# Patient Record
Sex: FEMALE | Race: WHITE | NOT HISPANIC OR LATINO | Employment: OTHER | URBAN - METROPOLITAN AREA
[De-identification: names, ages, dates, MRNs, and addresses within clinical notes are randomized per-mention and may not be internally consistent; named-entity substitution may affect disease eponyms.]

---

## 2018-02-11 ENCOUNTER — TRANSFERRED RECORDS (OUTPATIENT)
Dept: HEALTH INFORMATION MANAGEMENT | Facility: CLINIC | Age: 78
End: 2018-02-11

## 2018-05-17 ENCOUNTER — TRANSFERRED RECORDS (OUTPATIENT)
Dept: HEALTH INFORMATION MANAGEMENT | Facility: CLINIC | Age: 78
End: 2018-05-17

## 2018-05-23 ENCOUNTER — TRANSFERRED RECORDS (OUTPATIENT)
Dept: HEALTH INFORMATION MANAGEMENT | Facility: CLINIC | Age: 78
End: 2018-05-23

## 2018-05-29 ENCOUNTER — PRE VISIT (OUTPATIENT)
Dept: CARDIOLOGY | Facility: CLINIC | Age: 78
End: 2018-05-29

## 2018-05-29 DIAGNOSIS — R93.89 ABNORMAL FINDINGS ON DIAGNOSTIC IMAGING OF OTHER SPECIFIED BODY STRUCTURES: ICD-10-CM

## 2018-05-29 DIAGNOSIS — I79.0 ANEURYSM OF AORTA IN DISEASES CLASSIFIED ELSEWHERE (H): ICD-10-CM

## 2018-05-29 DIAGNOSIS — Q23.81 BICUSPID AORTIC VALVE: Primary | ICD-10-CM

## 2018-05-29 NOTE — TELEPHONE ENCOUNTER
FUTURE VISIT INFORMATION      FUTURE VISIT INFORMATION:    Date: 7/20/18    Time: 1000    [place] [facility] [place]  REFERRAL INFORMATION:    Referring provider:  unknown    Referring providers clinic:  [facility]    Reason for visit/diagnosis  BAV    RECORDS REQUESTED FROM:       Clinic name Comments Records Status Imaging Status   [facility] Called to get records Received Received                                   RECORDS STATUS

## 2018-07-13 ENCOUNTER — RADIANT APPOINTMENT (OUTPATIENT)
Dept: CARDIOLOGY | Facility: CLINIC | Age: 78
End: 2018-07-13
Payer: COMMERCIAL

## 2018-07-13 ENCOUNTER — OFFICE VISIT (OUTPATIENT)
Dept: CARDIOLOGY | Facility: CLINIC | Age: 78
End: 2018-07-13
Attending: INTERNAL MEDICINE
Payer: MEDICARE

## 2018-07-13 VITALS
SYSTOLIC BLOOD PRESSURE: 124 MMHG | DIASTOLIC BLOOD PRESSURE: 73 MMHG | HEART RATE: 81 BPM | HEIGHT: 61 IN | BODY MASS INDEX: 31.74 KG/M2 | OXYGEN SATURATION: 97 % | WEIGHT: 168.1 LBS

## 2018-07-13 DIAGNOSIS — Q23.81 BICUSPID AORTIC VALVE: ICD-10-CM

## 2018-07-13 DIAGNOSIS — I79.0 ANEURYSM OF AORTA IN DISEASES CLASSIFIED ELSEWHERE (H): ICD-10-CM

## 2018-07-13 DIAGNOSIS — R93.89 ABNORMAL FINDINGS ON DIAGNOSTIC IMAGING OF OTHER SPECIFIED BODY STRUCTURES: ICD-10-CM

## 2018-07-13 DIAGNOSIS — R06.09 EXERTIONAL DYSPNEA: Primary | ICD-10-CM

## 2018-07-13 LAB
ALBUMIN SERPL-MCNC: 3.8 G/DL (ref 3.4–5)
ALP SERPL-CCNC: 115 U/L (ref 40–150)
ALT SERPL W P-5'-P-CCNC: 24 U/L (ref 0–50)
ANION GAP SERPL CALCULATED.3IONS-SCNC: 7 MMOL/L (ref 3–14)
AST SERPL W P-5'-P-CCNC: 16 U/L (ref 0–45)
BASOPHILS # BLD AUTO: 0 10E9/L (ref 0–0.2)
BASOPHILS NFR BLD AUTO: 0.5 %
BILIRUB SERPL-MCNC: 0.8 MG/DL (ref 0.2–1.3)
BUN SERPL-MCNC: 11 MG/DL (ref 7–30)
CALCIUM SERPL-MCNC: 9.2 MG/DL (ref 8.5–10.1)
CHLORIDE SERPL-SCNC: 101 MMOL/L (ref 94–109)
CHOLEST SERPL-MCNC: 211 MG/DL
CO2 SERPL-SCNC: 28 MMOL/L (ref 20–32)
CREAT SERPL-MCNC: 0.89 MG/DL (ref 0.52–1.04)
DIFFERENTIAL METHOD BLD: NORMAL
EOSINOPHIL # BLD AUTO: 0.1 10E9/L (ref 0–0.7)
EOSINOPHIL NFR BLD AUTO: 1.4 %
ERYTHROCYTE [DISTWIDTH] IN BLOOD BY AUTOMATED COUNT: 12.4 % (ref 10–15)
GFR SERPL CREATININE-BSD FRML MDRD: 61 ML/MIN/1.7M2
GLUCOSE SERPL-MCNC: 99 MG/DL (ref 70–99)
HCT VFR BLD AUTO: 40.1 % (ref 35–47)
HDLC SERPL-MCNC: 59 MG/DL
HGB BLD-MCNC: 13.2 G/DL (ref 11.7–15.7)
IMM GRANULOCYTES # BLD: 0.1 10E9/L (ref 0–0.4)
IMM GRANULOCYTES NFR BLD: 0.9 %
LDLC SERPL CALC-MCNC: 129 MG/DL
LYMPHOCYTES # BLD AUTO: 2.4 10E9/L (ref 0.8–5.3)
LYMPHOCYTES NFR BLD AUTO: 30.5 %
MCH RBC QN AUTO: 29.9 PG (ref 26.5–33)
MCHC RBC AUTO-ENTMCNC: 32.9 G/DL (ref 31.5–36.5)
MCV RBC AUTO: 91 FL (ref 78–100)
MONOCYTES # BLD AUTO: 0.5 10E9/L (ref 0–1.3)
MONOCYTES NFR BLD AUTO: 6.4 %
NEUTROPHILS # BLD AUTO: 4.7 10E9/L (ref 1.6–8.3)
NEUTROPHILS NFR BLD AUTO: 60.3 %
NONHDLC SERPL-MCNC: 152 MG/DL
NRBC # BLD AUTO: 0 10*3/UL
NRBC BLD AUTO-RTO: 0 /100
PLATELET # BLD AUTO: 226 10E9/L (ref 150–450)
POTASSIUM SERPL-SCNC: 3.8 MMOL/L (ref 3.4–5.3)
PROT SERPL-MCNC: 7.3 G/DL (ref 6.8–8.8)
RBC # BLD AUTO: 4.42 10E12/L (ref 3.8–5.2)
SODIUM SERPL-SCNC: 136 MMOL/L (ref 133–144)
T4 FREE SERPL-MCNC: 1.02 NG/DL (ref 0.76–1.46)
TRIGL SERPL-MCNC: 115 MG/DL
TSH SERPL DL<=0.005 MIU/L-ACNC: 0.37 MU/L (ref 0.4–4)
WBC # BLD AUTO: 7.8 10E9/L (ref 4–11)

## 2018-07-13 PROCEDURE — G0463 HOSPITAL OUTPT CLINIC VISIT: HCPCS | Mod: 25,ZF

## 2018-07-13 PROCEDURE — 93010 ELECTROCARDIOGRAM REPORT: CPT | Mod: ZP | Performed by: INTERNAL MEDICINE

## 2018-07-13 PROCEDURE — 84439 ASSAY OF FREE THYROXINE: CPT | Performed by: INTERNAL MEDICINE

## 2018-07-13 PROCEDURE — 80061 LIPID PANEL: CPT | Performed by: INTERNAL MEDICINE

## 2018-07-13 PROCEDURE — 85025 COMPLETE CBC W/AUTO DIFF WBC: CPT | Performed by: INTERNAL MEDICINE

## 2018-07-13 PROCEDURE — 84443 ASSAY THYROID STIM HORMONE: CPT | Performed by: INTERNAL MEDICINE

## 2018-07-13 PROCEDURE — 36415 COLL VENOUS BLD VENIPUNCTURE: CPT | Performed by: INTERNAL MEDICINE

## 2018-07-13 PROCEDURE — 99204 OFFICE O/P NEW MOD 45 MIN: CPT | Mod: GC | Performed by: INTERNAL MEDICINE

## 2018-07-13 PROCEDURE — 93005 ELECTROCARDIOGRAM TRACING: CPT | Mod: ZF

## 2018-07-13 PROCEDURE — 80053 COMPREHEN METABOLIC PANEL: CPT | Performed by: INTERNAL MEDICINE

## 2018-07-13 RX ORDER — FLUTICASONE PROPIONATE 50 MCG
SPRAY, SUSPENSION (ML) NASAL
Refills: 2 | COMMUNITY
Start: 2018-06-18 | End: 2022-10-11

## 2018-07-13 RX ORDER — ATENOLOL 50 MG/1
TABLET ORAL
Refills: 3 | COMMUNITY
Start: 2018-04-10 | End: 2018-07-31 | Stop reason: DRUGHIGH

## 2018-07-13 RX ORDER — LOSARTAN POTASSIUM AND HYDROCHLOROTHIAZIDE 12.5; 5 MG/1; MG/1
TABLET ORAL
Refills: 3 | COMMUNITY
Start: 2017-08-10 | End: 2018-07-13

## 2018-07-13 RX ORDER — HYDROCHLOROTHIAZIDE 25 MG/1
TABLET ORAL
Refills: 1 | COMMUNITY
Start: 2018-05-23 | End: 2018-07-31 | Stop reason: DRUGHIGH

## 2018-07-13 RX ORDER — ASPIRIN 81 MG/1
81 TABLET ORAL
COMMUNITY

## 2018-07-13 RX ORDER — HYDROCHLOROTHIAZIDE 25 MG/1
25 TABLET ORAL DAILY
Qty: 30 TABLET | COMMUNITY
Start: 2018-07-13 | End: 2022-10-11

## 2018-07-13 RX ORDER — CHOLECALCIFEROL (VITAMIN D3) 50 MCG
2000 TABLET ORAL
COMMUNITY
Start: 2017-11-30 | End: 2022-11-14

## 2018-07-13 RX ADMIN — Medication 6 ML: at 11:15

## 2018-07-13 ASSESSMENT — PAIN SCALES - GENERAL: PAINLEVEL: NO PAIN (0)

## 2018-07-13 NOTE — NURSING NOTE
Labs: Patient was given results of the laboratory testing obtained today.Patient demonstrated understanding of this information and agreed to call with further questions or concerns.     Left Heart Catheterization: Patient given Coronary Angiogram and Angioplasty: A Patient's Guide booklet. Patient was instructed regarding left heart catheterization, including discussion of the indication, procedure, preparation, intra-procedural steps, and recovery at home. Patient demonstrated understanding of this information and agreed to call with further questions or concerns.    Med Reconcile: Reviewed and verified all current medications with the patient. The updated medication list was printed and given to the patient.    Return Appointment: Follow up with Dr London after catherization completed.  Patient given instructions regarding scheduling next clinic visit. Patient demonstrated understanding of this information and agreed to call with further questions or concerns.    Right Heart Catheterization: Patient was instructed regarding right heart catheterization, including discussion of the procedure, preparation, intra-procedural steps, and recovery at home. Patient demonstrated understanding of this information and agreed to call with further questions or concerns.    Medication Change: Discontinue Losartan. Patient was educated regarding prescribed medication change, including discussion of the indication, administration, side effects, and when to report to MD or RN. Patient demonstrated understanding of this information and agreed to call with further questions or concerns.    Patient stated she understood all health information given and agreed to call with further questions or concerns.    Isma Caal, RN  RN Care Coordinator  [facility]  [phone]

## 2018-07-13 NOTE — NURSING NOTE
Chief Complaint   Patient presents with     New Patient      77 yr old female with PMH significant for BAV s/p AVR for severe AS in 8/2016 at [facility] with ongoing exertional dyspnea presenting for evaluation     Vitals were taken and medications were reconciled. EKG was performed    Ann Marie COREAS  12:58 PM

## 2018-07-13 NOTE — MR AVS SNAPSHOT
"              After Visit Summary   7/13/2018    Kandis Gallagher    MRN: [id number]           Patient Information     Date Of Birth          1940        Visit Information        Provider Department      7/13/2018 1:00 PM Denae London MD [facility]        Today's Diagnoses     Exertional dyspnea    -  1      Care Instructions    You were seen today in the Adult Congenital and Cardiovascular Genetics Clinic at the [facility].    Cardiology Providers you saw during your visit:  Dr Desmond London    Diagnosis:  History of bicuspid aortic valve s/p AVR    Results:  Dr London reviewed the results of your echo and EKG with you in clinic to[facility]    Recommendations:    1.  Continue to eat a heart healthy, low salt diet.  2.  Continue to get 20-30 minutes of aerobic activity, 4-5 days per week.  Examples of aerobic activity include walking, running, swimming, cycling, etc.  3.  Continue to observe good oral hygiene, with regular dental visits.  4.  We will call you to set up a right and left heart catherization.  5.  Stop your Losartan   6.  Keep taking your Hydrochlorothiazide 25 mg daily.       Vitals:    07/13/18 1252   BP: 124/73   BP Location: Left arm   Patient Position: Chair   Cuff Size: Adult Regular   Pulse: 81   SpO2: 97%   Weight: 76.2 kg (168 lb 1.6 oz)   Height: 1.549 m (5' 1\")       SBE prophylaxis:   Yes_x___  No____    Lifelong Bacterial Endocarditis Prophylaxis:  YES____  NO____    If YES is checked, follow the recommendations outlined below:   1. Take antibiotic(s) prior to recommended dental procedures and procedures on the respiratory tract or with infected skin, muscle or bones. SBE prophylaxis is not needed for routine GI and  procedures (ie. Colonoscopy or vaginal delivery)   2. Observe good oral hygiene daily, as advised by your dentist. Get regular professional dental care.   3. Keep cuts clean.   4. Infections should be treated promptly.   5. Symptoms of Infective " Endocarditis could include: fever lasting more than 4-5 days or a recurrent fever that initially resolves but returns within 1-2 days)     Exercise restrictions:   Yes____  No__x__         If yes, list restrictions:  Must be allowed to rest if fatigued or SOB      Work restrictions:  Yes____  No__x__         If yes, list restrictions:    FASTING CHOLESTEROL was checked in the last 5 years YES_x__  NO___  2018  Continue to eat a heart healthy, low salt diet.         ____ Fasting lipid panel order today         ____ No changes in medications          ____ I recommend the following changes in your cholesterol medications.:          ____ Please follow up for cholesterol screening at your primary care physician      Follow-up:  We will schedule follow up after your catherization.      For after hours urgent needs, call [phone] and ask to speak to the Adult Congenital Physician on call.  Mention Job Code 0401.    For emergencies call 911.    For any scheduling needs, please call [facility] , at [phone]  Thank you for your visit today!  If you have questions or concerns about today's visit, please call me.    Isma Caal RN, BSN  Cardiology Care Coordinator  [facility]  [phone]    [address] [place] [address]  Mail Code [id number]  [place] [address]            Follow-ups after your visit        Who to contact     If you have questions or need follow up information about today's clinic visit or your schedule please contact [facility] directly at [phone].  Normal or non-critical lab and imaging results will be communicated to you by MyChart, letter or phone within 4 business days after the clinic has received the results. If you do not hear from us within 7 days, please contact the clinic through MyChart or phone. If you have a critical or abnormal lab result, we will notify you by phone as soon as possible.  Submit refill requests through [facility] [employer] or  "call your pharmacy and they will forward the refill request to us. Please allow 3 business days for your refill to be completed.          Additional Information About Your Visit        MyChart Information     [facility] gives you secure access to your electronic health record. If you see a primary care provider, you can also send messages to your care team and make appointments. If you have questions, please call your primary care clinic.  If you do not have a primary care provider, please call [phone] and they will assist you.        Care EveryWhere ID     This is your Care EveryWhere ID. This could be used by other organizations to access your [facility] medical records  [id number]        Your Vitals Were     Pulse Height Pulse Oximetry BMI (Body Mass Index)          81 1.549 m (5' 1\") 97% 31.76 kg/m2         Blood Pressure from Last 3 Encounters:   07/13/18 124/73    Weight from Last 3 Encounters:   07/13/18 76.2 kg (168 lb 1.6 oz)              We Performed the Following     EKG 12-lead, tracing only (Same Day)     EKG 12-lead, tracing only (Same Day)        Primary Care Provider Office Phone # Fax #    Winnie Jose [phone]       [facility] [address]    [facility] [address]        Equal Access to Services     GELA OSUNA : Hadii aad ku hadasho Soomaali, waaxda luqadaha, qaybta kaalmada adeegyada, waxay idiin haykhrisn gee chapa larahul murry. So [facility] [phone].    ATENCIÓN: Si habla español, tiene a novak disposición servicios gratuitos de asistencia lingüística. Llame al [phone].    We comply with applicable federal civil rights laws and [place] laws. We do not discriminate on the basis of race, color, national origin, age, disability, sex, sexual orientation, or gender identity.            Thank you!     Thank you for choosing [facility]  for your care. Our goal is always to provide you with excellent care. Hearing back from our patients is one way we can continue to " improve our services. Please take a few minutes to complete the written survey that you may receive in the mail after your visit with us. Thank you!             Your Updated Medication List - Protect others around you: Learn how to safely use, store and throw away your medicines at www.disposemymeds.org.          This list is accurate as of 7/13/18  2:40 PM.  Always use your most recent med list.                   Brand Name Dispense Instructions for use Diagnosis    aspirin 81 MG EC tablet      Take 81 mg by mouth        atenolol 50 MG tablet    TENORMIN     TK 1 T PO BID FOR HYPERTENSION        fluticasone 50 MCG/ACT spray    FLONASE     SHAKE LQ AND U 1 SPR IEN QD        * hydrochlorothiazide 25 MG tablet    HYDRODIURIL     TK 1 T PO QD        * hydrochlorothiazide 25 MG tablet    HYDRODIURIL    30 tablet    Take 1 tablet (25 mg) by mouth daily        vitamin D 2000 units tablet      Take 2,000 Units by mouth        * Notice:  This list has 2 medication(s) that are the same as other medications prescribed for you. Read the directions carefully, and ask your doctor or other care provider to review them with you.

## 2018-07-13 NOTE — LETTER
"7/13/2018      RE: Kandis Gallagher  [address] [facility] [address]       Dear Colleague,    Thank you for the opportunity to participate in the care of your patient, Kandis Gallagher, at the [facility]. Please see a copy of my visit note below.    HPI:     Please see dictated note    Ms. Gallagher's care was transferred from [place].    Kandis Gallagher is a 77 year old female with history of hyperlipidemia and bicuspid aortic valve with severe aortic stenosis who is status post 23 mm Magna Ease Aortic valve in August 2016. Kandis is accompanied by her daughter and grandson in clinic.  Kandis states that she had gone to the ED around 5-6 times about 1 month following her aortic valve replacement surgery due to elevated systolic blood pressures in the 170s-190s.  Her blood pressure has been controlled, but she states now she has been on the \"lower end\" and has some times had dizziness.  She has tracked her BP via an online application which did have a few lower blood pressures ranging 87-98/58-60. She has also had a cough that has been on and off over the last few months and she was placed on zyrtec which she thinks has helped (her daughter believes the cough is about the same).  She does note that she has been more tired since her surgery.  She used to walk and was very active and now she has not been the same.  Her daughter believes that her mother has limited to her activity and that this may be a consequence of her trying to be careful following her heart surgery and being cautious.   She notes she has had a cough in the past a few years ago when she was on lisinopril and that is why she was transitioned to losartan.    PAST MEDICAL HISTORY:  Past Medical History:   Diagnosis Date     Congenital bicuspid aortic valve      H/O aortic valve replacement     23 mm Magna Ease      Hyperlipidemia      Hypertension      Hyperthyroidism      Severe " aortic stenosis      Thyroiditis        CURRENT MEDICATIONS:  Medication Sig     aspirin  Take 81 mg by mouth     Atenolol 25 mg every morning and 50 mg every evening     Cholecalciferol (VITAMIN D) Take 2,000 Units by mouth     fluticasone (FLONASE) 50 MCG/ACT spray SHAKE LQ AND U 1 SPR IEN QD     Adult multivitamin  1 vitamin daily     hydrochlorothiazide Take 1 tablet (25 mg) by mouth daily    Losartan  50 mg BID    Amlodipine 2.5 mg every evening       PAST SURGICAL HISTORY:  2016 - Aortic valve replacement    - Cataract removal   - Cholecystectomy   2014 - Right Hip replacement   - Hysterectomy  Tonsil and adenoidectomy   - Tubal ligation  Laser eye surgery  Subtalar arthroereisis      ALLERGIES     Allergies   Allergen Reactions     Carvedilol Other (See Comments)     Lidocaine Nausea and Vomiting     Vomiting with general anesthesia     Lisinopril Cough     Seasonal Allergies Other (See Comments)     Problems with narcotics - oxygen desaturates     Spironolactone    Amlodipine: Edema    FAMILY HISTORY:  Sister - HTN  Sister - HTN  Sister - High cholesterol/HTN  Brother - Colon Cancer  Mother- Heart disease/MI in 70-80s ( 94 yo)  Maternal uncle - MI ( late 70s)  Father - unknown (killed when she was 3 yo)  Daughter - Breast/Colon Cancer      SOCIAL HISTORY:  Active with walking, walks 3,000-4,000 steps/day, no alcohol use, never smoked tobacco       ROS:   Constitutional: No fever, chills, or sweats. + weight gain, + fatigue  ENT: No visual disturbance, ear ache, epistaxis, sore throat  Allergies/Immunologic: Negative.   Respiratory: Shortness of breath with activity, cough  Cardiovascular: As per HPI  GI: No nausea, vomiting, hematemesis, melena, or hematochezia  : No urinary frequency, dysuria, or hematuria  Integument: Negative  Psychiatric: Negative  Neuro: Negative  Endocrinology: Negative   Musculoskeletal: Negative    EXAM:  /73 (BP Location: Left arm,  "Patient Position: Chair, Cuff Size: Adult Regular)  Pulse 81  Ht 1.549 m (5' 1\")  Wt 76.2 kg (168 lb 1.6 oz)  SpO2 97%  BMI 31.76 kg/m2  In general, the patient is a pleasant female in no apparent distress.    HEENT: NC/AT.  PERRLA.  EOMI.  Sclerae white, not injected.  Nares clear.  Pharynx without erythema or exudate.  Dentition intact.    Neck: No adenopathy.  No thyromegaly. Carotids +4/4 bilaterally without bruits.  + mild jugular venous distension.   Heart: RRR. Normal S1, S2 splits physiologically. Grade II-III/VI YARI RUSB; no rub, click, or gallop. The PMI is in the 5th ICS in the midclavicular line. There is no heave.    Lungs: CTA.  No ronchi, wheezes, rales.  No dullness to percussion.   Abdomen: Soft, nontender, nondistended. No organomegaly.  No bruits.   Extremities: No clubbing, cyanosis, or edema.  The pulses are +4/4 at the radial, brachial, femoral, popliteal, DP, and PT sites bilaterally.  No bruits are noted.  Neurologic: Alert and oriented to person/place/time, normal speech, gait and affect  Skin: No petechiae, purpura or rash.    Labs:  LIPID RESULTS:  Lab Results   Component Value Date    CHOL 211 (H) 07/13/2018    HDL 59 07/13/2018     (H) 07/13/2018    TRIG 115 07/13/2018    NHDL 152 (H) 07/13/2018       LIVER ENZYME RESULTS:  Lab Results   Component Value Date    AST 16 07/13/2018    ALT 24 07/13/2018       CBC RESULTS:  Lab Results   Component Value Date    WBC 7.8 07/13/2018    RBC 4.42 07/13/2018    HGB 13.2 07/13/2018    HCT 40.1 07/13/2018    MCV 91 07/13/2018    MCH 29.9 07/13/2018    MCHC 32.9 07/13/2018    RDW 12.4 07/13/2018     07/13/2018       BMP RESULTS:  Lab Results   Component Value Date     07/13/2018    POTASSIUM 3.8 07/13/2018    CHLORIDE 101 07/13/2018    CO2 28 07/13/2018    ANIONGAP 7 07/13/2018    GLC 99 07/13/2018    BUN 11 07/13/2018    CR 0.89 07/13/2018    GFRESTIMATED 61 07/13/2018    GFRESTBLACK 74 07/13/2018    DOUG 9.2 07/13/2018    "   EKG (7/13/18): sinus rhythm, ventricular rate 83 bpm, intraventricular conduction delay, nonspecific ST-T wave abnormality    ECHO (7/13/18): History of AVR for bicuspid valve. Left ventricular function, chamber size, wall motion, and wall thickness are normal.The EF is 55-60%. A bioprosthetic aortic valve is present. Doppler interrogation of the aortic valve is normal.  No aortic regurgitation is present. Ascending aorta with normal diameter 3.3 cm.      Assessment and Plan:     Ms. Gallagher is a 77 year old with history of hyperlipidemia and bicuspid aortic valve with severe aortic stenosis who is status post 23 mm Magna Ease Aortic valve in August 2016. Her echocardiogram today is reassuring.  She also has a history of hypertension, but most recently has had lower blood pressures.  She also complains of shortness of breath with significant exertion, cough and fatigue.  Given the history provided by Ms. Gallagher and her family today, it would be worth evaluating her for restrictive/constrictive pericarditis and coronary artery disease.  We discussed scheduling a right and left cardiac catheterization in the future. Her cough could be associated with her losartan use.  Given her fatigue and intermittent lower blood pressures with dizziness we would like to trial her off her losartan.  We discussed stopping both the morning and evening doses, but Ms. Gallagher stated she may be interested in discontinuing in a gradual manner starting with stopping 1 dose at a time.  If hypertension worsens, increasing amlodipine may be an option.  If her cough continues off losartan and she remains hypertensive, restarting it would be reasonable.  In addition it may be worth obtaining pulmonology input especially if the trial off losartan does not improve her symptoms.  She has gained weight and hopefully with increased energy she will be able to resume her normal level of activity.  Her TSH was borderline low, this will continued  to be trended and she may be referred to endocrinology in the future.  She has been reminded to have SBE prophylaxis for invasive dental procedures.      1.  Continue to eat a heart healthy, low salt diet.  2.  Continue to get 20-30 minutes of aerobic activity, 4-5 days per week.    3.  Continue to observe good oral hygiene, with regular dental visits. SBE prophylaxis required for invasive dental procedures  4.  We will call patient to set up a right and left heart catherization.  5.  Discontinue Losartan   6.  Keep taking your Hydrochlorothiazide 25 mg daily.   7.  Will schedule follow-up after cardiac catheterization      Michelle Hui DO  Pediatric Cardiology Fellow  7/14/2018 7:47 PM    JESSY London MD [facility]  Adult Congenital and Interventional Cardiology   [facility]      CC  Patient Care Team:  Winnie Jose as PCP - General (Internal Medicine)  SELF, REFERRED    Please do not hesitate to contact me if you have any questions/concerns.     Sincerely,     Denae London MD

## 2018-07-13 NOTE — PATIENT INSTRUCTIONS
"You were seen today in the Adult Congenital and Cardiovascular Genetics Clinic at the [facility].    Cardiology Providers you saw during your visit:  Dr Desmond London    Diagnosis:  History of bicuspid aortic valve s/p AVR    Results:  Dr London reviewed the results of your echo and EKG with you in clinic to[facility]    Recommendations:    1.  Continue to eat a heart healthy, low salt diet.  2.  Continue to get 20-30 minutes of aerobic activity, 4-5 days per week.  Examples of aerobic activity include walking, running, swimming, cycling, etc.  3.  Continue to observe good oral hygiene, with regular dental visits.  4.  We will call you to set up a right and left heart catherization.  5.  Stop your Losartan   6.  Keep taking your Hydrochlorothiazide 25 mg daily.       Vitals:    07/13/18 1252   BP: 124/73   BP Location: Left arm   Patient Position: Chair   Cuff Size: Adult Regular   Pulse: 81   SpO2: 97%   Weight: 76.2 kg (168 lb 1.6 oz)   Height: 1.549 m (5' 1\")       SBE prophylaxis:   Yes_x___  No____    Lifelong Bacterial Endocarditis Prophylaxis:  YES____  NO____    If YES is checked, follow the recommendations outlined below:   1. Take antibiotic(s) prior to recommended dental procedures and procedures on the respiratory tract or with infected skin, muscle or bones. SBE prophylaxis is not needed for routine GI and  procedures (ie. Colonoscopy or vaginal delivery)   2. Observe good oral hygiene daily, as advised by your dentist. Get regular professional dental care.   3. Keep cuts clean.   4. Infections should be treated promptly.   5. Symptoms of Infective Endocarditis could include: fever lasting more than 4-5 days or a recurrent fever that initially resolves but returns within 1-2 days)     Exercise restrictions:   Yes____  No__x__         If yes, list restrictions:  Must be allowed to rest if fatigued or SOB      Work restrictions:  Yes____  No__x__         If yes, list restrictions:    FASTING " CHOLESTEROL was checked in the last 5 years YES_x__  NO___  2018  Continue to eat a heart healthy, low salt diet.         ____ Fasting lipid panel order today         ____ No changes in medications          ____ I recommend the following changes in your cholesterol medications.:          ____ Please follow up for cholesterol screening at your primary care physician      Follow-up:  We will schedule follow up after your catherization.      For after hours urgent needs, call [phone] and ask to speak to the Adult Congenital Physician on call.  Mention Job Code 0401.    For emergencies call 651.    For any scheduling needs, please call [facility] , at [phone]  Thank you for your visit today!  If you have questions or concerns about today's visit, please call me.    Isma Caal RN, BSN  Cardiology Care Coordinator  [facility]  [phone]    [address] [place] [address]  Mail Code [id number]  [place] [address]

## 2018-07-13 NOTE — PROGRESS NOTES
"HPI:     Please see dictated note    Ms. Gallagher's care was transferred from [place].    Kandis Gallagher is a 77 year old female with history of hyperlipidemia and bicuspid aortic valve with severe aortic stenosis who is status post 23 mm Magna Ease Aortic valve in August 2016. Kandis is accompanied by her daughter and grandson in clinic.  Kandis states that she had gone to the ED around 5-6 times about 1 month following her aortic valve replacement surgery due to elevated systolic blood pressures in the 170s-190s.  Her blood pressure has been controlled, but she states now she has been on the \"lower end\" and has some times had dizziness.  She has tracked her BP via an online application which did have a few lower blood pressures ranging 87-98/58-60. She has also had a cough that has been on and off over the last few months and she was placed on zyrtec which she thinks has helped (her daughter believes the cough is about the same).  She does note that she has been more tired since her surgery.  She used to walk and was very active and now she has not been the same.  Her daughter believes that her mother has limited to her activity and that this may be a consequence of her trying to be careful following her heart surgery and being cautious.   She notes she has had a cough in the past a few years ago when she was on lisinopril and that is why she was transitioned to losartan.    PAST MEDICAL HISTORY:  Past Medical History:   Diagnosis Date     Congenital bicuspid aortic valve      H/O aortic valve replacement     23 mm Magna Ease      Hyperlipidemia      Hypertension      Hyperthyroidism      Severe aortic stenosis      Thyroiditis        CURRENT MEDICATIONS:  Medication Sig     aspirin  Take 81 mg by mouth     Atenolol 25 mg every morning and 50 mg every evening     Cholecalciferol (VITAMIN D) Take 2,000 Units by mouth     fluticasone (FLONASE) 50 MCG/ACT spray SHAKE LQ AND U 1 SPR IEN QD     Adult " "multivitamin  1 vitamin daily     hydrochlorothiazide Take 1 tablet (25 mg) by mouth daily    Losartan  50 mg BID    Amlodipine 2.5 mg every evening       PAST SURGICAL HISTORY:  2016 - Aortic valve replacement    - Cataract removal   - Cholecystectomy   2014 - Right Hip replacement   - Hysterectomy  Tonsil and adenoidectomy   - Tubal ligation  Laser eye surgery  Subtalar arthroereisis      ALLERGIES     Allergies   Allergen Reactions     Carvedilol Other (See Comments)     Lidocaine Nausea and Vomiting     Vomiting with general anesthesia     Lisinopril Cough     Seasonal Allergies Other (See Comments)     Problems with narcotics - oxygen desaturates     Spironolactone    Amlodipine: Edema    FAMILY HISTORY:  Sister - HTN  Sister - HTN  Sister - High cholesterol/HTN  Brother - Colon Cancer  Mother- Heart disease/MI in 70-80s ( 94 yo)  Maternal uncle - MI ( late 70s)  Father - unknown (killed when she was 3 yo)  Daughter - Breast/Colon Cancer      SOCIAL HISTORY:  Active with walking, walks 3,000-4,000 steps/day, no alcohol use, never smoked tobacco       ROS:   Constitutional: No fever, chills, or sweats. + weight gain, + fatigue  ENT: No visual disturbance, ear ache, epistaxis, sore throat  Allergies/Immunologic: Negative.   Respiratory: Shortness of breath with activity, cough  Cardiovascular: As per HPI  GI: No nausea, vomiting, hematemesis, melena, or hematochezia  : No urinary frequency, dysuria, or hematuria  Integument: Negative  Psychiatric: Negative  Neuro: Negative  Endocrinology: Negative   Musculoskeletal: Negative    EXAM:  /73 (BP Location: Left arm, Patient Position: Chair, Cuff Size: Adult Regular)  Pulse 81  Ht 1.549 m (5' 1\")  Wt 76.2 kg (168 lb 1.6 oz)  SpO2 97%  BMI 31.76 kg/m2  In general, the patient is a pleasant female in no apparent distress.    HEENT: NC/AT.  PERRLA.  EOMI.  Sclerae white, not injected.  Nares clear.  Pharynx without " erythema or exudate.  Dentition intact.    Neck: No adenopathy.  No thyromegaly. Carotids +4/4 bilaterally without bruits.  + mild jugular venous distension.   Heart: RRR. Normal S1, S2 splits physiologically. Grade II-III/VI YARI RUSB; no rub, click, or gallop. The PMI is in the 5th ICS in the midclavicular line. There is no heave.    Lungs: CTA.  No ronchi, wheezes, rales.  No dullness to percussion.   Abdomen: Soft, nontender, nondistended. No organomegaly.  No bruits.   Extremities: No clubbing, cyanosis, or edema.  The pulses are +4/4 at the radial, brachial, femoral, popliteal, DP, and PT sites bilaterally.  No bruits are noted.  Neurologic: Alert and oriented to person/place/time, normal speech, gait and affect  Skin: No petechiae, purpura or rash.    Labs:  LIPID RESULTS:  Lab Results   Component Value Date    CHOL 211 (H) 07/13/2018    HDL 59 07/13/2018     (H) 07/13/2018    TRIG 115 07/13/2018    NHDL 152 (H) 07/13/2018       LIVER ENZYME RESULTS:  Lab Results   Component Value Date    AST 16 07/13/2018    ALT 24 07/13/2018       CBC RESULTS:  Lab Results   Component Value Date    WBC 7.8 07/13/2018    RBC 4.42 07/13/2018    HGB 13.2 07/13/2018    HCT 40.1 07/13/2018    MCV 91 07/13/2018    MCH 29.9 07/13/2018    MCHC 32.9 07/13/2018    RDW 12.4 07/13/2018     07/13/2018       BMP RESULTS:  Lab Results   Component Value Date     07/13/2018    POTASSIUM 3.8 07/13/2018    CHLORIDE 101 07/13/2018    CO2 28 07/13/2018    ANIONGAP 7 07/13/2018    GLC 99 07/13/2018    BUN 11 07/13/2018    CR 0.89 07/13/2018    GFRESTIMATED 61 07/13/2018    GFRESTBLACK 74 07/13/2018    DOUG 9.2 07/13/2018      EKG (7/13/18): sinus rhythm, ventricular rate 83 bpm, intraventricular conduction delay, nonspecific ST-T wave abnormality    ECHO (7/13/18): History of AVR for bicuspid valve. Left ventricular function, chamber size, wall motion, and wall thickness are normal.The EF is 55-60%. A bioprosthetic aortic valve  is present. Doppler interrogation of the aortic valve is normal.  No aortic regurgitation is present. Ascending aorta with normal diameter 3.3 cm.      Assessment and Plan:     Ms. Gallagher is a 77 year old with history of hyperlipidemia and bicuspid aortic valve with severe aortic stenosis who is status post 23 mm Magna Ease Aortic valve in August 2016. Her echocardiogram today is reassuring.  She also has a history of hypertension, but most recently has had lower blood pressures.  She also complains of shortness of breath with significant exertion, cough and fatigue.  Given the history provided by Ms. Gallagher and her family today, it would be worth evaluating her for restrictive/constrictive pericarditis and coronary artery disease.  We discussed scheduling a right and left cardiac catheterization in the future. Her cough could be associated with her losartan use.  Given her fatigue and intermittent lower blood pressures with dizziness we would like to trial her off her losartan.  We discussed stopping both the morning and evening doses, but Ms. Gallagher stated she may be interested in discontinuing in a gradual manner starting with stopping 1 dose at a time.  If hypertension worsens, increasing amlodipine may be an option.  If her cough continues off losartan and she remains hypertensive, restarting it would be reasonable.  In addition it may be worth obtaining pulmonology input especially if the trial off losartan does not improve her symptoms.  She has gained weight and hopefully with increased energy she will be able to resume her normal level of activity.  Her TSH was borderline low, this will continued to be trended and she may be referred to endocrinology in the future.  She has been reminded to have SBE prophylaxis for invasive dental procedures.      1.  Continue to eat a heart healthy, low salt diet.  2.  Continue to get 20-30 minutes of aerobic activity, 4-5 days per week.    3.  Continue to observe  good oral hygiene, with regular dental visits. SBE prophylaxis required for invasive dental procedures  4.  We will call patient to set up a right and left heart catherization.  5.  Discontinue Losartan   6.  Keep taking your Hydrochlorothiazide 25 mg daily.   7.  Will schedule follow-up after cardiac catheterization      Michelle Hui DO  Pediatric Cardiology Fellow  7/14/2018 7:47 PM    JESSY London MD [facility]  Adult Congenital and Interventional Cardiology   [facility]      CC  Patient Care Team:  Winnie Jose as PCP - General (Internal Medicine)  SELF, REFERRED

## 2018-07-16 LAB — INTERPRETATION ECG - MUSE: NORMAL

## 2018-07-19 ENCOUNTER — TELEPHONE (OUTPATIENT)
Dept: CARDIOLOGY | Facility: CLINIC | Age: 78
End: 2018-07-19

## 2018-07-19 NOTE — TELEPHONE ENCOUNTER
Called patient back to discuss concerns regarding medication. Unable to leave  at this time.  Will attempt to reach patient again.    Isma Caal, RN  RN Care Coordinator  [facility]  [phone]

## 2018-07-20 NOTE — TELEPHONE ENCOUNTER
Called patient and left voicemail to call back to discuss questions regarding medications.  Provided call back number to discuss concerns.    Isma Caal, RN  RN Care Coordinator  [facility]  [phone]

## 2018-07-23 ENCOUNTER — TELEPHONE (OUTPATIENT)
Dept: CARDIOLOGY | Facility: CLINIC | Age: 78
End: 2018-07-23

## 2018-07-31 ENCOUNTER — CARE COORDINATION (OUTPATIENT)
Dept: CARDIOLOGY | Facility: CLINIC | Age: 78
End: 2018-07-31

## 2018-07-31 DIAGNOSIS — R06.02 SOB (SHORTNESS OF BREATH): ICD-10-CM

## 2018-07-31 DIAGNOSIS — Q23.81 BICUSPID AORTIC VALVE: Primary | ICD-10-CM

## 2018-07-31 DIAGNOSIS — R06.09 DYSPNEA ON EXERTION: ICD-10-CM

## 2018-07-31 DIAGNOSIS — R53.83 FATIGUE: ICD-10-CM

## 2018-07-31 DIAGNOSIS — I35.0 SEVERE AORTIC STENOSIS: ICD-10-CM

## 2018-07-31 DIAGNOSIS — I35.0 AORTIC VALVE STENOSIS: ICD-10-CM

## 2018-07-31 RX ORDER — LIDOCAINE 40 MG/G
CREAM TOPICAL
Status: CANCELLED | OUTPATIENT
Start: 2018-07-31 | End: 2019-07-31

## 2018-07-31 RX ORDER — ATENOLOL 25 MG/1
TABLET ORAL
Qty: 270 TABLET | Refills: 3 | Status: SHIPPED | OUTPATIENT
Start: 2018-07-31 | End: 2019-07-29

## 2018-07-31 RX ORDER — SODIUM CHLORIDE 9 MG/ML
INJECTION, SOLUTION INTRAVENOUS CONTINUOUS
Status: CANCELLED | OUTPATIENT
Start: 2018-07-31 | End: 2019-07-31

## 2018-07-31 RX ORDER — LOSARTAN POTASSIUM 100 MG/1
50 TABLET ORAL
Qty: 90 TABLET | Refills: 3 | COMMUNITY
Start: 2018-07-31 | End: 2022-10-11

## 2018-07-31 NOTE — PROGRESS NOTES
Patient called to discuss medication questions.  She states she Atenolol was entered wrong - updated this.  Also hydrochlorothiazide was entered twice - updated this.  Patient states that she could not tolerate being off of her Losartan and that she restarted the medication.  She states that she takes 50 mg BID. Restarted the medication on her med list. And will update Dr. London. Patient states her cough was alleviated with Zyrtec.     Regis Ramirez RN, BSN  Cardiology Care Coordinator  [facility]  [email or web address]  [phone]

## 2018-08-17 ENCOUNTER — TELEPHONE (OUTPATIENT)
Dept: CARDIOLOGY | Facility: CLINIC | Age: 78
End: 2018-08-17

## 2018-08-20 ENCOUNTER — CARE COORDINATION (OUTPATIENT)
Dept: CARDIOLOGY | Facility: CLINIC | Age: 78
End: 2018-08-20

## 2018-08-20 NOTE — PROGRESS NOTES
Mailed letter for [place] heart cath on 9/4 - prep and location.  Advised patient to call with questions.     Regis Ramirez RN, BSN  Cardiology Care Coordinator  [facility]  [email or web address]  [phone]

## 2018-08-20 NOTE — LETTER
August 20, 2018      TO: Kandis Gallagher  [address] [facility] [address]         Dear Kandis,    You have been scheduled for a heart cath on September 4th at the [facility]    Address:  [address] [place] [address]    Instructions:  1. Please make arrangements to have a  as you will not be allowed to drive following the procedure.  is available at no additional cost in front of the building.  2. 8 hours prior to arrival stop all food, milk and chewing tobacco  3. You may keep drinking water up to 2 hours to your arrival and take your usual morning medications with water.  4. Please take your 81 mg baby aspirin as usual.  5. Make arrangements to have someone stay with you the night after your procedure.  6. Please hold the following medications the day of your procedure: You may hold your Hydrochlorothiazide if you choose.     Please arrive to the 1st floor Welcome desk at 8:00 am for a 10:00 am procedure time.    You will be escorted back to the pre procedure area called 2A. Here they will insert an IV, draw labs, and obtain a short medical history. Please bring an updated list of your current medications.    Dr. London will come and talk with you about the procedure and obtain consent.    A nurse from the Cardiac Catheterization Lab will then escort you to the procedure area. You will be receiving sedation during the procedure so you will need someone to drive you to and from the hospital.    After the procedure you will recover for a short period (2 - 6 hours) on 7A. You will be discharged with instructions for post procedure care.    However, depending on what the angiogram shows you may have to stay overnight. We ask that you bring a small bag of necessities for your comfort if you would need to stay overnight. DO NOT BRING ANY VALUABLES!    Regis Ramirez, RN, BSN  Cardiology Care Coordinator  [facility]  Heart  [email or web address]  [phone]

## 2018-09-04 ENCOUNTER — APPOINTMENT (OUTPATIENT)
Dept: CARDIOLOGY | Facility: CLINIC | Age: 78
End: 2018-09-04
Attending: INTERNAL MEDICINE
Payer: MEDICARE

## 2018-09-04 ENCOUNTER — HOSPITAL ENCOUNTER (OUTPATIENT)
Facility: CLINIC | Age: 78
Discharge: HOME | End: 2018-09-04
Attending: INTERNAL MEDICINE | Admitting: INTERNAL MEDICINE
Payer: MEDICARE

## 2018-09-04 VITALS
SYSTOLIC BLOOD PRESSURE: 128 MMHG | BODY MASS INDEX: 31.15 KG/M2 | DIASTOLIC BLOOD PRESSURE: 79 MMHG | RESPIRATION RATE: 16 BRPM | HEART RATE: 71 BPM | TEMPERATURE: 98.4 F | WEIGHT: 169.3 LBS | HEIGHT: 62 IN | OXYGEN SATURATION: 98 %

## 2018-09-04 DIAGNOSIS — Q23.81 BICUSPID AORTIC VALVE: ICD-10-CM

## 2018-09-04 DIAGNOSIS — Z95.2 S/P AVR: Primary | ICD-10-CM

## 2018-09-04 DIAGNOSIS — R06.09 DYSPNEA ON EXERTION: ICD-10-CM

## 2018-09-04 DIAGNOSIS — R53.83 FATIGUE: ICD-10-CM

## 2018-09-04 DIAGNOSIS — R06.02 SOB (SHORTNESS OF BREATH): ICD-10-CM

## 2018-09-04 DIAGNOSIS — I35.0 SEVERE AORTIC STENOSIS: ICD-10-CM

## 2018-09-04 DIAGNOSIS — R06.09 EXERTIONAL DYSPNEA: ICD-10-CM

## 2018-09-04 LAB
ANION GAP SERPL CALCULATED.3IONS-SCNC: 8 MMOL/L (ref 3–14)
BUN SERPL-MCNC: 13 MG/DL (ref 7–30)
CALCIUM SERPL-MCNC: 9.2 MG/DL (ref 8.5–10.1)
CHLORIDE SERPL-SCNC: 100 MMOL/L (ref 94–109)
CO2 BLDCOV-SCNC: 26 MMOL/L (ref 21–28)
CO2 SERPL-SCNC: 29 MMOL/L (ref 20–32)
CREAT SERPL-MCNC: 0.94 MG/DL (ref 0.52–1.04)
ERYTHROCYTE [DISTWIDTH] IN BLOOD BY AUTOMATED COUNT: 13.2 % (ref 10–15)
GFR SERPL CREATININE-BSD FRML MDRD: 58 ML/MIN/1.7M2
GLUCOSE SERPL-MCNC: 107 MG/DL (ref 70–99)
HCT VFR BLD AUTO: 35.9 % (ref 35–47)
HGB BLD-MCNC: 12.3 G/DL (ref 11.7–15.7)
INR PPP: 0.97 (ref 0.86–1.14)
MCH RBC QN AUTO: 30.4 PG (ref 26.5–33)
MCHC RBC AUTO-ENTMCNC: 34.3 G/DL (ref 31.5–36.5)
MCV RBC AUTO: 89 FL (ref 78–100)
PCO2 BLDV: 40 MM HG (ref 40–50)
PH BLDV: 7.41 PH (ref 7.32–7.43)
PLATELET # BLD AUTO: 222 10E9/L (ref 150–450)
PO2 BLDV: 34 MM HG (ref 25–47)
POTASSIUM SERPL-SCNC: 3.6 MMOL/L (ref 3.4–5.3)
RBC # BLD AUTO: 4.05 10E12/L (ref 3.8–5.2)
SAO2 % BLDV FROM PO2: 66 %
SODIUM SERPL-SCNC: 137 MMOL/L (ref 133–144)
WBC # BLD AUTO: 6.7 10E9/L (ref 4–11)

## 2018-09-04 PROCEDURE — 27210914 ZZH SHEATH CR8

## 2018-09-04 PROCEDURE — 25000125 ZZHC RX 250: Performed by: INTERNAL MEDICINE

## 2018-09-04 PROCEDURE — 27211089 ZZH KIT ACIST INJECTOR CR3

## 2018-09-04 PROCEDURE — 40000852 ZZH STATISTIC HEART CATH LAB OR EP LAB

## 2018-09-04 PROCEDURE — C1769 GUIDE WIRE: HCPCS

## 2018-09-04 PROCEDURE — 27210787 ZZH MANIFOLD CR2

## 2018-09-04 PROCEDURE — 40000065 ZZH STATISTIC EKG NON-CHARGEABLE

## 2018-09-04 PROCEDURE — 25000128 H RX IP 250 OP 636: Performed by: INTERNAL MEDICINE

## 2018-09-04 PROCEDURE — 80048 BASIC METABOLIC PNL TOTAL CA: CPT | Performed by: INTERNAL MEDICINE

## 2018-09-04 PROCEDURE — 93005 ELECTROCARDIOGRAM TRACING: CPT

## 2018-09-04 PROCEDURE — 27210946 ZZH KIT HC TOTES DISP CR8

## 2018-09-04 PROCEDURE — 27210910 ZZH NEEDLE CR6

## 2018-09-04 PROCEDURE — 99153 MOD SED SAME PHYS/QHP EA: CPT

## 2018-09-04 PROCEDURE — 99152 MOD SED SAME PHYS/QHP 5/>YRS: CPT

## 2018-09-04 PROCEDURE — 40000235 ZZH STATISTIC TELEMETRY

## 2018-09-04 PROCEDURE — 99152 MOD SED SAME PHYS/QHP 5/>YRS: CPT | Performed by: INTERNAL MEDICINE

## 2018-09-04 PROCEDURE — 93010 ELECTROCARDIOGRAM REPORT: CPT | Performed by: INTERNAL MEDICINE

## 2018-09-04 PROCEDURE — 93460 R&L HRT ART/VENTRICLE ANGIO: CPT | Mod: 26 | Performed by: INTERNAL MEDICINE

## 2018-09-04 PROCEDURE — 27211181 ZZH BALLOON TIP PRESSURE CR5

## 2018-09-04 PROCEDURE — 85027 COMPLETE CBC AUTOMATED: CPT | Performed by: INTERNAL MEDICINE

## 2018-09-04 PROCEDURE — 85610 PROTHROMBIN TIME: CPT | Performed by: INTERNAL MEDICINE

## 2018-09-04 PROCEDURE — 27210788 ZZH MANIFOLD CR3

## 2018-09-04 PROCEDURE — 36591 DRAW BLOOD OFF VENOUS DEVICE: CPT

## 2018-09-04 PROCEDURE — 27210795 ZZH PAD DEFIB QUICK CR4

## 2018-09-04 PROCEDURE — 93460 R&L HRT ART/VENTRICLE ANGIO: CPT

## 2018-09-04 PROCEDURE — 82803 BLOOD GASES ANY COMBINATION: CPT

## 2018-09-04 PROCEDURE — 27210856 ZZH ACCESS HEART CATH CR2

## 2018-09-04 RX ORDER — LIDOCAINE HYDROCHLORIDE 10 MG/ML
1-10 INJECTION, SOLUTION EPIDURAL; INFILTRATION; INTRACAUDAL; PERINEURAL
Status: COMPLETED | OUTPATIENT
Start: 2018-09-04 | End: 2018-09-04

## 2018-09-04 RX ORDER — CLOPIDOGREL BISULFATE 75 MG/1
75 TABLET ORAL
Status: DISCONTINUED | OUTPATIENT
Start: 2018-09-04 | End: 2018-09-04 | Stop reason: HOSPADM

## 2018-09-04 RX ORDER — METHYLPREDNISOLONE SODIUM SUCCINATE 125 MG/2ML
125 INJECTION, POWDER, LYOPHILIZED, FOR SOLUTION INTRAMUSCULAR; INTRAVENOUS
Status: DISCONTINUED | OUTPATIENT
Start: 2018-09-04 | End: 2018-09-04 | Stop reason: HOSPADM

## 2018-09-04 RX ORDER — NITROGLYCERIN 0.4 MG/1
0.4 TABLET SUBLINGUAL EVERY 5 MIN PRN
Status: DISCONTINUED | OUTPATIENT
Start: 2018-09-04 | End: 2018-09-04 | Stop reason: HOSPADM

## 2018-09-04 RX ORDER — METOPROLOL TARTRATE 1 MG/ML
5 INJECTION, SOLUTION INTRAVENOUS EVERY 5 MIN PRN
Status: DISCONTINUED | OUTPATIENT
Start: 2018-09-04 | End: 2018-09-04 | Stop reason: HOSPADM

## 2018-09-04 RX ORDER — ENALAPRILAT 1.25 MG/ML
1.25-2.5 INJECTION INTRAVENOUS
Status: DISCONTINUED | OUTPATIENT
Start: 2018-09-04 | End: 2018-09-04 | Stop reason: HOSPADM

## 2018-09-04 RX ORDER — LIDOCAINE 40 MG/G
CREAM TOPICAL
Status: DISCONTINUED | OUTPATIENT
Start: 2018-09-04 | End: 2018-09-04 | Stop reason: HOSPADM

## 2018-09-04 RX ORDER — SODIUM NITROPRUSSIDE 25 MG/ML
100-200 INJECTION INTRAVENOUS
Status: DISCONTINUED | OUTPATIENT
Start: 2018-09-04 | End: 2018-09-04 | Stop reason: HOSPADM

## 2018-09-04 RX ORDER — POTASSIUM CHLORIDE 29.8 MG/ML
20 INJECTION INTRAVENOUS
Status: DISCONTINUED | OUTPATIENT
Start: 2018-09-04 | End: 2018-09-04 | Stop reason: HOSPADM

## 2018-09-04 RX ORDER — PROTAMINE SULFATE 10 MG/ML
25-100 INJECTION, SOLUTION INTRAVENOUS EVERY 5 MIN PRN
Status: DISCONTINUED | OUTPATIENT
Start: 2018-09-04 | End: 2018-09-04 | Stop reason: HOSPADM

## 2018-09-04 RX ORDER — ATROPINE SULFATE 0.1 MG/ML
.5-1 INJECTION INTRAVENOUS
Status: DISCONTINUED | OUTPATIENT
Start: 2018-09-04 | End: 2018-09-04 | Stop reason: HOSPADM

## 2018-09-04 RX ORDER — EPINEPHRINE 1 MG/ML
0.3 INJECTION, SOLUTION, CONCENTRATE INTRAVENOUS
Status: DISCONTINUED | OUTPATIENT
Start: 2018-09-04 | End: 2018-09-04 | Stop reason: HOSPADM

## 2018-09-04 RX ORDER — PRASUGREL 10 MG/1
10-60 TABLET, FILM COATED ORAL
Status: DISCONTINUED | OUTPATIENT
Start: 2018-09-04 | End: 2018-09-04 | Stop reason: HOSPADM

## 2018-09-04 RX ORDER — VERAPAMIL HYDROCHLORIDE 2.5 MG/ML
1-2.5 INJECTION, SOLUTION INTRAVENOUS
Status: DISCONTINUED | OUTPATIENT
Start: 2018-09-04 | End: 2018-09-04 | Stop reason: HOSPADM

## 2018-09-04 RX ORDER — NITROGLYCERIN 5 MG/ML
100-200 VIAL (ML) INTRAVENOUS
Status: DISCONTINUED | OUTPATIENT
Start: 2018-09-04 | End: 2018-09-04 | Stop reason: HOSPADM

## 2018-09-04 RX ORDER — DOPAMINE HYDROCHLORIDE 160 MG/100ML
2-20 INJECTION, SOLUTION INTRAVENOUS CONTINUOUS PRN
Status: DISCONTINUED | OUTPATIENT
Start: 2018-09-04 | End: 2018-09-04 | Stop reason: HOSPADM

## 2018-09-04 RX ORDER — HYDRALAZINE HYDROCHLORIDE 20 MG/ML
10-20 INJECTION INTRAMUSCULAR; INTRAVENOUS
Status: DISCONTINUED | OUTPATIENT
Start: 2018-09-04 | End: 2018-09-04 | Stop reason: HOSPADM

## 2018-09-04 RX ORDER — MORPHINE SULFATE 2 MG/ML
1-2 INJECTION, SOLUTION INTRAMUSCULAR; INTRAVENOUS EVERY 5 MIN PRN
Status: DISCONTINUED | OUTPATIENT
Start: 2018-09-04 | End: 2018-09-04 | Stop reason: HOSPADM

## 2018-09-04 RX ORDER — LIDOCAINE HYDROCHLORIDE 10 MG/ML
30 INJECTION, SOLUTION EPIDURAL; INFILTRATION; INTRACAUDAL; PERINEURAL
Status: DISCONTINUED | OUTPATIENT
Start: 2018-09-04 | End: 2018-09-04 | Stop reason: HOSPADM

## 2018-09-04 RX ORDER — NICARDIPINE HYDROCHLORIDE 2.5 MG/ML
100 INJECTION INTRAVENOUS
Status: DISCONTINUED | OUTPATIENT
Start: 2018-09-04 | End: 2018-09-04 | Stop reason: HOSPADM

## 2018-09-04 RX ORDER — NALOXONE HYDROCHLORIDE 0.4 MG/ML
.2-.4 INJECTION, SOLUTION INTRAMUSCULAR; INTRAVENOUS; SUBCUTANEOUS
Status: DISCONTINUED | OUTPATIENT
Start: 2018-09-04 | End: 2018-09-04 | Stop reason: HOSPADM

## 2018-09-04 RX ORDER — ONDANSETRON 2 MG/ML
4 INJECTION INTRAMUSCULAR; INTRAVENOUS EVERY 4 HOURS PRN
Status: DISCONTINUED | OUTPATIENT
Start: 2018-09-04 | End: 2018-09-04 | Stop reason: HOSPADM

## 2018-09-04 RX ORDER — FENTANYL CITRATE 50 UG/ML
25-50 INJECTION, SOLUTION INTRAMUSCULAR; INTRAVENOUS
Status: DISCONTINUED | OUTPATIENT
Start: 2018-09-04 | End: 2018-09-04 | Stop reason: HOSPADM

## 2018-09-04 RX ORDER — HYDROCODONE BITARTRATE AND ACETAMINOPHEN 5; 325 MG/1; MG/1
1-2 TABLET ORAL EVERY 4 HOURS PRN
Status: DISCONTINUED | OUTPATIENT
Start: 2018-09-04 | End: 2018-09-04 | Stop reason: HOSPADM

## 2018-09-04 RX ORDER — NALOXONE HYDROCHLORIDE 0.4 MG/ML
0.4 INJECTION, SOLUTION INTRAMUSCULAR; INTRAVENOUS; SUBCUTANEOUS EVERY 5 MIN PRN
Status: DISCONTINUED | OUTPATIENT
Start: 2018-09-04 | End: 2018-09-04 | Stop reason: HOSPADM

## 2018-09-04 RX ORDER — NIFEDIPINE 10 MG/1
10 CAPSULE ORAL
Status: DISCONTINUED | OUTPATIENT
Start: 2018-09-04 | End: 2018-09-04 | Stop reason: HOSPADM

## 2018-09-04 RX ORDER — ASPIRIN 325 MG
325 TABLET ORAL
Status: DISCONTINUED | OUTPATIENT
Start: 2018-09-04 | End: 2018-09-04 | Stop reason: HOSPADM

## 2018-09-04 RX ORDER — DOBUTAMINE HYDROCHLORIDE 200 MG/100ML
2-20 INJECTION INTRAVENOUS CONTINUOUS PRN
Status: DISCONTINUED | OUTPATIENT
Start: 2018-09-04 | End: 2018-09-04 | Stop reason: HOSPADM

## 2018-09-04 RX ORDER — ADENOSINE 3 MG/ML
12-12000 INJECTION, SOLUTION INTRAVENOUS
Status: DISCONTINUED | OUTPATIENT
Start: 2018-09-04 | End: 2018-09-04 | Stop reason: HOSPADM

## 2018-09-04 RX ORDER — FUROSEMIDE 10 MG/ML
20-100 INJECTION INTRAMUSCULAR; INTRAVENOUS
Status: DISCONTINUED | OUTPATIENT
Start: 2018-09-04 | End: 2018-09-04 | Stop reason: HOSPADM

## 2018-09-04 RX ORDER — BUPIVACAINE HYDROCHLORIDE 2.5 MG/ML
1-10 INJECTION, SOLUTION EPIDURAL; INFILTRATION; INTRACAUDAL
Status: DISCONTINUED | OUTPATIENT
Start: 2018-09-04 | End: 2018-09-04 | Stop reason: HOSPADM

## 2018-09-04 RX ORDER — FLUMAZENIL 0.1 MG/ML
0.2 INJECTION, SOLUTION INTRAVENOUS
Status: DISCONTINUED | OUTPATIENT
Start: 2018-09-04 | End: 2018-09-04 | Stop reason: HOSPADM

## 2018-09-04 RX ORDER — DEXTROSE MONOHYDRATE 25 G/50ML
12.5-5 INJECTION, SOLUTION INTRAVENOUS EVERY 30 MIN PRN
Status: DISCONTINUED | OUTPATIENT
Start: 2018-09-04 | End: 2018-09-04 | Stop reason: HOSPADM

## 2018-09-04 RX ORDER — DIPHENHYDRAMINE HYDROCHLORIDE 50 MG/ML
25-50 INJECTION INTRAMUSCULAR; INTRAVENOUS
Status: DISCONTINUED | OUTPATIENT
Start: 2018-09-04 | End: 2018-09-04 | Stop reason: HOSPADM

## 2018-09-04 RX ORDER — POTASSIUM CHLORIDE 7.45 MG/ML
10 INJECTION INTRAVENOUS
Status: DISCONTINUED | OUTPATIENT
Start: 2018-09-04 | End: 2018-09-04 | Stop reason: HOSPADM

## 2018-09-04 RX ORDER — NITROGLYCERIN 20 MG/100ML
.07-2 INJECTION INTRAVENOUS CONTINUOUS PRN
Status: DISCONTINUED | OUTPATIENT
Start: 2018-09-04 | End: 2018-09-04 | Stop reason: HOSPADM

## 2018-09-04 RX ORDER — LORAZEPAM 2 MG/ML
.5-2 INJECTION INTRAMUSCULAR EVERY 4 HOURS PRN
Status: DISCONTINUED | OUTPATIENT
Start: 2018-09-04 | End: 2018-09-04 | Stop reason: HOSPADM

## 2018-09-04 RX ORDER — CLOPIDOGREL 300 MG/1
300-600 TABLET, FILM COATED ORAL
Status: DISCONTINUED | OUTPATIENT
Start: 2018-09-04 | End: 2018-09-04 | Stop reason: HOSPADM

## 2018-09-04 RX ORDER — PHENYLEPHRINE HCL IN 0.9% NACL 1 MG/10 ML
20-100 SYRINGE (ML) INTRAVENOUS
Status: DISCONTINUED | OUTPATIENT
Start: 2018-09-04 | End: 2018-09-04 | Stop reason: HOSPADM

## 2018-09-04 RX ORDER — NALOXONE HYDROCHLORIDE 0.4 MG/ML
.1-.4 INJECTION, SOLUTION INTRAMUSCULAR; INTRAVENOUS; SUBCUTANEOUS
Status: DISCONTINUED | OUTPATIENT
Start: 2018-09-04 | End: 2018-09-04 | Stop reason: HOSPADM

## 2018-09-04 RX ORDER — NITROGLYCERIN 5 MG/ML
100-500 VIAL (ML) INTRAVENOUS
Status: DISCONTINUED | OUTPATIENT
Start: 2018-09-04 | End: 2018-09-04 | Stop reason: HOSPADM

## 2018-09-04 RX ORDER — ASPIRIN 81 MG/1
81-324 TABLET, CHEWABLE ORAL
Status: DISCONTINUED | OUTPATIENT
Start: 2018-09-04 | End: 2018-09-04 | Stop reason: HOSPADM

## 2018-09-04 RX ORDER — IOPAMIDOL 755 MG/ML
50 INJECTION, SOLUTION INTRAVASCULAR ONCE
Status: COMPLETED | OUTPATIENT
Start: 2018-09-04 | End: 2018-09-04

## 2018-09-04 RX ORDER — HEPARIN SODIUM 1000 [USP'U]/ML
1000-10000 INJECTION, SOLUTION INTRAVENOUS; SUBCUTANEOUS EVERY 5 MIN PRN
Status: DISCONTINUED | OUTPATIENT
Start: 2018-09-04 | End: 2018-09-04 | Stop reason: HOSPADM

## 2018-09-04 RX ORDER — ACETAMINOPHEN 325 MG/1
325-650 TABLET ORAL EVERY 4 HOURS PRN
Status: DISCONTINUED | OUTPATIENT
Start: 2018-09-04 | End: 2018-09-04 | Stop reason: HOSPADM

## 2018-09-04 RX ORDER — SODIUM CHLORIDE 9 MG/ML
INJECTION, SOLUTION INTRAVENOUS CONTINUOUS
Status: DISCONTINUED | OUTPATIENT
Start: 2018-09-04 | End: 2018-09-04 | Stop reason: HOSPADM

## 2018-09-04 RX ORDER — PROTAMINE SULFATE 10 MG/ML
1-5 INJECTION, SOLUTION INTRAVENOUS
Status: DISCONTINUED | OUTPATIENT
Start: 2018-09-04 | End: 2018-09-04 | Stop reason: HOSPADM

## 2018-09-04 RX ORDER — ATROPINE SULFATE 0.1 MG/ML
0.5 INJECTION INTRAVENOUS EVERY 5 MIN PRN
Status: DISCONTINUED | OUTPATIENT
Start: 2018-09-04 | End: 2018-09-04 | Stop reason: HOSPADM

## 2018-09-04 RX ADMIN — SODIUM CHLORIDE: 9 INJECTION, SOLUTION INTRAVENOUS at 09:00

## 2018-09-04 RX ADMIN — FENTANYL CITRATE 25 MCG: 50 INJECTION, SOLUTION INTRAMUSCULAR; INTRAVENOUS at 11:36

## 2018-09-04 RX ADMIN — IOPAMIDOL 50 ML: 755 INJECTION, SOLUTION INTRAVASCULAR at 12:00

## 2018-09-04 RX ADMIN — FENTANYL CITRATE 25 MCG: 50 INJECTION, SOLUTION INTRAMUSCULAR; INTRAVENOUS at 10:58

## 2018-09-04 RX ADMIN — LIDOCAINE HYDROCHLORIDE 1 ML: 10 INJECTION, SOLUTION EPIDURAL; INFILTRATION; INTRACAUDAL; PERINEURAL at 11:11

## 2018-09-04 RX ADMIN — MIDAZOLAM 0.5 MG: 1 INJECTION INTRAMUSCULAR; INTRAVENOUS at 11:07

## 2018-09-04 RX ADMIN — MIDAZOLAM 0.5 MG: 1 INJECTION INTRAMUSCULAR; INTRAVENOUS at 10:58

## 2018-09-04 RX ADMIN — LIDOCAINE HYDROCHLORIDE 1 ML: 10 INJECTION, SOLUTION EPIDURAL; INFILTRATION; INTRACAUDAL; PERINEURAL at 11:08

## 2018-09-04 RX ADMIN — HEPARIN SODIUM 5000 UNITS: 1000 INJECTION, SOLUTION INTRAVENOUS; SUBCUTANEOUS at 11:14

## 2018-09-04 NOTE — IP AVS SNAPSHOT
MRN:[id number]                      After Visit Summary   9/4/2018    Kandis Gallagher    MRN: [id number]           Visit Information        Department      9/4/2018  7:58 AM [facility]          Review of your medicines      UNREVIEWED medicines. Ask your doctor about these medicines        Dose / Directions    aspirin 81 MG EC tablet        Dose:  81 mg   Take 81 mg by mouth   Refills:  0       atenolol 25 MG tablet   Commonly known as:  TENORMIN   Used for:  Bicuspid aortic valve, Aortic valve stenosis        Please take 25 mg tablet in the morning and 50 mg tablet in the evening as ordered.   Quantity:  270 tablet   Refills:  3       fluticasone 50 MCG/ACT spray   Commonly known as:  FLONASE        SHAKE LQ AND U 1 SPR IEN QD   Refills:  2       hydrochlorothiazide 25 MG tablet   Commonly known as:  HYDRODIURIL        Dose:  25 mg   Take 1 tablet (25 mg) by mouth daily   Quantity:  30 tablet   Refills:  0       losartan 100 MG tablet   Commonly known as:  COZAAR   Used for:  Bicuspid aortic valve, Aortic valve stenosis        Dose:  50 mg   Take 0.5 tablets (50 mg) by mouth 2 times daily   Quantity:  90 tablet   Refills:  3       vitamin D 2000 units tablet        Dose:  2000 Units   Take 2,000 Units by mouth   Refills:  0                Protect others around you: Learn how to safely use, store and throw away your medicines at www.disposemymeds.org.         Follow-ups after your visit        Your next 10 appointments already scheduled     Sep 11, 2018  2:30 PM CDT   Return Visit with JOANN Doherty CNP   [facility] ([facility])    [address] [place] [id number] [phone] OPT 2               Care Instructions        After Care Instructions     Discharge Instructions - Follow up with [facility] Nurse Practitioner        Follow up with [facility] Nurse Practitioner at [facility] Clinic of patient  preference in 7-10 days.            Discharge Instructions - IF on Metformin (Glucophage or Glucovance) or Metformin containing medications       IF on Metformin (Glucophage or Glucovance) or Metformin containing medications , schedule a Basic Metabolic Panel at [facility] Heart or Primary Clinic in 48 - 72 hours post procedure and PRIOR TO resuming the Metformin or Metformin containing medications.  Hold Metformin (Glucophage or Glucovance) or Metformin containing medications until after the Basic Metabolic Panel on the 2nd or 3rd day following the procedure.  May resume after blood draw is complete.                  Further instructions from your care team       Cardiac Angiogram Discharge Instructions - Neck & Radial    After you go home:      Have an adult stay with you until tomorrow.    Drink extra fluids for 2 days.    You may resume your normal diet.    No smoking       For 24 hours - due to the sedation you received:    Relax and take it easy.    Do NOT make any important or legal decisions.    Do NOT drive or operate machines at home or at work.    Do NOT drink alcohol.    Care of Neck & Wrist Puncture Sites:      For the first 24 hrs - check the puncture site every 1-2 hours while awake.    It is normal to have soreness at the puncture site and mild tingling in your hand for up to 3 days.    Remove the bandaid after 24 hours. If there is minor oozing, apply another bandaid and remove it after 12 hours.    You may shower tomorrow. Do NOT take a bath, or use a hot tub or pool for at least 3 days. Do NOT scrub the site. Do not use lotion or powder near the puncture site.           Activity - For 2 days:    Neck site:    Avoid heavy lifting or the overuse of your shoulder.        Wrist site:    do not use your hand or arm to support your weight (such as rising from a chair)     do not bend your wrist (such as lifting a garage door).    do not lift more than 5 pounds or exercise your arm (such as tennis, golf or  bowling).    Do NOT do any heavy activity such as exercise, lifting, or straining.     Bleeding:     Neck site:    If you start bleeding from the site in your neck, sit down and press gently on the site for 10 minutes.     Once bleeding stops, sit still for 2 hours.     Call [facility] as soon as you can    Wrist site:    If you start bleeding from the site in your wrist, sit down and press firmly on/above the site for 10 minutes.     Once bleeding stops, keep arm still for 2 hours.     Call [facility] as soon as you can.    Call 911 right away if you have heavy bleeding or bleeding that does not stop.      Medicines:      Take your medications, including blood thinners, unless your provider tells you not to.      If you have stopped any medicines, check with your provider about when to restart them.    Follow Up Appointments:      Follow up with [facility] Nurse Practitioner at [facility] Clinic of patient preference in 7-10 days.    Call the clinic if:      You have increased pain or a large or growing hard lump around the site.    The site is red, swollen, hot or tender.    Blood or fluid is draining from the site.    You have chills or a fever greater than 101 F (38 C).    Your arm feels numb, cool or changes color.    You have hives, a rash or unusual itching.    Any questions or concerns.      [facility] at [place]:    [phone] [facility] (7 days a week)       Additional Information About Your Visit        MyChart Information     [employer] gives you secure access to your electronic health record. If you see a primary care provider, you can also send messages to your care team and make appointments. If you have questions, please call your primary care clinic.  If you do not have a primary care provider, please call [phone] and they will assist you.        Care EveryWhere ID     This is your Care EveryWhere ID. This could be used by other organizations to access your [facility]  "records  [id number]        Your Vitals Were     Blood Pressure Pulse Temperature Respirations Height Weight    130/62 71 98.4  F (36.9  C) (Oral) 16 1.562 m (5' 1.5\") 76.8 kg (169 lb 4.8 oz)    Pulse Oximetry BMI (Body Mass Index)                97% 31.47 kg/m2           Primary Care Provider Office Phone # Fax #    Winnie Jose [phone]      Equal Access to Services     GELA OSUNA : Hadii petey ku hadasho Soomaali, waaxda luqadaha, qaybta kaalmada adeegyada, arleth murillo haychandrakant alvarenga . So [facility] [phone].    ATENCIÓN: Si habla español, tiene a novak disposición servicios gratuitos de asistencia lingüística. Llame al [phone].    We comply with applicable federal civil rights laws and [place] laws. We do not discriminate on the basis of race, color, national origin, age, disability, sex, sexual orientation, or gender identity.            Thank you!     Thank you for choosing [facility] for your care. Our goal is always to provide you with excellent care. Hearing back from our patients is one way we can continue to improve our services. Please take a few minutes to complete the written survey that you may receive in the mail after you visit with us. Thank you!             Medication List: This is a list of all your medications and when to take them. Check marks below indicate your daily home schedule. Keep this list as a reference.      Medications           Morning Afternoon Evening Bedtime As Needed    aspirin 81 MG EC tablet   Take 81 mg by mouth                                atenolol 25 MG tablet   Commonly known as:  TENORMIN   Please take 25 mg tablet in the morning and 50 mg tablet in the evening as ordered.                                fluticasone 50 MCG/ACT spray   Commonly known as:  FLONASE   SHAKE LQ AND U 1 SPR IEN QD                                hydrochlorothiazide 25 MG tablet   Commonly known as:  HYDRODIURIL   Take 1 tablet (25 mg) by mouth daily          "                       losartan 100 MG tablet   Commonly known as:  COZAAR   Take 0.5 tablets (50 mg) by mouth 2 times daily                                vitamin D 2000 units tablet   Take 2,000 Units by mouth

## 2018-09-04 NOTE — PROGRESS NOTES
Consent has been signed.    IV started and labs pending.    EKG completed showing SR.     Pulses charted.  Patient took 81 mg aspirin today.  Patient is not taking Metformin or any blood thinners  Patient is accompanied by [facility] [phone]  Pre procedure plan of care reviewed with patient prior to procedure. All questions answered and patient verbalizes acceptance of plan.

## 2018-09-04 NOTE — DISCHARGE SUMMARY
Patient discharged to home by wheelchair at 2:41 PM 09/04/18.  Medication regimen discussed with patient and patient verbalizes understanding.  Diet and activity and wound care discussed with patient.  Upcoming appointments reviewed.  No questions at this time. Right neck and left radial sites CDI, no hematoma or bleeding, CMS intact, denies pain.  Tele shows SR.

## 2018-09-04 NOTE — IP AVS SNAPSHOT
[place] [address]    [place] [address]    Phone:  [phone]                                       After Visit Summary   9/4/2018    Kandis Gallagher    MRN: [id number]           After Visit Summary Signature Page     I have received my discharge instructions, and my questions have been answered. I have discussed any challenges I see with this plan with the nurse or doctor.    ..........................................................................................................................................  Patient/Patient Representative Signature      ..........................................................................................................................................  Patient Representative Print Name and Relationship to Patient    ..................................................               ................................................  Date                                            Time    ..........................................................................................................................................  Reviewed by Signature/Title    ...................................................              ..............................................  Date                                                            Time          22EPIC Rev 08/18

## 2018-09-04 NOTE — DISCHARGE INSTRUCTIONS
Cardiac Angiogram Discharge Instructions - Neck & Radial    After you go home:      Have an adult stay with you until tomorrow.    Drink extra fluids for 2 days.    You may resume your normal diet.    No smoking       For 24 hours - due to the sedation you received:    Relax and take it easy.    Do NOT make any important or legal decisions.    Do NOT drive or operate machines at home or at work.    Do NOT drink alcohol.    Care of Neck & Wrist Puncture Sites:      For the first 24 hrs - check the puncture site every 1-2 hours while awake.    It is normal to have soreness at the puncture site and mild tingling in your hand for up to 3 days.    Remove the bandaid after 24 hours. If there is minor oozing, apply another bandaid and remove it after 12 hours.    You may shower tomorrow. Do NOT take a bath, or use a hot tub or pool for at least 3 days. Do NOT scrub the site. Do not use lotion or powder near the puncture site.           Activity - For 2 days:    Neck site:    Avoid heavy lifting or the overuse of your shoulder.        Wrist site:    do not use your hand or arm to support your weight (such as rising from a chair)     do not bend your wrist (such as lifting a garage door).    do not lift more than 5 pounds or exercise your arm (such as tennis, golf or bowling).    Do NOT do any heavy activity such as exercise, lifting, or straining.     Bleeding:     Neck site:    If you start bleeding from the site in your neck, sit down and press gently on the site for 10 minutes.     Once bleeding stops, sit still for 2 hours.     Call [facility] as soon as you can    Wrist site:    If you start bleeding from the site in your wrist, sit down and press firmly on/above the site for 10 minutes.     Once bleeding stops, keep arm still for 2 hours.     Call [facility] as soon as you can.    Call 911 right away if you have heavy bleeding or bleeding that does not stop.      Medicines:      Take your medications, including blood  thinners, unless your provider tells you not to.      If you have stopped any medicines, check with your provider about when to restart them.    Follow Up Appointments:      Follow up with [facility] Nurse Practitioner at [facility] of patient preference in 7-10 days.    Call the clinic if:      You have increased pain or a large or growing hard lump around the site.    The site is red, swollen, hot or tender.    Blood or fluid is draining from the site.    You have chills or a fever greater than 101 F (38 C).    Your arm feels numb, cool or changes color.    You have hives, a rash or unusual itching.    Any questions or concerns.      [facility] [place]:    [phone] [facility] (7 days a week)

## 2018-09-07 PROBLEM — Q24.9 CONGENITAL HEART ANOMALY: Status: ACTIVE | Noted: 2018-09-07

## 2018-09-07 LAB — INTERPRETATION ECG - MUSE: NORMAL

## 2018-09-28 ENCOUNTER — OFFICE VISIT (OUTPATIENT)
Dept: CARDIOLOGY | Facility: CLINIC | Age: 78
End: 2018-09-28
Payer: COMMERCIAL

## 2018-09-28 VITALS
DIASTOLIC BLOOD PRESSURE: 64 MMHG | BODY MASS INDEX: 32.1 KG/M2 | WEIGHT: 170 LBS | SYSTOLIC BLOOD PRESSURE: 128 MMHG | HEART RATE: 70 BPM | HEIGHT: 61 IN

## 2018-09-28 DIAGNOSIS — R00.2 PALPITATIONS: Primary | ICD-10-CM

## 2018-09-28 DIAGNOSIS — Z95.2 STATUS POST AORTIC VALVE REPLACEMENT: ICD-10-CM

## 2018-09-28 DIAGNOSIS — I35.0 SEVERE AORTIC STENOSIS: ICD-10-CM

## 2018-09-28 PROCEDURE — 99214 OFFICE O/P EST MOD 30 MIN: CPT | Performed by: NURSE PRACTITIONER

## 2018-09-28 RX ORDER — MULTIPLE VITAMINS W/ MINERALS TAB 9MG-400MCG
1 TAB ORAL DAILY
COMMUNITY
End: 2020-11-18

## 2018-09-28 NOTE — PATIENT INSTRUCTIONS
Today's Recommendations    1. Heart monitor for 2 weeks    [facility] [phone]    [place] ([phone])    [place] ([phone])  2. Elevate legs for 1 hour after hydrochlorothiazide  3. Will call with results  4. Continue all other medications without changes.    Please send a Highlighter message or call [phone] with questions or concerns.

## 2018-09-28 NOTE — MR AVS SNAPSHOT
After Visit Summary   9/28/2018    Kandis Gallagher    MRN: [id number]           Patient Information     Date Of Birth          1940        Visit Information        Provider Department      9/28/2018 2:30 PM Vesna Dalton APRN CNP [facility]   [place]        Today's Diagnoses     Palpitations    -  1      Care Instructions    Today's Recommendations    1. Heart monitor for 2 weeks    [facility] [phone]    [place] ([phone])    [place] ([phone])  2. Elevate legs for 1 hour after hydrochlorothiazide  3. Will call with results  4. Continue all other medications without changes.    Please send a Mixwit message or call [phone] with questions or concerns.                   Follow-ups after your visit        Future tests that were ordered for you today     Open Future Orders        Priority Expected Expires Ordered    Zio Patch Holter Routine 10/1/2018 11/12/2018 9/28/2018            Who to contact     If you have questions or need follow up information about today's clinic visit or your schedule please contact [facility] directly at [phone].  Normal or non-critical lab and imaging results will be communicated to you by BitDefenderhart, letter or phone within 4 business days after the clinic has received the results. If you do not hear from us within 7 days, please contact the clinic through Audit Verifyt or phone. If you have a critical or abnormal lab result, we will notify you by phone as soon as possible.  Submit refill requests through Mixwit or call your pharmacy and they will forward the refill request to us. Please allow 3 business days for your refill to be completed.          Additional Information About Your Visit        MyChart Information     Mixwit gives you secure access to your electronic health record. If you see a primary care provider, you can also send messages to your care team and  "make appointments. If you have questions, please call your primary care clinic.  If you do not have a primary care provider, please call [phone] and they will assist you.        Care EveryWhere ID     This is your Care EveryWhere ID. This could be used by other organizations to access your [place] medical records  [id number]        Your Vitals Were     Pulse Height BMI (Body Mass Index)             70 1.549 m (5' 1\") 32.12 kg/m2          Blood Pressure from Last 3 Encounters:   09/28/18 128/64   09/04/18 128/79   07/13/18 124/73    Weight from Last 3 Encounters:   09/28/18 77.1 kg (170 lb)   09/04/18 76.8 kg (169 lb 4.8 oz)   07/13/18 76.2 kg (168 lb 1.6 oz)               Primary Care Provider Office Phone # Fax #    Winnie Jose [phone]       [facility] [address]    [facility] [address]        Equal Access to Services     GELA OSUNA : Hadii aad ku hadasho Soomaali, waaxda luqadaha, qaybta kaalmada adeegyada, waxay sanjivin haychandrakant alvarenga . So [facility] [phone].    ATENCIÓN: Si habla español, tiene a novak disposición servicios gratuitos de asistencia lingüística. Llame al [phone].    We comply with applicable federal civil rights laws and [place] laws. We do not discriminate on the basis of race, color, national origin, age, disability, sex, sexual orientation, or gender identity.            Thank you!     Thank you for choosing [facility]  for your care. Our goal is always to provide you with excellent care. Hearing back from our patients is one way we can continue to improve our services. Please take a few minutes to complete the written survey that you may receive in the mail after your visit with us. Thank you!             Your Updated Medication List - Protect others around you: Learn how to safely use, store and throw away your medicines at www.disposemymeds.org.          This list is accurate as of 9/28/18  2:58 PM.  " Always use your most recent med list.                   Brand Name Dispense Instructions for use Diagnosis    AMLODIPINE BESYLATE PO      Take 2.5 mg by mouth every evening        aspirin 81 MG EC tablet      Take 81 mg by mouth        atenolol 25 MG tablet    TENORMIN    270 tablet    Please take 25 mg tablet in the morning and 50 mg tablet in the evening as ordered.    Bicuspid aortic valve, Aortic valve stenosis       fluticasone 50 MCG/ACT spray    FLONASE     SHAKE LQ AND U 1 SPR IEN QD        hydrochlorothiazide 25 MG tablet    HYDRODIURIL    30 tablet    Take 1 tablet (25 mg) by mouth daily        losartan 100 MG tablet    COZAAR    90 tablet    Take 0.5 tablets (50 mg) by mouth 2 times daily    Bicuspid aortic valve, Aortic valve stenosis       Multi-vitamin Tabs tablet      Take 1 tablet by mouth daily        vitamin D 2000 units tablet      Take 2,000 Units by mouth        ZYRTEC ALLERGY PO      Take 10 mg by mouth as needed

## 2018-09-28 NOTE — LETTER
9/28/2018    Winnie RAGINI XiongDamon  [facility] [address]    [place] [address]    RE: Kandis Gallagher       Dear Colleague,    I had the pleasure of seeing Kandis Gallagher in the [facility].    Cardiology Clinic Progress Note  Kandis Gallagher MRN# [id number]   YOB: 1940 Age: 78 year old     Reason for visit: Follow up heart catheterization           Assessment and Plan:     1. Exertional shortness of breath and fatigue    No obstructive CAD and no evidence of restrictive or constrictive pericarditis    LVEF 55-60%    No complaints of orthopnea or PND with mild bilateral lower extremity edema    Leg elevation for 1 hour post hydrochlorothiazide    Increase activity    2. Palpitations    Zio Patch for 14 days for further evaluation    Will call with results     Continue Atenolol 25 mg in am and 50 mg in pm    If no significant findings, return to clinic in 1 year with echocardiogram    3. Bicuspid aortic valve    Status post AVR at [facility] in 2016    Normal function valve on recent echocardiogram from July 2018    4. Hypertension    Continue atenolol, losartan and amlodipine           History of Presenting Illness:    Kandis Gallagher is a very pleasant 78 year old patient of Dr. London who presents today for a follow up post right and left hear catheterization.  Her past medical history is notable for hyperlipidemia, hypertension (history of labile BP), bicuspid aortic valve with severe aortic stenosis status post aortic valve replacement (23 mm Magna Ease) in August 2016 at [facility].    She was evaluated by Dr. London at [facility] this past July   For complaints of shortness of breath on exertion, cough and fatigue.  Was recommended that she undergo evaluation for constrictive/restrictive pericarditis as well as coronary artery disease with a right and left heart catheterization.  She was trialed off losartan due to her cough, fatigue and  "intermittent low blood pressure with associated dizziness, but the patient stated that she had increased palpitations after she tapered down her losartan dose to 50 mg twice daily from 100 mg twice daily.  She subsequently is now back on 100 mg twice daily.  Her daughter who accompanied her to the visit today also notes that her mother is relatively inactive and recommends that she participate in regular  aerobic activity.    Her most recent echocardiogram was in July 2018 that showed normal LV size, function and wall motion with an LVEF estimated at 55-60%.  Her bioprosthetic aortic valve was functioning normal. She underwent right and left heart catheterization on 9/4/2018.  It revealed no obstructive coronary artery disease or evidence of constrictive or restrictive pericarditis.  The aortic valve is functioning without significant disease with a mildly elevated LVEDP at 18 mmHg.    Today in clinic she presents with her daughter.  She states that she has a long-standing history of palpitations with her heart rate getting up into the 160s to 200 bpm.  She is previously been evaluated this by wearing a monitor and been seen in the ED.           Review of Systems:   Review of Systems:  Skin:  Negative     Eyes:  Negative    ENT:  Negative    Respiratory:  Positive for dyspnea on exertion  Cardiovascular:    Positive for;edema;fatigue  Gastroenterology: Negative    Genitourinary:  not assessed    Musculoskeletal:  Negative    Neurologic:  Negative    Psychiatric:  Negative    Heme/Lymph/Imm:  Positive for allergies  Endocrine:  Negative                Physical Exam:     Vitals: /64  Pulse 70  Ht 1.549 m (5' 1\")  Wt 77.1 kg (170 lb)  BMI 32.12 kg/m2  Constitutional:  cooperative;well developed        Skin:  warm and dry to the touch;no apparent skin lesions or masses noted        Head:  normocephalic        Eyes:  pupils equal and round        ENT:  dentition good        Chest:  clear to auscultation;normal " symmetry        Cardiac: regular rhythm                  Abdomen:  abdomen soft        Extremities and Back:  no edema        Neurological:  affect appropriate               Medications:     Current Outpatient Prescriptions   Medication Sig Dispense Refill     AMLODIPINE BESYLATE PO Take 2.5 mg by mouth every evening       aspirin 81 MG EC tablet Take 81 mg by mouth       atenolol (TENORMIN) 25 MG tablet Please take 25 mg tablet in the morning and 50 mg tablet in the evening as ordered. 270 tablet 3     Cetirizine HCl (ZYRTEC ALLERGY PO) Take 10 mg by mouth as needed       Cholecalciferol (VITAMIN D) 2000 units tablet Take 2,000 Units by mouth       fluticasone (FLONASE) 50 MCG/ACT spray SHAKE LQ AND U 1 SPR IEN QD  2     hydrochlorothiazide (HYDRODIURIL) 25 MG tablet Take 1 tablet (25 mg) by mouth daily 30 tablet      losartan (COZAAR) 100 MG tablet Take 0.5 tablets (50 mg) by mouth 2 times daily 90 tablet 3     multivitamin, therapeutic with minerals (MULTI-VITAMIN) TABS tablet Take 1 tablet by mouth daily             No family history on file.    Social History     Social History     Marital status:      Spouse name: N/A     Number of children: N/A     Years of education: N/A     Occupational History     Not on file.     Social History Main Topics     Smoking status: Never Smoker     Smokeless tobacco: Never Used     Alcohol use No     Drug use: No     Sexual activity: Not on file     Other Topics Concern     Not on file     Social History Narrative            Past Medical History:     Past Medical History:   Diagnosis Date     Congenital bicuspid aortic valve      H/O aortic valve replacement     23 mm Magna Ease      Hyperlipidemia      Hypertension      Hyperthyroidism      Severe aortic stenosis      Thyroiditis               Past Surgical History:   No past surgical history on file.           Allergies:   Carvedilol; Lidocaine; Lisinopril; Seasonal allergies; and Spironolactone       Data:   All  laboratory data reviewed:    LAST CHOLESTEROL:  Lab Results   Component Value Date    CHOL 211 07/13/2018     Lab Results   Component Value Date    HDL 59 07/13/2018     Lab Results   Component Value Date     07/13/2018     Lab Results   Component Value Date    TRIG 115 07/13/2018     No results found for: CHOLHDLRATIO    LAST BMP:  Lab Results   Component Value Date     09/04/2018      Lab Results   Component Value Date    POTASSIUM 3.6 09/04/2018     Lab Results   Component Value Date    CHLORIDE 100 09/04/2018     Lab Results   Component Value Date    DOUG 9.2 09/04/2018     Lab Results   Component Value Date    CO2 29 09/04/2018     Lab Results   Component Value Date    BUN 13 09/04/2018     Lab Results   Component Value Date    CR 0.94 09/04/2018     Lab Results   Component Value Date     09/04/2018       LAST CBC:  Lab Results   Component Value Date    WBC 6.7 09/04/2018     Lab Results   Component Value Date    RBC 4.05 09/04/2018     Lab Results   Component Value Date    HGB 12.3 09/04/2018     Lab Results   Component Value Date    HCT 35.9 09/04/2018     Lab Results   Component Value Date    MCV 89 09/04/2018     Lab Results   Component Value Date    MCH 30.4 09/04/2018     Lab Results   Component Value Date    MCHC 34.3 09/04/2018     Lab Results   Component Value Date    RDW 13.2 09/04/2018     Lab Results   Component Value Date     09/04/2018         JOANN HOGAN, CNP    Thank you for allowing me to participate in the care of your patient.      Sincerely,     JOANN HOGAN CNP     [facility]

## 2018-09-28 NOTE — PROGRESS NOTES
Cardiology Clinic Progress Note  Kandis Gallagher MRN# [id number]   YOB: 1940 Age: 78 year old     Reason for visit: Follow up heart catheterization           Assessment and Plan:     1. Exertional shortness of breath and fatigue    No obstructive CAD and no evidence of restrictive or constrictive pericarditis    LVEF 55-60%    No complaints of orthopnea or PND with mild bilateral lower extremity edema    Leg elevation for 1 hour post hydrochlorothiazide    Increase activity    2. Palpitations    Zio Patch for 14 days for further evaluation    Will call with results     Continue Atenolol 25 mg in am and 50 mg in pm    If no significant findings, return to clinic in 1 year with echocardiogram    3. Bicuspid aortic valve    Status post AVR at [facility] in 2016    Normal function valve on recent echocardiogram from July 2018    4. Hypertension    Continue atenolol, losartan and amlodipine           History of Presenting Illness:    Kandis Gallagher is a very pleasant 78 year old patient of Dr. London who presents today for a follow up post right and left hear catheterization.  Her past medical history is notable for hyperlipidemia, hypertension (history of labile BP), bicuspid aortic valve with severe aortic stenosis status post aortic valve replacement (23 mm Magna Ease) in August 2016 at [facility].    She was evaluated by Dr. London at [facility] this past July   For complaints of shortness of breath on exertion, cough and fatigue.  Was recommended that she undergo evaluation for constrictive/restrictive pericarditis as well as coronary artery disease with a right and left heart catheterization.  She was trialed off losartan due to her cough, fatigue and intermittent low blood pressure with associated dizziness, but the patient stated that she had increased palpitations after she tapered down her losartan dose to 50 mg twice daily from 100 mg twice daily.  She subsequently is now back  "on 100 mg twice daily.  Her daughter who accompanied her to the visit today also notes that her mother is relatively inactive and recommends that she participate in regular  aerobic activity.    Her most recent echocardiogram was in July 2018 that showed normal LV size, function and wall motion with an LVEF estimated at 55-60%.  Her bioprosthetic aortic valve was functioning normal. She underwent right and left heart catheterization on 9/4/2018.  It revealed no obstructive coronary artery disease or evidence of constrictive or restrictive pericarditis.  The aortic valve is functioning without significant disease with a mildly elevated LVEDP at 18 mmHg.    Today in clinic she presents with her daughter.  She states that she has a long-standing history of palpitations with her heart rate getting up into the 160s to 200 bpm.  She is previously been evaluated this by wearing a monitor and been seen in the ED.           Review of Systems:   Review of Systems:  Skin:  Negative     Eyes:  Negative    ENT:  Negative    Respiratory:  Positive for dyspnea on exertion  Cardiovascular:    Positive for;edema;fatigue  Gastroenterology: Negative    Genitourinary:  not assessed    Musculoskeletal:  Negative    Neurologic:  Negative    Psychiatric:  Negative    Heme/Lymph/Imm:  Positive for allergies  Endocrine:  Negative                Physical Exam:     Vitals: /64  Pulse 70  Ht 1.549 m (5' 1\")  Wt 77.1 kg (170 lb)  BMI 32.12 kg/m2  Constitutional:  cooperative;well developed        Skin:  warm and dry to the touch;no apparent skin lesions or masses noted        Head:  normocephalic        Eyes:  pupils equal and round        ENT:  dentition good        Chest:  clear to auscultation;normal symmetry        Cardiac: regular rhythm                  Abdomen:  abdomen soft        Extremities and Back:  no edema        Neurological:  affect appropriate               Medications:     Current Outpatient Prescriptions "   Medication Sig Dispense Refill     AMLODIPINE BESYLATE PO Take 2.5 mg by mouth every evening       aspirin 81 MG EC tablet Take 81 mg by mouth       atenolol (TENORMIN) 25 MG tablet Please take 25 mg tablet in the morning and 50 mg tablet in the evening as ordered. 270 tablet 3     Cetirizine HCl (ZYRTEC ALLERGY PO) Take 10 mg by mouth as needed       Cholecalciferol (VITAMIN D) 2000 units tablet Take 2,000 Units by mouth       fluticasone (FLONASE) 50 MCG/ACT spray SHAKE LQ AND U 1 SPR IEN QD  2     hydrochlorothiazide (HYDRODIURIL) 25 MG tablet Take 1 tablet (25 mg) by mouth daily 30 tablet      losartan (COZAAR) 100 MG tablet Take 0.5 tablets (50 mg) by mouth 2 times daily 90 tablet 3     multivitamin, therapeutic with minerals (MULTI-VITAMIN) TABS tablet Take 1 tablet by mouth daily             No family history on file.    Social History     Social History     Marital status:      Spouse name: N/A     Number of children: N/A     Years of education: N/A     Occupational History     Not on file.     Social History Main Topics     Smoking status: Never Smoker     Smokeless tobacco: Never Used     Alcohol use No     Drug use: No     Sexual activity: Not on file     Other Topics Concern     Not on file     Social History Narrative            Past Medical History:     Past Medical History:   Diagnosis Date     Congenital bicuspid aortic valve      H/O aortic valve replacement     23 mm Magna Ease      Hyperlipidemia      Hypertension      Hyperthyroidism      Severe aortic stenosis      Thyroiditis               Past Surgical History:   No past surgical history on file.           Allergies:   Carvedilol; Lidocaine; Lisinopril; Seasonal allergies; and Spironolactone       Data:   All laboratory data reviewed:    LAST CHOLESTEROL:  Lab Results   Component Value Date    CHOL 211 07/13/2018     Lab Results   Component Value Date    HDL 59 07/13/2018     Lab Results   Component Value Date     07/13/2018      Lab Results   Component Value Date    TRIG 115 07/13/2018     No results found for: CHOLHDLRATIO    LAST BMP:  Lab Results   Component Value Date     09/04/2018      Lab Results   Component Value Date    POTASSIUM 3.6 09/04/2018     Lab Results   Component Value Date    CHLORIDE 100 09/04/2018     Lab Results   Component Value Date    DOUG 9.2 09/04/2018     Lab Results   Component Value Date    CO2 29 09/04/2018     Lab Results   Component Value Date    BUN 13 09/04/2018     Lab Results   Component Value Date    CR 0.94 09/04/2018     Lab Results   Component Value Date     09/04/2018       LAST CBC:  Lab Results   Component Value Date    WBC 6.7 09/04/2018     Lab Results   Component Value Date    RBC 4.05 09/04/2018     Lab Results   Component Value Date    HGB 12.3 09/04/2018     Lab Results   Component Value Date    HCT 35.9 09/04/2018     Lab Results   Component Value Date    MCV 89 09/04/2018     Lab Results   Component Value Date    MCH 30.4 09/04/2018     Lab Results   Component Value Date    MCHC 34.3 09/04/2018     Lab Results   Component Value Date    RDW 13.2 09/04/2018     Lab Results   Component Value Date     09/04/2018         ALYSSA JOSE, APRN, CNP

## 2018-10-01 DIAGNOSIS — Q23.0 AORTIC VALVE STENOSIS, CONGENITAL: ICD-10-CM

## 2018-10-01 DIAGNOSIS — Q23.81 BICUSPID AORTIC VALVE: Primary | ICD-10-CM

## 2018-10-09 ENCOUNTER — ALLIED HEALTH/NURSE VISIT (OUTPATIENT)
Dept: CARDIOLOGY | Facility: CLINIC | Age: 78
End: 2018-10-09
Attending: NURSE PRACTITIONER
Payer: COMMERCIAL

## 2018-10-09 DIAGNOSIS — R00.2 PALPITATIONS: ICD-10-CM

## 2018-10-09 PROCEDURE — 0298T ZZC EXT ECG > 48HR TO 21 DAY REVIEW AND INTERPRETATN: CPT

## 2018-10-09 PROCEDURE — 0296T ZIO PATCH HOLTER: CPT

## 2018-10-09 NOTE — MR AVS SNAPSHOT
MRN:[id number]                      After Visit Summary   10/9/2018    Kandis Gallagher    MRN: [id number]           Visit Information        Provider Department      10/9/2018 11:15 AM MG CV TECH; MG DEVICE [facility]        MyChart Information     [employer] gives you secure access to your electronic health record. If you see a primary care provider, you can also send messages to your care team and make appointments. If you have questions, please call your primary care clinic.  If you do not have a primary care provider, please call [phone] and they will assist you.      SMIC is an electronic gateway that provides easy, online access to your medical records. With SM[employer], you can request a clinic appointment, read your test results, renew a prescription or communicate with your care team.     To access your existing account, please contact your [facility] or call [phone] for assistance.        Care EveryWhere ID     This is your Care EveryWhere ID. This could be used by other organizations to access your [facility] medical records  [id number]        Equal Access to Services     GELA OSUNA : Hadii aad ku hadasho Soomaali, waaxda luqadaha, qaybta kaalmada adeegyadon, arleth murry. So [facility] [phone].    ATENCIÓN: Si habla español, tiene a novak disposición servicios gratuitos de asistencia lingüística. Llame al [phone].    We comply with applicable federal civil rights laws and [place] laws. We do not discriminate on the basis of race, color, national origin, age, disability, sex, sexual orientation, or gender identity.

## 2018-10-09 NOTE — PROGRESS NOTES
Patient has been prescribed a ZioPatch holter for 14 days.  Patient was instructed regarding the indication, function, care and prompt return of the ZioPatch holter monitor. The monitor, with S/N [id number],  was placed on the patient with instructions regarding care of the skin and monitor, as well as documentation in the patient diary. Patient demonstrated understanding of this information and agreed to call [facility] with further questions or concerns.

## 2018-10-31 ENCOUNTER — TELEPHONE (OUTPATIENT)
Dept: CARDIOLOGY | Facility: CLINIC | Age: 78
End: 2018-10-31

## 2018-11-05 ENCOUNTER — TELEPHONE (OUTPATIENT)
Dept: CARDIOLOGY | Facility: CLINIC | Age: 78
End: 2018-11-05

## 2018-11-05 NOTE — TELEPHONE ENCOUNTER
RN called patient with results and RAUL Vesna Dalton's recommendation below to f/u in 1 year with Dr. London. Patient verbalized understanding and had no further questions at this time.       No significant abnormalities noted on monitor. Follow up with Dr. London in September 2019       Vesna Dalton, APRN, CNP

## 2018-11-08 NOTE — TELEPHONE ENCOUNTER
Called patient and let her know from a Cardiovascular stand point we are ok with her having general anesthesia for her knee replacement.  Advised her to also speak with her PCP and the orthopedist to decide if this is the right decision for her but her heart is stable at this point.    Regis Ramirez RN, BSN  Cardiology Care Coordinator  [facility]  [email or web address]  [phone]

## 2019-07-25 DIAGNOSIS — Q23.81 BICUSPID AORTIC VALVE: ICD-10-CM

## 2019-07-25 DIAGNOSIS — I35.0 AORTIC VALVE STENOSIS: ICD-10-CM

## 2019-07-27 RX ORDER — ATENOLOL 25 MG/1
TABLET ORAL
Qty: 270 TABLET | Refills: 0 | Status: CANCELLED | OUTPATIENT
Start: 2019-07-27

## 2019-07-29 DIAGNOSIS — I35.0 AORTIC VALVE STENOSIS: ICD-10-CM

## 2019-07-29 DIAGNOSIS — Q23.81 BICUSPID AORTIC VALVE: ICD-10-CM

## 2019-07-29 RX ORDER — ATENOLOL 25 MG/1
TABLET ORAL
Qty: 270 TABLET | Refills: 1 | Status: SHIPPED | OUTPATIENT
Start: 2019-07-29 | End: 2020-01-23

## 2019-09-28 ENCOUNTER — HEALTH MAINTENANCE LETTER (OUTPATIENT)
Age: 79
End: 2019-09-28

## 2019-10-10 ENCOUNTER — HOSPITAL ENCOUNTER (OUTPATIENT)
Dept: CARDIOLOGY | Facility: CLINIC | Age: 79
Discharge: HOME | End: 2019-10-10
Attending: INTERNAL MEDICINE | Admitting: INTERNAL MEDICINE
Payer: MEDICARE

## 2019-10-10 ENCOUNTER — OFFICE VISIT (OUTPATIENT)
Dept: CARDIOLOGY | Facility: CLINIC | Age: 79
End: 2019-10-10
Payer: MEDICARE

## 2019-10-10 VITALS
BODY MASS INDEX: 30.71 KG/M2 | SYSTOLIC BLOOD PRESSURE: 139 MMHG | DIASTOLIC BLOOD PRESSURE: 80 MMHG | HEART RATE: 68 BPM | HEIGHT: 61 IN | WEIGHT: 162.65 LBS

## 2019-10-10 DIAGNOSIS — Q23.0 AORTIC VALVE STENOSIS, CONGENITAL: ICD-10-CM

## 2019-10-10 DIAGNOSIS — Q23.81 BICUSPID AORTIC VALVE: ICD-10-CM

## 2019-10-10 DIAGNOSIS — Q23.81 BICUSPID AORTIC VALVE: Primary | ICD-10-CM

## 2019-10-10 PROCEDURE — 25500064 ZZH RX 255 OP 636: Performed by: INTERNAL MEDICINE

## 2019-10-10 PROCEDURE — 40000264 ECHOCARDIOGRAM COMPLETE

## 2019-10-10 PROCEDURE — 93306 TTE W/DOPPLER COMPLETE: CPT | Mod: 26 | Performed by: INTERNAL MEDICINE

## 2019-10-10 PROCEDURE — 99213 OFFICE O/P EST LOW 20 MIN: CPT | Mod: 25 | Performed by: INTERNAL MEDICINE

## 2019-10-10 RX ADMIN — HUMAN ALBUMIN MICROSPHERES AND PERFLUTREN 9 ML: 10; .22 INJECTION, SOLUTION INTRAVENOUS at 09:45

## 2019-10-10 ASSESSMENT — MIFFLIN-ST. JEOR: SCORE: 1140.16

## 2019-10-10 NOTE — PROGRESS NOTES
Service Date: 10/10/2019      HISTORY OF PRESENT ILLNESS:  Ms. Gallagher is a delightful 79-year-old woman who I last saw in 12/2018, when I met her for the first time.  She has a history of bicuspid aortic valve that progressed to severe aortic stenosis and underwent a 23 mm Magna Ease aortic valve replacement 08/2016 at [facility].  Her valve has been doing well without any concerning gradients including on her echo today.  She has had no other significant valve disease, and her ascending aorta is 3.6 cm at the root.      Ms. Gallagher has been doing well in the past year.  She has no concerning symptoms.  In the past she was having some palpitations on occasion, and we did do a Zio Patch and she had had very rare short-lived episodes of SVT.  She feels that she has not felt those anymore.  She has not been checking her blood pressure consistently at home, it is up a bit today, but she does have a blood pressure cuff at home and will try to do so.  She did not do well on a higher dose of amlodipine, her legs swelled significantly, so we cannot increase that.  She is pretty sensitive to medications in general but is tolerating hydrochlorothiazide 25 daily in the morning and she is on losartan 50 twice a day.  She is noting having to get up to urinate a lot at night.  We discussed kind of drinking last about 2 hours before bedtime, and she could try to take the losartan 100 mg once a day and see if that helps at all.  Her first-degree relatives have not been screened for bicuspid aortic valve, but she knows they need to be.  There is a family history of a lot of cancers including colon cancer in a daughter and breast cancer.  She is getting routine evaluations for that.      She was having some shortness of breath when I saw her last summer, and ultimately we decided to proceed with a right and left heart cath and constriction/restriction study.  She had that done on 09/04/2018.  She had mild coronary artery  disease which is 20% in her RCA, normal pressures throughout and no evidence of constrictive or restrictive disease.  She is looking at downsizing in the near future so working on that because she has 17 steps up to the second floor and down to the basement.      IMPRESSION, REPORT, PLAN:   1.  Bicuspid aortic valve, progressing to severe stenosis, status post aortic valve replacement with a 23 mm Magna Ease aortic valve 2016 at [facility].   2.  Hypertension.   3.  Mild coronary artery disease on cath 2018 with just 20% RCA.   4.  Family history of colon and breast cancer and getting routine screening.      DISCUSSION:  It was a pleasure to see Ms. Gallagher in followup.  She is doing well from a cardiovascular standpoint, had no concerning cardiac symptoms.  We discussed that I would like to get her blood pressure ideally for sure less than 130.  She will make sure her blood pressure readings at home are in that range, and if they are not, she will call in and we can adjust her medicine -- probably would switch atenolol, given her borderline renal function, to metoprolol 50 twice a day.  Then, continue to monitor and make adjustments from there.  Otherwise, we will plan to see her back in a year with an echo.  She will work on increasing her exercise in the interim.  It was a pleasure to see her.  Please do not hesitate to contact me with any questions or concerns.         ROSALINO AQUINO MD             D: 10/10/2019   T: 10/10/2019   MT: EDWIN      Name:     BEBO GALLAGHER   MRN:      [id number]        Account:      [id number]   :      1940           Service Date: 10/10/2019      Document: [id number]

## 2019-10-10 NOTE — NURSING NOTE
Diet: Patient instructed regarding a heart healthy diet, including discussion of reduced fat and sodium intake. Patient demonstrated understanding of this information and agreed to call with further questions or concerns.  Med Reconcile: Reviewed and verified all current medications with the patient. The updated medication list was printed and given to the patient.  Return Appointment: Patient given instructions regarding scheduling next clinic visit. Patient demonstrated understanding of this information and agreed to call with further questions or concerns.  Patient stated she understood all health information given and agreed to call with further questions or concerns.

## 2019-10-10 NOTE — PATIENT INSTRUCTIONS
You were seen today in the Adult Congenital and Cardiovascular Genetics Clinic at the [facility].    Cardiology Providers you saw during your visit:  JESSY London MD    Diagnosis:  BAV    Results:  JESSY London MD reviewed the results of your echocardiogram testing today in clinic.    Recommendations:    1. Continue to eat a heart healthy, low salt diet.  2. Continue to get 20-30 minutes of aerobic activity, 4-5 days per week.  Examples of aerobic activity include walking, running, swimming, cycling, etc.  3. Continue to observe good oral hygiene, with regular dental visits.  4. Please check your blood pressure at home. If it is high (over 130 systolic), we may change medications from atenolol to metroprolol 50mg twice daily.      SBE prophylaxis:   Yes_X___  No____    Lifelong Bacterial Endocarditis Prophylaxis:  YES____  NO____    If YES is checked, follow the recommendations outlined below:  1. Take antibiotic(s) prior to recommended dental procedures and procedures on the respiratory tract or with infected skin, muscle or bones. SBE prophylaxis is not needed for routine GI and  procedures (ie. Colonoscopy or vaginal delivery)  2. Observe good oral hygiene daily, as advised by your dentist. Get regular professional dental care.  3. Keep cuts clean.  4. Infections should be treated promptly.  5. Symptoms of Infective Endocarditis could include: fever lasting more than 4-5 days or a recurrent fever that initially resolves but returns within 1-2 days)      Exercise restrictions:   Yes__X__  No____         If yes, list restrictions:  Must be allowed to rest if fatigued or SOB      Work restrictions:  Yes____  No_X___         If yes, list restrictions:    FASTING CHOLESTEROL was checked in the last 5 years YES_X__  NO___ (2018)  Continue to eat a heart healthy, low salt diet.         ____ Fasting lipid panel order today         ____ No changes in medications          ____ I recommend the  following changes in your cholesterol medications.:          ____ Please follow up for cholesterol screening at your primary care physician      Follow-up:  Follow up in one year with Dr. London with labs and echocardiogram.     If you have questions or concerns please contact us at:    Rosy Howell, MSN, RN, CNL    Kayley Barcenas (Scheduling)  Nurse Care Coordinator     Clinic   Adult Congenital and CV Genetics   Adult Congenital and CV Genetic  [facility]   [facility]  (P) [phone]     (P) [phone]  [email or web address]   (F) [phone]        For after hours urgent needs, call [phone] and ask to speak to the Adult Congenital Physician on call.  Mention Job Code 0401.    For emergencies call 911.    [facility]   Clinics and Surgery Center  Mail Code [id number]  [address] [place] [address]  [phone]

## 2019-10-10 NOTE — PROGRESS NOTES
CARDIOLOGY CONSULTATION:    Please see dictated note    PAST MEDICAL HISTORY:  Past Medical History:   Diagnosis Date     Congenital bicuspid aortic valve      H/O aortic valve replacement     23 mm Magna Ease      Hyperlipidemia      Hypertension      Hyperthyroidism      Severe aortic stenosis      Thyroiditis        CURRENT MEDICATIONS:  Current Outpatient Medications   Medication Sig Dispense Refill     AMLODIPINE BESYLATE PO Take 2.5 mg by mouth every evening       aspirin 81 MG EC tablet Take 81 mg by mouth       atenolol (TENORMIN) 25 MG tablet Please take 25 mg tablet in the morning and 50 mg tablet in the evening as ordered. 270 tablet 1     Cetirizine HCl (ZYRTEC ALLERGY PO) Take 10 mg by mouth as needed       Cholecalciferol (VITAMIN D) 2000 units tablet Take 2,000 Units by mouth       fluticasone (FLONASE) 50 MCG/ACT spray SHAKE LQ AND U 1 SPR IEN QD  2     hydrochlorothiazide (HYDRODIURIL) 25 MG tablet Take 1 tablet (25 mg) by mouth daily 30 tablet      losartan (COZAAR) 100 MG tablet Take 0.5 tablets (50 mg) by mouth 2 times daily 90 tablet 3     Multiple Vitamins-Minerals (HAIR SKIN AND NAILS FORMULA) TABS Take by mouth daily       multivitamin, therapeutic with minerals (MULTI-VITAMIN) TABS tablet Take 1 tablet by mouth daily         PAST SURGICAL HISTORY:  History reviewed. No pertinent surgical history.    ALLERGIES  Carvedilol; Lidocaine; Lisinopril; Seasonal allergies; and Spironolactone    FAMILY HX:  History reviewed. No pertinent family history.    SOCIAL HX:  Social History     Socioeconomic History     Marital status:      Spouse name: None     Number of children: None     Years of education: None     Highest education level: None   Occupational History     None   Social Needs     Financial resource strain: None     Food insecurity:     Worry: None     Inability: None     Transportation needs:     Medical: None     Non-medical: None   Tobacco Use     Smoking status: Never Smoker      "Smokeless tobacco: Never Used   Substance and Sexual Activity     Alcohol use: No     Drug use: No     Sexual activity: None   Lifestyle     Physical activity:     Days per week: None     Minutes per session: None     Stress: None   Relationships     Social connections:     Talks on phone: None     Gets together: None     Attends Latter-day service: None     Active member of club or organization: None     Attends meetings of clubs or organizations: None     Relationship status: None     Intimate partner violence:     Fear of current or ex partner: None     Emotionally abused: None     Physically abused: None     Forced sexual activity: None   Other Topics Concern     None   Social History Narrative     None       ROS:  Constitutional: No fever, chills, or sweats. No weight gain/loss.   ENT: No visual disturbance, ear ache, epistaxis, sore throat.   Allergies/Immunologic: Negative.   Respiratory: No cough, hemoptysis.   Cardiovascular: As per HPI.   GI: No nausea, vomiting, hematemesis, melena, or hematochezia.   : No urinary frequency, dysuria, or hematuria.   Integument: Negative.   Psychiatric: Negative.   Neuro: Negative.   Endocrinology: Negative.   Musculoskeletal: No myalgia.    VITAL SIGNS:  /80   Pulse 68   Ht 1.549 m (5' 1\")   Wt 73.8 kg (162 lb 10.4 oz)   BMI 30.73 kg/m    Body mass index is 32.12 kg/m .  Wt Readings from Last 2 Encounters:   09/28/18 77.1 kg (170 lb)   09/04/18 76.8 kg (169 lb 4.8 oz)       PHYSICAL EXAM  Kandis Gallagher IS A 79 year old female.in no acute distress.  HEENT: Unremarkable.  Neck: JVP normal.  Carotids +4/4 bilaterally without bruits.  Lungs: CTA.  Cor: RRR. Normal S1 and S2 crisp.  No murmur, rub, or gallop.  PMI in Lf 5th ICS.  Abd: Soft, nontender, nondistended.  NABS.  No pulsatile mass.  Extremities: No C/C/E.  Pulses +4/4 symmetric in upper and lower extremities.  Neuro: Grossly intact.    LABS    Lab Results   Component Value Date    WBC 6.7 09/04/2018 "     Lab Results   Component Value Date    RBC 4.05 09/04/2018     Lab Results   Component Value Date    HGB 12.3 09/04/2018     Lab Results   Component Value Date    HCT 35.9 09/04/2018     No components found for: MCT  Lab Results   Component Value Date    MCV 89 09/04/2018     Lab Results   Component Value Date    MCH 30.4 09/04/2018     Lab Results   Component Value Date    MCHC 34.3 09/04/2018     Lab Results   Component Value Date    RDW 13.2 09/04/2018     Lab Results   Component Value Date     09/04/2018      Recent Labs   Lab Test 09/04/18  0900 07/13/18  1036    136   POTASSIUM 3.6 3.8   CHLORIDE 100 101   CO2 29 28   ANIONGAP 8 7   * 99   BUN 13 11   CR 0.94 0.89   DOUG 9.2 9.2     Recent Labs   Lab Test 07/13/18  1036   CHOL 211*   HDL 59   *   TRIG 115   NHDL 152*        JESSY London MD

## 2019-10-10 NOTE — LETTER
10/10/2019    Winnie RAGINI Damon  [facility] [address]    [place] [address]    RE: Kandis Gallagher       Dear Colleague,    I had the pleasure of seeing Kandis Gallagher in the [facility].    CARDIOLOGY CONSULTATION:    Please see dictated note    PAST MEDICAL HISTORY:  Past Medical History:   Diagnosis Date     Congenital bicuspid aortic valve      H/O aortic valve replacement     23 mm Magna Ease      Hyperlipidemia      Hypertension      Hyperthyroidism      Severe aortic stenosis      Thyroiditis        CURRENT MEDICATIONS:  Current Outpatient Medications   Medication Sig Dispense Refill     AMLODIPINE BESYLATE PO Take 2.5 mg by mouth every evening       aspirin 81 MG EC tablet Take 81 mg by mouth       atenolol (TENORMIN) 25 MG tablet Please take 25 mg tablet in the morning and 50 mg tablet in the evening as ordered. 270 tablet 1     Cetirizine HCl (ZYRTEC ALLERGY PO) Take 10 mg by mouth as needed       Cholecalciferol (VITAMIN D) 2000 units tablet Take 2,000 Units by mouth       fluticasone (FLONASE) 50 MCG/ACT spray SHAKE LQ AND U 1 SPR IEN QD  2     hydrochlorothiazide (HYDRODIURIL) 25 MG tablet Take 1 tablet (25 mg) by mouth daily 30 tablet      losartan (COZAAR) 100 MG tablet Take 0.5 tablets (50 mg) by mouth 2 times daily 90 tablet 3     Multiple Vitamins-Minerals (HAIR SKIN AND NAILS FORMULA) TABS Take by mouth daily       multivitamin, therapeutic with minerals (MULTI-VITAMIN) TABS tablet Take 1 tablet by mouth daily         PAST SURGICAL HISTORY:  History reviewed. No pertinent surgical history.    ALLERGIES  Carvedilol; Lidocaine; Lisinopril; Seasonal allergies; and Spironolactone    FAMILY HX:  History reviewed. No pertinent family history.    SOCIAL HX:  Social History     Socioeconomic History     Marital status:      Spouse name: None     Number of children: None     Years of education: None     Highest education level: None   Occupational History      "None   Social Needs     Financial resource strain: None     Food insecurity:     Worry: None     Inability: None     Transportation needs:     Medical: None     Non-medical: None   Tobacco Use     Smoking status: Never Smoker     Smokeless tobacco: Never Used   Substance and Sexual Activity     Alcohol use: No     Drug use: No     Sexual activity: None   Lifestyle     Physical activity:     Days per week: None     Minutes per session: None     Stress: None   Relationships     Social connections:     Talks on phone: None     Gets together: None     Attends Sabianism service: None     Active member of club or organization: None     Attends meetings of clubs or organizations: None     Relationship status: None     Intimate partner violence:     Fear of current or ex partner: None     Emotionally abused: None     Physically abused: None     Forced sexual activity: None   Other Topics Concern     None   Social History Narrative     None       ROS:  Constitutional: No fever, chills, or sweats. No weight gain/loss.   ENT: No visual disturbance, ear ache, epistaxis, sore throat.   Allergies/Immunologic: Negative.   Respiratory: No cough, hemoptysis.   Cardiovascular: As per HPI.   GI: No nausea, vomiting, hematemesis, melena, or hematochezia.   : No urinary frequency, dysuria, or hematuria.   Integument: Negative.   Psychiatric: Negative.   Neuro: Negative.   Endocrinology: Negative.   Musculoskeletal: No myalgia.    VITAL SIGNS:  /80   Pulse 68   Ht 1.549 m (5' 1\")   Wt 73.8 kg (162 lb 10.4 oz)   BMI 30.73 kg/m     Body mass index is 32.12 kg/m .  Wt Readings from Last 2 Encounters:   09/28/18 77.1 kg (170 lb)   09/04/18 76.8 kg (169 lb 4.8 oz)       PHYSICAL EXAM  Kandis Gallagher IS A 79 year old female.in no acute distress.  HEENT: Unremarkable.  Neck: JVP normal.  Carotids +4/4 bilaterally without bruits.  Lungs: CTA.  Cor: RRR. Normal S1 and S2 crisp.  No murmur, rub, or gallop.  PMI in Lf 5th ICS.  Abd: " Soft, nontender, nondistended.  NABS.  No pulsatile mass.  Extremities: No C/C/E.  Pulses +4/4 symmetric in upper and lower extremities.  Neuro: Grossly intact.    LABS    Lab Results   Component Value Date    WBC 6.7 09/04/2018     Lab Results   Component Value Date    RBC 4.05 09/04/2018     Lab Results   Component Value Date    HGB 12.3 09/04/2018     Lab Results   Component Value Date    HCT 35.9 09/04/2018     No components found for: MCT  Lab Results   Component Value Date    MCV 89 09/04/2018     Lab Results   Component Value Date    MCH 30.4 09/04/2018     Lab Results   Component Value Date    MCHC 34.3 09/04/2018     Lab Results   Component Value Date    RDW 13.2 09/04/2018     Lab Results   Component Value Date     09/04/2018      Recent Labs   Lab Test 09/04/18  0900 07/13/18  1036    136   POTASSIUM 3.6 3.8   CHLORIDE 100 101   CO2 29 28   ANIONGAP 8 7   * 99   BUN 13 11   CR 0.94 0.89   DOUG 9.2 9.2     Recent Labs   Lab Test 07/13/18  1036   CHOL 211*   HDL 59   *   TRIG 115   NHDL 152*        SVee London MD     Service Date: 10/10/2019      HISTORY OF PRESENT ILLNESS:  Ms. Gallagher is a delightful 79-year-old woman who I last saw in 12/2018, when I met her for the first time.  She has a history of bicuspid aortic valve that progressed to severe aortic stenosis and underwent a 23 mm Magna Ease aortic valve replacement 08/2016 at [facility].  Her valve has been doing well without any concerning gradients including on her echo today.  She has had no other significant valve disease, and her ascending aorta is 3.6 cm at the root.      Ms. Gallagher has been doing well in the past year.  She has no concerning symptoms.  In the past she was having some palpitations on occasion, and we did do a Zio Patch and she had had very rare short-lived episodes of SVT.  She feels that she has not felt those anymore.  She has not been checking her blood pressure consistently at home,  it is up a bit today, but she does have a blood pressure cuff at home and will try to do so.  She did not do well on a higher dose of amlodipine, her legs swelled significantly, so we cannot increase that.  She is pretty sensitive to medications in general but is tolerating hydrochlorothiazide 25 daily in the morning and she is on losartan 50 twice a day.  She is noting having to get up to urinate a lot at night.  We discussed kind of drinking last about 2 hours before bedtime, and she could try to take the losartan 100 mg once a day and see if that helps at all.  Her first-degree relatives have not been screened for bicuspid aortic valve, but she knows they need to be.  There is a family history of a lot of cancers including colon cancer in a daughter and breast cancer.  She is getting routine evaluations for that.      She was having some shortness of breath when I saw her last summer, and ultimately we decided to proceed with a right and left heart cath and constriction/restriction study.  She had that done on 09/04/2018.  She had mild coronary artery disease which is 20% in her RCA, normal pressures throughout and no evidence of constrictive or restrictive disease.  She is looking at downsizing in the near future so working on that because she has 17 steps up to the second floor and down to the basement.      IMPRESSION, REPORT, PLAN:   1.  Bicuspid aortic valve, progressing to severe stenosis, status post aortic valve replacement with a 23 mm Magna Ease aortic valve 08/2016 at [facility].   2.  Hypertension.   3.  Mild coronary artery disease on cath 09/2018 with just 20% RCA.   4.  Family history of colon and breast cancer and getting routine screening.      DISCUSSION:  It was a pleasure to see Ms. Gallagher in followup.  She is doing well from a cardiovascular standpoint, had no concerning cardiac symptoms.  We discussed that I would like to get her blood pressure ideally for sure less than 130.  She  will make sure her blood pressure readings at home are in that range, and if they are not, she will call in and we can adjust her medicine -- probably would switch atenolol, given her borderline renal function, to metoprolol 50 twice a day.  Then, continue to monitor and make adjustments from there.  Otherwise, we will plan to see her back in a year with an echo.  She will work on increasing her exercise in the interim.  It was a pleasure to see her.  Please do not hesitate to contact me with any questions or concerns.         ROSALINO LONDON MD             D: 10/10/2019   T: 10/10/2019   MT: EDWIN      Name:     BEBO POON   MRN:      [id number]        Account:      [id number]   :      1940           Service Date: 10/10/2019      Document: [id number]       Thank you for allowing me to participate in the care of your patient.      Sincerely,     Rosalino London MD     [facility]

## 2020-01-23 DIAGNOSIS — Q23.81 BICUSPID AORTIC VALVE: ICD-10-CM

## 2020-01-23 DIAGNOSIS — I35.0 AORTIC VALVE STENOSIS: ICD-10-CM

## 2020-01-23 RX ORDER — ATENOLOL 25 MG/1
TABLET ORAL
Qty: 270 TABLET | Refills: 2 | Status: SHIPPED | OUTPATIENT
Start: 2020-01-23 | End: 2020-10-12

## 2020-03-15 ENCOUNTER — HEALTH MAINTENANCE LETTER (OUTPATIENT)
Age: 80
End: 2020-03-15

## 2020-10-06 ENCOUNTER — HOSPITAL ENCOUNTER (OUTPATIENT)
Dept: CARDIOLOGY | Facility: CLINIC | Age: 80
Discharge: HOME | End: 2020-10-06
Attending: INTERNAL MEDICINE | Admitting: INTERNAL MEDICINE
Payer: MEDICARE

## 2020-10-06 DIAGNOSIS — Q23.81 BICUSPID AORTIC VALVE: ICD-10-CM

## 2020-10-06 LAB
ALT SERPL W P-5'-P-CCNC: <5 U/L (ref 5–30)
CHOLEST SERPL-MCNC: 237 MG/DL
HDLC SERPL-MCNC: 65 MG/DL
LDLC SERPL CALC-MCNC: 140 MG/DL
NONHDLC SERPL-MCNC: 172 MG/DL
TRIGL SERPL-MCNC: 158 MG/DL

## 2020-10-06 PROCEDURE — 93306 TTE W/DOPPLER COMPLETE: CPT | Mod: 26 | Performed by: INTERNAL MEDICINE

## 2020-10-06 PROCEDURE — 93306 TTE W/DOPPLER COMPLETE: CPT

## 2020-10-06 PROCEDURE — 36415 COLL VENOUS BLD VENIPUNCTURE: CPT | Performed by: INTERNAL MEDICINE

## 2020-10-06 PROCEDURE — 80061 LIPID PANEL: CPT | Performed by: INTERNAL MEDICINE

## 2020-10-06 PROCEDURE — 84460 ALANINE AMINO (ALT) (SGPT): CPT | Performed by: INTERNAL MEDICINE

## 2020-10-12 DIAGNOSIS — I35.0 AORTIC VALVE STENOSIS: ICD-10-CM

## 2020-10-12 DIAGNOSIS — Q23.81 BICUSPID AORTIC VALVE: ICD-10-CM

## 2020-10-12 RX ORDER — ATENOLOL 25 MG/1
TABLET ORAL
Qty: 270 TABLET | Refills: 0 | Status: SHIPPED | OUTPATIENT
Start: 2020-10-12 | End: 2022-10-11

## 2020-11-05 ENCOUNTER — TRANSFERRED RECORDS (OUTPATIENT)
Dept: HEALTH INFORMATION MANAGEMENT | Facility: CLINIC | Age: 80
End: 2020-11-05

## 2020-11-18 ENCOUNTER — VIRTUAL VISIT (OUTPATIENT)
Dept: CARDIOLOGY | Facility: CLINIC | Age: 80
End: 2020-11-18
Payer: MEDICARE

## 2020-11-18 DIAGNOSIS — Q23.81 BICUSPID AORTIC VALVE: Primary | ICD-10-CM

## 2020-11-18 DIAGNOSIS — E78.5 HYPERLIPIDEMIA LDL GOAL <100: ICD-10-CM

## 2020-11-18 PROCEDURE — 99214 OFFICE O/P EST MOD 30 MIN: CPT | Mod: 95 | Performed by: INTERNAL MEDICINE

## 2020-11-18 RX ORDER — ATORVASTATIN CALCIUM 10 MG/1
10 TABLET, FILM COATED ORAL DAILY
Qty: 90 TABLET | Refills: 3 | Status: SHIPPED | OUTPATIENT
Start: 2020-11-18 | End: 2022-10-24

## 2020-11-18 NOTE — PROGRESS NOTES
"Kandis Gallagher is a 80 year old female who is being evaluated via a billable video visit.      The patient has been notified of following:     \"This video visit will be conducted via a call between you and your physician/provider. We have found that certain health care needs can be provided without the need for an in-person physical exam.  This service lets us provide the care you need with a video conversation.  If a prescription is necessary we can send it directly to your pharmacy.  If lab work is needed we can place an order for that and you can then stop by our lab to have the test done at a later time.    Video visits are billed at different rates depending on your insurance coverage.  Please reach out to your insurance provider with any questions.    If during the course of the call the physician/provider feels a video visit is not appropriate, you will not be charged for this service.\"    Patient has given verbal consent for Video visit? Yes  How would you like to obtain your AVS? MyChart  If you are dropped from the video visit, the video invite should be resent to: Send to e-mail at: [email or web address]  Will anyone else be joining your video visit? No      Review Of Systems  Skin: NEGATIVE  Eyes:Ears/Nose/Throat: NEGATIVE  Respiratory: NEGATIVE  Cardiovascular:NEGATIVE  Gastrointestinal: NEGATIVE  Genitourinary:NEGATIVE  Musculoskeletal: Arthritis  Neurologic: NEGATIVE  Psychiatric: NEGATIVE  Hematologic/Lymphatic/Immunologic: NEGATIVE  Endocrine:  NEGATIVE    Vitals - Patient Reported  Weight (Patient Reported): 71.7 kg (158 lb)(on 10/31/20)  Height (Patient Reported): 154.9 cm (5' 1\")  Pulse (Patient Reported): 60    Telephone number of patient: [phone]    NAPOLEON Skaggs(AAMA) 11/18/20       Video-Visit Details    Type of service:  Video Visit    Video duration: 20 minutes    Originating Location (pt. Location): home    Distant Location (provider location):  [facility] " [facility]     Platform used for Video Visit: [facility]      CARDIOLOGY CONSULTATION:  Ms. Gallagher is a delightful 80-year-old woman who I last saw 10/2019.  She has a history of bicuspid aortic valve that progressed to severe aortic stenosis and underwent a 23 mm Magna Ease aortic valve replacement 08/2016 at [facility].  Her valve has been doing well without any concerning gradients including on her echo today.  She has had no other significant valve disease, and her ascending aorta is 3.6 cm at the root.      Ms. Gallagher has been doing well in the past year. Two of her 4 kids have been checked and negative.        She was having some shortness of breath when I met her in 2018 and ultimately we decided to proceed with a right and left heart cath and constriction/restriction study.  She had that done on 09/04/2018.  She had mild coronary artery disease which is 20% in her RCA, normal pressures throughout and no evidence of constrictive or restrictive disease.     Cholesterol is up a bit and she is not on medications.    PAST MEDICAL HISTORY:  Past Medical History:   Diagnosis Date     Congenital bicuspid aortic valve      H/O aortic valve replacement     23 mm Magna Ease      Hyperlipidemia      Hypertension      Hyperthyroidism      Severe aortic stenosis      Thyroiditis        CURRENT MEDICATIONS:  Current Outpatient Medications   Medication Sig Dispense Refill     AMLODIPINE BESYLATE PO Take 2.5 mg by mouth every evening       aspirin 81 MG EC tablet Take 81 mg by mouth       atenolol (TENORMIN) 25 MG tablet Please take 25 mg tablet in the morning and 50 mg tablet in the evening as ordered. 270 tablet 0     Cholecalciferol (VITAMIN D) 2000 units tablet Take 2,000 Units by mouth       fluticasone (FLONASE) 50 MCG/ACT spray SHAKE LQ AND U 1 SPR IEN QD  2     hydrochlorothiazide (HYDRODIURIL) 25 MG tablet Take 1 tablet (25 mg) by mouth daily 30 tablet      losartan (COZAAR) 100 MG tablet Take 0.5 tablets (50  mg) by mouth 2 times daily 90 tablet 3     Multiple Vitamins-Minerals (HAIR SKIN AND NAILS FORMULA) TABS Take by mouth daily         PAST SURGICAL HISTORY:  History reviewed. No pertinent surgical history.    ALLERGIES  Carvedilol, Lidocaine, Lisinopril, Seasonal allergies, and Spironolactone    FAMILY HX:  History reviewed. No pertinent family history.    SOCIAL HX:  Social History     Socioeconomic History     Marital status:      Spouse name: None     Number of children: None     Years of education: None     Highest education level: None   Occupational History     None   Social Needs     Financial resource strain: None     Food insecurity     Worry: None     Inability: None     Transportation needs     Medical: None     Non-medical: None   Tobacco Use     Smoking status: Never Smoker     Smokeless tobacco: Never Used   Substance and Sexual Activity     Alcohol use: No     Drug use: No     Sexual activity: None   Lifestyle     Physical activity     Days per week: None     Minutes per session: None     Stress: None   Relationships     Social connections     Talks on phone: None     Gets together: None     Attends Mandaeism service: None     Active member of club or organization: None     Attends meetings of clubs or organizations: None     Relationship status: None     Intimate partner violence     Fear of current or ex partner: None     Emotionally abused: None     Physically abused: None     Forced sexual activity: None   Other Topics Concern     None   Social History Narrative     None       ROS:  Constitutional: No fever, chills, or sweats. No weight gain/loss.   ENT: No visual disturbance, ear ache, epistaxis, sore throat.   Allergies/Immunologic: Negative.   Respiratory: No cough, hemoptysis.   Cardiovascular: As per HPI.   GI: No nausea, vomiting, hematemesis, melena, or hematochezia.   : No urinary frequency, dysuria, or hematuria.   Integument: Negative.   Psychiatric: Negative.   Neuro: Negative.    Endocrinology: Negative.   Musculoskeletal: No myalgia.    VITAL SIGNS:  There were no vitals taken for this visit.  There is no height or weight on file to calculate BMI.  Wt Readings from Last 2 Encounters:   10/10/19 73.8 kg (162 lb 10.4 oz)   09/28/18 77.1 kg (170 lb)       PHYSICAL EXAM  Kandis Gallagher IS A 80 year old female.in no acute distress.     LABS    Lab Results   Component Value Date    WBC 6.7 09/04/2018     Lab Results   Component Value Date    RBC 4.05 09/04/2018     Lab Results   Component Value Date    HGB 12.3 09/04/2018     Lab Results   Component Value Date    HCT 35.9 09/04/2018     No components found for: MCT  Lab Results   Component Value Date    MCV 89 09/04/2018     Lab Results   Component Value Date    MCH 30.4 09/04/2018     Lab Results   Component Value Date    MCHC 34.3 09/04/2018     Lab Results   Component Value Date    RDW 13.2 09/04/2018     Lab Results   Component Value Date     09/04/2018      Recent Labs   Lab Test 09/04/18  0900 07/13/18  1036    136   POTASSIUM 3.6 3.8   CHLORIDE 100 101   CO2 29 28   ANIONGAP 8 7   * 99   BUN 13 11   CR 0.94 0.89   DOUG 9.2 9.2     Recent Labs   Lab Test 10/06/20  0952 07/13/18  1036   CHOL 237* 211*   HDL 65 59   * 129*   TRIG 158* 115   NHDL 172* 152*        IMPRESSION, REPORT, PLAN:   1.  Bicuspid aortic valve, progressing to severe stenosis, status post aortic valve replacement with a 23 mm Magna Ease aortic valve 08/2016 at [facility].   2.  Hypertension.   3.  Mild coronary artery disease on cath 09/2018 with just 20% RCA.   4.  Family history of colon and breast cancer and getting routine screening.      DISCUSSION:  It was a pleasure to see Ms. Gallagher in followup.  She is doing well from a cardiovascular standpoint, had no concerning cardiac symptoms.  We started lipitor 10 mg daily for . Will check lipids and liver function in 2 months and can call with those results with goal LDL  <100     Otherwise, we will plan to see her back in a year with an echo.  She will work on increasing her exercise in the interim.  It was a pleasure to see her.  Please do not hesitate to contact me with any questions or concerns.         ROSALINO AQUINO MD

## 2020-11-18 NOTE — LETTER
"11/18/2020    [facility] [address]    [facility] [address]    RE: Kandis Gallagher       Dear Colleague,    I had the pleasure of seeing Kandis Gallagher in the [facility].    Kandis Gallagher is a 80 year old female who is being evaluated via a billable video visit.      The patient has been notified of following:     \"This video visit will be conducted via a call between you and your physician/provider. We have found that certain health care needs can be provided without the need for an in-person physical exam.  This service lets us provide the care you need with a video conversation.  If a prescription is necessary we can send it directly to your pharmacy.  If lab work is needed we can place an order for that and you can then stop by our lab to have the test done at a later time.    Video visits are billed at different rates depending on your insurance coverage.  Please reach out to your insurance provider with any questions.    If during the course of the call the physician/provider feels a video visit is not appropriate, you will not be charged for this service.\"    Patient has given verbal consent for Video visit? Yes  How would you like to obtain your AVS? MyChart  If you are dropped from the video visit, the video invite should be resent to: Send to e-mail at: [email or web address]  Will anyone else be joining your video visit? No      Review Of Systems  Skin: NEGATIVE  Eyes:Ears/Nose/Throat: NEGATIVE  Respiratory: NEGATIVE  Cardiovascular:NEGATIVE  Gastrointestinal: NEGATIVE  Genitourinary:NEGATIVE  Musculoskeletal: Arthritis  Neurologic: NEGATIVE  Psychiatric: NEGATIVE  Hematologic/Lymphatic/Immunologic: NEGATIVE  Endocrine:  NEGATIVE    Vitals - Patient Reported  Weight (Patient Reported): 71.7 kg (158 lb)(on 10/31/20)  Height (Patient Reported): 154.9 cm (5' 1\")  Pulse (Patient Reported): 60    Telephone number of patient: " [phone]    PHUONGeltontori NAPOLEON(AAMA) 11/18/20       Video-Visit Details    Type of service:  Video Visit    Video duration: 20 minutes    Originating Location (pt. Location): home    Distant Location (provider location):  [facility]     Platform used for Video Visit: [facility]      CARDIOLOGY CONSULTATION:  Ms. Gallagher is a delightful 80-year-old woman who I last saw 10/2019.  She has a history of bicuspid aortic valve that progressed to severe aortic stenosis and underwent a 23 mm Magna Ease aortic valve replacement 08/2016 at [facility].  Her valve has been doing well without any concerning gradients including on her echo today.  She has had no other significant valve disease, and her ascending aorta is 3.6 cm at the root.      Ms. Gallagher has been doing well in the past year. Two of her 4 kids have been checked and negative.        She was having some shortness of breath when I met her in 2018 and ultimately we decided to proceed with a right and left heart cath and constriction/restriction study.  She had that done on 09/04/2018.  She had mild coronary artery disease which is 20% in her RCA, normal pressures throughout and no evidence of constrictive or restrictive disease.     Cholesterol is up a bit and she is not on medications.    PAST MEDICAL HISTORY:  Past Medical History:   Diagnosis Date     Congenital bicuspid aortic valve      H/O aortic valve replacement     23 mm Magna Ease      Hyperlipidemia      Hypertension      Hyperthyroidism      Severe aortic stenosis      Thyroiditis        CURRENT MEDICATIONS:  Current Outpatient Medications   Medication Sig Dispense Refill     AMLODIPINE BESYLATE PO Take 2.5 mg by mouth every evening       aspirin 81 MG EC tablet Take 81 mg by mouth       atenolol (TENORMIN) 25 MG tablet Please take 25 mg tablet in the morning and 50 mg tablet in the evening as ordered. 270 tablet 0     Cholecalciferol (VITAMIN D) 2000 units tablet  Take 2,000 Units by mouth       fluticasone (FLONASE) 50 MCG/ACT spray SHAKE LQ AND U 1 SPR IEN QD  2     hydrochlorothiazide (HYDRODIURIL) 25 MG tablet Take 1 tablet (25 mg) by mouth daily 30 tablet      losartan (COZAAR) 100 MG tablet Take 0.5 tablets (50 mg) by mouth 2 times daily 90 tablet 3     Multiple Vitamins-Minerals (HAIR SKIN AND NAILS FORMULA) TABS Take by mouth daily         PAST SURGICAL HISTORY:  History reviewed. No pertinent surgical history.    ALLERGIES  Carvedilol, Lidocaine, Lisinopril, Seasonal allergies, and Spironolactone    FAMILY HX:  History reviewed. No pertinent family history.    SOCIAL HX:  Social History     Socioeconomic History     Marital status:      Spouse name: None     Number of children: None     Years of education: None     Highest education level: None   Occupational History     None   Social Needs     Financial resource strain: None     Food insecurity     Worry: None     Inability: None     Transportation needs     Medical: None     Non-medical: None   Tobacco Use     Smoking status: Never Smoker     Smokeless tobacco: Never Used   Substance and Sexual Activity     Alcohol use: No     Drug use: No     Sexual activity: None   Lifestyle     Physical activity     Days per week: None     Minutes per session: None     Stress: None   Relationships     Social connections     Talks on phone: None     Gets together: None     Attends Buddhist service: None     Active member of club or organization: None     Attends meetings of clubs or organizations: None     Relationship status: None     Intimate partner violence     Fear of current or ex partner: None     Emotionally abused: None     Physically abused: None     Forced sexual activity: None   Other Topics Concern     None   Social History Narrative     None       ROS:  Constitutional: No fever, chills, or sweats. No weight gain/loss.   ENT: No visual disturbance, ear ache, epistaxis, sore throat.   Allergies/Immunologic:  Negative.   Respiratory: No cough, hemoptysis.   Cardiovascular: As per HPI.   GI: No nausea, vomiting, hematemesis, melena, or hematochezia.   : No urinary frequency, dysuria, or hematuria.   Integument: Negative.   Psychiatric: Negative.   Neuro: Negative.   Endocrinology: Negative.   Musculoskeletal: No myalgia.    VITAL SIGNS:  There were no vitals taken for this visit.  There is no height or weight on file to calculate BMI.  Wt Readings from Last 2 Encounters:   10/10/19 73.8 kg (162 lb 10.4 oz)   09/28/18 77.1 kg (170 lb)       PHYSICAL EXAM  Kandis Gallagher IS A 80 year old female.in no acute distress.     LABS    Lab Results   Component Value Date    WBC 6.7 09/04/2018     Lab Results   Component Value Date    RBC 4.05 09/04/2018     Lab Results   Component Value Date    HGB 12.3 09/04/2018     Lab Results   Component Value Date    HCT 35.9 09/04/2018     No components found for: MCT  Lab Results   Component Value Date    MCV 89 09/04/2018     Lab Results   Component Value Date    MCH 30.4 09/04/2018     Lab Results   Component Value Date    MCHC 34.3 09/04/2018     Lab Results   Component Value Date    RDW 13.2 09/04/2018     Lab Results   Component Value Date     09/04/2018      Recent Labs   Lab Test 09/04/18  0900 07/13/18  1036    136   POTASSIUM 3.6 3.8   CHLORIDE 100 101   CO2 29 28   ANIONGAP 8 7   * 99   BUN 13 11   CR 0.94 0.89   DOUG 9.2 9.2     Recent Labs   Lab Test 10/06/20  0952 07/13/18  1036   CHOL 237* 211*   HDL 65 59   * 129*   TRIG 158* 115   NHDL 172* 152*        IMPRESSION, REPORT, PLAN:   1.  Bicuspid aortic valve, progressing to severe stenosis, status post aortic valve replacement with a 23 mm Magna Ease aortic valve 08/2016 at [facility].   2.  Hypertension.   3.  Mild coronary artery disease on cath 09/2018 with just 20% RCA.   4.  Family history of colon and breast cancer and getting routine screening.      DISCUSSION:  It was a pleasure  to see Ms. Gallagher in followup.  She is doing well from a cardiovascular standpoint, had no concerning cardiac symptoms.  We started lipitor 10 mg daily for . Will check lipids and liver function in 2 months and can call with those results with goal LDL <100     Otherwise, we will plan to see her back in a year with an echo.  She will work on increasing her exercise in the interim.  It was a pleasure to see her.  Please do not hesitate to contact me with any questions or concerns.       Thank you for allowing me to participate in the care of your patient.    Sincerely,     Denae London MD     [facility]

## 2021-05-08 ENCOUNTER — HEALTH MAINTENANCE LETTER (OUTPATIENT)
Age: 81
End: 2021-05-08

## 2021-10-04 ENCOUNTER — OFFICE VISIT (OUTPATIENT)
Dept: INTERNAL MEDICINE | Age: 81
End: 2021-10-04

## 2021-10-04 ENCOUNTER — LAB SERVICES (OUTPATIENT)
Dept: LAB | Age: 81
End: 2021-10-04

## 2021-10-04 VITALS
BODY MASS INDEX: 27.82 KG/M2 | WEIGHT: 138 LBS | SYSTOLIC BLOOD PRESSURE: 142 MMHG | HEART RATE: 78 BPM | HEIGHT: 59 IN | DIASTOLIC BLOOD PRESSURE: 60 MMHG

## 2021-10-04 DIAGNOSIS — Z00.00 ROUTINE ADULT HEALTH MAINTENANCE: Primary | ICD-10-CM

## 2021-10-04 DIAGNOSIS — Z23 NEED FOR VACCINATION: ICD-10-CM

## 2021-10-04 DIAGNOSIS — M80.00XS AGE-RELATED OSTEOPOROSIS WITH CURRENT PATHOLOGICAL FRACTURE, SEQUELA: ICD-10-CM

## 2021-10-04 DIAGNOSIS — R60.0 BILATERAL LEG EDEMA: ICD-10-CM

## 2021-10-04 DIAGNOSIS — I10 PRIMARY HYPERTENSION: ICD-10-CM

## 2021-10-04 DIAGNOSIS — Z00.00 ROUTINE ADULT HEALTH MAINTENANCE: ICD-10-CM

## 2021-10-04 DIAGNOSIS — E78.2 MIXED HYPERLIPIDEMIA: ICD-10-CM

## 2021-10-04 PROBLEM — Z95.2 S/P AVR (AORTIC VALVE REPLACEMENT): Status: ACTIVE | Noted: 2021-10-04

## 2021-10-04 PROBLEM — M80.00XA OSTEOPOROSIS WITH CURRENT PATHOLOGICAL FRACTURE: Status: ACTIVE | Noted: 2021-10-04

## 2021-10-04 LAB
ALBUMIN SERPL-MCNC: 3.7 G/DL (ref 3.6–5.1)
ALBUMIN/GLOB SERPL: 1.4 {RATIO} (ref 1–2.4)
ALP SERPL-CCNC: 88 UNITS/L (ref 45–117)
ALT SERPL-CCNC: 25 UNITS/L
ANION GAP SERPL CALC-SCNC: 11 MMOL/L (ref 10–20)
AST SERPL-CCNC: 20 UNITS/L
BILIRUB SERPL-MCNC: 0.4 MG/DL (ref 0.2–1)
BUN SERPL-MCNC: 20 MG/DL (ref 6–20)
BUN/CREAT SERPL: 24 (ref 7–25)
CALCIUM SERPL-MCNC: 9.6 MG/DL (ref 8.4–10.2)
CHLORIDE SERPL-SCNC: 106 MMOL/L (ref 98–107)
CO2 SERPL-SCNC: 31 MMOL/L (ref 21–32)
CREAT SERPL-MCNC: 0.82 MG/DL (ref 0.51–0.95)
D DIMER PPP FEU-MCNC: 0.82 MG/L (FEU)
DEPRECATED RDW RBC: 45.7 FL (ref 39–50)
ERYTHROCYTE [DISTWIDTH] IN BLOOD: 12.7 % (ref 11–15)
FASTING DURATION TIME PATIENT: 2 HOURS (ref 0–999)
GFR SERPLBLD BASED ON 1.73 SQ M-ARVRAT: 66 ML/MIN
GLOBULIN SER-MCNC: 2.7 G/DL (ref 2–4)
GLUCOSE SERPL-MCNC: 94 MG/DL (ref 70–99)
HCT VFR BLD CALC: 35.8 % (ref 36–46.5)
HGB BLD-MCNC: 11.2 G/DL (ref 12–15.5)
MCH RBC QN AUTO: 30.7 PG (ref 26–34)
MCHC RBC AUTO-ENTMCNC: 31.3 G/DL (ref 32–36.5)
MCV RBC AUTO: 98.1 FL (ref 78–100)
NRBC BLD MANUAL-RTO: 0 /100 WBC
PLATELET # BLD AUTO: 131 K/MCL (ref 140–450)
POTASSIUM SERPL-SCNC: 4 MMOL/L (ref 3.4–5.1)
PROT SERPL-MCNC: 6.4 G/DL (ref 6.4–8.2)
RBC # BLD: 3.65 MIL/MCL (ref 4–5.2)
SODIUM SERPL-SCNC: 144 MMOL/L (ref 135–145)
TSH SERPL-ACNC: 0.41 MCUNITS/ML (ref 0.35–5)
WBC # BLD: 6 K/MCL (ref 4.2–11)

## 2021-10-04 PROCEDURE — G0008 ADMIN INFLUENZA VIRUS VAC: HCPCS | Performed by: INTERNAL MEDICINE

## 2021-10-04 PROCEDURE — 84443 ASSAY THYROID STIM HORMONE: CPT | Performed by: CLINICAL MEDICAL LABORATORY

## 2021-10-04 PROCEDURE — 80053 COMPREHEN METABOLIC PANEL: CPT | Performed by: CLINICAL MEDICAL LABORATORY

## 2021-10-04 PROCEDURE — 85379 FIBRIN DEGRADATION QUANT: CPT | Performed by: CLINICAL MEDICAL LABORATORY

## 2021-10-04 PROCEDURE — 90662 IIV NO PRSV INCREASED AG IM: CPT | Performed by: INTERNAL MEDICINE

## 2021-10-04 PROCEDURE — 99204 OFFICE O/P NEW MOD 45 MIN: CPT | Performed by: INTERNAL MEDICINE

## 2021-10-04 PROCEDURE — 36415 COLL VENOUS BLD VENIPUNCTURE: CPT | Performed by: INTERNAL MEDICINE

## 2021-10-04 PROCEDURE — 85027 COMPLETE CBC AUTOMATED: CPT | Performed by: CLINICAL MEDICAL LABORATORY

## 2021-10-04 RX ORDER — LANOLIN ALCOHOL/MO/W.PET/CERES
CREAM (GRAM) TOPICAL
COMMUNITY
End: 2022-01-26 | Stop reason: ALTCHOICE

## 2021-10-04 RX ORDER — METOPROLOL SUCCINATE 25 MG/1
25 TABLET, EXTENDED RELEASE ORAL DAILY
COMMUNITY
End: 2022-01-26 | Stop reason: SDUPTHER

## 2021-10-04 RX ORDER — ASPIRIN 81 MG/1
81 TABLET, CHEWABLE ORAL DAILY
COMMUNITY
End: 2022-01-26 | Stop reason: SDUPTHER

## 2021-10-04 RX ORDER — ATORVASTATIN CALCIUM 10 MG/1
10 TABLET, FILM COATED ORAL DAILY
COMMUNITY
End: 2022-01-26 | Stop reason: SDUPTHER

## 2021-10-04 RX ORDER — ASCORBIC ACID/VITAMIN E/BIOTIN 7.5MG-125
2500 TABLET,CHEWABLE ORAL DAILY
COMMUNITY

## 2021-10-04 RX ORDER — LOSARTAN POTASSIUM 50 MG/1
50 TABLET ORAL DAILY
COMMUNITY
End: 2021-12-13 | Stop reason: SDUPTHER

## 2021-10-04 RX ORDER — FERROUS SULFATE 325(65) MG
325 TABLET ORAL
COMMUNITY

## 2021-10-04 ASSESSMENT — ENCOUNTER SYMPTOMS
ACTIVITY CHANGE: 0
DIARRHEA: 0
SINUS PAIN: 0
LIGHT-HEADEDNESS: 0
FATIGUE: 0
EYE DISCHARGE: 0
NAUSEA: 0
VOMITING: 0
PHOTOPHOBIA: 0
CHEST TIGHTNESS: 0
SORE THROAT: 0
EYE ITCHING: 0
CHILLS: 0
UNEXPECTED WEIGHT CHANGE: 0
SHORTNESS OF BREATH: 0
SINUS PRESSURE: 0
HEADACHES: 0
APPETITE CHANGE: 0
CONSTIPATION: 0
COUGH: 0
DIZZINESS: 0
RHINORRHEA: 0
VOICE CHANGE: 0
WHEEZING: 0
ABDOMINAL PAIN: 0

## 2021-10-04 ASSESSMENT — PATIENT HEALTH QUESTIONNAIRE - PHQ9
1. LITTLE INTEREST OR PLEASURE IN DOING THINGS: NOT AT ALL
CLINICAL INTERPRETATION OF PHQ2 SCORE: NO FURTHER SCREENING NEEDED
SUM OF ALL RESPONSES TO PHQ9 QUESTIONS 1 AND 2: 0
SUM OF ALL RESPONSES TO PHQ9 QUESTIONS 1 AND 2: 0
CLINICAL INTERPRETATION OF PHQ9 SCORE: NO FURTHER SCREENING NEEDED
2. FEELING DOWN, DEPRESSED OR HOPELESS: NOT AT ALL

## 2021-10-11 ENCOUNTER — TELEPHONE (OUTPATIENT)
Dept: INTERNAL MEDICINE | Age: 81
End: 2021-10-11

## 2021-10-23 ENCOUNTER — HEALTH MAINTENANCE LETTER (OUTPATIENT)
Age: 81
End: 2021-10-23

## 2021-11-08 ENCOUNTER — TELEPHONE (OUTPATIENT)
Dept: INTERNAL MEDICINE | Age: 81
End: 2021-11-08

## 2021-11-08 DIAGNOSIS — J32.9 SINUSITIS, UNSPECIFIED CHRONICITY, UNSPECIFIED LOCATION: ICD-10-CM

## 2021-11-08 DIAGNOSIS — R05.9 COUGH: Primary | ICD-10-CM

## 2021-11-09 ENCOUNTER — TELEPHONE (OUTPATIENT)
Dept: INTERNAL MEDICINE | Age: 81
End: 2021-11-09

## 2021-11-09 RX ORDER — DOXYCYCLINE 100 MG/1
100 CAPSULE ORAL 2 TIMES DAILY
Qty: 14 CAPSULE | Refills: 0 | Status: SHIPPED | OUTPATIENT
Start: 2021-11-09 | End: 2021-11-16

## 2021-11-12 RX ORDER — AMOXICILLIN AND CLAVULANATE POTASSIUM 875; 125 MG/1; MG/1
1 TABLET, FILM COATED ORAL 2 TIMES DAILY
Qty: 20 TABLET | Refills: 0 | Status: SHIPPED | OUTPATIENT
Start: 2021-11-12 | End: 2021-11-22 | Stop reason: ALTCHOICE

## 2021-11-20 ASSESSMENT — PATIENT HEALTH QUESTIONNAIRE - PHQ9
SUM OF ALL RESPONSES TO PHQ9 QUESTIONS 1 AND 2: 0
CLINICAL INTERPRETATION OF PHQ2 SCORE: 0
1. LITTLE INTEREST OR PLEASURE IN DOING THINGS: NOT AT ALL
CLINICAL INTERPRETATION OF PHQ2 SCORE: NO FURTHER SCREENING NEEDED
2. FEELING DOWN, DEPRESSED OR HOPELESS: NOT AT ALL

## 2021-11-22 ENCOUNTER — OFFICE VISIT (OUTPATIENT)
Dept: INTERNAL MEDICINE | Age: 81
End: 2021-11-22

## 2021-11-22 VITALS
BODY MASS INDEX: 27.82 KG/M2 | HEIGHT: 59 IN | SYSTOLIC BLOOD PRESSURE: 120 MMHG | DIASTOLIC BLOOD PRESSURE: 60 MMHG | WEIGHT: 138 LBS

## 2021-11-22 DIAGNOSIS — M80.00XS AGE-RELATED OSTEOPOROSIS WITH CURRENT PATHOLOGICAL FRACTURE, SEQUELA: ICD-10-CM

## 2021-11-22 DIAGNOSIS — E78.2 MIXED HYPERLIPIDEMIA: ICD-10-CM

## 2021-11-22 DIAGNOSIS — Z78.9 IMPAIRED MOBILITY AND ADLS: ICD-10-CM

## 2021-11-22 DIAGNOSIS — E55.9 VITAMIN D DEFICIENCY: ICD-10-CM

## 2021-11-22 DIAGNOSIS — Z74.09 IMPAIRED MOBILITY AND ADLS: ICD-10-CM

## 2021-11-22 DIAGNOSIS — I10 PRIMARY HYPERTENSION: ICD-10-CM

## 2021-11-22 DIAGNOSIS — Z95.2 S/P AVR (AORTIC VALVE REPLACEMENT): ICD-10-CM

## 2021-11-22 DIAGNOSIS — M80.80XS LOCALIZED OSTEOPOROSIS WITH CURRENT PATHOLOGICAL FRACTURE, SEQUELA: Primary | ICD-10-CM

## 2021-11-22 DIAGNOSIS — Z12.31 ENCOUNTER FOR SCREENING MAMMOGRAM FOR MALIGNANT NEOPLASM OF BREAST: ICD-10-CM

## 2021-11-22 DIAGNOSIS — R60.0 BILATERAL LEG EDEMA: ICD-10-CM

## 2021-11-22 PROBLEM — Z80.3 FAMILY HISTORY OF BREAST CANCER: Status: ACTIVE | Noted: 2021-11-22

## 2021-11-22 PROCEDURE — G0439 PPPS, SUBSEQ VISIT: HCPCS | Performed by: INTERNAL MEDICINE

## 2021-11-23 ENCOUNTER — TELEPHONE (OUTPATIENT)
Dept: INTERNAL MEDICINE | Age: 81
End: 2021-11-23

## 2021-11-23 ENCOUNTER — TELEPHONE (OUTPATIENT)
Dept: ENDOCRINOLOGY | Age: 81
End: 2021-11-23

## 2021-12-06 ENCOUNTER — TELEPHONE (OUTPATIENT)
Dept: INTERNAL MEDICINE | Age: 81
End: 2021-12-06

## 2021-12-14 RX ORDER — LOSARTAN POTASSIUM 50 MG/1
50 TABLET ORAL DAILY
Qty: 90 TABLET | Refills: 3 | Status: SHIPPED | OUTPATIENT
Start: 2021-12-14 | End: 2021-12-17

## 2021-12-17 ENCOUNTER — TELEPHONE (OUTPATIENT)
Dept: INTERNAL MEDICINE | Age: 81
End: 2021-12-17

## 2021-12-17 RX ORDER — LOSARTAN POTASSIUM 50 MG/1
50 TABLET ORAL DAILY
Qty: 90 TABLET | Refills: 0 | Status: SHIPPED | OUTPATIENT
Start: 2021-12-17 | End: 2022-01-26 | Stop reason: DRUGHIGH

## 2022-01-11 ENCOUNTER — IMAGING SERVICES (OUTPATIENT)
Dept: MAMMOGRAPHY | Age: 82
End: 2022-01-11
Attending: INTERNAL MEDICINE

## 2022-01-11 DIAGNOSIS — Z12.31 ENCOUNTER FOR SCREENING MAMMOGRAM FOR MALIGNANT NEOPLASM OF BREAST: ICD-10-CM

## 2022-01-11 PROCEDURE — 77067 SCR MAMMO BI INCL CAD: CPT | Performed by: RADIOLOGY

## 2022-01-11 PROCEDURE — 77063 BREAST TOMOSYNTHESIS BI: CPT | Performed by: RADIOLOGY

## 2022-01-19 DIAGNOSIS — N64.89 BREAST ASYMMETRY: Primary | ICD-10-CM

## 2022-01-19 DIAGNOSIS — N63.0 BREAST MASS: ICD-10-CM

## 2022-01-26 ENCOUNTER — OFFICE VISIT (OUTPATIENT)
Dept: CARDIOLOGY | Age: 82
End: 2022-01-26
Attending: INTERNAL MEDICINE

## 2022-01-26 VITALS
HEART RATE: 84 BPM | BODY MASS INDEX: 28.67 KG/M2 | OXYGEN SATURATION: 99 % | RESPIRATION RATE: 16 BRPM | SYSTOLIC BLOOD PRESSURE: 130 MMHG | WEIGHT: 142.2 LBS | HEIGHT: 59 IN | DIASTOLIC BLOOD PRESSURE: 60 MMHG

## 2022-01-26 DIAGNOSIS — Z95.2 S/P AVR (AORTIC VALVE REPLACEMENT): ICD-10-CM

## 2022-01-26 PROCEDURE — 99203 OFFICE O/P NEW LOW 30 MIN: CPT | Performed by: INTERNAL MEDICINE

## 2022-01-26 PROCEDURE — 99211 OFF/OP EST MAY X REQ PHY/QHP: CPT | Performed by: INTERNAL MEDICINE

## 2022-01-26 RX ORDER — ATORVASTATIN CALCIUM 10 MG/1
10 TABLET, FILM COATED ORAL DAILY
Qty: 90 TABLET | Refills: 11 | Status: SHIPPED | OUTPATIENT
Start: 2022-01-26

## 2022-01-26 RX ORDER — LOSARTAN POTASSIUM 25 MG/1
25 TABLET ORAL 2 TIMES DAILY
Qty: 180 TABLET | Refills: 11 | Status: ON HOLD | OUTPATIENT
Start: 2022-01-26 | End: 2022-06-05 | Stop reason: HOSPADM

## 2022-01-26 RX ORDER — METOPROLOL SUCCINATE 25 MG/1
12.5 TABLET, EXTENDED RELEASE ORAL 2 TIMES DAILY
Qty: 90 TABLET | Refills: 11 | Status: ON HOLD | OUTPATIENT
Start: 2022-01-26 | End: 2022-06-05 | Stop reason: HOSPADM

## 2022-01-26 RX ORDER — ASPIRIN 81 MG/1
81 TABLET, CHEWABLE ORAL DAILY
Qty: 90 TABLET | Refills: 11 | Status: SHIPPED | OUTPATIENT
Start: 2022-01-26

## 2022-01-31 ENCOUNTER — HOSPITAL ENCOUNTER (OUTPATIENT)
Dept: ULTRASOUND IMAGING | Age: 82
Discharge: HOME | End: 2022-01-31
Attending: INTERNAL MEDICINE

## 2022-01-31 ENCOUNTER — HOSPITAL ENCOUNTER (OUTPATIENT)
Dept: MAMMOGRAPHY | Age: 82
Discharge: HOME | End: 2022-01-31
Attending: INTERNAL MEDICINE

## 2022-01-31 DIAGNOSIS — N64.89 BREAST ASYMMETRY: ICD-10-CM

## 2022-01-31 PROCEDURE — 76642 ULTRASOUND BREAST LIMITED: CPT

## 2022-01-31 PROCEDURE — 77065 DX MAMMO INCL CAD UNI: CPT

## 2022-03-07 ENCOUNTER — WALK IN (OUTPATIENT)
Dept: URGENT CARE | Age: 82
End: 2022-03-07

## 2022-03-07 VITALS
HEART RATE: 85 BPM | SYSTOLIC BLOOD PRESSURE: 160 MMHG | RESPIRATION RATE: 20 BRPM | OXYGEN SATURATION: 98 % | DIASTOLIC BLOOD PRESSURE: 80 MMHG

## 2022-03-07 DIAGNOSIS — L60.9 FINGERNAIL ABNORMALITIES: Primary | ICD-10-CM

## 2022-03-07 PROCEDURE — 99213 OFFICE O/P EST LOW 20 MIN: CPT | Performed by: NURSE PRACTITIONER

## 2022-03-24 ENCOUNTER — TELEPHONE (OUTPATIENT)
Dept: CARDIOLOGY | Age: 82
End: 2022-03-24

## 2022-04-30 ENCOUNTER — NURSE TRIAGE (OUTPATIENT)
Dept: TELEHEALTH | Age: 82
End: 2022-04-30

## 2022-04-30 ENCOUNTER — E-ADVICE (OUTPATIENT)
Dept: INTERNAL MEDICINE | Age: 82
End: 2022-04-30

## 2022-04-30 ENCOUNTER — V-VISIT (OUTPATIENT)
Dept: FAMILY MEDICINE | Age: 82
End: 2022-04-30

## 2022-04-30 DIAGNOSIS — R69 DIAGNOSIS DEFERRED: Primary | ICD-10-CM

## 2022-05-12 ENCOUNTER — TELEPHONE (OUTPATIENT)
Dept: CARDIOLOGY | Age: 82
End: 2022-05-12

## 2022-05-17 ENCOUNTER — TELEPHONE (OUTPATIENT)
Dept: HEMATOLOGY/ONCOLOGY | Age: 82
End: 2022-05-17

## 2022-05-20 ENCOUNTER — LAB SERVICES (OUTPATIENT)
Dept: LAB | Age: 82
End: 2022-05-20

## 2022-05-20 DIAGNOSIS — R60.0 BILATERAL LEG EDEMA: ICD-10-CM

## 2022-05-20 DIAGNOSIS — E55.9 VITAMIN D DEFICIENCY: ICD-10-CM

## 2022-05-20 DIAGNOSIS — I10 PRIMARY HYPERTENSION: ICD-10-CM

## 2022-05-20 LAB
ANION GAP SERPL CALC-SCNC: 11 MMOL/L (ref 7–19)
BUN SERPL-MCNC: 24 MG/DL (ref 6–20)
BUN/CREAT SERPL: 19 (ref 7–25)
CALCIUM SERPL-MCNC: 10.2 MG/DL (ref 8.4–10.2)
CHLORIDE SERPL-SCNC: 101 MMOL/L (ref 97–110)
CO2 SERPL-SCNC: 30 MMOL/L (ref 21–32)
CREAT SERPL-MCNC: 1.25 MG/DL (ref 0.51–0.95)
DEPRECATED RDW RBC: 54.5 FL (ref 39–50)
ERYTHROCYTE [DISTWIDTH] IN BLOOD: 16.2 % (ref 11–15)
FASTING DURATION TIME PATIENT: 14 HOURS (ref 0–999)
GFR SERPLBLD BASED ON 1.73 SQ M-ARVRAT: 43 ML/MIN
GLUCOSE SERPL-MCNC: 98 MG/DL (ref 70–99)
HCT VFR BLD CALC: 47.3 % (ref 36–46.5)
HGB BLD-MCNC: 15.1 G/DL (ref 12–15.5)
MCH RBC QN AUTO: 29.3 PG (ref 26–34)
MCHC RBC AUTO-ENTMCNC: 31.9 G/DL (ref 32–36.5)
MCV RBC AUTO: 91.8 FL (ref 78–100)
NRBC BLD MANUAL-RTO: 0 /100 WBC
PLATELET # BLD AUTO: 210 K/MCL (ref 140–450)
POTASSIUM SERPL-SCNC: 4.7 MMOL/L (ref 3.4–5.1)
RBC # BLD: 5.15 MIL/MCL (ref 4–5.2)
SODIUM SERPL-SCNC: 137 MMOL/L (ref 135–145)
WBC # BLD: 10.3 K/MCL (ref 4.2–11)

## 2022-05-20 PROCEDURE — 80048 BASIC METABOLIC PNL TOTAL CA: CPT | Performed by: CLINICAL MEDICAL LABORATORY

## 2022-05-20 PROCEDURE — 85027 COMPLETE CBC AUTOMATED: CPT | Performed by: CLINICAL MEDICAL LABORATORY

## 2022-05-20 PROCEDURE — 82306 VITAMIN D 25 HYDROXY: CPT | Performed by: CLINICAL MEDICAL LABORATORY

## 2022-05-20 PROCEDURE — 36415 COLL VENOUS BLD VENIPUNCTURE: CPT | Performed by: INTERNAL MEDICINE

## 2022-05-21 LAB — 25(OH)D3+25(OH)D2 SERPL-MCNC: 38.6 NG/ML (ref 30–100)

## 2022-05-23 ENCOUNTER — HOSPITAL ENCOUNTER (OUTPATIENT)
Dept: CV DIAGNOSTICS | Age: 82
Discharge: HOME | End: 2022-05-23
Attending: INTERNAL MEDICINE

## 2022-05-23 DIAGNOSIS — Z95.2 S/P AVR (AORTIC VALVE REPLACEMENT): ICD-10-CM

## 2022-05-23 PROCEDURE — 93306 TTE W/DOPPLER COMPLETE: CPT | Performed by: INTERNAL MEDICINE

## 2022-05-24 ENCOUNTER — OFFICE VISIT (OUTPATIENT)
Dept: INTERNAL MEDICINE | Age: 82
End: 2022-05-24

## 2022-05-24 VITALS
BODY MASS INDEX: 29.08 KG/M2 | SYSTOLIC BLOOD PRESSURE: 130 MMHG | WEIGHT: 144 LBS | DIASTOLIC BLOOD PRESSURE: 74 MMHG | HEART RATE: 82 BPM

## 2022-05-24 DIAGNOSIS — E05.90 SUBCLINICAL HYPERTHYROIDISM: Primary | ICD-10-CM

## 2022-05-24 DIAGNOSIS — Z00.00 GENERAL MEDICAL EXAM: ICD-10-CM

## 2022-05-24 DIAGNOSIS — C90.00 MULTIPLE MYELOMA NOT HAVING ACHIEVED REMISSION (CMD): ICD-10-CM

## 2022-05-24 DIAGNOSIS — E55.9 VITAMIN D DEFICIENCY: ICD-10-CM

## 2022-05-24 PROCEDURE — 99214 OFFICE O/P EST MOD 30 MIN: CPT | Performed by: INTERNAL MEDICINE

## 2022-05-24 RX ORDER — POTASSIUM BICARBONATE 782 MG/1
TABLET, EFFERVESCENT ORAL
Status: ON HOLD | COMMUNITY
Start: 2022-04-11 | End: 2022-06-05 | Stop reason: HOSPADM

## 2022-05-24 ASSESSMENT — ENCOUNTER SYMPTOMS
CHEST TIGHTNESS: 0
APPETITE CHANGE: 0
DIARRHEA: 0
SINUS PAIN: 0
ACTIVITY CHANGE: 0
VOMITING: 0
VOICE CHANGE: 0
SINUS PRESSURE: 0
NAUSEA: 0
HEADACHES: 0
FATIGUE: 0
SORE THROAT: 0
WHEEZING: 0
CHILLS: 0
COUGH: 0
UNEXPECTED WEIGHT CHANGE: 0
ABDOMINAL PAIN: 0
DIZZINESS: 0
EYE ITCHING: 0
SHORTNESS OF BREATH: 0
EYE DISCHARGE: 0
CONSTIPATION: 0
LIGHT-HEADEDNESS: 0
PHOTOPHOBIA: 0
RHINORRHEA: 0

## 2022-05-24 ASSESSMENT — PATIENT HEALTH QUESTIONNAIRE - PHQ9
1. LITTLE INTEREST OR PLEASURE IN DOING THINGS: NOT AT ALL
SUM OF ALL RESPONSES TO PHQ9 QUESTIONS 1 AND 2: 0
2. FEELING DOWN, DEPRESSED OR HOPELESS: NOT AT ALL
SUM OF ALL RESPONSES TO PHQ9 QUESTIONS 1 AND 2: 0
CLINICAL INTERPRETATION OF PHQ2 SCORE: NO FURTHER SCREENING NEEDED

## 2022-05-25 ENCOUNTER — TELEPHONE (OUTPATIENT)
Dept: HEMATOLOGY/ONCOLOGY | Age: 82
End: 2022-05-25

## 2022-05-25 LAB
AORTIC VALVE AREA: 1.33 CMÂ²
AV MEAN GRADIENT (AVMG): 12.95 MMHG
AV PEAK GRADIENT (AVPG): 22.52 MMHG
AV PEAK VELOCITY (AVPV): 2.3 M/S
AVI LVOT PEAK GRADIENT (LVOTMG): 2.78 MMHG
DOP CALC LVOT PEAK VEL (LVOTPV): 1 M/S
E WAVE DECELARATION TIME (MDT): 0.48 S
EST RIGHT VENT SYSTOLIC PRESSURE BY TRICUSPID REGURGITATION JET (RVSP): 43.54 MMHG
INTERVENTRICULAR SEPTUM IN END DIASTOLE (IVSD): 1.3 CM
LEFT INTERNAL DIMENSION IN SYSTOLE (LVSD): 2.59 CM
LEFT VENTRICULAR INTERNAL DIMENSION IN DIASTOLE (LVDD): 3.64 CM
LEFT VENTRICULAR POSTERIOR WALL IN END DIASTOLE (LVPW): 1.14 CM
LV EF: 52 %
LVOT 2D (LVOTD): 1.97 CM
LVOT VTI (LVOTVTI): 22.52 CM
MV E TISSUE VEL LAT (MELV): 0 M/S
MV E TISSUE VEL MED (MESV): 0 M/S
MV E WAVE VEL/E TISSUE VEL MED(MSR): 29.55 UNITLESS
MV PEAK A VELOCITY (MVPAV): 0.3 M/S
MV PEAK E VELOCITY (MVPEV): 0.7 M/S
TRICUSPID VALVE ANNULAR PEAK VELOCITY (TVAPV): 0 M/S
TRICUSPID VALVE PEAK REGURGITATION VELOCITY (TRPV): 2.3 M/S
TV ESTIMATED RIGHT ARTERIAL PRESSURE (RAP): 15 MMHG

## 2022-05-26 ENCOUNTER — TELEPHONE (OUTPATIENT)
Dept: CARDIOLOGY | Age: 82
End: 2022-05-26

## 2022-05-26 PROBLEM — E85.81 AL AMYLOIDOSIS (CMD): Status: ACTIVE | Noted: 2022-05-26

## 2022-05-27 ENCOUNTER — CANCER NAVIGATOR (OUTPATIENT)
Dept: ONCOLOGY | Age: 82
End: 2022-05-27

## 2022-05-27 ENCOUNTER — APPOINTMENT (OUTPATIENT)
Dept: HEMATOLOGY/ONCOLOGY | Age: 82
End: 2022-05-27
Attending: INTERNAL MEDICINE

## 2022-05-27 ENCOUNTER — LAB SERVICES (OUTPATIENT)
Dept: HEMATOLOGY/ONCOLOGY | Age: 82
End: 2022-05-27
Attending: INTERNAL MEDICINE

## 2022-05-27 ENCOUNTER — OFFICE VISIT (OUTPATIENT)
Dept: HEMATOLOGY/ONCOLOGY | Age: 82
End: 2022-05-27
Attending: INTERNAL MEDICINE

## 2022-05-27 VITALS
SYSTOLIC BLOOD PRESSURE: 124 MMHG | WEIGHT: 146.28 LBS | RESPIRATION RATE: 18 BRPM | TEMPERATURE: 97.9 F | DIASTOLIC BLOOD PRESSURE: 59 MMHG | BODY MASS INDEX: 29.49 KG/M2 | OXYGEN SATURATION: 97 % | HEART RATE: 85 BPM | HEIGHT: 59 IN

## 2022-05-27 DIAGNOSIS — C90.00 MULTIPLE MYELOMA NOT HAVING ACHIEVED REMISSION (CMD): Primary | ICD-10-CM

## 2022-05-27 DIAGNOSIS — M81.8 OTHER OSTEOPOROSIS, UNSPECIFIED PATHOLOGICAL FRACTURE PRESENCE: ICD-10-CM

## 2022-05-27 DIAGNOSIS — C90.00 MULTIPLE MYELOMA NOT HAVING ACHIEVED REMISSION (CMD): ICD-10-CM

## 2022-05-27 DIAGNOSIS — E85.81 AL AMYLOIDOSIS (CMD): ICD-10-CM

## 2022-05-27 LAB
25(OH)D3+25(OH)D2 SERPL-MCNC: 39.7 NG/ML (ref 30–100)
ALBUMIN SERPL-MCNC: 2.7 G/DL (ref 3.6–5.1)
ALBUMIN/GLOB SERPL: 0.7 {RATIO} (ref 1–2.4)
ALP SERPL-CCNC: 226 UNITS/L (ref 45–117)
ALT SERPL-CCNC: 19 UNITS/L
ANION GAP SERPL CALC-SCNC: 8 MMOL/L (ref 7–19)
AST SERPL-CCNC: 28 UNITS/L
B2 MICROGLOB SERPL-MCNC: 6.4 MG/L (ref 1.2–3.5)
BASOPHILS # BLD: 0.2 K/MCL (ref 0–0.3)
BASOPHILS NFR BLD: 1 %
BILIRUB SERPL-MCNC: 0.6 MG/DL (ref 0.2–1)
BUN SERPL-MCNC: 20 MG/DL (ref 6–20)
BUN/CREAT SERPL: 18 (ref 7–25)
CALCIUM SERPL-MCNC: 9.1 MG/DL (ref 8.4–10.2)
CHLORIDE SERPL-SCNC: 104 MMOL/L (ref 97–110)
CO2 SERPL-SCNC: 30 MMOL/L (ref 21–32)
CREAT SERPL-MCNC: 1.12 MG/DL (ref 0.51–0.95)
DEPRECATED RDW RBC: 54.2 FL (ref 39–50)
EOSINOPHIL # BLD: 0.8 K/MCL (ref 0–0.5)
EOSINOPHIL NFR BLD: 7 %
ERYTHROCYTE [DISTWIDTH] IN BLOOD: 16.3 % (ref 11–15)
FASTING DURATION TIME PATIENT: ABNORMAL H
FERRITIN SERPL-MCNC: 61 NG/ML (ref 8–252)
GFR SERPLBLD BASED ON 1.73 SQ M-ARVRAT: 49 ML/MIN
GLOBULIN SER-MCNC: 3.7 G/DL (ref 2–4)
GLUCOSE SERPL-MCNC: 102 MG/DL (ref 70–99)
HCT VFR BLD CALC: 45.9 % (ref 36–46.5)
HGB BLD-MCNC: 14.9 G/DL (ref 12–15.5)
IMM GRANULOCYTES # BLD AUTO: 0.1 K/MCL (ref 0–0.2)
IMM GRANULOCYTES # BLD: 0 %
IRON SATN MFR SERPL: 18 % (ref 15–45)
IRON SERPL-MCNC: 49 MCG/DL (ref 50–170)
LDH SERPL L TO P-CCNC: 247 UNITS/L (ref 82–240)
LYMPHOCYTES # BLD: 3.1 K/MCL (ref 1–4)
LYMPHOCYTES NFR BLD: 25 %
MAGNESIUM SERPL-MCNC: 2.3 MG/DL (ref 1.7–2.4)
MCH RBC QN AUTO: 29.7 PG (ref 26–34)
MCHC RBC AUTO-ENTMCNC: 32.5 G/DL (ref 32–36.5)
MCV RBC AUTO: 91.4 FL (ref 78–100)
MONOCYTES # BLD: 1 K/MCL (ref 0.3–0.9)
MONOCYTES NFR BLD: 8 %
NEUTROPHILS # BLD: 7.4 K/MCL (ref 1.8–7.7)
NEUTROPHILS # BLD: 7.4 K/MCL (ref 1.8–7.7)
NEUTROPHILS NFR BLD: 59 %
NRBC BLD MANUAL-RTO: 0 /100 WBC
NT-PROBNP SERPL-MCNC: ABNORMAL PG/ML
PLATELET # BLD AUTO: 324 K/MCL (ref 140–450)
POTASSIUM SERPL-SCNC: 4.4 MMOL/L (ref 3.4–5.1)
PROT SERPL-MCNC: 6.4 G/DL (ref 6.4–8.2)
RBC # BLD: 5.02 MIL/MCL (ref 4–5.2)
SODIUM SERPL-SCNC: 138 MMOL/L (ref 135–145)
TIBC SERPL-MCNC: 268 MCG/DL (ref 250–450)
TROPONIN I SERPL DL<=0.01 NG/ML-MCNC: 71 NG/L
WBC # BLD: 12.5 K/MCL (ref 4.2–11)

## 2022-05-27 PROCEDURE — 83880 ASSAY OF NATRIURETIC PEPTIDE: CPT

## 2022-05-27 PROCEDURE — 83521 IG LIGHT CHAINS FREE EACH: CPT

## 2022-05-27 PROCEDURE — 80053 COMPREHEN METABOLIC PANEL: CPT

## 2022-05-27 PROCEDURE — 85025 COMPLETE CBC W/AUTO DIFF WBC: CPT

## 2022-05-27 PROCEDURE — 99211 OFF/OP EST MAY X REQ PHY/QHP: CPT

## 2022-05-27 PROCEDURE — 82728 ASSAY OF FERRITIN: CPT

## 2022-05-27 PROCEDURE — G2212 PROLONG OUTPT/OFFICE VIS: HCPCS | Performed by: INTERNAL MEDICINE

## 2022-05-27 PROCEDURE — 83735 ASSAY OF MAGNESIUM: CPT

## 2022-05-27 PROCEDURE — 99205 OFFICE O/P NEW HI 60 MIN: CPT | Performed by: INTERNAL MEDICINE

## 2022-05-27 PROCEDURE — 82232 ASSAY OF BETA-2 PROTEIN: CPT

## 2022-05-27 PROCEDURE — 82306 VITAMIN D 25 HYDROXY: CPT

## 2022-05-27 PROCEDURE — 36415 COLL VENOUS BLD VENIPUNCTURE: CPT

## 2022-05-27 PROCEDURE — 83540 ASSAY OF IRON: CPT

## 2022-05-27 PROCEDURE — 84165 PROTEIN E-PHORESIS SERUM: CPT

## 2022-05-27 PROCEDURE — 83615 LACTATE (LD) (LDH) ENZYME: CPT

## 2022-05-27 PROCEDURE — 84484 ASSAY OF TROPONIN QUANT: CPT

## 2022-05-27 RX ORDER — ONDANSETRON HYDROCHLORIDE 8 MG/1
8 TABLET, FILM COATED ORAL EVERY 8 HOURS PRN
Qty: 20 TABLET | Refills: 3 | Status: SHIPPED | OUTPATIENT
Start: 2022-05-27 | End: 2022-05-31 | Stop reason: ALTCHOICE

## 2022-05-27 RX ORDER — ACYCLOVIR 200 MG/5ML
400 SUSPENSION ORAL 2 TIMES DAILY
Qty: 473 ML | Refills: 11 | Status: SHIPPED | OUTPATIENT
Start: 2022-05-27

## 2022-05-27 ASSESSMENT — PAIN SCALES - GENERAL: PAINLEVEL: 2

## 2022-05-31 LAB
ALBUMIN SERPL ELPH-MCNC: 3.1 G/DL (ref 3.5–4.9)
ALPHA 1: 0.4 G/DL (ref 0.2–0.4)
ALPHA2 GLOB SERPL ELPH-MCNC: 1 G/DL (ref 0.5–0.9)
B-GLOBULIN SERPL ELPH-MCNC: 0.7 G/DL (ref 0.7–1.2)
GAMMA GLOB SERPL ELPH-MCNC: 0.6 G/DL (ref 0.7–1.7)
GLOBULIN SER-MCNC: 2.8 G/DL (ref 2.1–4.2)
IGA SERPL-MCNC: 166 MG/DL (ref 70–400)
IGG SERPL-MCNC: 706 MG/DL (ref 700–1600)
IGM SERPL-MCNC: 61 MG/DL (ref 40–230)
KAPPA LC FREE SER NEPH-MCNC: 67.26 MG/DL (ref 0.33–1.94)
KAPPA LC/LAMBDA SER: 27.01 {RATIO} (ref 0.26–1.65)
LAMBDA LC FREE SER NEPH-MCNC: 2.49 MG/DL (ref 0.57–2.63)
PATH INTERP BLD-IMP: ABNORMAL
PATH INTERP SPEC-IMP: ABNORMAL
PROT SERPL-MCNC: 5.9 G/DL (ref 6.4–8.2)

## 2022-05-31 RX ORDER — ONDANSETRON HYDROCHLORIDE 8 MG/1
8 TABLET, FILM COATED ORAL 2 TIMES DAILY
Status: CANCELLED | OUTPATIENT
Start: 2022-06-02

## 2022-05-31 RX ORDER — ONDANSETRON 8 MG/1
8 TABLET, ORALLY DISINTEGRATING ORAL EVERY 8 HOURS PRN
Qty: 20 TABLET | Refills: 5 | Status: SHIPPED | OUTPATIENT
Start: 2022-05-31

## 2022-05-31 RX ORDER — ACYCLOVIR 800 MG/1
400 TABLET ORAL 2 TIMES DAILY
Status: CANCELLED | OUTPATIENT
Start: 2022-06-01

## 2022-05-31 RX ORDER — PROCHLORPERAZINE MALEATE 10 MG
10 TABLET ORAL EVERY 6 HOURS PRN
Status: CANCELLED | OUTPATIENT
Start: 2022-06-01

## 2022-06-01 ENCOUNTER — HOSPITAL ENCOUNTER (OUTPATIENT)
Dept: CARDIOLOGY | Age: 82
Discharge: HOME | DRG: 640 | End: 2022-06-01

## 2022-06-01 ENCOUNTER — CANCER NAVIGATOR (OUTPATIENT)
Dept: ONCOLOGY | Age: 82
End: 2022-06-01

## 2022-06-01 ENCOUNTER — OFFICE VISIT (OUTPATIENT)
Dept: HEMATOLOGY/ONCOLOGY | Age: 82
End: 2022-06-01
Attending: INTERNAL MEDICINE

## 2022-06-01 ENCOUNTER — LAB SERVICES (OUTPATIENT)
Dept: HEMATOLOGY/ONCOLOGY | Age: 82
End: 2022-06-01
Attending: INTERNAL MEDICINE

## 2022-06-01 VITALS
DIASTOLIC BLOOD PRESSURE: 58 MMHG | HEIGHT: 59 IN | RESPIRATION RATE: 20 BRPM | TEMPERATURE: 97.8 F | BODY MASS INDEX: 29.27 KG/M2 | SYSTOLIC BLOOD PRESSURE: 119 MMHG | WEIGHT: 145.17 LBS | HEART RATE: 48 BPM | OXYGEN SATURATION: 95 %

## 2022-06-01 DIAGNOSIS — E85.81 AL AMYLOIDOSIS (CMD): Primary | ICD-10-CM

## 2022-06-01 DIAGNOSIS — E85.81 AL AMYLOIDOSIS (CMD): ICD-10-CM

## 2022-06-01 DIAGNOSIS — C90.00 MULTIPLE MYELOMA NOT HAVING ACHIEVED REMISSION (CMD): ICD-10-CM

## 2022-06-01 LAB
ALBUMIN SERPL-MCNC: 2.5 G/DL (ref 3.6–5.1)
ALBUMIN/GLOB SERPL: 0.7 {RATIO} (ref 1–2.4)
ALP SERPL-CCNC: 210 UNITS/L (ref 45–117)
ALT SERPL-CCNC: 20 UNITS/L
ANION GAP SERPL CALC-SCNC: 10 MMOL/L (ref 7–19)
AST SERPL-CCNC: 29 UNITS/L
ATRIAL RATE (BPM): 84
BASOPHILS # BLD: 0.1 K/MCL (ref 0–0.3)
BASOPHILS NFR BLD: 1 %
BILIRUB SERPL-MCNC: 0.6 MG/DL (ref 0.2–1)
BUN SERPL-MCNC: 18 MG/DL (ref 6–20)
BUN/CREAT SERPL: 17 (ref 7–25)
CALCIUM SERPL-MCNC: 9.7 MG/DL (ref 8.4–10.2)
CHLORIDE SERPL-SCNC: 103 MMOL/L (ref 97–110)
CO2 SERPL-SCNC: 28 MMOL/L (ref 21–32)
CREAT SERPL-MCNC: 1.09 MG/DL (ref 0.51–0.95)
DEPRECATED RDW RBC: 55.1 FL (ref 39–50)
EOSINOPHIL # BLD: 0.6 K/MCL (ref 0–0.5)
EOSINOPHIL NFR BLD: 6 %
ERYTHROCYTE [DISTWIDTH] IN BLOOD: 16 % (ref 11–15)
FASTING DURATION TIME PATIENT: ABNORMAL H
GFR SERPLBLD BASED ON 1.73 SQ M-ARVRAT: 50 ML/MIN
GLOBULIN SER-MCNC: 3.7 G/DL (ref 2–4)
GLUCOSE SERPL-MCNC: 148 MG/DL (ref 70–99)
HCT VFR BLD CALC: 47.2 % (ref 36–46.5)
HGB BLD-MCNC: 14.9 G/DL (ref 12–15.5)
IMM GRANULOCYTES # BLD AUTO: 0.1 K/MCL (ref 0–0.2)
IMM GRANULOCYTES # BLD: 0 %
LYMPHOCYTES # BLD: 2.5 K/MCL (ref 1–4)
LYMPHOCYTES NFR BLD: 22 %
MCH RBC QN AUTO: 29.3 PG (ref 26–34)
MCHC RBC AUTO-ENTMCNC: 31.6 G/DL (ref 32–36.5)
MCV RBC AUTO: 92.7 FL (ref 78–100)
MONOCYTES # BLD: 0.8 K/MCL (ref 0.3–0.9)
MONOCYTES NFR BLD: 7 %
NEUTROPHILS # BLD: 7.3 K/MCL (ref 1.8–7.7)
NEUTROPHILS # BLD: 7.3 K/MCL (ref 1.8–7.7)
NEUTROPHILS NFR BLD: 64 %
NRBC BLD MANUAL-RTO: 0 /100 WBC
P AXIS (DEGREES): 70
PLATELET # BLD AUTO: 309 K/MCL (ref 140–450)
POTASSIUM SERPL-SCNC: 4.7 MMOL/L (ref 3.4–5.1)
PR-INTERVAL (MSEC): 240
PROT SERPL-MCNC: 6.2 G/DL (ref 6.4–8.2)
QRS-INTERVAL (MSEC): 102
QT-INTERVAL (MSEC): 418
QTC: 494
R AXIS (DEGREES): -35
RAINBOW EXTRA TUBES HOLD SPECIMEN: NORMAL
RAINBOW EXTRA TUBES HOLD SPECIMEN: NORMAL
RBC # BLD: 5.09 MIL/MCL (ref 4–5.2)
REPORT TEXT: NORMAL
SODIUM SERPL-SCNC: 136 MMOL/L (ref 135–145)
T AXIS (DEGREES): 154
VENTRICULAR RATE EKG/MIN (BPM): 84
WBC # BLD: 11.3 K/MCL (ref 4.2–11)

## 2022-06-01 PROCEDURE — 10002800 HB RX 250 W HCPCS: Performed by: INTERNAL MEDICINE

## 2022-06-01 PROCEDURE — 36415 COLL VENOUS BLD VENIPUNCTURE: CPT

## 2022-06-01 PROCEDURE — 93010 ELECTROCARDIOGRAM REPORT: CPT | Performed by: INTERNAL MEDICINE

## 2022-06-01 PROCEDURE — 80053 COMPREHEN METABOLIC PANEL: CPT

## 2022-06-01 PROCEDURE — 86335 IMMUNFIX E-PHORSIS/URINE/CSF: CPT

## 2022-06-01 PROCEDURE — 10002803 HB RX 637: Performed by: INTERNAL MEDICINE

## 2022-06-01 PROCEDURE — 10002807 HB RX 258: Performed by: INTERNAL MEDICINE

## 2022-06-01 PROCEDURE — 93005 ELECTROCARDIOGRAM TRACING: CPT

## 2022-06-01 PROCEDURE — 96374 THER/PROPH/DIAG INJ IV PUSH: CPT

## 2022-06-01 PROCEDURE — 99215 OFFICE O/P EST HI 40 MIN: CPT | Performed by: INTERNAL MEDICINE

## 2022-06-01 PROCEDURE — 96401 CHEMO ANTI-NEOPL SQ/IM: CPT | Performed by: INTERNAL MEDICINE

## 2022-06-01 PROCEDURE — 96375 TX/PRO/DX INJ NEW DRUG ADDON: CPT

## 2022-06-01 PROCEDURE — 85025 COMPLETE CBC W/AUTO DIFF WBC: CPT

## 2022-06-01 PROCEDURE — 84166 PROTEIN E-PHORESIS/URINE/CSF: CPT

## 2022-06-01 PROCEDURE — 10002801 HB RX 250 W/O HCPCS: Performed by: INTERNAL MEDICINE

## 2022-06-01 RX ORDER — MONTELUKAST SODIUM 5 MG/1
10 TABLET, CHEWABLE ORAL ONCE
Status: COMPLETED | OUTPATIENT
Start: 2022-06-01 | End: 2022-06-01

## 2022-06-01 RX ORDER — ACETAMINOPHEN 160 MG/5ML
650 LIQUID ORAL ONCE
Status: COMPLETED | OUTPATIENT
Start: 2022-06-01 | End: 2022-06-01

## 2022-06-01 RX ORDER — METHYLPREDNISOLONE SODIUM SUCCINATE 125 MG/2ML
125 INJECTION, POWDER, LYOPHILIZED, FOR SOLUTION INTRAMUSCULAR; INTRAVENOUS
Status: DISCONTINUED | OUTPATIENT
Start: 2022-06-01 | End: 2022-06-05 | Stop reason: HOSPADM

## 2022-06-01 RX ORDER — FAMOTIDINE 10 MG/ML
20 INJECTION, SOLUTION INTRAVENOUS ONCE
Status: COMPLETED | OUTPATIENT
Start: 2022-06-01 | End: 2022-06-01

## 2022-06-01 RX ORDER — LORATADINE 10 MG/1
10 TABLET ORAL ONCE
Status: COMPLETED | OUTPATIENT
Start: 2022-06-01 | End: 2022-06-01

## 2022-06-01 RX ORDER — PROCHLORPERAZINE MALEATE 10 MG
10 TABLET ORAL EVERY 6 HOURS PRN
Qty: 30 TABLET | Refills: 5 | Status: SHIPPED | OUTPATIENT
Start: 2022-06-01

## 2022-06-01 RX ORDER — DIPHENHYDRAMINE HYDROCHLORIDE 50 MG/ML
25 INJECTION INTRAMUSCULAR; INTRAVENOUS ONCE
Status: COMPLETED | OUTPATIENT
Start: 2022-06-01 | End: 2022-06-01

## 2022-06-01 RX ORDER — FAMOTIDINE 10 MG/ML
20 INJECTION, SOLUTION INTRAVENOUS
Status: DISCONTINUED | OUTPATIENT
Start: 2022-06-01 | End: 2022-06-05 | Stop reason: HOSPADM

## 2022-06-01 RX ORDER — DIPHENHYDRAMINE HYDROCHLORIDE 50 MG/ML
50 INJECTION INTRAMUSCULAR; INTRAVENOUS
Status: DISCONTINUED | OUTPATIENT
Start: 2022-06-01 | End: 2022-06-05 | Stop reason: HOSPADM

## 2022-06-01 RX ORDER — ALBUTEROL SULFATE 90 UG/1
2 AEROSOL, METERED RESPIRATORY (INHALATION)
Status: DISCONTINUED | OUTPATIENT
Start: 2022-06-01 | End: 2022-06-05 | Stop reason: HOSPADM

## 2022-06-01 RX ORDER — HEPARIN 100 UNIT/ML
5 SYRINGE INTRAVENOUS
Status: DISCONTINUED | OUTPATIENT
Start: 2022-06-01 | End: 2022-06-05 | Stop reason: HOSPADM

## 2022-06-01 RX ADMIN — BORTEZOMIB 2 MG: 3.5 INJECTION, POWDER, LYOPHILIZED, FOR SOLUTION INTRAVENOUS; SUBCUTANEOUS at 11:22

## 2022-06-01 RX ADMIN — DARATUMUMAB AND HYALURONIDASE-FIHJ (HUMAN RECOMBINANT) 1800 MG: 1800; 30000 INJECTION SUBCUTANEOUS at 11:28

## 2022-06-01 RX ADMIN — ACETAMINOPHEN 650 MG: 650 SOLUTION ORAL at 10:43

## 2022-06-01 RX ADMIN — SODIUM CHLORIDE 250 ML: 9 INJECTION, SOLUTION INTRAVENOUS at 10:10

## 2022-06-01 RX ADMIN — LORATADINE 10 MG: 10 TABLET ORAL at 10:11

## 2022-06-01 RX ADMIN — FAMOTIDINE 20 MG: 10 INJECTION INTRAVENOUS at 10:11

## 2022-06-01 RX ADMIN — DIPHENHYDRAMINE HYDROCHLORIDE 25 MG: 50 INJECTION, SOLUTION INTRAMUSCULAR; INTRAVENOUS at 10:12

## 2022-06-01 RX ADMIN — DEXAMETHASONE SODIUM PHOSPHATE 16 MG: 4 INJECTION, SOLUTION INTRAMUSCULAR; INTRAVENOUS at 10:12

## 2022-06-01 RX ADMIN — MONTELUKAST SODIUM 10 MG: 5 TABLET, CHEWABLE ORAL at 10:11

## 2022-06-01 ASSESSMENT — PAIN SCALES - GENERAL: PAINLEVEL: 0

## 2022-06-02 ENCOUNTER — TELEPHONE (OUTPATIENT)
Dept: HEMATOLOGY/ONCOLOGY | Age: 82
End: 2022-06-02

## 2022-06-02 DIAGNOSIS — E85.81 AL AMYLOIDOSIS (CMD): ICD-10-CM

## 2022-06-02 DIAGNOSIS — C90.00 MULTIPLE MYELOMA NOT HAVING ACHIEVED REMISSION (CMD): Primary | ICD-10-CM

## 2022-06-02 PROBLEM — I95.89 HYPOTENSION DUE TO HYPOVOLEMIA: Status: ACTIVE | Noted: 2022-06-02

## 2022-06-02 PROBLEM — E86.1 HYPOTENSION DUE TO HYPOVOLEMIA: Status: ACTIVE | Noted: 2022-06-02

## 2022-06-02 LAB
COLLECT DURATION TIME UR: 24 HRS
PATH INTERP SPEC-IMP: ABNORMAL
PATH INTERP SPEC-IMP: NORMAL
PROT 24H UR-MRATE: 7517 MG/24 HR (ref 0–148)
PROT UR-MCNC: 1274 MG/DL
SPECIMEN VOL UR: 590 ML

## 2022-06-02 RX ORDER — ONDANSETRON 2 MG/ML
4 INJECTION INTRAMUSCULAR; INTRAVENOUS
OUTPATIENT
Start: 2022-06-02

## 2022-06-02 RX ORDER — PROCHLORPERAZINE MALEATE 10 MG
5 TABLET ORAL
OUTPATIENT
Start: 2022-06-02

## 2022-06-02 RX ORDER — PROCHLORPERAZINE EDISYLATE 5 MG/ML
5 INJECTION INTRAMUSCULAR; INTRAVENOUS
OUTPATIENT
Start: 2022-06-02

## 2022-06-02 RX ORDER — DEXAMETHASONE 4 MG/1
4 TABLET ORAL DAILY
Qty: 6 TABLET | Refills: 0 | Status: SHIPPED | OUTPATIENT
Start: 2022-06-02 | End: 2022-06-07 | Stop reason: SDUPTHER

## 2022-06-02 RX ORDER — SODIUM CHLORIDE 9 MG/ML
INJECTION, SOLUTION INTRAVENOUS ONCE
OUTPATIENT
Start: 2022-06-02 | End: 2022-06-02

## 2022-06-03 ENCOUNTER — APPOINTMENT (OUTPATIENT)
Dept: GENERAL RADIOLOGY | Age: 82
DRG: 640 | End: 2022-06-03
Attending: INTERNAL MEDICINE

## 2022-06-03 ENCOUNTER — APPOINTMENT (OUTPATIENT)
Dept: HEMATOLOGY/ONCOLOGY | Age: 82
End: 2022-06-03
Attending: INTERNAL MEDICINE

## 2022-06-03 ENCOUNTER — LAB SERVICES (OUTPATIENT)
Dept: HEMATOLOGY/ONCOLOGY | Age: 82
End: 2022-06-03
Attending: INTERNAL MEDICINE

## 2022-06-03 ENCOUNTER — HOSPITAL ENCOUNTER (INPATIENT)
Age: 82
LOS: 2 days | Discharge: HOME | DRG: 640 | End: 2022-06-05
Attending: STUDENT IN AN ORGANIZED HEALTH CARE EDUCATION/TRAINING PROGRAM | Admitting: INTERNAL MEDICINE

## 2022-06-03 ENCOUNTER — OFFICE VISIT (OUTPATIENT)
Dept: HEMATOLOGY/ONCOLOGY | Age: 82
End: 2022-06-03
Attending: INTERNAL MEDICINE

## 2022-06-03 VITALS
HEIGHT: 59 IN | RESPIRATION RATE: 20 BRPM | HEART RATE: 39 BPM | DIASTOLIC BLOOD PRESSURE: 55 MMHG | SYSTOLIC BLOOD PRESSURE: 120 MMHG | TEMPERATURE: 97.9 F | WEIGHT: 148.48 LBS | OXYGEN SATURATION: 96 % | BODY MASS INDEX: 29.93 KG/M2

## 2022-06-03 DIAGNOSIS — E85.81 AL AMYLOIDOSIS (CMD): Primary | ICD-10-CM

## 2022-06-03 DIAGNOSIS — U07.1 COVID-19 VIRUS INFECTION: Primary | ICD-10-CM

## 2022-06-03 DIAGNOSIS — R94.31 ST SEGMENT DEPRESSION: ICD-10-CM

## 2022-06-03 DIAGNOSIS — R79.89 ELEVATED TROPONIN: ICD-10-CM

## 2022-06-03 DIAGNOSIS — E85.81 AL AMYLOIDOSIS (CMD): ICD-10-CM

## 2022-06-03 DIAGNOSIS — R00.1 BRADYCARDIA: ICD-10-CM

## 2022-06-03 DIAGNOSIS — C90.00 MULTIPLE MYELOMA NOT HAVING ACHIEVED REMISSION (CMD): ICD-10-CM

## 2022-06-03 DIAGNOSIS — E87.5 HYPERKALEMIA: ICD-10-CM

## 2022-06-03 DIAGNOSIS — N17.9 ACUTE KIDNEY INJURY (CMD): ICD-10-CM

## 2022-06-03 PROBLEM — N17.0 ACUTE KIDNEY INJURY (AKI) WITH ACUTE TUBULAR NECROSIS (ATN) (CMD): Status: ACTIVE | Noted: 2022-06-03

## 2022-06-03 PROBLEM — D72.829 LEUKOCYTOSIS: Status: ACTIVE | Noted: 2022-06-03

## 2022-06-03 LAB
ALBUMIN SERPL-MCNC: 2.7 G/DL (ref 3.6–5.1)
ALBUMIN SERPL-MCNC: 2.9 G/DL (ref 3.6–5.1)
ALBUMIN/GLOB SERPL: 0.8 {RATIO} (ref 1–2.4)
ALBUMIN/GLOB SERPL: 0.8 {RATIO} (ref 1–2.4)
ALP SERPL-CCNC: 179 UNITS/L (ref 45–117)
ALP SERPL-CCNC: 194 UNITS/L (ref 45–117)
ALT SERPL-CCNC: 22 UNITS/L
ALT SERPL-CCNC: 25 UNITS/L
ANION GAP SERPL CALC-SCNC: 13 MMOL/L (ref 7–19)
ANION GAP SERPL CALC-SCNC: 14 MMOL/L (ref 7–19)
ANION GAP SERPL CALC-SCNC: 15 MMOL/L (ref 7–19)
APPEARANCE UR: ABNORMAL
APTT PPP: 23 SEC (ref 22–30)
AST SERPL-CCNC: 28 UNITS/L
AST SERPL-CCNC: 42 UNITS/L
ATRIAL RATE (BPM): 45
ATRIAL RATE (BPM): 89
BACTERIA #/AREA URNS HPF: ABNORMAL /HPF
BASOPHILS # BLD: 0 K/MCL (ref 0–0.3)
BASOPHILS NFR BLD: 0 %
BILIRUB SERPL-MCNC: 0.4 MG/DL (ref 0.2–1)
BILIRUB SERPL-MCNC: 0.6 MG/DL (ref 0.2–1)
BILIRUB UR QL STRIP: NEGATIVE
BUN SERPL-MCNC: 54 MG/DL (ref 6–20)
BUN SERPL-MCNC: 55 MG/DL (ref 6–20)
BUN SERPL-MCNC: 55 MG/DL (ref 6–20)
BUN/CREAT SERPL: 20 (ref 7–25)
BUN/CREAT SERPL: 21 (ref 7–25)
BUN/CREAT SERPL: 21 (ref 7–25)
CALCIUM SERPL-MCNC: 8.9 MG/DL (ref 8.4–10.2)
CALCIUM SERPL-MCNC: 9.2 MG/DL (ref 8.4–10.2)
CALCIUM SERPL-MCNC: 9.6 MG/DL (ref 8.4–10.2)
CHLORIDE SERPL-SCNC: 101 MMOL/L (ref 97–110)
CHLORIDE SERPL-SCNC: 101 MMOL/L (ref 97–110)
CHLORIDE SERPL-SCNC: 102 MMOL/L (ref 97–110)
CO2 SERPL-SCNC: 24 MMOL/L (ref 21–32)
CO2 SERPL-SCNC: 25 MMOL/L (ref 21–32)
CO2 SERPL-SCNC: 26 MMOL/L (ref 21–32)
COLOR UR: YELLOW
CREAT SERPL-MCNC: 2.58 MG/DL (ref 0.51–0.95)
CREAT SERPL-MCNC: 2.67 MG/DL (ref 0.51–0.95)
CREAT SERPL-MCNC: 2.75 MG/DL (ref 0.51–0.95)
DEPRECATED RDW RBC: 56.4 FL (ref 39–50)
EOSINOPHIL # BLD: 0 K/MCL (ref 0–0.5)
EOSINOPHIL NFR BLD: 0 %
ERYTHROCYTE [DISTWIDTH] IN BLOOD: 17 % (ref 11–15)
FASTING DURATION TIME PATIENT: ABNORMAL H
GFR SERPLBLD BASED ON 1.73 SQ M-ARVRAT: 17 ML/MIN
GFR SERPLBLD BASED ON 1.73 SQ M-ARVRAT: 17 ML/MIN
GFR SERPLBLD BASED ON 1.73 SQ M-ARVRAT: 18 ML/MIN
GLOBULIN SER-MCNC: 3.6 G/DL (ref 2–4)
GLOBULIN SER-MCNC: 3.8 G/DL (ref 2–4)
GLUCOSE BLDC GLUCOMTR-MCNC: 148 MG/DL (ref 70–99)
GLUCOSE BLDC GLUCOMTR-MCNC: 157 MG/DL (ref 70–99)
GLUCOSE BLDC GLUCOMTR-MCNC: 188 MG/DL (ref 70–99)
GLUCOSE SERPL-MCNC: 112 MG/DL (ref 70–99)
GLUCOSE SERPL-MCNC: 118 MG/DL (ref 70–99)
GLUCOSE SERPL-MCNC: 151 MG/DL (ref 70–99)
GLUCOSE UR STRIP-MCNC: ABNORMAL MG/DL
GRAN CASTS #/AREA URNS LPF: ABNORMAL /LPF
HCT VFR BLD CALC: 47 % (ref 36–46.5)
HGB BLD-MCNC: 15.1 G/DL (ref 12–15.5)
HGB UR QL STRIP: ABNORMAL
HYALINE CASTS #/AREA URNS LPF: ABNORMAL /LPF
IMM GRANULOCYTES # BLD AUTO: 0.1 K/MCL (ref 0–0.2)
IMM GRANULOCYTES # BLD: 1 %
INR PPP: 1
KETONES UR STRIP-MCNC: NEGATIVE MG/DL
LACTATE BLDV-SCNC: 1.9 MMOL/L (ref 0–2)
LEUKOCYTE ESTERASE UR QL STRIP: NEGATIVE
LYMPHOCYTES # BLD: 1.1 K/MCL (ref 1–4)
LYMPHOCYTES NFR BLD: 7 %
MAGNESIUM SERPL-MCNC: 2.5 MG/DL (ref 1.7–2.4)
MCH RBC QN AUTO: 29.3 PG (ref 26–34)
MCHC RBC AUTO-ENTMCNC: 32.1 G/DL (ref 32–36.5)
MCV RBC AUTO: 91.1 FL (ref 78–100)
MONOCYTES # BLD: 0.5 K/MCL (ref 0.3–0.9)
MONOCYTES NFR BLD: 3 %
MUCOUS THREADS URNS QL MICRO: PRESENT
NEUTROPHILS # BLD: 13.7 K/MCL (ref 1.8–7.7)
NEUTROPHILS NFR BLD: 89 %
NITRITE UR QL STRIP: NEGATIVE
NRBC BLD MANUAL-RTO: 0 /100 WBC
NT-PROBNP SERPL-MCNC: ABNORMAL PG/ML
P AXIS (DEGREES): 46
P AXIS (DEGREES): 78
PH UR STRIP: 5 [PH] (ref 5–7)
PHOSPHATE SERPL-MCNC: 3.9 MG/DL (ref 2.4–4.7)
PLATELET # BLD AUTO: 252 K/MCL (ref 140–450)
POTASSIUM SERPL-SCNC: 4.7 MMOL/L (ref 3.4–5.1)
POTASSIUM SERPL-SCNC: 5.5 MMOL/L (ref 3.4–5.1)
POTASSIUM SERPL-SCNC: 6.5 MMOL/L (ref 3.4–5.1)
PR-INTERVAL (MSEC): 188
PR-INTERVAL (MSEC): 232
PROT SERPL-MCNC: 6.3 G/DL (ref 6.4–8.2)
PROT SERPL-MCNC: 6.7 G/DL (ref 6.4–8.2)
PROT UR STRIP-MCNC: >=500 MG/DL
PROTHROMBIN TIME: 10.6 SEC (ref 9.7–11.8)
QRS-INTERVAL (MSEC): 100
QRS-INTERVAL (MSEC): 104
QT-INTERVAL (MSEC): 382
QT-INTERVAL (MSEC): 520
QTC: 450
QTC: 465
R AXIS (DEGREES): -16
R AXIS (DEGREES): 8
RBC # BLD: 5.16 MIL/MCL (ref 4–5.2)
RBC #/AREA URNS HPF: ABNORMAL /HPF
REPORT TEXT: NORMAL
REPORT TEXT: NORMAL
SARS-COV-2 N GENE CT SPEC QN NAA N2: 42.1
SARS-COV-2 RNA RESP QL NAA+PROBE: DETECTED
SERVICE CMNT-IMP: ABNORMAL
SODIUM SERPL-SCNC: 132 MMOL/L (ref 135–145)
SODIUM SERPL-SCNC: 135 MMOL/L (ref 135–145)
SODIUM SERPL-SCNC: 136 MMOL/L (ref 135–145)
SP GR UR STRIP: 1.01 (ref 1–1.03)
SQUAMOUS #/AREA URNS HPF: ABNORMAL /HPF
T AXIS (DEGREES): -168
T AXIS (DEGREES): 154
TROPONIN I SERPL DL<=0.01 NG/ML-MCNC: 88 NG/L
TSH SERPL-ACNC: 0.68 MCUNITS/ML (ref 0.35–5)
URATE SERPL-MCNC: 6.4 MG/DL (ref 2.6–5.9)
UROBILINOGEN UR STRIP-MCNC: 0.2 MG/DL
VENTRICULAR RATE EKG/MIN (BPM): 45
VENTRICULAR RATE EKG/MIN (BPM): 89
WBC # BLD: 15.4 K/MCL (ref 4.2–11)
WBC #/AREA URNS HPF: ABNORMAL /HPF

## 2022-06-03 PROCEDURE — 96365 THER/PROPH/DIAG IV INF INIT: CPT

## 2022-06-03 PROCEDURE — 87635 SARS-COV-2 COVID-19 AMP PRB: CPT | Performed by: STUDENT IN AN ORGANIZED HEALTH CARE EDUCATION/TRAINING PROGRAM

## 2022-06-03 PROCEDURE — 96375 TX/PRO/DX INJ NEW DRUG ADDON: CPT

## 2022-06-03 PROCEDURE — 10002807 HB RX 258: Performed by: STUDENT IN AN ORGANIZED HEALTH CARE EDUCATION/TRAINING PROGRAM

## 2022-06-03 PROCEDURE — 10002801 HB RX 250 W/O HCPCS: Performed by: STUDENT IN AN ORGANIZED HEALTH CARE EDUCATION/TRAINING PROGRAM

## 2022-06-03 PROCEDURE — 82962 GLUCOSE BLOOD TEST: CPT

## 2022-06-03 PROCEDURE — 10002801 HB RX 250 W/O HCPCS: Performed by: INTERNAL MEDICINE

## 2022-06-03 PROCEDURE — 99215 OFFICE O/P EST HI 40 MIN: CPT | Performed by: INTERNAL MEDICINE

## 2022-06-03 PROCEDURE — 99212 OFFICE O/P EST SF 10 MIN: CPT

## 2022-06-03 PROCEDURE — 84100 ASSAY OF PHOSPHORUS: CPT | Performed by: INTERNAL MEDICINE

## 2022-06-03 PROCEDURE — 84443 ASSAY THYROID STIM HORMONE: CPT | Performed by: STUDENT IN AN ORGANIZED HEALTH CARE EDUCATION/TRAINING PROGRAM

## 2022-06-03 PROCEDURE — 99285 EMERGENCY DEPT VISIT HI MDM: CPT

## 2022-06-03 PROCEDURE — 84484 ASSAY OF TROPONIN QUANT: CPT | Performed by: STUDENT IN AN ORGANIZED HEALTH CARE EDUCATION/TRAINING PROGRAM

## 2022-06-03 PROCEDURE — 84550 ASSAY OF BLOOD/URIC ACID: CPT | Performed by: INTERNAL MEDICINE

## 2022-06-03 PROCEDURE — 83880 ASSAY OF NATRIURETIC PEPTIDE: CPT | Performed by: STUDENT IN AN ORGANIZED HEALTH CARE EDUCATION/TRAINING PROGRAM

## 2022-06-03 PROCEDURE — 85025 COMPLETE CBC W/AUTO DIFF WBC: CPT | Performed by: STUDENT IN AN ORGANIZED HEALTH CARE EDUCATION/TRAINING PROGRAM

## 2022-06-03 PROCEDURE — 10002807 HB RX 258: Performed by: INTERNAL MEDICINE

## 2022-06-03 PROCEDURE — 10000008 HB ROOM CHARGE ICU OR CCU

## 2022-06-03 PROCEDURE — 10004651 HB RX, NO CHARGE ITEM: Performed by: INTERNAL MEDICINE

## 2022-06-03 PROCEDURE — 99223 1ST HOSP IP/OBS HIGH 75: CPT | Performed by: INTERNAL MEDICINE

## 2022-06-03 PROCEDURE — 83605 ASSAY OF LACTIC ACID: CPT | Performed by: STUDENT IN AN ORGANIZED HEALTH CARE EDUCATION/TRAINING PROGRAM

## 2022-06-03 PROCEDURE — 36415 COLL VENOUS BLD VENIPUNCTURE: CPT | Performed by: STUDENT IN AN ORGANIZED HEALTH CARE EDUCATION/TRAINING PROGRAM

## 2022-06-03 PROCEDURE — G2212 PROLONG OUTPT/OFFICE VIS: HCPCS | Performed by: INTERNAL MEDICINE

## 2022-06-03 PROCEDURE — 10002803 HB RX 637: Performed by: INTERNAL MEDICINE

## 2022-06-03 PROCEDURE — 80048 BASIC METABOLIC PNL TOTAL CA: CPT | Performed by: INTERNAL MEDICINE

## 2022-06-03 PROCEDURE — 85610 PROTHROMBIN TIME: CPT | Performed by: STUDENT IN AN ORGANIZED HEALTH CARE EDUCATION/TRAINING PROGRAM

## 2022-06-03 PROCEDURE — 87040 BLOOD CULTURE FOR BACTERIA: CPT | Performed by: STUDENT IN AN ORGANIZED HEALTH CARE EDUCATION/TRAINING PROGRAM

## 2022-06-03 PROCEDURE — 81001 URINALYSIS AUTO W/SCOPE: CPT | Performed by: INTERNAL MEDICINE

## 2022-06-03 PROCEDURE — 36415 COLL VENOUS BLD VENIPUNCTURE: CPT

## 2022-06-03 PROCEDURE — 10004157 XR CHEST AP OR PA

## 2022-06-03 PROCEDURE — 80053 COMPREHEN METABOLIC PANEL: CPT

## 2022-06-03 PROCEDURE — 10002800 HB RX 250 W HCPCS: Performed by: INTERNAL MEDICINE

## 2022-06-03 PROCEDURE — 10002800 HB RX 250 W HCPCS: Performed by: STUDENT IN AN ORGANIZED HEALTH CARE EDUCATION/TRAINING PROGRAM

## 2022-06-03 PROCEDURE — 80053 COMPREHEN METABOLIC PANEL: CPT | Performed by: STUDENT IN AN ORGANIZED HEALTH CARE EDUCATION/TRAINING PROGRAM

## 2022-06-03 PROCEDURE — 85730 THROMBOPLASTIN TIME PARTIAL: CPT | Performed by: STUDENT IN AN ORGANIZED HEALTH CARE EDUCATION/TRAINING PROGRAM

## 2022-06-03 PROCEDURE — 93005 ELECTROCARDIOGRAM TRACING: CPT | Performed by: STUDENT IN AN ORGANIZED HEALTH CARE EDUCATION/TRAINING PROGRAM

## 2022-06-03 PROCEDURE — 83735 ASSAY OF MAGNESIUM: CPT | Performed by: STUDENT IN AN ORGANIZED HEALTH CARE EDUCATION/TRAINING PROGRAM

## 2022-06-03 PROCEDURE — 10002803 HB RX 637: Performed by: STUDENT IN AN ORGANIZED HEALTH CARE EDUCATION/TRAINING PROGRAM

## 2022-06-03 RX ORDER — ALBUTEROL SULFATE 90 UG/1
10 AEROSOL, METERED RESPIRATORY (INHALATION) ONCE
Status: COMPLETED | OUTPATIENT
Start: 2022-06-03 | End: 2022-06-03

## 2022-06-03 RX ORDER — ONDANSETRON 2 MG/ML
4 INJECTION INTRAMUSCULAR; INTRAVENOUS EVERY 12 HOURS PRN
Status: DISCONTINUED | OUTPATIENT
Start: 2022-06-03 | End: 2022-06-05 | Stop reason: HOSPADM

## 2022-06-03 RX ORDER — HEPARIN SODIUM 5000 [USP'U]/ML
5000 INJECTION, SOLUTION INTRAVENOUS; SUBCUTANEOUS EVERY 8 HOURS SCHEDULED
Status: DISCONTINUED | OUTPATIENT
Start: 2022-06-03 | End: 2022-06-05 | Stop reason: HOSPADM

## 2022-06-03 RX ORDER — SODIUM CHLORIDE 9 MG/ML
INJECTION, SOLUTION INTRAVENOUS CONTINUOUS PRN
Status: DISCONTINUED | OUTPATIENT
Start: 2022-06-03 | End: 2022-06-05 | Stop reason: HOSPADM

## 2022-06-03 RX ORDER — DEXTROSE MONOHYDRATE 25 G/50ML
25 INJECTION, SOLUTION INTRAVENOUS PRN
Status: DISCONTINUED | OUTPATIENT
Start: 2022-06-03 | End: 2022-06-05 | Stop reason: HOSPADM

## 2022-06-03 RX ORDER — 0.9 % SODIUM CHLORIDE 0.9 %
2 VIAL (ML) INJECTION EVERY 12 HOURS SCHEDULED
Status: DISCONTINUED | OUTPATIENT
Start: 2022-06-03 | End: 2022-06-05 | Stop reason: HOSPADM

## 2022-06-03 RX ORDER — FERROUS SULFATE 325(65) MG
325 TABLET ORAL
Status: DISCONTINUED | OUTPATIENT
Start: 2022-06-04 | End: 2022-06-05 | Stop reason: HOSPADM

## 2022-06-03 RX ORDER — DEXTROSE MONOHYDRATE 25 G/50ML
12.5 INJECTION, SOLUTION INTRAVENOUS PRN
Status: DISCONTINUED | OUTPATIENT
Start: 2022-06-03 | End: 2022-06-05 | Stop reason: HOSPADM

## 2022-06-03 RX ORDER — CALCIUM GLUCONATE 20 MG/ML
1 INJECTION, SOLUTION INTRAVENOUS ONCE
Status: COMPLETED | OUTPATIENT
Start: 2022-06-03 | End: 2022-06-03

## 2022-06-03 RX ORDER — ACETAMINOPHEN 325 MG/1
650 TABLET ORAL EVERY 4 HOURS PRN
Status: DISCONTINUED | OUTPATIENT
Start: 2022-06-03 | End: 2022-06-05 | Stop reason: HOSPADM

## 2022-06-03 RX ORDER — HYDRALAZINE HYDROCHLORIDE 20 MG/ML
10 INJECTION INTRAMUSCULAR; INTRAVENOUS EVERY 6 HOURS PRN
Status: DISCONTINUED | OUTPATIENT
Start: 2022-06-03 | End: 2022-06-05 | Stop reason: HOSPADM

## 2022-06-03 RX ORDER — FAMOTIDINE 10 MG/ML
20 INJECTION, SOLUTION INTRAVENOUS DAILY
Status: DISCONTINUED | OUTPATIENT
Start: 2022-06-03 | End: 2022-06-04

## 2022-06-03 RX ORDER — HYDROCODONE BITARTRATE AND ACETAMINOPHEN 5; 325 MG/1; MG/1
1 TABLET ORAL EVERY 4 HOURS PRN
Status: DISCONTINUED | OUTPATIENT
Start: 2022-06-03 | End: 2022-06-05 | Stop reason: HOSPADM

## 2022-06-03 RX ORDER — ALBUTEROL SULFATE 90 UG/1
2 AEROSOL, METERED RESPIRATORY (INHALATION)
Status: DISCONTINUED | OUTPATIENT
Start: 2022-06-03 | End: 2022-06-05 | Stop reason: HOSPADM

## 2022-06-03 RX ORDER — DEXTROSE MONOHYDRATE 25 G/50ML
25 INJECTION, SOLUTION INTRAVENOUS ONCE
Status: COMPLETED | OUTPATIENT
Start: 2022-06-03 | End: 2022-06-03

## 2022-06-03 RX ORDER — HUMAN INSULIN 100 [IU]/ML
INJECTION, SOLUTION SUBCUTANEOUS
Status: DISCONTINUED | OUTPATIENT
Start: 2022-06-03 | End: 2022-06-04

## 2022-06-03 RX ORDER — ONDANSETRON 4 MG/1
4 TABLET, ORALLY DISINTEGRATING ORAL EVERY 12 HOURS PRN
Status: DISCONTINUED | OUTPATIENT
Start: 2022-06-03 | End: 2022-06-05 | Stop reason: HOSPADM

## 2022-06-03 RX ORDER — HUMAN INSULIN 100 [IU]/ML
INJECTION, SOLUTION SUBCUTANEOUS EVERY 6 HOURS SCHEDULED
Status: DISCONTINUED | OUTPATIENT
Start: 2022-06-03 | End: 2022-06-03

## 2022-06-03 RX ORDER — POLYETHYLENE GLYCOL 3350 17 G/17G
17 POWDER, FOR SOLUTION ORAL DAILY PRN
Status: DISCONTINUED | OUTPATIENT
Start: 2022-06-03 | End: 2022-06-05 | Stop reason: HOSPADM

## 2022-06-03 RX ORDER — NICOTINE POLACRILEX 4 MG
15 LOZENGE BUCCAL PRN
Status: DISCONTINUED | OUTPATIENT
Start: 2022-06-03 | End: 2022-06-05 | Stop reason: HOSPADM

## 2022-06-03 RX ORDER — NICOTINE POLACRILEX 4 MG
30 LOZENGE BUCCAL PRN
Status: DISCONTINUED | OUTPATIENT
Start: 2022-06-03 | End: 2022-06-05 | Stop reason: HOSPADM

## 2022-06-03 RX ORDER — BISACODYL 10 MG
10 SUPPOSITORY, RECTAL RECTAL DAILY PRN
Status: DISCONTINUED | OUTPATIENT
Start: 2022-06-03 | End: 2022-06-05 | Stop reason: HOSPADM

## 2022-06-03 RX ADMIN — SODIUM CHLORIDE 500 ML: 9 INJECTION, SOLUTION INTRAVENOUS at 14:15

## 2022-06-03 RX ADMIN — ATROPINE SULFATE 0.5 MG: 0.1 INJECTION INTRAVENOUS at 14:15

## 2022-06-03 RX ADMIN — DEXTROSE MONOHYDRATE 12.5 G: 25 INJECTION, SOLUTION INTRAVENOUS at 17:08

## 2022-06-03 RX ADMIN — CALCIUM GLUCONATE 1 G: 20 INJECTION, SOLUTION INTRAVENOUS at 14:36

## 2022-06-03 RX ADMIN — SODIUM ZIRCONIUM CYCLOSILICATE 10 G: 10 POWDER, FOR SUSPENSION ORAL at 18:24

## 2022-06-03 RX ADMIN — SODIUM CHLORIDE 2 ML: 9 INJECTION, SOLUTION INTRAMUSCULAR; INTRAVENOUS; SUBCUTANEOUS at 21:22

## 2022-06-03 RX ADMIN — HEPARIN SODIUM 5000 UNITS: 5000 INJECTION INTRAVENOUS; SUBCUTANEOUS at 22:30

## 2022-06-03 RX ADMIN — SODIUM CHLORIDE 5 UNITS: 9 INJECTION, SOLUTION INTRAVENOUS at 14:53

## 2022-06-03 RX ADMIN — DEXTROSE MONOHYDRATE 25 G: 25 INJECTION, SOLUTION INTRAVENOUS at 14:49

## 2022-06-03 RX ADMIN — CALCIUM GLUCONATE 1 G: 20 INJECTION, SOLUTION INTRAVENOUS at 16:30

## 2022-06-03 RX ADMIN — CEFEPIME HYDROCHLORIDE 1000 MG: 1 INJECTION, POWDER, FOR SOLUTION INTRAMUSCULAR; INTRAVENOUS at 20:14

## 2022-06-03 RX ADMIN — ALBUTEROL SULFATE 10 PUFF: 90 AEROSOL, METERED RESPIRATORY (INHALATION) at 16:33

## 2022-06-03 RX ADMIN — SODIUM CHLORIDE 5 UNITS: 9 INJECTION, SOLUTION INTRAVENOUS at 17:11

## 2022-06-03 RX ADMIN — FAMOTIDINE 20 MG: 10 INJECTION, SOLUTION INTRAVENOUS at 18:43

## 2022-06-03 ASSESSMENT — LIFESTYLE VARIABLES
HOW OFTEN DO YOU HAVE A DRINK CONTAINING ALCOHOL: NEVER
AUDIT-C TOTAL SCORE: 0
HOW MANY STANDARD DRINKS CONTAINING ALCOHOL DO YOU HAVE ON A TYPICAL DAY: 0,1 OR 2
ALCOHOL_USE_STATUS: NO OR LOW RISK WITH VALIDATED TOOL
HOW OFTEN DO YOU HAVE 6 OR MORE DRINKS ON ONE OCCASION: NEVER

## 2022-06-03 ASSESSMENT — PATIENT HEALTH QUESTIONNAIRE - PHQ9
SUM OF ALL RESPONSES TO PHQ9 QUESTIONS 1 AND 2: 0
SUM OF ALL RESPONSES TO PHQ9 QUESTIONS 1 AND 2: 0
CLINICAL INTERPRETATION OF PHQ2 SCORE: NO FURTHER SCREENING NEEDED
IS PATIENT ABLE TO COMPLETE PHQ2 OR PHQ9: YES
2. FEELING DOWN, DEPRESSED OR HOPELESS: NOT AT ALL
1. LITTLE INTEREST OR PLEASURE IN DOING THINGS: NOT AT ALL

## 2022-06-03 ASSESSMENT — ACTIVITIES OF DAILY LIVING (ADL)
ADL_SCORE: 12
ADL_SHORT_OF_BREATH: NO
ADL_BEFORE_ADMISSION: INDEPENDENT
RECENT_DECLINE_ADL: NO
MOBILITY_ASSIST_DEVICES: FOUR WHEEL WALKER
CHRONIC_PAIN_PRESENT: NO

## 2022-06-03 ASSESSMENT — COLUMBIA-SUICIDE SEVERITY RATING SCALE - C-SSRS
IS THE PATIENT ABLE TO COMPLETE C-SSRS: YES
2. HAVE YOU ACTUALLY HAD ANY THOUGHTS OF KILLING YOURSELF?: NO
1. WITHIN THE PAST MONTH, HAVE YOU WISHED YOU WERE DEAD OR WISHED YOU COULD GO TO SLEEP AND NOT WAKE UP?: NO
6. HAVE YOU EVER DONE ANYTHING, STARTED TO DO ANYTHING, OR PREPARED TO DO ANYTHING TO END YOUR LIFE?: NO

## 2022-06-03 ASSESSMENT — PAIN SCALES - GENERAL
PAINLEVEL: 0
PAINLEVEL_OUTOF10: 0
PAINLEVEL_OUTOF10: 0

## 2022-06-03 ASSESSMENT — COGNITIVE AND FUNCTIONAL STATUS - GENERAL
BECAUSE OF A PHYSICAL, MENTAL, OR EMOTIONAL CONDITION, DO YOU HAVE SERIOUS DIFFICULTY CONCENTRATING, REMEMBERING OR MAKING DECISIONS: NO
BECAUSE OF A PHYSICAL, MENTAL, OR EMOTIONAL CONDITION, DO YOU HAVE DIFFICULTY DOING ERRANDS ALONE: YES
ARE YOU DEAF OR DO YOU HAVE SERIOUS DIFFICULTY  HEARING: NO
DO YOU HAVE SERIOUS DIFFICULTY WALKING OR CLIMBING STAIRS: NO
ARE YOU BLIND OR DO YOU HAVE SERIOUS DIFFICULTY SEEING, EVEN WHEN WEARING GLASSES: NO
DO YOU HAVE DIFFICULTY DRESSING OR BATHING: NO

## 2022-06-04 ENCOUNTER — HEALTH MAINTENANCE LETTER (OUTPATIENT)
Age: 82
End: 2022-06-04

## 2022-06-04 ENCOUNTER — APPOINTMENT (OUTPATIENT)
Dept: CV DIAGNOSTICS | Age: 82
DRG: 640 | End: 2022-06-04
Attending: INTERNAL MEDICINE

## 2022-06-04 PROBLEM — R00.1 SINUS BRADYCARDIA: Status: RESOLVED | Noted: 2022-06-03 | Resolved: 2022-06-04

## 2022-06-04 PROBLEM — E87.5 HYPERKALEMIA: Status: RESOLVED | Noted: 2022-06-03 | Resolved: 2022-06-04

## 2022-06-04 PROBLEM — U07.1 COVID-19 VIRUS INFECTION: Status: RESOLVED | Noted: 2022-06-03 | Resolved: 2022-06-04

## 2022-06-04 LAB
ALBUMIN SERPL-MCNC: 2.4 G/DL (ref 3.6–5.1)
ALP SERPL-CCNC: 165 UNITS/L (ref 45–117)
ALT SERPL-CCNC: 23 UNITS/L
ANION GAP SERPL CALC-SCNC: 12 MMOL/L (ref 7–19)
AST SERPL-CCNC: 28 UNITS/L
BASOPHILS # BLD: 0 K/MCL (ref 0–0.3)
BASOPHILS NFR BLD: 0 %
BILIRUB CONJ SERPL-MCNC: 0.4 MG/DL (ref 0–0.2)
BILIRUB SERPL-MCNC: 0.5 MG/DL (ref 0.2–1)
BUN SERPL-MCNC: 53 MG/DL (ref 6–20)
BUN/CREAT SERPL: 23 (ref 7–25)
CALCIUM SERPL-MCNC: 8.8 MG/DL (ref 8.4–10.2)
CHLORIDE SERPL-SCNC: 106 MMOL/L (ref 97–110)
CO2 SERPL-SCNC: 25 MMOL/L (ref 21–32)
CREAT SERPL-MCNC: 2.26 MG/DL (ref 0.51–0.95)
DEPRECATED RDW RBC: 55.1 FL (ref 39–50)
EOSINOPHIL # BLD: 0 K/MCL (ref 0–0.5)
EOSINOPHIL NFR BLD: 0 %
ERYTHROCYTE [DISTWIDTH] IN BLOOD: 16.8 % (ref 11–15)
FASTING DURATION TIME PATIENT: ABNORMAL H
GFR SERPLBLD BASED ON 1.73 SQ M-ARVRAT: 21 ML/MIN
GLUCOSE BLDC GLUCOMTR-MCNC: 100 MG/DL (ref 70–99)
GLUCOSE BLDC GLUCOMTR-MCNC: 123 MG/DL (ref 70–99)
GLUCOSE BLDC GLUCOMTR-MCNC: 92 MG/DL (ref 70–99)
GLUCOSE BLDC GLUCOMTR-MCNC: 95 MG/DL (ref 70–99)
GLUCOSE SERPL-MCNC: 109 MG/DL (ref 70–99)
HCT VFR BLD CALC: 40.4 % (ref 36–46.5)
HGB BLD-MCNC: 12.9 G/DL (ref 12–15.5)
IMM GRANULOCYTES # BLD AUTO: 0.1 K/MCL (ref 0–0.2)
IMM GRANULOCYTES # BLD: 1 %
LYMPHOCYTES # BLD: 1.7 K/MCL (ref 1–4)
LYMPHOCYTES NFR BLD: 15 %
MAGNESIUM SERPL-MCNC: 2.4 MG/DL (ref 1.7–2.4)
MCH RBC QN AUTO: 28.8 PG (ref 26–34)
MCHC RBC AUTO-ENTMCNC: 31.9 G/DL (ref 32–36.5)
MCV RBC AUTO: 90.2 FL (ref 78–100)
MONOCYTES # BLD: 1.1 K/MCL (ref 0.3–0.9)
MONOCYTES NFR BLD: 10 %
NEUTROPHILS # BLD: 8.4 K/MCL (ref 1.8–7.7)
NEUTROPHILS NFR BLD: 74 %
NRBC BLD MANUAL-RTO: 0 /100 WBC
PLATELET # BLD AUTO: 219 K/MCL (ref 140–450)
POTASSIUM SERPL-SCNC: 4.8 MMOL/L (ref 3.4–5.1)
PROT SERPL-MCNC: 5.5 G/DL (ref 6.4–8.2)
RBC # BLD: 4.48 MIL/MCL (ref 4–5.2)
SODIUM SERPL-SCNC: 138 MMOL/L (ref 135–145)
TROPONIN I SERPL DL<=0.01 NG/ML-MCNC: 91 NG/L
TROPONIN I SERPL DL<=0.01 NG/ML-MCNC: 98 NG/L
WBC # BLD: 11.3 K/MCL (ref 4.2–11)

## 2022-06-04 PROCEDURE — 97161 PT EVAL LOW COMPLEX 20 MIN: CPT

## 2022-06-04 PROCEDURE — 84484 ASSAY OF TROPONIN QUANT: CPT | Performed by: INTERNAL MEDICINE

## 2022-06-04 PROCEDURE — 10004173 HB COUNTER-THERAPY VISIT PT

## 2022-06-04 PROCEDURE — 99233 SBSQ HOSP IP/OBS HIGH 50: CPT | Performed by: INTERNAL MEDICINE

## 2022-06-04 PROCEDURE — 97530 THERAPEUTIC ACTIVITIES: CPT

## 2022-06-04 PROCEDURE — 10002801 HB RX 250 W/O HCPCS: Performed by: INTERNAL MEDICINE

## 2022-06-04 PROCEDURE — 10002807 HB RX 258: Performed by: INTERNAL MEDICINE

## 2022-06-04 PROCEDURE — 10002800 HB RX 250 W HCPCS: Performed by: INTERNAL MEDICINE

## 2022-06-04 PROCEDURE — 10004174 HB COUNTER-THERAPY VISIT SP

## 2022-06-04 PROCEDURE — 10002803 HB RX 637: Performed by: INTERNAL MEDICINE

## 2022-06-04 PROCEDURE — 97165 OT EVAL LOW COMPLEX 30 MIN: CPT

## 2022-06-04 PROCEDURE — 93308 TTE F-UP OR LMTD: CPT | Performed by: INTERNAL MEDICINE

## 2022-06-04 PROCEDURE — 83735 ASSAY OF MAGNESIUM: CPT | Performed by: INTERNAL MEDICINE

## 2022-06-04 PROCEDURE — 10004172 HB COUNTER-THERAPY VISIT OT

## 2022-06-04 PROCEDURE — 10004651 HB RX, NO CHARGE ITEM: Performed by: INTERNAL MEDICINE

## 2022-06-04 PROCEDURE — 36415 COLL VENOUS BLD VENIPUNCTURE: CPT | Performed by: INTERNAL MEDICINE

## 2022-06-04 PROCEDURE — 85025 COMPLETE CBC W/AUTO DIFF WBC: CPT | Performed by: INTERNAL MEDICINE

## 2022-06-04 PROCEDURE — 10000002 HB ROOM CHARGE MED SURG

## 2022-06-04 PROCEDURE — 10003445 HB TELEMETRY PER DAY

## 2022-06-04 PROCEDURE — 80048 BASIC METABOLIC PNL TOTAL CA: CPT | Performed by: INTERNAL MEDICINE

## 2022-06-04 PROCEDURE — 92610 EVALUATE SWALLOWING FUNCTION: CPT

## 2022-06-04 PROCEDURE — S0310 HOSPITALIST VISIT: HCPCS | Performed by: STUDENT IN AN ORGANIZED HEALTH CARE EDUCATION/TRAINING PROGRAM

## 2022-06-04 PROCEDURE — 80076 HEPATIC FUNCTION PANEL: CPT | Performed by: INTERNAL MEDICINE

## 2022-06-04 PROCEDURE — 82962 GLUCOSE BLOOD TEST: CPT

## 2022-06-04 RX ORDER — ATORVASTATIN CALCIUM 10 MG/1
10 TABLET, FILM COATED ORAL DAILY
Status: DISCONTINUED | OUTPATIENT
Start: 2022-06-04 | End: 2022-06-05 | Stop reason: HOSPADM

## 2022-06-04 RX ORDER — CEPHALEXIN 250 MG/5ML
250 POWDER, FOR SUSPENSION ORAL EVERY 12 HOURS
Status: DISCONTINUED | OUTPATIENT
Start: 2022-06-04 | End: 2022-06-05 | Stop reason: HOSPADM

## 2022-06-04 RX ORDER — ASPIRIN 81 MG/1
81 TABLET, CHEWABLE ORAL DAILY
Status: DISCONTINUED | OUTPATIENT
Start: 2022-06-04 | End: 2022-06-05 | Stop reason: HOSPADM

## 2022-06-04 RX ORDER — PANTOPRAZOLE SODIUM 40 MG/10ML
40 INJECTION, POWDER, LYOPHILIZED, FOR SOLUTION INTRAVENOUS EVERY 12 HOURS SCHEDULED
Status: DISCONTINUED | OUTPATIENT
Start: 2022-06-04 | End: 2022-06-04

## 2022-06-04 RX ORDER — LOSARTAN POTASSIUM 25 MG/1
25 TABLET ORAL 2 TIMES DAILY
Status: DISCONTINUED | OUTPATIENT
Start: 2022-06-04 | End: 2022-06-04

## 2022-06-04 RX ORDER — SODIUM CHLORIDE 9 MG/ML
INJECTION, SOLUTION INTRAVENOUS CONTINUOUS
Status: DISCONTINUED | OUTPATIENT
Start: 2022-06-04 | End: 2022-06-05 | Stop reason: HOSPADM

## 2022-06-04 RX ADMIN — ATORVASTATIN CALCIUM 10 MG: 10 TABLET, FILM COATED ORAL at 10:59

## 2022-06-04 RX ADMIN — FERROUS SULFATE TAB 325 MG (65 MG ELEMENTAL FE) 325 MG: 325 (65 FE) TAB at 08:29

## 2022-06-04 RX ADMIN — HEPARIN SODIUM 5000 UNITS: 5000 INJECTION INTRAVENOUS; SUBCUTANEOUS at 06:52

## 2022-06-04 RX ADMIN — SODIUM CHLORIDE: 9 INJECTION, SOLUTION INTRAVENOUS at 08:27

## 2022-06-04 RX ADMIN — FAMOTIDINE 20 MG: 10 INJECTION, SOLUTION INTRAVENOUS at 08:29

## 2022-06-04 RX ADMIN — HEPARIN SODIUM 5000 UNITS: 5000 INJECTION INTRAVENOUS; SUBCUTANEOUS at 20:44

## 2022-06-04 RX ADMIN — ASPIRIN 81 MG CHEWABLE TABLET 81 MG: 81 TABLET CHEWABLE at 10:59

## 2022-06-04 RX ADMIN — CEPHALEXIN 250 MG: 250 FOR SUSPENSION ORAL at 19:05

## 2022-06-04 RX ADMIN — PANTOPRAZOLE 40 MG: 40 TABLET, DELAYED RELEASE ORAL at 20:44

## 2022-06-04 ASSESSMENT — COGNITIVE AND FUNCTIONAL STATUS - GENERAL
DAILY_ACTIVITY_CONVERTED_SCORE: 47.10
HELP NEEDED DRESSING REGULAR LOWER BODY CLOTHING: A LITTLE
HELP NEEDED FOR BATHING: A LITTLE
DAILY_ACTIVITY_RAW_SCORE: 22
BASIC_MOBILITY_CONVERTED_SCORE: 50.88
BASIC_MOBILITY_RAW_SCORE: 23

## 2022-06-04 ASSESSMENT — PAIN SCALES - GENERAL
PAINLEVEL_OUTOF10: 0

## 2022-06-04 ASSESSMENT — ACTIVITIES OF DAILY LIVING (ADL)
PRIOR_ADL_BATHING: MINIMAL ASSIST (MIN)
PRIOR_ADL_TOILETING: MODIFIED INDEPENDENT

## 2022-06-05 VITALS
WEIGHT: 147.93 LBS | DIASTOLIC BLOOD PRESSURE: 63 MMHG | HEART RATE: 81 BPM | BODY MASS INDEX: 29.82 KG/M2 | SYSTOLIC BLOOD PRESSURE: 135 MMHG | RESPIRATION RATE: 18 BRPM | OXYGEN SATURATION: 95 % | HEIGHT: 59 IN | TEMPERATURE: 98.4 F

## 2022-06-05 LAB
ANION GAP SERPL CALC-SCNC: 12 MMOL/L (ref 7–19)
BASOPHILS # BLD: 0 K/MCL (ref 0–0.3)
BASOPHILS NFR BLD: 0 %
BUN SERPL-MCNC: 45 MG/DL (ref 6–20)
BUN/CREAT SERPL: 26 (ref 7–25)
CALCIUM SERPL-MCNC: 8.9 MG/DL (ref 8.4–10.2)
CHLORIDE SERPL-SCNC: 107 MMOL/L (ref 97–110)
CO2 SERPL-SCNC: 25 MMOL/L (ref 21–32)
CREAT SERPL-MCNC: 1.72 MG/DL (ref 0.51–0.95)
DEPRECATED RDW RBC: 53.8 FL (ref 39–50)
EOSINOPHIL # BLD: 0.5 K/MCL (ref 0–0.5)
EOSINOPHIL NFR BLD: 4 %
ERYTHROCYTE [DISTWIDTH] IN BLOOD: 17.1 % (ref 11–15)
FASTING DURATION TIME PATIENT: ABNORMAL H
GFR SERPLBLD BASED ON 1.73 SQ M-ARVRAT: 29 ML/MIN
GLUCOSE BLDC GLUCOMTR-MCNC: 75 MG/DL (ref 70–99)
GLUCOSE SERPL-MCNC: 75 MG/DL (ref 70–99)
HCT VFR BLD CALC: 48.1 % (ref 36–46.5)
HGB BLD-MCNC: 15.8 G/DL (ref 12–15.5)
IMM GRANULOCYTES # BLD AUTO: 0.1 K/MCL (ref 0–0.2)
IMM GRANULOCYTES # BLD: 1 %
LV EF: 60 %
LYMPHOCYTES # BLD: 1.6 K/MCL (ref 1–4)
LYMPHOCYTES NFR BLD: 13 %
MCH RBC QN AUTO: 29.4 PG (ref 26–34)
MCHC RBC AUTO-ENTMCNC: 32.8 G/DL (ref 32–36.5)
MCV RBC AUTO: 89.4 FL (ref 78–100)
MONOCYTES # BLD: 1.1 K/MCL (ref 0.3–0.9)
MONOCYTES NFR BLD: 9 %
NEUTROPHILS # BLD: 8.9 K/MCL (ref 1.8–7.7)
NEUTROPHILS NFR BLD: 73 %
NRBC BLD MANUAL-RTO: 0 /100 WBC
PLATELET # BLD AUTO: 191 K/MCL (ref 140–450)
POTASSIUM SERPL-SCNC: 4.4 MMOL/L (ref 3.4–5.1)
RBC # BLD: 5.38 MIL/MCL (ref 4–5.2)
SODIUM SERPL-SCNC: 140 MMOL/L (ref 135–145)
WBC # BLD: 12.2 K/MCL (ref 4.2–11)

## 2022-06-05 PROCEDURE — 10002803 HB RX 637: Performed by: HOSPITALIST

## 2022-06-05 PROCEDURE — 10002803 HB RX 637: Performed by: INTERNAL MEDICINE

## 2022-06-05 PROCEDURE — 10002800 HB RX 250 W HCPCS: Performed by: INTERNAL MEDICINE

## 2022-06-05 PROCEDURE — 36415 COLL VENOUS BLD VENIPUNCTURE: CPT | Performed by: INTERNAL MEDICINE

## 2022-06-05 PROCEDURE — 80048 BASIC METABOLIC PNL TOTAL CA: CPT | Performed by: INTERNAL MEDICINE

## 2022-06-05 PROCEDURE — 10004651 HB RX, NO CHARGE ITEM: Performed by: INTERNAL MEDICINE

## 2022-06-05 PROCEDURE — 82962 GLUCOSE BLOOD TEST: CPT

## 2022-06-05 PROCEDURE — 99239 HOSP IP/OBS DSCHRG MGMT >30: CPT | Performed by: HOSPITALIST

## 2022-06-05 PROCEDURE — 85025 COMPLETE CBC W/AUTO DIFF WBC: CPT | Performed by: INTERNAL MEDICINE

## 2022-06-05 RX ORDER — AMLODIPINE BESYLATE 10 MG/1
10 TABLET ORAL DAILY
Qty: 30 TABLET | Refills: 0 | Status: SHIPPED | OUTPATIENT
Start: 2022-06-06 | End: 2022-06-30 | Stop reason: SDUPTHER

## 2022-06-05 RX ORDER — AMLODIPINE BESYLATE 10 MG/1
10 TABLET ORAL DAILY
Status: DISCONTINUED | OUTPATIENT
Start: 2022-06-05 | End: 2022-06-05 | Stop reason: HOSPADM

## 2022-06-05 RX ORDER — CEPHALEXIN 500 MG/1
500 CAPSULE ORAL 2 TIMES DAILY
Qty: 12 CAPSULE | Refills: 0 | Status: SHIPPED | OUTPATIENT
Start: 2022-06-05 | End: 2022-06-11

## 2022-06-05 RX ADMIN — CEPHALEXIN 250 MG: 250 FOR SUSPENSION ORAL at 05:39

## 2022-06-05 RX ADMIN — ATORVASTATIN CALCIUM 10 MG: 10 TABLET, FILM COATED ORAL at 08:54

## 2022-06-05 RX ADMIN — FERROUS SULFATE TAB 325 MG (65 MG ELEMENTAL FE) 325 MG: 325 (65 FE) TAB at 08:54

## 2022-06-05 RX ADMIN — HEPARIN SODIUM 5000 UNITS: 5000 INJECTION INTRAVENOUS; SUBCUTANEOUS at 05:39

## 2022-06-05 RX ADMIN — AMLODIPINE BESYLATE 10 MG: 10 TABLET ORAL at 11:01

## 2022-06-05 RX ADMIN — ASPIRIN 81 MG CHEWABLE TABLET 81 MG: 81 TABLET CHEWABLE at 08:54

## 2022-06-05 RX ADMIN — PANTOPRAZOLE 40 MG: 40 TABLET, DELAYED RELEASE ORAL at 08:54

## 2022-06-05 ASSESSMENT — PAIN SCALES - GENERAL
PAINLEVEL_OUTOF10: 0
PAINLEVEL_OUTOF10: 0

## 2022-06-06 ENCOUNTER — TELEPHONE (OUTPATIENT)
Dept: INTERNAL MEDICINE | Age: 82
End: 2022-06-06

## 2022-06-06 ENCOUNTER — APPOINTMENT (OUTPATIENT)
Dept: HEMATOLOGY/ONCOLOGY | Age: 82
End: 2022-06-06
Attending: INTERNAL MEDICINE

## 2022-06-06 ENCOUNTER — TELEPHONE (OUTPATIENT)
Dept: HEMATOLOGY/ONCOLOGY | Age: 82
End: 2022-06-06

## 2022-06-07 ENCOUNTER — OFFICE VISIT (OUTPATIENT)
Dept: HEMATOLOGY/ONCOLOGY | Age: 82
End: 2022-06-07
Attending: INTERNAL MEDICINE

## 2022-06-07 ENCOUNTER — LAB SERVICES (OUTPATIENT)
Dept: HEMATOLOGY/ONCOLOGY | Age: 82
End: 2022-06-07
Attending: INTERNAL MEDICINE

## 2022-06-07 VITALS
HEART RATE: 84 BPM | SYSTOLIC BLOOD PRESSURE: 125 MMHG | OXYGEN SATURATION: 99 % | RESPIRATION RATE: 20 BRPM | HEIGHT: 59 IN | WEIGHT: 148.59 LBS | TEMPERATURE: 97.1 F | DIASTOLIC BLOOD PRESSURE: 61 MMHG | BODY MASS INDEX: 29.96 KG/M2

## 2022-06-07 DIAGNOSIS — E85.81 AL AMYLOIDOSIS (CMD): ICD-10-CM

## 2022-06-07 DIAGNOSIS — Z51.11 ENCOUNTER FOR CHEMOTHERAPY MANAGEMENT: ICD-10-CM

## 2022-06-07 DIAGNOSIS — E85.81 AL AMYLOIDOSIS (CMD): Primary | ICD-10-CM

## 2022-06-07 LAB
ALBUMIN SERPL-MCNC: 2.6 G/DL (ref 3.6–5.1)
ALBUMIN/GLOB SERPL: 0.7 {RATIO} (ref 1–2.4)
ALP SERPL-CCNC: 181 UNITS/L (ref 45–117)
ALT SERPL-CCNC: 26 UNITS/L
ANION GAP SERPL CALC-SCNC: 12 MMOL/L (ref 7–19)
AST SERPL-CCNC: 24 UNITS/L
BASOPHILS # BLD: 0 K/MCL (ref 0–0.3)
BASOPHILS NFR BLD: 0 %
BILIRUB SERPL-MCNC: 0.6 MG/DL (ref 0.2–1)
BUN SERPL-MCNC: 26 MG/DL (ref 6–20)
BUN/CREAT SERPL: 24 (ref 7–25)
CALCIUM SERPL-MCNC: 9 MG/DL (ref 8.4–10.2)
CHLORIDE SERPL-SCNC: 104 MMOL/L (ref 97–110)
CO2 SERPL-SCNC: 25 MMOL/L (ref 21–32)
CREAT SERPL-MCNC: 1.1 MG/DL (ref 0.51–0.95)
DEPRECATED RDW RBC: 51.9 FL (ref 39–50)
EOSINOPHIL # BLD: 0 K/MCL (ref 0–0.5)
EOSINOPHIL NFR BLD: 0 %
ERYTHROCYTE [DISTWIDTH] IN BLOOD: 16.1 % (ref 11–15)
FASTING DURATION TIME PATIENT: ABNORMAL H
GFR SERPLBLD BASED ON 1.73 SQ M-ARVRAT: 50 ML/MIN
GLOBULIN SER-MCNC: 3.5 G/DL (ref 2–4)
GLUCOSE SERPL-MCNC: 169 MG/DL (ref 70–99)
HCT VFR BLD CALC: 46.8 % (ref 36–46.5)
HGB BLD-MCNC: 15.2 G/DL (ref 12–15.5)
IMM GRANULOCYTES # BLD AUTO: 0 K/MCL (ref 0–0.2)
IMM GRANULOCYTES # BLD: 1 %
LYMPHOCYTES # BLD: 1 K/MCL (ref 1–4)
LYMPHOCYTES NFR BLD: 15 %
MCH RBC QN AUTO: 28.9 PG (ref 26–34)
MCHC RBC AUTO-ENTMCNC: 32.5 G/DL (ref 32–36.5)
MCV RBC AUTO: 89 FL (ref 78–100)
MONOCYTES # BLD: 0.5 K/MCL (ref 0.3–0.9)
MONOCYTES NFR BLD: 8 %
NEUTROPHILS # BLD: 5 K/MCL (ref 1.8–7.7)
NEUTROPHILS # BLD: 5 K/MCL (ref 1.8–7.7)
NEUTROPHILS NFR BLD: 76 %
NRBC BLD MANUAL-RTO: 0 /100 WBC
PLATELET # BLD AUTO: 246 K/MCL (ref 140–450)
POTASSIUM SERPL-SCNC: 3.9 MMOL/L (ref 3.4–5.1)
PROT SERPL-MCNC: 6.1 G/DL (ref 6.4–8.2)
RBC # BLD: 5.26 MIL/MCL (ref 4–5.2)
SODIUM SERPL-SCNC: 137 MMOL/L (ref 135–145)
WBC # BLD: 6.5 K/MCL (ref 4.2–11)

## 2022-06-07 PROCEDURE — 96374 THER/PROPH/DIAG INJ IV PUSH: CPT

## 2022-06-07 PROCEDURE — 96375 TX/PRO/DX INJ NEW DRUG ADDON: CPT

## 2022-06-07 PROCEDURE — 10002803 HB RX 637: Performed by: NURSE PRACTITIONER

## 2022-06-07 PROCEDURE — 36415 COLL VENOUS BLD VENIPUNCTURE: CPT

## 2022-06-07 PROCEDURE — 10002807 HB RX 258: Performed by: NURSE PRACTITIONER

## 2022-06-07 PROCEDURE — 10002800 HB RX 250 W HCPCS: Performed by: NURSE PRACTITIONER

## 2022-06-07 PROCEDURE — 85025 COMPLETE CBC W/AUTO DIFF WBC: CPT

## 2022-06-07 PROCEDURE — 96401 CHEMO ANTI-NEOPL SQ/IM: CPT | Performed by: NURSE PRACTITIONER

## 2022-06-07 PROCEDURE — 80053 COMPREHEN METABOLIC PANEL: CPT

## 2022-06-07 PROCEDURE — 99215 OFFICE O/P EST HI 40 MIN: CPT | Performed by: NURSE PRACTITIONER

## 2022-06-07 RX ORDER — ACETAMINOPHEN 160 MG/5ML
650 LIQUID ORAL ONCE
Status: COMPLETED | OUTPATIENT
Start: 2022-06-07 | End: 2022-06-07

## 2022-06-07 RX ORDER — DIPHENHYDRAMINE HYDROCHLORIDE 50 MG/ML
25 INJECTION INTRAMUSCULAR; INTRAVENOUS ONCE
Status: COMPLETED | OUTPATIENT
Start: 2022-06-07 | End: 2022-06-07

## 2022-06-07 RX ORDER — ALBUTEROL SULFATE 90 UG/1
2 AEROSOL, METERED RESPIRATORY (INHALATION)
Status: DISCONTINUED | OUTPATIENT
Start: 2022-06-07 | End: 2022-06-07 | Stop reason: HOSPADM

## 2022-06-07 RX ORDER — ONDANSETRON HYDROCHLORIDE 8 MG/1
8 TABLET, FILM COATED ORAL 2 TIMES DAILY
Status: CANCELLED | OUTPATIENT
Start: 2022-06-08

## 2022-06-07 RX ORDER — HEPARIN 100 UNIT/ML
5 SYRINGE INTRAVENOUS
Status: DISCONTINUED | OUTPATIENT
Start: 2022-06-07 | End: 2022-06-07 | Stop reason: HOSPADM

## 2022-06-07 RX ORDER — DEXAMETHASONE 4 MG/1
4 TABLET ORAL DAILY
Qty: 2 TABLET | Refills: 0 | Status: SHIPPED | OUTPATIENT
Start: 2022-06-07 | End: 2022-06-09

## 2022-06-07 RX ORDER — METHYLPREDNISOLONE SODIUM SUCCINATE 125 MG/2ML
125 INJECTION, POWDER, LYOPHILIZED, FOR SOLUTION INTRAMUSCULAR; INTRAVENOUS
Status: DISCONTINUED | OUTPATIENT
Start: 2022-06-07 | End: 2022-06-07 | Stop reason: HOSPADM

## 2022-06-07 RX ORDER — DIPHENHYDRAMINE HYDROCHLORIDE 50 MG/ML
50 INJECTION INTRAMUSCULAR; INTRAVENOUS
Status: DISCONTINUED | OUTPATIENT
Start: 2022-06-07 | End: 2022-06-07 | Stop reason: HOSPADM

## 2022-06-07 RX ORDER — FAMOTIDINE 10 MG/ML
20 INJECTION, SOLUTION INTRAVENOUS
Status: DISCONTINUED | OUTPATIENT
Start: 2022-06-07 | End: 2022-06-07 | Stop reason: HOSPADM

## 2022-06-07 RX ADMIN — DEXAMETHASONE SODIUM PHOSPHATE 16 MG: 4 INJECTION, SOLUTION INTRAMUSCULAR; INTRAVENOUS at 09:06

## 2022-06-07 RX ADMIN — BORTEZOMIB 2 MG: 3.5 INJECTION, POWDER, LYOPHILIZED, FOR SOLUTION INTRAVENOUS; SUBCUTANEOUS at 09:55

## 2022-06-07 RX ADMIN — DARATUMUMAB AND HYALURONIDASE-FIHJ (HUMAN RECOMBINANT) 1800 MG: 1800; 30000 INJECTION SUBCUTANEOUS at 10:00

## 2022-06-07 RX ADMIN — ACETAMINOPHEN 650 MG: 160 SOLUTION ORAL at 08:49

## 2022-06-07 RX ADMIN — DIPHENHYDRAMINE HYDROCHLORIDE 25 MG: 50 INJECTION, SOLUTION INTRAMUSCULAR; INTRAVENOUS at 09:03

## 2022-06-08 LAB
BACTERIA BLD CULT: NORMAL
BACTERIA BLD CULT: NORMAL

## 2022-06-09 ENCOUNTER — APPOINTMENT (OUTPATIENT)
Dept: REHABILITATION | Age: 82
End: 2022-06-09
Attending: INTERNAL MEDICINE

## 2022-06-10 ENCOUNTER — OFFICE VISIT (OUTPATIENT)
Dept: HEMATOLOGY/ONCOLOGY | Age: 82
End: 2022-06-10
Attending: INTERNAL MEDICINE

## 2022-06-10 ENCOUNTER — LAB SERVICES (OUTPATIENT)
Dept: HEMATOLOGY/ONCOLOGY | Age: 82
End: 2022-06-10
Attending: INTERNAL MEDICINE

## 2022-06-10 ENCOUNTER — APPOINTMENT (OUTPATIENT)
Dept: HEMATOLOGY/ONCOLOGY | Age: 82
End: 2022-06-10
Attending: INTERNAL MEDICINE

## 2022-06-10 VITALS
DIASTOLIC BLOOD PRESSURE: 66 MMHG | OXYGEN SATURATION: 96 % | RESPIRATION RATE: 16 BRPM | SYSTOLIC BLOOD PRESSURE: 151 MMHG | TEMPERATURE: 98.1 F | HEART RATE: 74 BPM | WEIGHT: 151.68 LBS | BODY MASS INDEX: 30.63 KG/M2

## 2022-06-10 DIAGNOSIS — E85.81 AL AMYLOIDOSIS (CMD): ICD-10-CM

## 2022-06-10 DIAGNOSIS — E85.81 AL AMYLOIDOSIS (CMD): Primary | ICD-10-CM

## 2022-06-10 DIAGNOSIS — Z09 CHEMOTHERAPY FOLLOW-UP EXAMINATION: ICD-10-CM

## 2022-06-10 LAB
ALBUMIN SERPL-MCNC: 2.7 G/DL (ref 3.6–5.1)
ALBUMIN/GLOB SERPL: 0.9 {RATIO} (ref 1–2.4)
ALP SERPL-CCNC: 154 UNITS/L (ref 45–117)
ALT SERPL-CCNC: 42 UNITS/L
ANION GAP SERPL CALC-SCNC: 10 MMOL/L (ref 7–19)
AST SERPL-CCNC: 37 UNITS/L
BASOPHILS # BLD: 0 K/MCL (ref 0–0.3)
BASOPHILS NFR BLD: 0 %
BILIRUB SERPL-MCNC: 0.5 MG/DL (ref 0.2–1)
BUN SERPL-MCNC: 38 MG/DL (ref 6–20)
BUN/CREAT SERPL: 31 (ref 7–25)
CALCIUM SERPL-MCNC: 9.4 MG/DL (ref 8.4–10.2)
CHLORIDE SERPL-SCNC: 102 MMOL/L (ref 97–110)
CO2 SERPL-SCNC: 28 MMOL/L (ref 21–32)
CREAT SERPL-MCNC: 1.22 MG/DL (ref 0.51–0.95)
DEPRECATED RDW RBC: 53 FL (ref 39–50)
EOSINOPHIL # BLD: 0 K/MCL (ref 0–0.5)
EOSINOPHIL NFR BLD: 0 %
ERYTHROCYTE [DISTWIDTH] IN BLOOD: 15.9 % (ref 11–15)
FASTING DURATION TIME PATIENT: ABNORMAL H
GFR SERPLBLD BASED ON 1.73 SQ M-ARVRAT: 44 ML/MIN
GLOBULIN SER-MCNC: 3 G/DL (ref 2–4)
GLUCOSE SERPL-MCNC: 109 MG/DL (ref 70–99)
HCT VFR BLD CALC: 50.1 % (ref 36–46.5)
HGB BLD-MCNC: 16 G/DL (ref 12–15.5)
IMM GRANULOCYTES # BLD AUTO: 0.1 K/MCL (ref 0–0.2)
IMM GRANULOCYTES # BLD: 1 %
LYMPHOCYTES # BLD: 2.1 K/MCL (ref 1–4)
LYMPHOCYTES NFR BLD: 17 %
MCH RBC QN AUTO: 28.8 PG (ref 26–34)
MCHC RBC AUTO-ENTMCNC: 31.9 G/DL (ref 32–36.5)
MCV RBC AUTO: 90.3 FL (ref 78–100)
MONOCYTES # BLD: 1.3 K/MCL (ref 0.3–0.9)
MONOCYTES NFR BLD: 11 %
NEUTROPHILS # BLD: 8.4 K/MCL (ref 1.8–7.7)
NEUTROPHILS # BLD: 8.4 K/MCL (ref 1.8–7.7)
NEUTROPHILS NFR BLD: 71 %
NRBC BLD MANUAL-RTO: 0 /100 WBC
PLATELET # BLD AUTO: 211 K/MCL (ref 140–450)
POTASSIUM SERPL-SCNC: 4.5 MMOL/L (ref 3.4–5.1)
PROT SERPL-MCNC: 5.7 G/DL (ref 6.4–8.2)
RBC # BLD: 5.55 MIL/MCL (ref 4–5.2)
SODIUM SERPL-SCNC: 135 MMOL/L (ref 135–145)
WBC # BLD: 11.9 K/MCL (ref 4.2–11)

## 2022-06-10 PROCEDURE — 99211 OFF/OP EST MAY X REQ PHY/QHP: CPT

## 2022-06-10 PROCEDURE — 36415 COLL VENOUS BLD VENIPUNCTURE: CPT

## 2022-06-10 PROCEDURE — 99214 OFFICE O/P EST MOD 30 MIN: CPT | Performed by: NURSE PRACTITIONER

## 2022-06-10 PROCEDURE — 85025 COMPLETE CBC W/AUTO DIFF WBC: CPT

## 2022-06-10 PROCEDURE — 80053 COMPREHEN METABOLIC PANEL: CPT

## 2022-06-10 RX ORDER — FUROSEMIDE 20 MG/1
20 TABLET ORAL DAILY
Qty: 1 TABLET | Refills: 0 | Status: SHIPPED | OUTPATIENT
Start: 2022-06-10 | End: 2022-07-01 | Stop reason: SDUPTHER

## 2022-06-10 ASSESSMENT — PAIN SCALES - GENERAL: PAINLEVEL: 2

## 2022-06-13 ENCOUNTER — APPOINTMENT (OUTPATIENT)
Dept: INTERNAL MEDICINE | Age: 82
End: 2022-06-13

## 2022-06-13 RX ORDER — ONDANSETRON HYDROCHLORIDE 8 MG/1
8 TABLET, FILM COATED ORAL 2 TIMES DAILY
Status: CANCELLED | OUTPATIENT
Start: 2022-06-15

## 2022-06-14 ENCOUNTER — OFFICE VISIT (OUTPATIENT)
Dept: HEMATOLOGY/ONCOLOGY | Age: 82
End: 2022-06-14
Attending: INTERNAL MEDICINE

## 2022-06-14 ENCOUNTER — APPOINTMENT (OUTPATIENT)
Dept: HEMATOLOGY/ONCOLOGY | Age: 82
End: 2022-06-14
Attending: INTERNAL MEDICINE

## 2022-06-14 ENCOUNTER — LAB SERVICES (OUTPATIENT)
Dept: HEMATOLOGY/ONCOLOGY | Age: 82
End: 2022-06-14
Attending: INTERNAL MEDICINE

## 2022-06-14 VITALS
BODY MASS INDEX: 30.37 KG/M2 | OXYGEN SATURATION: 98 % | SYSTOLIC BLOOD PRESSURE: 128 MMHG | TEMPERATURE: 97.7 F | DIASTOLIC BLOOD PRESSURE: 60 MMHG | HEART RATE: 90 BPM | WEIGHT: 150.35 LBS | RESPIRATION RATE: 20 BRPM

## 2022-06-14 DIAGNOSIS — E85.81 AL AMYLOIDOSIS (CMD): ICD-10-CM

## 2022-06-14 DIAGNOSIS — E85.81 AL AMYLOIDOSIS (CMD): Primary | ICD-10-CM

## 2022-06-14 LAB
ALBUMIN SERPL-MCNC: 2.5 G/DL (ref 3.6–5.1)
ALBUMIN/GLOB SERPL: 0.8 {RATIO} (ref 1–2.4)
ALP SERPL-CCNC: 179 UNITS/L (ref 45–117)
ALT SERPL-CCNC: 41 UNITS/L
ANION GAP SERPL CALC-SCNC: 12 MMOL/L (ref 7–19)
AST SERPL-CCNC: 31 UNITS/L
BASOPHILS # BLD: 0 K/MCL (ref 0–0.3)
BASOPHILS NFR BLD: 0 %
BILIRUB SERPL-MCNC: 0.6 MG/DL (ref 0.2–1)
BUN SERPL-MCNC: 26 MG/DL (ref 6–20)
BUN/CREAT SERPL: 25 (ref 7–25)
CALCIUM SERPL-MCNC: 9 MG/DL (ref 8.4–10.2)
CHLORIDE SERPL-SCNC: 100 MMOL/L (ref 97–110)
CO2 SERPL-SCNC: 26 MMOL/L (ref 21–32)
CREAT SERPL-MCNC: 1.03 MG/DL (ref 0.51–0.95)
DEPRECATED RDW RBC: 51.1 FL (ref 39–50)
EOSINOPHIL # BLD: 0 K/MCL (ref 0–0.5)
EOSINOPHIL NFR BLD: 0 %
ERYTHROCYTE [DISTWIDTH] IN BLOOD: 15.9 % (ref 11–15)
FASTING DURATION TIME PATIENT: ABNORMAL H
GFR SERPLBLD BASED ON 1.73 SQ M-ARVRAT: 54 ML/MIN
GLOBULIN SER-MCNC: 3.3 G/DL (ref 2–4)
GLUCOSE SERPL-MCNC: 173 MG/DL (ref 70–99)
HCT VFR BLD CALC: 46.2 % (ref 36–46.5)
HGB BLD-MCNC: 15.4 G/DL (ref 12–15.5)
IMM GRANULOCYTES # BLD AUTO: 0 K/MCL (ref 0–0.2)
IMM GRANULOCYTES # BLD: 0 %
LYMPHOCYTES # BLD: 1 K/MCL (ref 1–4)
LYMPHOCYTES NFR BLD: 13 %
MCH RBC QN AUTO: 29.3 PG (ref 26–34)
MCHC RBC AUTO-ENTMCNC: 33.3 G/DL (ref 32–36.5)
MCV RBC AUTO: 88 FL (ref 78–100)
MONOCYTES # BLD: 0.3 K/MCL (ref 0.3–0.9)
MONOCYTES NFR BLD: 5 %
NEUTROPHILS # BLD: 5.9 K/MCL (ref 1.8–7.7)
NEUTROPHILS # BLD: 5.9 K/MCL (ref 1.8–7.7)
NEUTROPHILS NFR BLD: 82 %
NRBC BLD MANUAL-RTO: 0 /100 WBC
PLATELET # BLD AUTO: 180 K/MCL (ref 140–450)
POTASSIUM SERPL-SCNC: 4.2 MMOL/L (ref 3.4–5.1)
PROT SERPL-MCNC: 5.8 G/DL (ref 6.4–8.2)
RBC # BLD: 5.25 MIL/MCL (ref 4–5.2)
SODIUM SERPL-SCNC: 134 MMOL/L (ref 135–145)
WBC # BLD: 7.2 K/MCL (ref 4.2–11)

## 2022-06-14 PROCEDURE — 10002803 HB RX 637: Performed by: INTERNAL MEDICINE

## 2022-06-14 PROCEDURE — 96401 CHEMO ANTI-NEOPL SQ/IM: CPT | Performed by: INTERNAL MEDICINE

## 2022-06-14 PROCEDURE — 10002807 HB RX 258: Performed by: INTERNAL MEDICINE

## 2022-06-14 PROCEDURE — 10002800 HB RX 250 W HCPCS: Performed by: INTERNAL MEDICINE

## 2022-06-14 PROCEDURE — 85025 COMPLETE CBC W/AUTO DIFF WBC: CPT | Performed by: INTERNAL MEDICINE

## 2022-06-14 PROCEDURE — 80053 COMPREHEN METABOLIC PANEL: CPT | Performed by: INTERNAL MEDICINE

## 2022-06-14 PROCEDURE — 96374 THER/PROPH/DIAG INJ IV PUSH: CPT

## 2022-06-14 PROCEDURE — 99214 OFFICE O/P EST MOD 30 MIN: CPT | Performed by: INTERNAL MEDICINE

## 2022-06-14 RX ORDER — DIPHENHYDRAMINE HCL 25 MG
25 CAPSULE ORAL ONCE
Status: COMPLETED | OUTPATIENT
Start: 2022-06-14 | End: 2022-06-14

## 2022-06-14 RX ORDER — ALBUTEROL SULFATE 90 UG/1
2 AEROSOL, METERED RESPIRATORY (INHALATION)
Status: DISCONTINUED | OUTPATIENT
Start: 2022-06-14 | End: 2022-06-27 | Stop reason: HOSPADM

## 2022-06-14 RX ORDER — ACETAMINOPHEN 160 MG/5ML
650 LIQUID ORAL ONCE
Status: COMPLETED | OUTPATIENT
Start: 2022-06-14 | End: 2022-06-14

## 2022-06-14 RX ORDER — DIPHENHYDRAMINE HYDROCHLORIDE 50 MG/ML
50 INJECTION INTRAMUSCULAR; INTRAVENOUS
Status: DISCONTINUED | OUTPATIENT
Start: 2022-06-14 | End: 2022-06-27 | Stop reason: HOSPADM

## 2022-06-14 RX ORDER — FAMOTIDINE 10 MG/ML
20 INJECTION, SOLUTION INTRAVENOUS
Status: DISCONTINUED | OUTPATIENT
Start: 2022-06-14 | End: 2022-06-27 | Stop reason: HOSPADM

## 2022-06-14 RX ORDER — METHYLPREDNISOLONE SODIUM SUCCINATE 125 MG/2ML
125 INJECTION, POWDER, LYOPHILIZED, FOR SOLUTION INTRAMUSCULAR; INTRAVENOUS
Status: DISCONTINUED | OUTPATIENT
Start: 2022-06-14 | End: 2022-06-27 | Stop reason: HOSPADM

## 2022-06-14 RX ORDER — HEPARIN 100 UNIT/ML
5 SYRINGE INTRAVENOUS
Status: DISCONTINUED | OUTPATIENT
Start: 2022-06-14 | End: 2022-06-27 | Stop reason: HOSPADM

## 2022-06-14 RX ADMIN — DEXAMETHASONE SODIUM PHOSPHATE 16 MG: 4 INJECTION, SOLUTION INTRAMUSCULAR; INTRAVENOUS at 10:01

## 2022-06-14 RX ADMIN — DARATUMUMAB AND HYALURONIDASE-FIHJ (HUMAN RECOMBINANT) 1800 MG: 1800; 30000 INJECTION SUBCUTANEOUS at 10:30

## 2022-06-14 RX ADMIN — BORTEZOMIB 2 MG: 3.5 INJECTION, POWDER, LYOPHILIZED, FOR SOLUTION INTRAVENOUS; SUBCUTANEOUS at 10:36

## 2022-06-14 RX ADMIN — DIPHENHYDRAMINE HYDROCHLORIDE 25 MG: 25 CAPSULE ORAL at 09:52

## 2022-06-14 RX ADMIN — ACETAMINOPHEN 650 MG: 160 SOLUTION ORAL at 09:52

## 2022-06-14 ASSESSMENT — PAIN SCALES - GENERAL: PAINLEVEL: 0

## 2022-06-15 ENCOUNTER — APPOINTMENT (OUTPATIENT)
Dept: HEMATOLOGY/ONCOLOGY | Age: 82
End: 2022-06-15
Attending: INTERNAL MEDICINE

## 2022-06-16 ENCOUNTER — TELEPHONE (OUTPATIENT)
Dept: NEPHROLOGY | Age: 82
End: 2022-06-16

## 2022-06-16 DIAGNOSIS — E85.81 LIGHT CHAIN (AL) AMYLOIDOSIS (CMD): Primary | ICD-10-CM

## 2022-06-17 ENCOUNTER — TELEPHONE (OUTPATIENT)
Dept: HEMATOLOGY/ONCOLOGY | Age: 82
End: 2022-06-17

## 2022-06-22 ENCOUNTER — OFFICE VISIT (OUTPATIENT)
Dept: HEMATOLOGY/ONCOLOGY | Age: 82
End: 2022-06-22
Attending: INTERNAL MEDICINE

## 2022-06-22 ENCOUNTER — LAB SERVICES (OUTPATIENT)
Dept: HEMATOLOGY/ONCOLOGY | Age: 82
End: 2022-06-22
Attending: INTERNAL MEDICINE

## 2022-06-22 ENCOUNTER — APPOINTMENT (OUTPATIENT)
Dept: HEMATOLOGY/ONCOLOGY | Age: 82
End: 2022-06-22
Attending: INTERNAL MEDICINE

## 2022-06-22 ENCOUNTER — CANCER NAVIGATOR (OUTPATIENT)
Dept: ONCOLOGY | Age: 82
End: 2022-06-22

## 2022-06-22 VITALS
HEART RATE: 87 BPM | SYSTOLIC BLOOD PRESSURE: 116 MMHG | DIASTOLIC BLOOD PRESSURE: 55 MMHG | TEMPERATURE: 97.7 F | HEIGHT: 59 IN | WEIGHT: 138.89 LBS | BODY MASS INDEX: 28 KG/M2 | RESPIRATION RATE: 16 BRPM | OXYGEN SATURATION: 99 %

## 2022-06-22 DIAGNOSIS — E85.81 AL AMYLOIDOSIS (CMD): ICD-10-CM

## 2022-06-22 DIAGNOSIS — E85.81 AL AMYLOIDOSIS (CMD): Primary | ICD-10-CM

## 2022-06-22 LAB
ALBUMIN SERPL-MCNC: 2.2 G/DL (ref 3.6–5.1)
ALBUMIN/GLOB SERPL: 0.7 {RATIO} (ref 1–2.4)
ALP SERPL-CCNC: 229 UNITS/L (ref 45–117)
ALT SERPL-CCNC: 39 UNITS/L
ANION GAP SERPL CALC-SCNC: 12 MMOL/L (ref 7–19)
AST SERPL-CCNC: 25 UNITS/L
BASOPHILS # BLD: 0 K/MCL (ref 0–0.3)
BASOPHILS NFR BLD: 0 %
BILIRUB SERPL-MCNC: 0.6 MG/DL (ref 0.2–1)
BUN SERPL-MCNC: 21 MG/DL (ref 6–20)
BUN/CREAT SERPL: 27 (ref 7–25)
CALCIUM SERPL-MCNC: 8.6 MG/DL (ref 8.4–10.2)
CHLORIDE SERPL-SCNC: 101 MMOL/L (ref 97–110)
CO2 SERPL-SCNC: 27 MMOL/L (ref 21–32)
CREAT SERPL-MCNC: 0.78 MG/DL (ref 0.51–0.95)
DEPRECATED RDW RBC: 48.4 FL (ref 39–50)
EOSINOPHIL # BLD: 0 K/MCL (ref 0–0.5)
EOSINOPHIL NFR BLD: 0 %
ERYTHROCYTE [DISTWIDTH] IN BLOOD: 15.3 % (ref 11–15)
FASTING DURATION TIME PATIENT: ABNORMAL H
GFR SERPLBLD BASED ON 1.73 SQ M-ARVRAT: 75 ML/MIN
GLOBULIN SER-MCNC: 3.1 G/DL (ref 2–4)
GLUCOSE SERPL-MCNC: 145 MG/DL (ref 70–99)
HCT VFR BLD CALC: 43.3 % (ref 36–46.5)
HGB BLD-MCNC: 14.6 G/DL (ref 12–15.5)
IMM GRANULOCYTES # BLD AUTO: 0 K/MCL (ref 0–0.2)
IMM GRANULOCYTES # BLD: 1 %
LYMPHOCYTES # BLD: 1 K/MCL (ref 1–4)
LYMPHOCYTES NFR BLD: 16 %
MCH RBC QN AUTO: 29.1 PG (ref 26–34)
MCHC RBC AUTO-ENTMCNC: 33.7 G/DL (ref 32–36.5)
MCV RBC AUTO: 86.3 FL (ref 78–100)
MONOCYTES # BLD: 0.4 K/MCL (ref 0.3–0.9)
MONOCYTES NFR BLD: 6 %
NEUTROPHILS # BLD: 4.9 K/MCL (ref 1.8–7.7)
NEUTROPHILS # BLD: 4.9 K/MCL (ref 1.8–7.7)
NEUTROPHILS NFR BLD: 77 %
NRBC BLD MANUAL-RTO: 0 /100 WBC
PLATELET # BLD AUTO: 152 K/MCL (ref 140–450)
POTASSIUM SERPL-SCNC: 3.9 MMOL/L (ref 3.4–5.1)
PROT SERPL-MCNC: 5.3 G/DL (ref 6.4–8.2)
RBC # BLD: 5.02 MIL/MCL (ref 4–5.2)
SODIUM SERPL-SCNC: 136 MMOL/L (ref 135–145)
WBC # BLD: 6.3 K/MCL (ref 4.2–11)

## 2022-06-22 PROCEDURE — 85025 COMPLETE CBC W/AUTO DIFF WBC: CPT

## 2022-06-22 PROCEDURE — 36415 COLL VENOUS BLD VENIPUNCTURE: CPT

## 2022-06-22 PROCEDURE — 10002807 HB RX 258: Performed by: PHYSICIAN ASSISTANT

## 2022-06-22 PROCEDURE — 80053 COMPREHEN METABOLIC PANEL: CPT

## 2022-06-22 PROCEDURE — 96375 TX/PRO/DX INJ NEW DRUG ADDON: CPT

## 2022-06-22 PROCEDURE — 96374 THER/PROPH/DIAG INJ IV PUSH: CPT

## 2022-06-22 PROCEDURE — 99215 OFFICE O/P EST HI 40 MIN: CPT | Performed by: PHYSICIAN ASSISTANT

## 2022-06-22 PROCEDURE — 96401 CHEMO ANTI-NEOPL SQ/IM: CPT | Performed by: PHYSICIAN ASSISTANT

## 2022-06-22 PROCEDURE — 10002803 HB RX 637: Performed by: PHYSICIAN ASSISTANT

## 2022-06-22 PROCEDURE — 10002800 HB RX 250 W HCPCS: Performed by: PHYSICIAN ASSISTANT

## 2022-06-22 RX ORDER — DIPHENHYDRAMINE HYDROCHLORIDE 50 MG/ML
50 INJECTION INTRAMUSCULAR; INTRAVENOUS
Status: DISCONTINUED | OUTPATIENT
Start: 2022-06-22 | End: 2022-06-22 | Stop reason: HOSPADM

## 2022-06-22 RX ORDER — DEXAMETHASONE 4 MG/1
TABLET ORAL
Qty: 30 TABLET | Refills: 1 | Status: SHIPPED | OUTPATIENT
Start: 2022-06-22

## 2022-06-22 RX ORDER — FAMOTIDINE 10 MG/ML
20 INJECTION, SOLUTION INTRAVENOUS
Status: DISCONTINUED | OUTPATIENT
Start: 2022-06-22 | End: 2022-06-22 | Stop reason: HOSPADM

## 2022-06-22 RX ORDER — HEPARIN 100 UNIT/ML
5 SYRINGE INTRAVENOUS
Status: DISCONTINUED | OUTPATIENT
Start: 2022-06-22 | End: 2022-06-22 | Stop reason: HOSPADM

## 2022-06-22 RX ORDER — METHYLPREDNISOLONE SODIUM SUCCINATE 125 MG/2ML
125 INJECTION, POWDER, LYOPHILIZED, FOR SOLUTION INTRAMUSCULAR; INTRAVENOUS
Status: DISCONTINUED | OUTPATIENT
Start: 2022-06-22 | End: 2022-06-22 | Stop reason: HOSPADM

## 2022-06-22 RX ORDER — ACETAMINOPHEN 160 MG/5ML
650 LIQUID ORAL ONCE
Status: COMPLETED | OUTPATIENT
Start: 2022-06-22 | End: 2022-06-22

## 2022-06-22 RX ORDER — DIPHENHYDRAMINE HYDROCHLORIDE 50 MG/ML
25 INJECTION INTRAMUSCULAR; INTRAVENOUS ONCE
Status: COMPLETED | OUTPATIENT
Start: 2022-06-22 | End: 2022-06-22

## 2022-06-22 RX ORDER — ALBUTEROL SULFATE 90 UG/1
2 AEROSOL, METERED RESPIRATORY (INHALATION)
Status: DISCONTINUED | OUTPATIENT
Start: 2022-06-22 | End: 2022-06-22 | Stop reason: HOSPADM

## 2022-06-22 RX ORDER — ONDANSETRON HYDROCHLORIDE 8 MG/1
8 TABLET, FILM COATED ORAL 2 TIMES DAILY
Status: CANCELLED | OUTPATIENT
Start: 2022-06-23

## 2022-06-22 RX ADMIN — BORTEZOMIB 2 MG: 3.5 INJECTION, POWDER, LYOPHILIZED, FOR SOLUTION INTRAVENOUS; SUBCUTANEOUS at 10:51

## 2022-06-22 RX ADMIN — SODIUM CHLORIDE 250 ML: 9 INJECTION, SOLUTION INTRAVENOUS at 09:52

## 2022-06-22 RX ADMIN — DEXAMETHASONE SODIUM PHOSPHATE 16 MG: 4 INJECTION, SOLUTION INTRAMUSCULAR; INTRAVENOUS at 10:00

## 2022-06-22 RX ADMIN — ACETAMINOPHEN 650 MG: 160 SOLUTION ORAL at 09:52

## 2022-06-22 RX ADMIN — DIPHENHYDRAMINE HYDROCHLORIDE 25 MG: 50 INJECTION, SOLUTION INTRAMUSCULAR; INTRAVENOUS at 09:50

## 2022-06-22 RX ADMIN — DARATUMUMAB AND HYALURONIDASE-FIHJ (HUMAN RECOMBINANT) 1800 MG: 1800; 30000 INJECTION SUBCUTANEOUS at 10:49

## 2022-06-22 ASSESSMENT — PAIN SCALES - GENERAL: PAINLEVEL: 0

## 2022-06-24 ENCOUNTER — TELEPHONE (OUTPATIENT)
Dept: ONCOLOGY | Age: 82
End: 2022-06-24

## 2022-06-25 SDOH — ECONOMIC STABILITY: HOUSING INSECURITY: HAVE YOU HAD DIFFICULTIES WITH HOUSING OR FINDING SOMEWHERE STABLE TO LIVE?: NO

## 2022-06-25 ASSESSMENT — LIFESTYLE VARIABLES: IN THE PAST WEEK, HAS SUBSTANCE ABUSE BEEN A PROBLEM FOR YOU: NO

## 2022-06-26 RX ORDER — FAMOTIDINE 10 MG/ML
20 INJECTION, SOLUTION INTRAVENOUS
Status: CANCELLED | OUTPATIENT
Start: 2022-07-04

## 2022-06-26 RX ORDER — DIPHENHYDRAMINE HYDROCHLORIDE 50 MG/ML
50 INJECTION INTRAMUSCULAR; INTRAVENOUS
Status: CANCELLED
Start: 2022-07-19

## 2022-06-26 RX ORDER — DIPHENHYDRAMINE HYDROCHLORIDE 50 MG/ML
50 INJECTION INTRAMUSCULAR; INTRAVENOUS
Status: CANCELLED
Start: 2022-07-26

## 2022-06-26 RX ORDER — DIPHENHYDRAMINE HYDROCHLORIDE 50 MG/ML
25 INJECTION INTRAMUSCULAR; INTRAVENOUS ONCE
Status: CANCELLED
Start: 2022-06-27 | End: 2022-06-27

## 2022-06-26 RX ORDER — DIPHENHYDRAMINE HYDROCHLORIDE 50 MG/ML
50 INJECTION INTRAMUSCULAR; INTRAVENOUS
Status: CANCELLED
Start: 2022-07-04

## 2022-06-26 RX ORDER — ALBUTEROL SULFATE 90 UG/1
2 AEROSOL, METERED RESPIRATORY (INHALATION)
Status: CANCELLED | OUTPATIENT
Start: 2022-07-19

## 2022-06-26 RX ORDER — DIPHENHYDRAMINE HYDROCHLORIDE 50 MG/ML
25 INJECTION INTRAMUSCULAR; INTRAVENOUS ONCE
Status: CANCELLED
Start: 2022-07-04 | End: 2022-07-04

## 2022-06-26 RX ORDER — ONDANSETRON HYDROCHLORIDE 8 MG/1
8 TABLET, FILM COATED ORAL 2 TIMES DAILY
Status: CANCELLED | OUTPATIENT
Start: 2022-07-20

## 2022-06-26 RX ORDER — ACETAMINOPHEN 160 MG/5ML
650 LIQUID ORAL ONCE
Status: CANCELLED
Start: 2022-07-04 | End: 2022-07-04

## 2022-06-26 RX ORDER — ACETAMINOPHEN 160 MG/5ML
650 LIQUID ORAL ONCE
Status: CANCELLED
Start: 2022-07-19 | End: 2022-07-11

## 2022-06-26 RX ORDER — ONDANSETRON HYDROCHLORIDE 8 MG/1
8 TABLET, FILM COATED ORAL 2 TIMES DAILY
Status: CANCELLED | OUTPATIENT
Start: 2022-06-28

## 2022-06-26 RX ORDER — PROCHLORPERAZINE MALEATE 10 MG
10 TABLET ORAL EVERY 6 HOURS PRN
Status: CANCELLED | OUTPATIENT
Start: 2022-06-27

## 2022-06-26 RX ORDER — DIPHENHYDRAMINE HYDROCHLORIDE 50 MG/ML
25 INJECTION INTRAMUSCULAR; INTRAVENOUS ONCE
Status: CANCELLED
Start: 2022-07-26 | End: 2022-07-18

## 2022-06-26 RX ORDER — FAMOTIDINE 10 MG/ML
20 INJECTION, SOLUTION INTRAVENOUS
Status: CANCELLED | OUTPATIENT
Start: 2022-07-19

## 2022-06-26 RX ORDER — FAMOTIDINE 10 MG/ML
20 INJECTION, SOLUTION INTRAVENOUS
Status: CANCELLED | OUTPATIENT
Start: 2022-07-26

## 2022-06-26 RX ORDER — HEPARIN 100 UNIT/ML
5 SYRINGE INTRAVENOUS
Status: CANCELLED | OUTPATIENT
Start: 2022-07-26

## 2022-06-26 RX ORDER — ACETAMINOPHEN 160 MG/5ML
650 LIQUID ORAL ONCE
Status: CANCELLED
Start: 2022-07-26 | End: 2022-07-18

## 2022-06-26 RX ORDER — ACYCLOVIR 800 MG/1
400 TABLET ORAL 2 TIMES DAILY
Status: CANCELLED | OUTPATIENT
Start: 2022-06-27

## 2022-06-26 RX ORDER — ONDANSETRON HYDROCHLORIDE 8 MG/1
8 TABLET, FILM COATED ORAL 2 TIMES DAILY
Status: CANCELLED | OUTPATIENT
Start: 2022-07-27

## 2022-06-26 RX ORDER — ALBUTEROL SULFATE 90 UG/1
2 AEROSOL, METERED RESPIRATORY (INHALATION)
Status: CANCELLED | OUTPATIENT
Start: 2022-07-04

## 2022-06-26 RX ORDER — HEPARIN 100 UNIT/ML
5 SYRINGE INTRAVENOUS
Status: CANCELLED | OUTPATIENT
Start: 2022-07-04

## 2022-06-26 RX ORDER — ALBUTEROL SULFATE 90 UG/1
2 AEROSOL, METERED RESPIRATORY (INHALATION)
Status: CANCELLED | OUTPATIENT
Start: 2022-07-26

## 2022-06-26 RX ORDER — ONDANSETRON HYDROCHLORIDE 8 MG/1
8 TABLET, FILM COATED ORAL 2 TIMES DAILY
Status: CANCELLED | OUTPATIENT
Start: 2022-07-05

## 2022-06-26 RX ORDER — METHYLPREDNISOLONE SODIUM SUCCINATE 125 MG/2ML
125 INJECTION, POWDER, LYOPHILIZED, FOR SOLUTION INTRAMUSCULAR; INTRAVENOUS
Status: CANCELLED | OUTPATIENT
Start: 2022-07-04

## 2022-06-26 RX ORDER — METHYLPREDNISOLONE SODIUM SUCCINATE 125 MG/2ML
125 INJECTION, POWDER, LYOPHILIZED, FOR SOLUTION INTRAMUSCULAR; INTRAVENOUS
Status: CANCELLED | OUTPATIENT
Start: 2022-07-26

## 2022-06-26 RX ORDER — METHYLPREDNISOLONE SODIUM SUCCINATE 125 MG/2ML
125 INJECTION, POWDER, LYOPHILIZED, FOR SOLUTION INTRAMUSCULAR; INTRAVENOUS
Status: CANCELLED | OUTPATIENT
Start: 2022-07-19

## 2022-06-26 RX ORDER — HEPARIN 100 UNIT/ML
5 SYRINGE INTRAVENOUS
Status: CANCELLED | OUTPATIENT
Start: 2022-07-19

## 2022-06-26 RX ORDER — DIPHENHYDRAMINE HYDROCHLORIDE 50 MG/ML
25 INJECTION INTRAMUSCULAR; INTRAVENOUS ONCE
Status: CANCELLED
Start: 2022-07-19 | End: 2022-07-11

## 2022-06-27 ENCOUNTER — APPOINTMENT (OUTPATIENT)
Dept: HEMATOLOGY/ONCOLOGY | Age: 82
End: 2022-06-27
Attending: INTERNAL MEDICINE

## 2022-06-27 ENCOUNTER — LAB SERVICES (OUTPATIENT)
Dept: HEMATOLOGY/ONCOLOGY | Age: 82
End: 2022-06-27
Attending: INTERNAL MEDICINE

## 2022-06-27 ENCOUNTER — OFFICE VISIT (OUTPATIENT)
Dept: HEMATOLOGY/ONCOLOGY | Age: 82
End: 2022-06-27
Attending: INTERNAL MEDICINE

## 2022-06-27 VITALS
DIASTOLIC BLOOD PRESSURE: 62 MMHG | WEIGHT: 156.53 LBS | BODY MASS INDEX: 31.56 KG/M2 | HEART RATE: 90 BPM | HEIGHT: 59 IN | RESPIRATION RATE: 16 BRPM | TEMPERATURE: 98 F | SYSTOLIC BLOOD PRESSURE: 135 MMHG | OXYGEN SATURATION: 97 %

## 2022-06-27 DIAGNOSIS — E85.81 AL AMYLOIDOSIS (CMD): ICD-10-CM

## 2022-06-27 DIAGNOSIS — E85.81 AL AMYLOIDOSIS (CMD): Primary | ICD-10-CM

## 2022-06-27 LAB
ALBUMIN SERPL-MCNC: 2 G/DL (ref 3.6–5.1)
ALBUMIN/GLOB SERPL: 0.7 {RATIO} (ref 1–2.4)
ALP SERPL-CCNC: 153 UNITS/L (ref 45–117)
ALT SERPL-CCNC: 49 UNITS/L
ANION GAP SERPL CALC-SCNC: 10 MMOL/L (ref 7–19)
APPEARANCE UR: CLEAR
AST SERPL-CCNC: 40 UNITS/L
BACTERIA #/AREA URNS HPF: ABNORMAL /HPF
BASOPHILS # BLD: 0 K/MCL (ref 0–0.3)
BASOPHILS NFR BLD: 0 %
BILIRUB SERPL-MCNC: 0.7 MG/DL (ref 0.2–1)
BILIRUB UR QL STRIP: NEGATIVE
BUN SERPL-MCNC: 26 MG/DL (ref 6–20)
BUN/CREAT SERPL: 38 (ref 7–25)
CALCIUM SERPL-MCNC: 7.9 MG/DL (ref 8.4–10.2)
CAOX CRY URNS QL MICRO: PRESENT
CHLORIDE SERPL-SCNC: 99 MMOL/L (ref 97–110)
CO2 SERPL-SCNC: 27 MMOL/L (ref 21–32)
COLOR UR: YELLOW
CREAT SERPL-MCNC: 0.69 MG/DL (ref 0.51–0.95)
DEPRECATED RDW RBC: 49.5 FL (ref 39–50)
EOSINOPHIL # BLD: 0 K/MCL (ref 0–0.5)
EOSINOPHIL NFR BLD: 0 %
ERYTHROCYTE [DISTWIDTH] IN BLOOD: 15 % (ref 11–15)
FASTING DURATION TIME PATIENT: ABNORMAL H
GFR SERPLBLD BASED ON 1.73 SQ M-ARVRAT: 86 ML/MIN
GLOBULIN SER-MCNC: 2.7 G/DL (ref 2–4)
GLUCOSE SERPL-MCNC: 129 MG/DL (ref 70–99)
GLUCOSE UR STRIP-MCNC: NEGATIVE MG/DL
HCT VFR BLD CALC: 42.5 % (ref 36–46.5)
HGB BLD-MCNC: 14 G/DL (ref 12–15.5)
HGB UR QL STRIP: NEGATIVE
HYALINE CASTS #/AREA URNS LPF: ABNORMAL /LPF
IMM GRANULOCYTES # BLD AUTO: 0 K/MCL (ref 0–0.2)
IMM GRANULOCYTES # BLD: 1 %
KETONES UR STRIP-MCNC: NEGATIVE MG/DL
LEUKOCYTE ESTERASE UR QL STRIP: NEGATIVE
LYMPHOCYTES # BLD: 0.9 K/MCL (ref 1–4)
LYMPHOCYTES NFR BLD: 12 %
MAGNESIUM SERPL-MCNC: 1.8 MG/DL (ref 1.7–2.4)
MCH RBC QN AUTO: 29.4 PG (ref 26–34)
MCHC RBC AUTO-ENTMCNC: 32.9 G/DL (ref 32–36.5)
MCV RBC AUTO: 89.1 FL (ref 78–100)
MONOCYTES # BLD: 0.3 K/MCL (ref 0.3–0.9)
MONOCYTES NFR BLD: 4 %
MUCOUS THREADS URNS QL MICRO: PRESENT
NEUTROPHILS # BLD: 5.7 K/MCL (ref 1.8–7.7)
NEUTROPHILS # BLD: 5.7 K/MCL (ref 1.8–7.7)
NEUTROPHILS NFR BLD: 83 %
NITRITE UR QL STRIP: NEGATIVE
NRBC BLD MANUAL-RTO: 1 /100 WBC
NT-PROBNP SERPL-MCNC: ABNORMAL PG/ML
PH UR STRIP: 6 [PH] (ref 5–7)
PLATELET # BLD AUTO: 105 K/MCL (ref 140–450)
POTASSIUM SERPL-SCNC: 4 MMOL/L (ref 3.4–5.1)
PROT SERPL-MCNC: 4.7 G/DL (ref 6.4–8.2)
PROT UR STRIP-MCNC: >=500 MG/DL
RBC # BLD: 4.77 MIL/MCL (ref 4–5.2)
RBC #/AREA URNS HPF: ABNORMAL /HPF
SODIUM SERPL-SCNC: 132 MMOL/L (ref 135–145)
SP GR UR STRIP: 1.01 (ref 1–1.03)
SQUAMOUS #/AREA URNS HPF: ABNORMAL /HPF
TRANS CELLS #/AREA URNS HPF: ABNORMAL /HPF
TROPONIN I SERPL DL<=0.01 NG/ML-MCNC: 71 NG/L
UROBILINOGEN UR STRIP-MCNC: 0.2 MG/DL
WBC # BLD: 6.9 K/MCL (ref 4.2–11)
WBC #/AREA URNS HPF: ABNORMAL /HPF

## 2022-06-27 PROCEDURE — 84156 ASSAY OF PROTEIN URINE: CPT

## 2022-06-27 PROCEDURE — 80053 COMPREHEN METABOLIC PANEL: CPT

## 2022-06-27 PROCEDURE — 96401 CHEMO ANTI-NEOPL SQ/IM: CPT | Performed by: INTERNAL MEDICINE

## 2022-06-27 PROCEDURE — 85025 COMPLETE CBC W/AUTO DIFF WBC: CPT

## 2022-06-27 PROCEDURE — 83880 ASSAY OF NATRIURETIC PEPTIDE: CPT

## 2022-06-27 PROCEDURE — 10002800 HB RX 250 W HCPCS: Performed by: INTERNAL MEDICINE

## 2022-06-27 PROCEDURE — 83735 ASSAY OF MAGNESIUM: CPT

## 2022-06-27 PROCEDURE — 84484 ASSAY OF TROPONIN QUANT: CPT

## 2022-06-27 PROCEDURE — 10002803 HB RX 637: Performed by: INTERNAL MEDICINE

## 2022-06-27 PROCEDURE — 36415 COLL VENOUS BLD VENIPUNCTURE: CPT

## 2022-06-27 PROCEDURE — 81001 URINALYSIS AUTO W/SCOPE: CPT | Performed by: INTERNAL MEDICINE

## 2022-06-27 PROCEDURE — 99214 OFFICE O/P EST MOD 30 MIN: CPT | Performed by: INTERNAL MEDICINE

## 2022-06-27 RX ORDER — FAMOTIDINE 10 MG/ML
20 INJECTION, SOLUTION INTRAVENOUS
Status: DISCONTINUED | OUTPATIENT
Start: 2022-06-27 | End: 2022-07-06 | Stop reason: HOSPADM

## 2022-06-27 RX ORDER — ACETAMINOPHEN 160 MG/5ML
650 LIQUID ORAL ONCE
Status: COMPLETED | OUTPATIENT
Start: 2022-06-27 | End: 2022-06-27

## 2022-06-27 RX ORDER — HEPARIN 100 UNIT/ML
5 SYRINGE INTRAVENOUS
Status: DISCONTINUED | OUTPATIENT
Start: 2022-06-27 | End: 2022-07-06 | Stop reason: HOSPADM

## 2022-06-27 RX ORDER — ALBUTEROL SULFATE 90 UG/1
2 AEROSOL, METERED RESPIRATORY (INHALATION)
Status: DISCONTINUED | OUTPATIENT
Start: 2022-06-27 | End: 2022-07-06 | Stop reason: HOSPADM

## 2022-06-27 RX ORDER — ONDANSETRON 4 MG/1
16 TABLET, ORALLY DISINTEGRATING ORAL ONCE
Status: COMPLETED | OUTPATIENT
Start: 2022-06-27 | End: 2022-06-27

## 2022-06-27 RX ORDER — METHYLPREDNISOLONE SODIUM SUCCINATE 125 MG/2ML
125 INJECTION, POWDER, LYOPHILIZED, FOR SOLUTION INTRAMUSCULAR; INTRAVENOUS
Status: DISCONTINUED | OUTPATIENT
Start: 2022-06-27 | End: 2022-07-06 | Stop reason: HOSPADM

## 2022-06-27 RX ORDER — DEXAMETHASONE SODIUM PHOSPHATE 4 MG/ML
20 INJECTION, SOLUTION INTRA-ARTICULAR; INTRALESIONAL; INTRAMUSCULAR; INTRAVENOUS; SOFT TISSUE ONCE
Status: COMPLETED | OUTPATIENT
Start: 2022-06-27 | End: 2022-06-27

## 2022-06-27 RX ORDER — DIPHENHYDRAMINE HYDROCHLORIDE 50 MG/ML
50 INJECTION INTRAMUSCULAR; INTRAVENOUS
Status: DISCONTINUED | OUTPATIENT
Start: 2022-06-27 | End: 2022-07-06 | Stop reason: HOSPADM

## 2022-06-27 RX ADMIN — DARATUMUMAB AND HYALURONIDASE-FIHJ (HUMAN RECOMBINANT) 1800 MG: 1800; 30000 INJECTION SUBCUTANEOUS at 14:29

## 2022-06-27 RX ADMIN — DIPHENHYDRAMINE HYDROCHLORIDE 25 MG: 12.5 SOLUTION ORAL at 13:46

## 2022-06-27 RX ADMIN — ONDANSETRON 16 MG: 4 TABLET, ORALLY DISINTEGRATING ORAL at 13:44

## 2022-06-27 RX ADMIN — ACETAMINOPHEN 650 MG: 160 SOLUTION ORAL at 13:46

## 2022-06-27 RX ADMIN — BORTEZOMIB 2 MG: 3.5 INJECTION, POWDER, LYOPHILIZED, FOR SOLUTION INTRAVENOUS; SUBCUTANEOUS at 14:25

## 2022-06-27 RX ADMIN — Medication 20 MG: at 13:59

## 2022-06-27 ASSESSMENT — PAIN SCALES - GENERAL: PAINLEVEL: 0

## 2022-06-28 ENCOUNTER — APPOINTMENT (OUTPATIENT)
Dept: REHABILITATION | Age: 82
End: 2022-06-28
Attending: INTERNAL MEDICINE

## 2022-06-28 ENCOUNTER — HOSPITAL ENCOUNTER (OUTPATIENT)
Dept: REHABILITATION | Age: 82
Discharge: STILL ADMITTED OR ADMINISTRATIVE | End: 2022-06-28
Attending: INTERNAL MEDICINE

## 2022-06-28 LAB
COLLECT DURATION TIME UR: 24 HRS
PATH INTERP SPEC-IMP: ABNORMAL
PATH INTERP SPEC-IMP: ABNORMAL
PROT 24H UR-MRATE: 1746 MG/24 HR (ref 0–148)
PROT UR-MCNC: 291 MG/DL
SPECIMEN VOL UR: 600 ML

## 2022-06-28 PROCEDURE — 10004173 HB COUNTER-THERAPY VISIT PT: Performed by: PHYSICAL THERAPIST

## 2022-06-28 PROCEDURE — 97162 PT EVAL MOD COMPLEX 30 MIN: CPT | Performed by: PHYSICAL THERAPIST

## 2022-06-28 ASSESSMENT — ENCOUNTER SYMPTOMS
ALLEVIATING FACTORS: SUPPORTIVE DEVICE
PAIN SCALE AT LOWEST: 0
PAIN SEVERITY NOW: 0
PAIN LOCATION: LOW BACK
SUBJECTIVE PAIN PROGRESSION: WORSENING
QUALITY: SORE
ALLEVIATING FACTORS: REST
QUALITY: ACHE

## 2022-06-28 ASSESSMENT — GAIT ASSESSMENTS
GAIT WITH AD: FOUR-WHEELED WALKER
TUG TIME: 19
TUG TURNS: STABLE ON TURNS
TUG TIME: WHEELED WALKER
TUG TIME: 80-89 YEARS MALE 10 SECONDS, FEMALE 11 SECONDS

## 2022-06-28 ASSESSMENT — BALANCE ASSESSMENTS: BODY SWAY SIT R/L: WITHIN NORMAL LIMITS

## 2022-06-29 ENCOUNTER — APPOINTMENT (OUTPATIENT)
Dept: HEMATOLOGY/ONCOLOGY | Age: 82
End: 2022-06-29
Attending: INTERNAL MEDICINE

## 2022-06-30 ENCOUNTER — TELEPHONE (OUTPATIENT)
Dept: CARDIOLOGY | Age: 82
End: 2022-06-30

## 2022-06-30 ENCOUNTER — OFFICE VISIT (OUTPATIENT)
Dept: CARDIOLOGY | Age: 82
End: 2022-06-30
Attending: INTERNAL MEDICINE

## 2022-06-30 ENCOUNTER — APPOINTMENT (OUTPATIENT)
Dept: REHABILITATION | Age: 82
End: 2022-06-30
Attending: INTERNAL MEDICINE

## 2022-06-30 VITALS
SYSTOLIC BLOOD PRESSURE: 122 MMHG | RESPIRATION RATE: 16 BRPM | DIASTOLIC BLOOD PRESSURE: 60 MMHG | WEIGHT: 156 LBS | HEART RATE: 86 BPM | OXYGEN SATURATION: 98 % | HEIGHT: 59 IN | BODY MASS INDEX: 31.45 KG/M2

## 2022-06-30 DIAGNOSIS — E78.2 MIXED HYPERLIPIDEMIA: ICD-10-CM

## 2022-06-30 DIAGNOSIS — R60.0 BILATERAL LEG EDEMA: Primary | ICD-10-CM

## 2022-06-30 PROCEDURE — 99214 OFFICE O/P EST MOD 30 MIN: CPT | Performed by: INTERNAL MEDICINE

## 2022-06-30 PROCEDURE — 99211 OFF/OP EST MAY X REQ PHY/QHP: CPT | Performed by: INTERNAL MEDICINE

## 2022-06-30 RX ORDER — AMLODIPINE BESYLATE 10 MG/1
10 TABLET ORAL DAILY
Qty: 90 TABLET | Refills: 3 | Status: SHIPPED | OUTPATIENT
Start: 2022-06-30 | End: 2022-06-30 | Stop reason: SDUPTHER

## 2022-06-30 RX ORDER — AMLODIPINE BESYLATE 10 MG/1
5 TABLET ORAL DAILY
Qty: 90 TABLET | Refills: 3 | Status: ON HOLD | COMMUNITY
Start: 2022-06-30 | End: 2022-09-02 | Stop reason: HOSPADM

## 2022-07-01 DIAGNOSIS — R60.0 BILATERAL LEG EDEMA: ICD-10-CM

## 2022-07-01 RX ORDER — FUROSEMIDE 20 MG/1
20 TABLET ORAL DAILY
Qty: 30 TABLET | Refills: 3 | Status: CANCELLED | OUTPATIENT
Start: 2022-07-01

## 2022-07-01 RX ORDER — FUROSEMIDE 20 MG/1
20 TABLET ORAL DAILY
Qty: 30 TABLET | Refills: 3 | Status: SHIPPED | OUTPATIENT
Start: 2022-07-01 | End: 2022-07-19 | Stop reason: SDUPTHER

## 2022-07-03 ENCOUNTER — HOSPITAL ENCOUNTER (EMERGENCY)
Age: 82
Discharge: HOME | End: 2022-07-03
Attending: EMERGENCY MEDICINE

## 2022-07-03 ENCOUNTER — APPOINTMENT (OUTPATIENT)
Dept: GENERAL RADIOLOGY | Age: 82
End: 2022-07-03
Attending: EMERGENCY MEDICINE

## 2022-07-03 ENCOUNTER — LAB SERVICES (OUTPATIENT)
Dept: LAB | Age: 82
End: 2022-07-03
Attending: EMERGENCY MEDICINE

## 2022-07-03 ENCOUNTER — NURSE TRIAGE (OUTPATIENT)
Dept: TELEHEALTH | Age: 82
End: 2022-07-03

## 2022-07-03 VITALS
WEIGHT: 159.4 LBS | DIASTOLIC BLOOD PRESSURE: 61 MMHG | TEMPERATURE: 98.8 F | BODY MASS INDEX: 32.13 KG/M2 | SYSTOLIC BLOOD PRESSURE: 124 MMHG | HEIGHT: 59 IN | OXYGEN SATURATION: 91 % | HEART RATE: 96 BPM | RESPIRATION RATE: 20 BRPM

## 2022-07-03 DIAGNOSIS — A09 DIARRHEA OF INFECTIOUS ORIGIN: ICD-10-CM

## 2022-07-03 DIAGNOSIS — R50.9 FEBRILE ILLNESS: ICD-10-CM

## 2022-07-03 DIAGNOSIS — A09 DIARRHEA OF INFECTIOUS ORIGIN: Primary | ICD-10-CM

## 2022-07-03 LAB
ALBUMIN SERPL-MCNC: 2 G/DL (ref 3.6–5.1)
ALBUMIN/GLOB SERPL: 0.8 {RATIO} (ref 1–2.4)
ALP SERPL-CCNC: 178 UNITS/L (ref 45–117)
ALT SERPL-CCNC: 49 UNITS/L
ANION GAP SERPL CALC-SCNC: 15 MMOL/L (ref 7–19)
APPEARANCE UR: ABNORMAL
AST SERPL-CCNC: 47 UNITS/L
BACTERIA #/AREA URNS HPF: ABNORMAL /HPF
BASOPHILS # BLD: 0 K/MCL (ref 0–0.3)
BASOPHILS NFR BLD: 0 %
BILIRUB SERPL-MCNC: 1.1 MG/DL (ref 0.2–1)
BILIRUB UR QL STRIP: NEGATIVE
BUN SERPL-MCNC: 30 MG/DL (ref 6–20)
BUN/CREAT SERPL: 38 (ref 7–25)
C DIFF TOX B TCDB STL QL NAA+PROBE: NOT DETECTED
CALCIUM SERPL-MCNC: 8.4 MG/DL (ref 8.4–10.2)
CHLORIDE SERPL-SCNC: 100 MMOL/L (ref 97–110)
CO2 SERPL-SCNC: 23 MMOL/L (ref 21–32)
COLOR UR: ABNORMAL
CREAT SERPL-MCNC: 0.8 MG/DL (ref 0.51–0.95)
CRP SERPL-MCNC: 10.7 MG/DL
DEPRECATED RDW RBC: 48.3 FL (ref 39–50)
EOSINOPHIL # BLD: 0 K/MCL (ref 0–0.5)
EOSINOPHIL NFR BLD: 0 %
ERYTHROCYTE [DISTWIDTH] IN BLOOD: 15.6 % (ref 11–15)
ERYTHROCYTE [SEDIMENTATION RATE] IN BLOOD BY WESTERGREN METHOD: 18 MM/HR (ref 0–20)
FASTING DURATION TIME PATIENT: ABNORMAL H
GFR SERPLBLD BASED ON 1.73 SQ M-ARVRAT: 73 ML/MIN
GLOBULIN SER-MCNC: 2.6 G/DL (ref 2–4)
GLUCOSE SERPL-MCNC: 91 MG/DL (ref 70–99)
GLUCOSE UR STRIP-MCNC: NEGATIVE MG/DL
GRAN CASTS #/AREA URNS LPF: ABNORMAL /LPF
HCT VFR BLD CALC: 37.9 % (ref 36–46.5)
HGB BLD-MCNC: 12.8 G/DL (ref 12–15.5)
HGB UR QL STRIP: ABNORMAL
HYALINE CASTS #/AREA URNS LPF: ABNORMAL /LPF
IMM GRANULOCYTES # BLD AUTO: 0.1 K/MCL (ref 0–0.2)
IMM GRANULOCYTES # BLD: 1 %
KETONES UR STRIP-MCNC: NEGATIVE MG/DL
LACTATE BLDV-SCNC: 0.7 MMOL/L
LEUKOCYTE ESTERASE UR QL STRIP: NEGATIVE
LYMPHOCYTES # BLD: 0.9 K/MCL (ref 1–4)
LYMPHOCYTES NFR BLD: 7 %
MCH RBC QN AUTO: 28.9 PG (ref 26–34)
MCHC RBC AUTO-ENTMCNC: 33.8 G/DL (ref 32–36.5)
MCV RBC AUTO: 85.6 FL (ref 78–100)
MONOCYTES # BLD: 0.5 K/MCL (ref 0.3–0.9)
MONOCYTES NFR BLD: 4 %
MUCOUS THREADS URNS QL MICRO: PRESENT
NEUTROPHILS # BLD: 11.4 K/MCL (ref 1.8–7.7)
NEUTROPHILS NFR BLD: 88 %
NITRITE UR QL STRIP: NEGATIVE
NRBC BLD MANUAL-RTO: 1 /100 WBC
PH UR STRIP: 5 [PH] (ref 5–7)
PLATELET # BLD AUTO: 71 K/MCL (ref 140–450)
POTASSIUM SERPL-SCNC: 3.7 MMOL/L (ref 3.4–5.1)
PROCALCITONIN SERPL IA-MCNC: 0.65 NG/ML
PROT SERPL-MCNC: 4.6 G/DL (ref 6.4–8.2)
PROT UR STRIP-MCNC: >=500 MG/DL
RBC # BLD: 4.43 MIL/MCL (ref 4–5.2)
RBC #/AREA URNS HPF: ABNORMAL /HPF
SARS-COV-2 RNA RESP QL NAA+PROBE: NOT DETECTED
SERVICE CMNT-IMP: NORMAL
SERVICE CMNT-IMP: NORMAL
SODIUM SERPL-SCNC: 134 MMOL/L (ref 135–145)
SP GR UR STRIP: 1.02 (ref 1–1.03)
SQUAMOUS #/AREA URNS HPF: ABNORMAL /HPF
UROBILINOGEN UR STRIP-MCNC: 0.2 MG/DL
WBC # BLD: 12.8 K/MCL (ref 4.2–11)
WBC #/AREA URNS HPF: ABNORMAL /HPF

## 2022-07-03 PROCEDURE — 83605 ASSAY OF LACTIC ACID: CPT

## 2022-07-03 PROCEDURE — 36000 PLACE NEEDLE IN VEIN: CPT

## 2022-07-03 PROCEDURE — 99284 EMERGENCY DEPT VISIT MOD MDM: CPT

## 2022-07-03 PROCEDURE — 84145 PROCALCITONIN (PCT): CPT | Performed by: EMERGENCY MEDICINE

## 2022-07-03 PROCEDURE — 87449 NOS EACH ORGANISM AG IA: CPT

## 2022-07-03 PROCEDURE — 80053 COMPREHEN METABOLIC PANEL: CPT | Performed by: EMERGENCY MEDICINE

## 2022-07-03 PROCEDURE — 86140 C-REACTIVE PROTEIN: CPT | Performed by: EMERGENCY MEDICINE

## 2022-07-03 PROCEDURE — 36415 COLL VENOUS BLD VENIPUNCTURE: CPT

## 2022-07-03 PROCEDURE — 85025 COMPLETE CBC W/AUTO DIFF WBC: CPT | Performed by: EMERGENCY MEDICINE

## 2022-07-03 PROCEDURE — 87040 BLOOD CULTURE FOR BACTERIA: CPT | Performed by: EMERGENCY MEDICINE

## 2022-07-03 PROCEDURE — 71045 X-RAY EXAM CHEST 1 VIEW: CPT

## 2022-07-03 PROCEDURE — 87493 C DIFF AMPLIFIED PROBE: CPT

## 2022-07-03 PROCEDURE — 87045 FECES CULTURE AEROBIC BACT: CPT

## 2022-07-03 PROCEDURE — 85652 RBC SED RATE AUTOMATED: CPT | Performed by: EMERGENCY MEDICINE

## 2022-07-03 PROCEDURE — 81001 URINALYSIS AUTO W/SCOPE: CPT | Performed by: EMERGENCY MEDICINE

## 2022-07-03 PROCEDURE — 87427 SHIGA-LIKE TOXIN AG IA: CPT

## 2022-07-03 PROCEDURE — 87635 SARS-COV-2 COVID-19 AMP PRB: CPT | Performed by: EMERGENCY MEDICINE

## 2022-07-03 RX ORDER — AMOXICILLIN AND CLAVULANATE POTASSIUM 400; 57 MG/5ML; MG/5ML
800 POWDER, FOR SUSPENSION ORAL 2 TIMES DAILY
Qty: 140 ML | Refills: 0 | Status: SHIPPED | OUTPATIENT
Start: 2022-07-03 | End: 2022-07-04 | Stop reason: SDUPTHER

## 2022-07-03 ASSESSMENT — ENCOUNTER SYMPTOMS
CONFUSION: 0
DIZZINESS: 0
HEADACHES: 0
NAUSEA: 0
ABDOMINAL PAIN: 0
SHORTNESS OF BREATH: 0
ADENOPATHY: 0
FATIGUE: 1
SORE THROAT: 0
VOMITING: 0
DIARRHEA: 1
COUGH: 0
FEVER: 1

## 2022-07-03 ASSESSMENT — PAIN SCALES - GENERAL: PAINLEVEL_OUTOF10: 0

## 2022-07-04 RX ORDER — AMOXICILLIN AND CLAVULANATE POTASSIUM 400; 57 MG/5ML; MG/5ML
800 POWDER, FOR SUSPENSION ORAL 2 TIMES DAILY
Qty: 140 ML | Refills: 0 | Status: SHIPPED | OUTPATIENT
Start: 2022-07-04 | End: 2022-07-11

## 2022-07-06 ENCOUNTER — OFFICE VISIT (OUTPATIENT)
Dept: HEMATOLOGY/ONCOLOGY | Age: 82
End: 2022-07-06
Attending: INTERNAL MEDICINE

## 2022-07-06 ENCOUNTER — APPOINTMENT (OUTPATIENT)
Dept: HEMATOLOGY/ONCOLOGY | Age: 82
End: 2022-07-06
Attending: INTERNAL MEDICINE

## 2022-07-06 ENCOUNTER — LAB SERVICES (OUTPATIENT)
Dept: HEMATOLOGY/ONCOLOGY | Age: 82
End: 2022-07-06
Attending: INTERNAL MEDICINE

## 2022-07-06 VITALS
TEMPERATURE: 97.7 F | RESPIRATION RATE: 16 BRPM | OXYGEN SATURATION: 92 % | SYSTOLIC BLOOD PRESSURE: 125 MMHG | HEART RATE: 71 BPM | DIASTOLIC BLOOD PRESSURE: 57 MMHG

## 2022-07-06 DIAGNOSIS — E85.81 AL AMYLOIDOSIS (CMD): ICD-10-CM

## 2022-07-06 DIAGNOSIS — E85.81 AL AMYLOIDOSIS (CMD): Primary | ICD-10-CM

## 2022-07-06 LAB
ALBUMIN SERPL-MCNC: 1.9 G/DL (ref 3.6–5.1)
ALBUMIN/GLOB SERPL: 0.7 {RATIO} (ref 1–2.4)
ALP SERPL-CCNC: 225 UNITS/L (ref 45–117)
ALT SERPL-CCNC: 39 UNITS/L
ANION GAP SERPL CALC-SCNC: 10 MMOL/L (ref 7–19)
AST SERPL-CCNC: 33 UNITS/L
BASOPHILS # BLD: 0 K/MCL (ref 0–0.3)
BASOPHILS NFR BLD: 0 %
BILIRUB SERPL-MCNC: 1 MG/DL (ref 0.2–1)
BUN SERPL-MCNC: 35 MG/DL (ref 6–20)
BUN/CREAT SERPL: 41 (ref 7–25)
C JEJUNI+C COLI AG STL QL: NORMAL
CALCIUM SERPL-MCNC: 8.4 MG/DL (ref 8.4–10.2)
CHLORIDE SERPL-SCNC: 101 MMOL/L (ref 97–110)
CO2 SERPL-SCNC: 28 MMOL/L (ref 21–32)
CREAT SERPL-MCNC: 0.85 MG/DL (ref 0.51–0.95)
DEPRECATED RDW RBC: 50.9 FL (ref 39–50)
E COLI SHIGA-LIKE TOXIN 1+2 STL QL IA: NORMAL
EOSINOPHIL # BLD: 0 K/MCL (ref 0–0.5)
EOSINOPHIL NFR BLD: 0 %
ERYTHROCYTE [DISTWIDTH] IN BLOOD: 15.9 % (ref 11–15)
FASTING DURATION TIME PATIENT: ABNORMAL H
GFR SERPLBLD BASED ON 1.73 SQ M-ARVRAT: 68 ML/MIN
GLOBULIN SER-MCNC: 2.6 G/DL (ref 2–4)
GLUCOSE SERPL-MCNC: 104 MG/DL (ref 70–99)
HCT VFR BLD CALC: 37.4 % (ref 36–46.5)
HGB BLD-MCNC: 12.3 G/DL (ref 12–15.5)
IMM GRANULOCYTES # BLD AUTO: 0.1 K/MCL (ref 0–0.2)
IMM GRANULOCYTES # BLD: 1 %
LYMPHOCYTES # BLD: 1 K/MCL (ref 1–4)
LYMPHOCYTES NFR BLD: 10 %
MCH RBC QN AUTO: 28.7 PG (ref 26–34)
MCHC RBC AUTO-ENTMCNC: 32.9 G/DL (ref 32–36.5)
MCV RBC AUTO: 87.4 FL (ref 78–100)
MONOCYTES # BLD: 0.4 K/MCL (ref 0.3–0.9)
MONOCYTES NFR BLD: 3 %
NEUTROPHILS # BLD: 8.9 K/MCL (ref 1.8–7.7)
NEUTROPHILS # BLD: 8.9 K/MCL (ref 1.8–7.7)
NEUTROPHILS NFR BLD: 86 %
NRBC BLD MANUAL-RTO: 0 /100 WBC
PLATELET # BLD AUTO: 141 K/MCL (ref 140–450)
POTASSIUM SERPL-SCNC: 3.9 MMOL/L (ref 3.4–5.1)
PROT SERPL-MCNC: 4.5 G/DL (ref 6.4–8.2)
RBC # BLD: 4.28 MIL/MCL (ref 4–5.2)
SODIUM SERPL-SCNC: 135 MMOL/L (ref 135–145)
WBC # BLD: 10.4 K/MCL (ref 4.2–11)

## 2022-07-06 PROCEDURE — 85025 COMPLETE CBC W/AUTO DIFF WBC: CPT

## 2022-07-06 PROCEDURE — 10002803 HB RX 637: Performed by: INTERNAL MEDICINE

## 2022-07-06 PROCEDURE — 10002800 HB RX 250 W HCPCS: Performed by: INTERNAL MEDICINE

## 2022-07-06 PROCEDURE — 96401 CHEMO ANTI-NEOPL SQ/IM: CPT | Performed by: INTERNAL MEDICINE

## 2022-07-06 PROCEDURE — 36415 COLL VENOUS BLD VENIPUNCTURE: CPT

## 2022-07-06 PROCEDURE — 80053 COMPREHEN METABOLIC PANEL: CPT

## 2022-07-06 RX ORDER — ACETAMINOPHEN 160 MG/5ML
650 LIQUID ORAL ONCE
Status: COMPLETED | OUTPATIENT
Start: 2022-07-06 | End: 2022-07-06

## 2022-07-06 RX ORDER — HEPARIN 100 UNIT/ML
5 SYRINGE INTRAVENOUS
Status: DISCONTINUED | OUTPATIENT
Start: 2022-07-06 | End: 2022-07-06 | Stop reason: HOSPADM

## 2022-07-06 RX ORDER — DIPHENHYDRAMINE HCL 25 MG
25 CAPSULE ORAL ONCE
Status: COMPLETED | OUTPATIENT
Start: 2022-07-06 | End: 2022-07-06

## 2022-07-06 RX ORDER — DIPHENHYDRAMINE HYDROCHLORIDE 50 MG/ML
25 INJECTION INTRAMUSCULAR; INTRAVENOUS ONCE
Status: DISCONTINUED | OUTPATIENT
Start: 2022-07-06 | End: 2022-07-06

## 2022-07-06 RX ORDER — ONDANSETRON 4 MG/1
16 TABLET, ORALLY DISINTEGRATING ORAL ONCE
Status: COMPLETED | OUTPATIENT
Start: 2022-07-06 | End: 2022-07-06

## 2022-07-06 RX ADMIN — ONDANSETRON 16 MG: 4 TABLET, ORALLY DISINTEGRATING ORAL at 14:16

## 2022-07-06 RX ADMIN — DEXAMETHASONE 20 MG: 6 TABLET ORAL at 14:16

## 2022-07-06 RX ADMIN — BORTEZOMIB 2 MG: 3.5 INJECTION, POWDER, LYOPHILIZED, FOR SOLUTION INTRAVENOUS; SUBCUTANEOUS at 14:56

## 2022-07-06 RX ADMIN — DIPHENHYDRAMINE HYDROCHLORIDE 25 MG: 25 CAPSULE ORAL at 14:15

## 2022-07-06 RX ADMIN — ACETAMINOPHEN 650 MG: 160 SOLUTION ORAL at 14:11

## 2022-07-06 RX ADMIN — DARATUMUMAB AND HYALURONIDASE-FIHJ (HUMAN RECOMBINANT) 1800 MG: 1800; 30000 INJECTION SUBCUTANEOUS at 14:56

## 2022-07-06 ASSESSMENT — PAIN SCALES - GENERAL: PAINLEVEL: 0

## 2022-07-07 ENCOUNTER — HOSPITAL ENCOUNTER (OUTPATIENT)
Dept: GENERAL RADIOLOGY | Age: 82
Discharge: HOME | End: 2022-07-07
Attending: NURSE PRACTITIONER

## 2022-07-07 ENCOUNTER — TELEPHONE (OUTPATIENT)
Dept: HEMATOLOGY/ONCOLOGY | Age: 82
End: 2022-07-07

## 2022-07-07 ENCOUNTER — LAB SERVICES (OUTPATIENT)
Dept: HEMATOLOGY/ONCOLOGY | Age: 82
End: 2022-07-07
Attending: INTERNAL MEDICINE

## 2022-07-07 ENCOUNTER — OFFICE VISIT (OUTPATIENT)
Dept: HEMATOLOGY/ONCOLOGY | Age: 82
End: 2022-07-07
Attending: INTERNAL MEDICINE

## 2022-07-07 VITALS
RESPIRATION RATE: 16 BRPM | WEIGHT: 161.6 LBS | OXYGEN SATURATION: 97 % | DIASTOLIC BLOOD PRESSURE: 57 MMHG | SYSTOLIC BLOOD PRESSURE: 122 MMHG | HEART RATE: 85 BPM | BODY MASS INDEX: 32.64 KG/M2 | TEMPERATURE: 97.3 F

## 2022-07-07 DIAGNOSIS — E85.81 AL AMYLOIDOSIS (CMD): Primary | ICD-10-CM

## 2022-07-07 DIAGNOSIS — E85.81 AL AMYLOIDOSIS (CMD): ICD-10-CM

## 2022-07-07 DIAGNOSIS — M25.552 LEFT HIP PAIN: ICD-10-CM

## 2022-07-07 DIAGNOSIS — E88.09 HYPOALBUMINEMIA: ICD-10-CM

## 2022-07-07 DIAGNOSIS — C90.00 MULTIPLE MYELOMA NOT HAVING ACHIEVED REMISSION (CMD): Primary | ICD-10-CM

## 2022-07-07 DIAGNOSIS — C90.00 MULTIPLE MYELOMA NOT HAVING ACHIEVED REMISSION (CMD): ICD-10-CM

## 2022-07-07 DIAGNOSIS — R60.0 BILATERAL LEG EDEMA: ICD-10-CM

## 2022-07-07 DIAGNOSIS — R19.7 DIARRHEA, UNSPECIFIED TYPE: ICD-10-CM

## 2022-07-07 LAB
ALBUMIN SERPL-MCNC: 2.1 G/DL (ref 3.6–5.1)
ALBUMIN/GLOB SERPL: 0.7 {RATIO} (ref 1–2.4)
ALP SERPL-CCNC: 233 UNITS/L (ref 45–117)
ALT SERPL-CCNC: 45 UNITS/L
ANION GAP SERPL CALC-SCNC: 10 MMOL/L (ref 7–19)
AST SERPL-CCNC: 33 UNITS/L
BACTERIA STL CULT: NORMAL
BASOPHILS # BLD: 0 K/MCL (ref 0–0.3)
BASOPHILS NFR BLD: 0 %
BILIRUB SERPL-MCNC: 0.8 MG/DL (ref 0.2–1)
BUN SERPL-MCNC: 39 MG/DL (ref 6–20)
BUN/CREAT SERPL: 41 (ref 7–25)
CALCIUM SERPL-MCNC: 8.8 MG/DL (ref 8.4–10.2)
CHLORIDE SERPL-SCNC: 102 MMOL/L (ref 97–110)
CO2 SERPL-SCNC: 27 MMOL/L (ref 21–32)
CREAT SERPL-MCNC: 0.94 MG/DL (ref 0.51–0.95)
DEPRECATED RDW RBC: 50.5 FL (ref 39–50)
EOSINOPHIL # BLD: 0 K/MCL (ref 0–0.5)
EOSINOPHIL NFR BLD: 0 %
ERYTHROCYTE [DISTWIDTH] IN BLOOD: 16 % (ref 11–15)
FASTING DURATION TIME PATIENT: ABNORMAL H
GFR SERPLBLD BASED ON 1.73 SQ M-ARVRAT: 60 ML/MIN
GLOBULIN SER-MCNC: 2.9 G/DL (ref 2–4)
GLUCOSE SERPL-MCNC: 168 MG/DL (ref 70–99)
HCT VFR BLD CALC: 37.3 % (ref 36–46.5)
HGB BLD-MCNC: 12.6 G/DL (ref 12–15.5)
IMM GRANULOCYTES # BLD AUTO: 0.2 K/MCL (ref 0–0.2)
IMM GRANULOCYTES # BLD: 1 %
LYMPHOCYTES # BLD: 1.6 K/MCL (ref 1–4)
LYMPHOCYTES NFR BLD: 12 %
MCH RBC QN AUTO: 29.4 PG (ref 26–34)
MCHC RBC AUTO-ENTMCNC: 33.8 G/DL (ref 32–36.5)
MCV RBC AUTO: 86.9 FL (ref 78–100)
MONOCYTES # BLD: 0.5 K/MCL (ref 0.3–0.9)
MONOCYTES NFR BLD: 4 %
NEUTROPHILS # BLD: 11 K/MCL (ref 1.8–7.7)
NEUTROPHILS # BLD: 11 K/MCL (ref 1.8–7.7)
NEUTROPHILS NFR BLD: 83 %
NRBC BLD MANUAL-RTO: 0 /100 WBC
PLATELET # BLD AUTO: 154 K/MCL (ref 140–450)
POTASSIUM SERPL-SCNC: 4.3 MMOL/L (ref 3.4–5.1)
PROCALCITONIN SERPL IA-MCNC: 0.63 NG/ML
PROT SERPL-MCNC: 5 G/DL (ref 6.4–8.2)
RBC # BLD: 4.29 MIL/MCL (ref 4–5.2)
SODIUM SERPL-SCNC: 135 MMOL/L (ref 135–145)
WBC # BLD: 13.3 K/MCL (ref 4.2–11)

## 2022-07-07 PROCEDURE — 85025 COMPLETE CBC W/AUTO DIFF WBC: CPT

## 2022-07-07 PROCEDURE — 36415 COLL VENOUS BLD VENIPUNCTURE: CPT

## 2022-07-07 PROCEDURE — 99211 OFF/OP EST MAY X REQ PHY/QHP: CPT

## 2022-07-07 PROCEDURE — 99215 OFFICE O/P EST HI 40 MIN: CPT | Performed by: NURSE PRACTITIONER

## 2022-07-07 PROCEDURE — 73502 X-RAY EXAM HIP UNI 2-3 VIEWS: CPT

## 2022-07-07 PROCEDURE — 84145 PROCALCITONIN (PCT): CPT

## 2022-07-07 PROCEDURE — 80053 COMPREHEN METABOLIC PANEL: CPT

## 2022-07-07 ASSESSMENT — PAIN SCALES - GENERAL: PAINLEVEL: 0

## 2022-07-08 LAB
BACTERIA BLD CULT: NORMAL
BACTERIA BLD CULT: NORMAL

## 2022-07-12 ENCOUNTER — LAB SERVICES (OUTPATIENT)
Dept: HEMATOLOGY/ONCOLOGY | Age: 82
End: 2022-07-12
Attending: INTERNAL MEDICINE

## 2022-07-12 ENCOUNTER — OFFICE VISIT (OUTPATIENT)
Dept: HEMATOLOGY/ONCOLOGY | Age: 82
End: 2022-07-12
Attending: INTERNAL MEDICINE

## 2022-07-12 VITALS
OXYGEN SATURATION: 96 % | RESPIRATION RATE: 16 BRPM | HEIGHT: 59 IN | DIASTOLIC BLOOD PRESSURE: 60 MMHG | HEART RATE: 85 BPM | SYSTOLIC BLOOD PRESSURE: 121 MMHG | TEMPERATURE: 98.3 F | BODY MASS INDEX: 32.29 KG/M2 | WEIGHT: 160.16 LBS

## 2022-07-12 DIAGNOSIS — C90.00 MULTIPLE MYELOMA NOT HAVING ACHIEVED REMISSION (CMD): ICD-10-CM

## 2022-07-12 DIAGNOSIS — E85.81 AL AMYLOIDOSIS (CMD): ICD-10-CM

## 2022-07-12 DIAGNOSIS — E85.81 AL AMYLOIDOSIS (CMD): Primary | ICD-10-CM

## 2022-07-12 LAB
ALBUMIN SERPL-MCNC: 1.8 G/DL (ref 3.6–5.1)
ALBUMIN/GLOB SERPL: 0.8 {RATIO} (ref 1–2.4)
ALP SERPL-CCNC: 176 UNITS/L (ref 45–117)
ALT SERPL-CCNC: 59 UNITS/L
ANION GAP SERPL CALC-SCNC: 10 MMOL/L (ref 7–19)
AST SERPL-CCNC: 45 UNITS/L
BASOPHILS # BLD: 0 K/MCL (ref 0–0.3)
BASOPHILS NFR BLD: 0 %
BILIRUB SERPL-MCNC: 0.8 MG/DL (ref 0.2–1)
BUN SERPL-MCNC: 28 MG/DL (ref 6–20)
BUN/CREAT SERPL: 37 (ref 7–25)
CALCIUM SERPL-MCNC: 8 MG/DL (ref 8.4–10.2)
CHLORIDE SERPL-SCNC: 101 MMOL/L (ref 97–110)
CO2 SERPL-SCNC: 30 MMOL/L (ref 21–32)
CREAT SERPL-MCNC: 0.76 MG/DL (ref 0.51–0.95)
DEPRECATED RDW RBC: 52.2 FL (ref 39–50)
EOSINOPHIL # BLD: 0 K/MCL (ref 0–0.5)
EOSINOPHIL NFR BLD: 0 %
ERYTHROCYTE [DISTWIDTH] IN BLOOD: 16.2 % (ref 11–15)
FASTING DURATION TIME PATIENT: ABNORMAL H
GFR SERPLBLD BASED ON 1.73 SQ M-ARVRAT: 78 ML/MIN
GLOBULIN SER-MCNC: 2.3 G/DL (ref 2–4)
GLUCOSE SERPL-MCNC: 134 MG/DL (ref 70–99)
HCT VFR BLD CALC: 35.9 % (ref 36–46.5)
HGB BLD-MCNC: 11.4 G/DL (ref 12–15.5)
IMM GRANULOCYTES # BLD AUTO: 0.1 K/MCL (ref 0–0.2)
IMM GRANULOCYTES # BLD: 1 %
LYMPHOCYTES # BLD: 1.8 K/MCL (ref 1–4)
LYMPHOCYTES NFR BLD: 15 %
MCH RBC QN AUTO: 28.4 PG (ref 26–34)
MCHC RBC AUTO-ENTMCNC: 31.8 G/DL (ref 32–36.5)
MCV RBC AUTO: 89.3 FL (ref 78–100)
MONOCYTES # BLD: 0.4 K/MCL (ref 0.3–0.9)
MONOCYTES NFR BLD: 4 %
NEUTROPHILS # BLD: 9.4 K/MCL (ref 1.8–7.7)
NEUTROPHILS # BLD: 9.4 K/MCL (ref 1.8–7.7)
NEUTROPHILS NFR BLD: 80 %
NRBC BLD MANUAL-RTO: 1 /100 WBC
PLATELET # BLD AUTO: 81 K/MCL (ref 140–450)
POTASSIUM SERPL-SCNC: 4.2 MMOL/L (ref 3.4–5.1)
PROT SERPL-MCNC: 4.1 G/DL (ref 6.4–8.2)
RBC # BLD: 4.02 MIL/MCL (ref 4–5.2)
SODIUM SERPL-SCNC: 137 MMOL/L (ref 135–145)
WBC # BLD: 11.8 K/MCL (ref 4.2–11)

## 2022-07-12 PROCEDURE — 85025 COMPLETE CBC W/AUTO DIFF WBC: CPT

## 2022-07-12 PROCEDURE — 99215 OFFICE O/P EST HI 40 MIN: CPT | Performed by: NURSE PRACTITIONER

## 2022-07-12 PROCEDURE — 80053 COMPREHEN METABOLIC PANEL: CPT

## 2022-07-12 PROCEDURE — 36415 COLL VENOUS BLD VENIPUNCTURE: CPT

## 2022-07-12 RX ORDER — OMEGA-3/DHA/EPA/FISH OIL 1000 MG
1 CAPSULE ORAL DAILY
COMMUNITY

## 2022-07-12 ASSESSMENT — PAIN SCALES - GENERAL: PAINLEVEL: 0

## 2022-07-13 ENCOUNTER — APPOINTMENT (OUTPATIENT)
Dept: REHABILITATION | Age: 82
End: 2022-07-13
Attending: INTERNAL MEDICINE

## 2022-07-15 ENCOUNTER — APPOINTMENT (OUTPATIENT)
Dept: REHABILITATION | Age: 82
End: 2022-07-15
Attending: INTERNAL MEDICINE

## 2022-07-18 ENCOUNTER — APPOINTMENT (OUTPATIENT)
Dept: REHABILITATION | Age: 82
End: 2022-07-18
Attending: INTERNAL MEDICINE

## 2022-07-19 ENCOUNTER — OFFICE VISIT (OUTPATIENT)
Dept: HEMATOLOGY/ONCOLOGY | Age: 82
End: 2022-07-19
Attending: INTERNAL MEDICINE

## 2022-07-19 ENCOUNTER — APPOINTMENT (OUTPATIENT)
Dept: HEMATOLOGY/ONCOLOGY | Age: 82
End: 2022-07-19
Attending: INTERNAL MEDICINE

## 2022-07-19 ENCOUNTER — TELEPHONE (OUTPATIENT)
Dept: HEMATOLOGY/ONCOLOGY | Age: 82
End: 2022-07-19

## 2022-07-19 ENCOUNTER — LAB SERVICES (OUTPATIENT)
Dept: HEMATOLOGY/ONCOLOGY | Age: 82
End: 2022-07-19
Attending: NURSE PRACTITIONER

## 2022-07-19 VITALS
TEMPERATURE: 98.1 F | OXYGEN SATURATION: 95 % | HEART RATE: 85 BPM | SYSTOLIC BLOOD PRESSURE: 124 MMHG | HEIGHT: 59 IN | WEIGHT: 161.07 LBS | BODY MASS INDEX: 32.47 KG/M2 | RESPIRATION RATE: 12 BRPM | DIASTOLIC BLOOD PRESSURE: 58 MMHG

## 2022-07-19 DIAGNOSIS — C90.00 MULTIPLE MYELOMA NOT HAVING ACHIEVED REMISSION (CMD): ICD-10-CM

## 2022-07-19 DIAGNOSIS — E85.81 AL AMYLOIDOSIS (CMD): ICD-10-CM

## 2022-07-19 DIAGNOSIS — R60.0 BILATERAL LEG EDEMA: Primary | ICD-10-CM

## 2022-07-19 LAB
ALBUMIN SERPL-MCNC: 1.9 G/DL (ref 3.6–5.1)
ALBUMIN/GLOB SERPL: 0.8 {RATIO} (ref 1–2.4)
ALP SERPL-CCNC: 254 UNITS/L (ref 45–117)
ALT SERPL-CCNC: 33 UNITS/L
ANION GAP SERPL CALC-SCNC: 9 MMOL/L (ref 7–19)
AST SERPL-CCNC: 36 UNITS/L
BASOPHILS # BLD: 0.1 K/MCL (ref 0–0.3)
BASOPHILS NFR BLD: 1 %
BILIRUB SERPL-MCNC: 0.9 MG/DL (ref 0.2–1)
BUN SERPL-MCNC: 18 MG/DL (ref 6–20)
BUN/CREAT SERPL: 30 (ref 7–25)
CALCIUM SERPL-MCNC: 8.1 MG/DL (ref 8.4–10.2)
CHLORIDE SERPL-SCNC: 106 MMOL/L (ref 97–110)
CO2 SERPL-SCNC: 28 MMOL/L (ref 21–32)
CREAT SERPL-MCNC: 0.6 MG/DL (ref 0.51–0.95)
DEPRECATED RDW RBC: 54.6 FL (ref 39–50)
EOSINOPHIL # BLD: 0.3 K/MCL (ref 0–0.5)
EOSINOPHIL NFR BLD: 2 %
ERYTHROCYTE [DISTWIDTH] IN BLOOD: 17.2 % (ref 11–15)
FASTING DURATION TIME PATIENT: ABNORMAL H
GFR SERPLBLD BASED ON 1.73 SQ M-ARVRAT: 89 ML/MIN
GLOBULIN SER-MCNC: 2.5 G/DL (ref 2–4)
GLUCOSE SERPL-MCNC: 103 MG/DL (ref 70–99)
HCT VFR BLD CALC: 35.9 % (ref 36–46.5)
HGB BLD-MCNC: 11.5 G/DL (ref 12–15.5)
IMM GRANULOCYTES # BLD AUTO: 0.1 K/MCL (ref 0–0.2)
IMM GRANULOCYTES # BLD: 1 %
LYMPHOCYTES # BLD: 4.6 K/MCL (ref 1–4)
LYMPHOCYTES NFR BLD: 35 %
MCH RBC QN AUTO: 28.3 PG (ref 26–34)
MCHC RBC AUTO-ENTMCNC: 32 G/DL (ref 32–36.5)
MCV RBC AUTO: 88.4 FL (ref 78–100)
MONOCYTES # BLD: 0.9 K/MCL (ref 0.3–0.9)
MONOCYTES NFR BLD: 7 %
NEUTROPHILS # BLD: 7.4 K/MCL (ref 1.8–7.7)
NEUTROPHILS # BLD: 7.4 K/MCL (ref 1.8–7.7)
NEUTROPHILS NFR BLD: 54 %
NRBC BLD MANUAL-RTO: 0 /100 WBC
PLATELET # BLD AUTO: 224 K/MCL (ref 140–450)
POTASSIUM SERPL-SCNC: 3.3 MMOL/L (ref 3.4–5.1)
PROT SERPL-MCNC: 4.4 G/DL (ref 6.4–8.2)
RBC # BLD: 4.06 MIL/MCL (ref 4–5.2)
SODIUM SERPL-SCNC: 140 MMOL/L (ref 135–145)
WBC # BLD: 13.4 K/MCL (ref 4.2–11)

## 2022-07-19 PROCEDURE — 10002800 HB RX 250 W HCPCS: Performed by: INTERNAL MEDICINE

## 2022-07-19 PROCEDURE — 96401 CHEMO ANTI-NEOPL SQ/IM: CPT | Performed by: INTERNAL MEDICINE

## 2022-07-19 PROCEDURE — 85025 COMPLETE CBC W/AUTO DIFF WBC: CPT

## 2022-07-19 PROCEDURE — 10002803 HB RX 637: Performed by: INTERNAL MEDICINE

## 2022-07-19 PROCEDURE — 10002803 HB RX 637: Performed by: NURSE PRACTITIONER

## 2022-07-19 PROCEDURE — 99215 OFFICE O/P EST HI 40 MIN: CPT | Performed by: NURSE PRACTITIONER

## 2022-07-19 PROCEDURE — 80053 COMPREHEN METABOLIC PANEL: CPT

## 2022-07-19 PROCEDURE — 36415 COLL VENOUS BLD VENIPUNCTURE: CPT

## 2022-07-19 RX ORDER — DIPHENHYDRAMINE HCL 25 MG
25 CAPSULE ORAL ONCE
Status: DISCONTINUED | OUTPATIENT
Start: 2022-07-19 | End: 2022-07-19

## 2022-07-19 RX ORDER — DIPHENHYDRAMINE HYDROCHLORIDE 50 MG/ML
50 INJECTION INTRAMUSCULAR; INTRAVENOUS
Status: DISCONTINUED | OUTPATIENT
Start: 2022-07-19 | End: 2022-07-19 | Stop reason: HOSPADM

## 2022-07-19 RX ORDER — FAMOTIDINE 10 MG/ML
20 INJECTION, SOLUTION INTRAVENOUS
Status: DISCONTINUED | OUTPATIENT
Start: 2022-07-19 | End: 2022-07-19 | Stop reason: HOSPADM

## 2022-07-19 RX ORDER — POTASSIUM CHLORIDE 1.5 G/1
POWDER, FOR SOLUTION ORAL
Qty: 90 EACH | Refills: 3 | Status: SHIPPED | OUTPATIENT
Start: 2022-07-19

## 2022-07-19 RX ORDER — FUROSEMIDE 20 MG/1
40 TABLET ORAL DAILY
Qty: 60 TABLET | Refills: 3 | Status: SHIPPED | OUTPATIENT
Start: 2022-07-19 | End: 2022-07-19

## 2022-07-19 RX ORDER — ONDANSETRON 4 MG/1
16 TABLET, ORALLY DISINTEGRATING ORAL ONCE
Status: COMPLETED | OUTPATIENT
Start: 2022-07-19 | End: 2022-07-19

## 2022-07-19 RX ORDER — ACETAMINOPHEN 160 MG/5ML
650 LIQUID ORAL ONCE
Status: COMPLETED | OUTPATIENT
Start: 2022-07-19 | End: 2022-07-19

## 2022-07-19 RX ORDER — METHYLPREDNISOLONE SODIUM SUCCINATE 125 MG/2ML
125 INJECTION, POWDER, LYOPHILIZED, FOR SOLUTION INTRAMUSCULAR; INTRAVENOUS
Status: DISCONTINUED | OUTPATIENT
Start: 2022-07-19 | End: 2022-07-19 | Stop reason: HOSPADM

## 2022-07-19 RX ORDER — ALBUTEROL SULFATE 90 UG/1
2 AEROSOL, METERED RESPIRATORY (INHALATION)
Status: DISCONTINUED | OUTPATIENT
Start: 2022-07-19 | End: 2022-07-19 | Stop reason: HOSPADM

## 2022-07-19 RX ORDER — FUROSEMIDE 20 MG/1
40 TABLET ORAL DAILY
Qty: 180 TABLET | Refills: 3 | Status: ON HOLD | OUTPATIENT
Start: 2022-07-19 | End: 2022-09-02 | Stop reason: SDUPTHER

## 2022-07-19 RX ORDER — POTASSIUM CHLORIDE 1.5 G/1.58G
20 POWDER, FOR SOLUTION ORAL DAILY
Qty: 30 PACKET | Refills: 3 | Status: SHIPPED | OUTPATIENT
Start: 2022-07-19 | End: 2022-07-19

## 2022-07-19 RX ORDER — HEPARIN 100 UNIT/ML
5 SYRINGE INTRAVENOUS
Status: DISCONTINUED | OUTPATIENT
Start: 2022-07-19 | End: 2022-07-19 | Stop reason: HOSPADM

## 2022-07-19 RX ADMIN — BORTEZOMIB 2 MG: 3.5 INJECTION, POWDER, LYOPHILIZED, FOR SOLUTION INTRAVENOUS; SUBCUTANEOUS at 10:57

## 2022-07-19 RX ADMIN — ACETAMINOPHEN 649.6 MG: 160 SOLUTION ORAL at 10:06

## 2022-07-19 RX ADMIN — DEXAMETHASONE 20 MG: 6 TABLET ORAL at 09:58

## 2022-07-19 RX ADMIN — DIPHENHYDRAMINE HYDROCHLORIDE 25 MG: 12.5 SOLUTION ORAL at 10:10

## 2022-07-19 RX ADMIN — ONDANSETRON 16 MG: 4 TABLET, ORALLY DISINTEGRATING ORAL at 09:59

## 2022-07-19 RX ADMIN — DARATUMUMAB AND HYALURONIDASE-FIHJ (HUMAN RECOMBINANT) 1800 MG: 1800; 30000 INJECTION SUBCUTANEOUS at 10:59

## 2022-07-19 ASSESSMENT — PAIN SCALES - GENERAL: PAINLEVEL: 0

## 2022-07-21 ENCOUNTER — APPOINTMENT (OUTPATIENT)
Dept: REHABILITATION | Age: 82
End: 2022-07-21
Attending: INTERNAL MEDICINE

## 2022-07-25 ENCOUNTER — APPOINTMENT (OUTPATIENT)
Dept: REHABILITATION | Age: 82
End: 2022-07-25
Attending: INTERNAL MEDICINE

## 2022-07-26 ENCOUNTER — LAB SERVICES (OUTPATIENT)
Dept: HEMATOLOGY/ONCOLOGY | Age: 82
End: 2022-07-26
Attending: INTERNAL MEDICINE

## 2022-07-26 ENCOUNTER — OFFICE VISIT (OUTPATIENT)
Dept: HEMATOLOGY/ONCOLOGY | Age: 82
End: 2022-07-26
Attending: INTERNAL MEDICINE

## 2022-07-26 VITALS
DIASTOLIC BLOOD PRESSURE: 65 MMHG | BODY MASS INDEX: 31.4 KG/M2 | HEIGHT: 59 IN | HEART RATE: 92 BPM | SYSTOLIC BLOOD PRESSURE: 141 MMHG | TEMPERATURE: 97.9 F | RESPIRATION RATE: 20 BRPM | WEIGHT: 155.75 LBS | OXYGEN SATURATION: 94 %

## 2022-07-26 DIAGNOSIS — E85.81 AL AMYLOIDOSIS (CMD): Primary | ICD-10-CM

## 2022-07-26 DIAGNOSIS — E85.81 AL AMYLOIDOSIS (CMD): ICD-10-CM

## 2022-07-26 LAB
ALBUMIN SERPL-MCNC: 2.3 G/DL (ref 3.6–5.1)
ALBUMIN/GLOB SERPL: 0.9 {RATIO} (ref 1–2.4)
ALP SERPL-CCNC: 232 UNITS/L (ref 45–117)
ALT SERPL-CCNC: 28 UNITS/L
ANION GAP SERPL CALC-SCNC: 9 MMOL/L (ref 7–19)
AST SERPL-CCNC: 34 UNITS/L
BASOPHILS # BLD: 0.1 K/MCL (ref 0–0.3)
BASOPHILS NFR BLD: 1 %
BILIRUB SERPL-MCNC: 0.9 MG/DL (ref 0.2–1)
BUN SERPL-MCNC: 15 MG/DL (ref 6–20)
BUN/CREAT SERPL: 22 (ref 7–25)
CALCIUM SERPL-MCNC: 8.8 MG/DL (ref 8.4–10.2)
CHLORIDE SERPL-SCNC: 106 MMOL/L (ref 97–110)
CO2 SERPL-SCNC: 29 MMOL/L (ref 21–32)
CREAT SERPL-MCNC: 0.67 MG/DL (ref 0.51–0.95)
DEPRECATED RDW RBC: 62 FL (ref 39–50)
EOSINOPHIL # BLD: 0.3 K/MCL (ref 0–0.5)
EOSINOPHIL NFR BLD: 2 %
ERYTHROCYTE [DISTWIDTH] IN BLOOD: 18.8 % (ref 11–15)
FASTING DURATION TIME PATIENT: ABNORMAL H
GFR SERPLBLD BASED ON 1.73 SQ M-ARVRAT: 87 ML/MIN
GLOBULIN SER-MCNC: 2.7 G/DL (ref 2–4)
GLUCOSE SERPL-MCNC: 108 MG/DL (ref 70–99)
HCT VFR BLD CALC: 39.1 % (ref 36–46.5)
HGB BLD-MCNC: 12.2 G/DL (ref 12–15.5)
LYMPHOCYTES # BLD: 5.8 K/MCL (ref 1–4)
LYMPHOCYTES NFR BLD: 34 %
MCH RBC QN AUTO: 28.4 PG (ref 26–34)
MCHC RBC AUTO-ENTMCNC: 31.2 G/DL (ref 32–36.5)
MCV RBC AUTO: 90.9 FL (ref 78–100)
MONOCYTES # BLD: 1.3 K/MCL (ref 0.3–0.9)
MONOCYTES NFR BLD: 9 %
NEUTROPHILS # BLD: 6.3 K/MCL (ref 1.8–7.7)
NEUTROPHILS # BLD: 6.6 K/MCL (ref 1.8–7.7)
NEUTS SEG NFR BLD: 47 %
NRBC BLD MANUAL-RTO: 0 /100 WBC
NT-PROBNP SERPL-MCNC: ABNORMAL PG/ML
PLATELET # BLD AUTO: 131 K/MCL (ref 140–450)
POTASSIUM SERPL-SCNC: 3.9 MMOL/L (ref 3.4–5.1)
PROT SERPL-MCNC: 5 G/DL (ref 6.4–8.2)
RBC # BLD: 4.3 MIL/MCL (ref 4–5.2)
SODIUM SERPL-SCNC: 140 MMOL/L (ref 135–145)
TROPONIN I SERPL DL<=0.01 NG/ML-MCNC: 53 NG/L
VARIANT LYMPHS NFR BLD: 7 % (ref 0–5)
WBC # BLD: 14.1 K/MCL (ref 4.2–11)

## 2022-07-26 PROCEDURE — 99215 OFFICE O/P EST HI 40 MIN: CPT | Performed by: INTERNAL MEDICINE

## 2022-07-26 PROCEDURE — 84484 ASSAY OF TROPONIN QUANT: CPT

## 2022-07-26 PROCEDURE — 36415 COLL VENOUS BLD VENIPUNCTURE: CPT

## 2022-07-26 PROCEDURE — 96401 CHEMO ANTI-NEOPL SQ/IM: CPT | Performed by: INTERNAL MEDICINE

## 2022-07-26 PROCEDURE — 85027 COMPLETE CBC AUTOMATED: CPT

## 2022-07-26 PROCEDURE — 83521 IG LIGHT CHAINS FREE EACH: CPT

## 2022-07-26 PROCEDURE — 84155 ASSAY OF PROTEIN SERUM: CPT

## 2022-07-26 PROCEDURE — 83880 ASSAY OF NATRIURETIC PEPTIDE: CPT

## 2022-07-26 PROCEDURE — 80053 COMPREHEN METABOLIC PANEL: CPT

## 2022-07-26 PROCEDURE — 10002800 HB RX 250 W HCPCS: Performed by: INTERNAL MEDICINE

## 2022-07-26 PROCEDURE — 10002803 HB RX 637: Performed by: INTERNAL MEDICINE

## 2022-07-26 RX ORDER — METHYLPREDNISOLONE SODIUM SUCCINATE 125 MG/2ML
125 INJECTION, POWDER, LYOPHILIZED, FOR SOLUTION INTRAMUSCULAR; INTRAVENOUS
Status: DISCONTINUED | OUTPATIENT
Start: 2022-07-26 | End: 2022-07-26 | Stop reason: HOSPADM

## 2022-07-26 RX ORDER — DIPHENHYDRAMINE HYDROCHLORIDE 50 MG/ML
50 INJECTION INTRAMUSCULAR; INTRAVENOUS
Status: DISCONTINUED | OUTPATIENT
Start: 2022-07-26 | End: 2022-07-26 | Stop reason: HOSPADM

## 2022-07-26 RX ORDER — ONDANSETRON 4 MG/1
16 TABLET, ORALLY DISINTEGRATING ORAL ONCE
Status: COMPLETED | OUTPATIENT
Start: 2022-07-26 | End: 2022-07-26

## 2022-07-26 RX ORDER — HEPARIN 100 UNIT/ML
5 SYRINGE INTRAVENOUS
Status: DISCONTINUED | OUTPATIENT
Start: 2022-07-26 | End: 2022-07-26 | Stop reason: HOSPADM

## 2022-07-26 RX ORDER — ALBUTEROL SULFATE 90 UG/1
2 AEROSOL, METERED RESPIRATORY (INHALATION)
Status: DISCONTINUED | OUTPATIENT
Start: 2022-07-26 | End: 2022-07-26 | Stop reason: HOSPADM

## 2022-07-26 RX ORDER — ACETAMINOPHEN 160 MG/5ML
650 LIQUID ORAL ONCE
Status: COMPLETED | OUTPATIENT
Start: 2022-07-26 | End: 2022-07-26

## 2022-07-26 RX ORDER — FAMOTIDINE 10 MG/ML
20 INJECTION, SOLUTION INTRAVENOUS
Status: DISCONTINUED | OUTPATIENT
Start: 2022-07-26 | End: 2022-07-26 | Stop reason: HOSPADM

## 2022-07-26 RX ADMIN — DEXAMETHASONE 20 MG: 6 TABLET ORAL at 11:57

## 2022-07-26 RX ADMIN — DARATUMUMAB AND HYALURONIDASE-FIHJ (HUMAN RECOMBINANT) 1800 MG: 1800; 30000 INJECTION SUBCUTANEOUS at 12:50

## 2022-07-26 RX ADMIN — BORTEZOMIB 2 MG: 3.5 INJECTION, POWDER, LYOPHILIZED, FOR SOLUTION INTRAVENOUS; SUBCUTANEOUS at 12:49

## 2022-07-26 RX ADMIN — DIPHENHYDRAMINE HYDROCHLORIDE 25 MG: 12.5 SOLUTION ORAL at 11:55

## 2022-07-26 RX ADMIN — ACETAMINOPHEN 650 MG: 160 SOLUTION ORAL at 11:55

## 2022-07-26 RX ADMIN — ONDANSETRON 16 MG: 4 TABLET, ORALLY DISINTEGRATING ORAL at 11:53

## 2022-07-26 ASSESSMENT — PAIN SCALES - GENERAL: PAINLEVEL: 0

## 2022-07-27 LAB
ALBUMIN SERPL ELPH-MCNC: 2.4 G/DL (ref 3.5–4.9)
ALPHA 1: 0.4 G/DL (ref 0.2–0.4)
ALPHA2 GLOB SERPL ELPH-MCNC: 0.8 G/DL (ref 0.5–0.9)
B-GLOBULIN SERPL ELPH-MCNC: 0.5 G/DL (ref 0.7–1.2)
GAMMA GLOB SERPL ELPH-MCNC: 0.4 G/DL (ref 0.7–1.7)
GLOBULIN SER-MCNC: 2.2 G/DL (ref 2.1–4.2)
IGA SERPL-MCNC: 37 MG/DL (ref 70–400)
IGG SERPL-MCNC: 443 MG/DL (ref 700–1600)
IGM SERPL-MCNC: 35 MG/DL (ref 40–230)
KAPPA LC FREE SER NEPH-MCNC: 3.28 MG/DL (ref 0.33–1.94)
KAPPA LC/LAMBDA SER: 3.04 {RATIO} (ref 0.26–1.65)
LAMBDA LC FREE SER NEPH-MCNC: 1.08 MG/DL (ref 0.57–2.63)
M PROTEIN 1 SERPL ELPH-MCNC: 0.2 G/DL
PATH INTERP BLD-IMP: ABNORMAL
PATH INTERP SPEC-IMP: ABNORMAL
PATH REV BLD -IMP: YES
PATH REV: ABNORMAL
PROT SERPL-MCNC: 4.6 G/DL (ref 6.4–8.2)

## 2022-07-28 ENCOUNTER — APPOINTMENT (OUTPATIENT)
Dept: REHABILITATION | Age: 82
End: 2022-07-28
Attending: INTERNAL MEDICINE

## 2022-07-28 ENCOUNTER — TELEPHONE (OUTPATIENT)
Dept: HEMATOLOGY/ONCOLOGY | Age: 82
End: 2022-07-28

## 2022-07-31 ENCOUNTER — APPOINTMENT (OUTPATIENT)
Dept: CT IMAGING | Age: 82
DRG: 377 | End: 2022-07-31
Attending: EMERGENCY MEDICINE

## 2022-07-31 ENCOUNTER — TRANSFERRED RECORDS (OUTPATIENT)
Dept: HEALTH INFORMATION MANAGEMENT | Facility: CLINIC | Age: 82
End: 2022-07-31

## 2022-07-31 ENCOUNTER — HOSPITAL ENCOUNTER (INPATIENT)
Age: 82
LOS: 32 days | Discharge: NURSING, REHAB OR LONG-TERM CARE | DRG: 377 | End: 2022-09-02
Attending: EMERGENCY MEDICINE | Admitting: INTERNAL MEDICINE

## 2022-07-31 ENCOUNTER — APPOINTMENT (OUTPATIENT)
Dept: GENERAL RADIOLOGY | Age: 82
DRG: 377 | End: 2022-07-31
Attending: EMERGENCY MEDICINE

## 2022-07-31 DIAGNOSIS — D64.9 ANEMIA, UNSPECIFIED TYPE: ICD-10-CM

## 2022-07-31 DIAGNOSIS — U07.1 COVID-19 VIRUS INFECTION: ICD-10-CM

## 2022-07-31 DIAGNOSIS — R55 NEAR SYNCOPE: ICD-10-CM

## 2022-07-31 DIAGNOSIS — R53.1 WEAKNESS: Primary | ICD-10-CM

## 2022-07-31 DIAGNOSIS — R79.89 ELEVATED TROPONIN: ICD-10-CM

## 2022-07-31 LAB
ACANTHOCYTES BLD QL SMEAR: ABNORMAL
ALBUMIN SERPL-MCNC: 1.9 G/DL (ref 3.6–5.1)
ALBUMIN/GLOB SERPL: 0.9 {RATIO} (ref 1–2.4)
ALP SERPL-CCNC: 148 UNITS/L (ref 45–117)
ALT SERPL-CCNC: 20 UNITS/L
ANION GAP SERPL CALC-SCNC: 8 MMOL/L (ref 7–19)
APPEARANCE UR: ABNORMAL
AST SERPL-CCNC: 25 UNITS/L
ATRIAL RATE (BPM): 98
BACTERIA #/AREA URNS HPF: ABNORMAL /HPF
BASOPHILS # BLD: 0 K/MCL (ref 0–0.3)
BASOPHILS NFR BLD: 0 %
BILIRUB SERPL-MCNC: 0.6 MG/DL (ref 0.2–1)
BILIRUB UR QL STRIP: NEGATIVE
BUN SERPL-MCNC: 33 MG/DL (ref 6–20)
BUN/CREAT SERPL: 51 (ref 7–25)
CALCIUM SERPL-MCNC: 8.2 MG/DL (ref 8.4–10.2)
CHLORIDE SERPL-SCNC: 107 MMOL/L (ref 97–110)
CO2 SERPL-SCNC: 31 MMOL/L (ref 21–32)
COLOR UR: YELLOW
CREAT SERPL-MCNC: 0.65 MG/DL (ref 0.51–0.95)
DEPRECATED RDW RBC: 60.6 FL (ref 39–50)
DIASTOLIC BLOOD PRESSURE: 59
EOSINOPHIL # BLD: 0.5 K/MCL (ref 0–0.5)
EOSINOPHIL NFR BLD: 4 %
ERYTHROCYTE [DISTWIDTH] IN BLOOD: 18.8 % (ref 11–15)
FASTING DURATION TIME PATIENT: ABNORMAL H
GFR SERPLBLD BASED ON 1.73 SQ M-ARVRAT: 87 ML/MIN
GLOBULIN SER-MCNC: 2.2 G/DL (ref 2–4)
GLUCOSE SERPL-MCNC: 117 MG/DL (ref 70–99)
GLUCOSE UR STRIP-MCNC: NEGATIVE MG/DL
HCT VFR BLD CALC: 26.6 % (ref 36–46.5)
HEMOCCULT STL QL: NEGATIVE
HGB BLD-MCNC: 8.4 G/DL (ref 12–15.5)
HGB UR QL STRIP: NEGATIVE
HYALINE CASTS #/AREA URNS LPF: ABNORMAL /LPF
INTERNAL PROCEDURAL CONTROLS ACCEPTABLE: YES
KETONES UR STRIP-MCNC: NEGATIVE MG/DL
LACTATE BLDV-SCNC: 0.7 MMOL/L
LEUKOCYTE ESTERASE UR QL STRIP: NEGATIVE
LIPASE SERPL-CCNC: 140 UNITS/L (ref 73–393)
LYMPHOCYTES # BLD: 5 K/MCL (ref 1–4)
LYMPHOCYTES NFR BLD: 39 %
MCH RBC QN AUTO: 28.9 PG (ref 26–34)
MCHC RBC AUTO-ENTMCNC: 31.6 G/DL (ref 32–36.5)
MCV RBC AUTO: 91.4 FL (ref 78–100)
MONOCYTES # BLD: 0.3 K/MCL (ref 0.3–0.9)
MONOCYTES NFR BLD: 2 %
MUCOUS THREADS URNS QL MICRO: PRESENT
MYELOCYTES # BLD MANUAL: 1 %
NEUTROPHILS # BLD: 7 K/MCL (ref 1.8–7.7)
NEUTS SEG NFR BLD: 54 %
NITRITE UR QL STRIP: NEGATIVE
NRBC BLD MANUAL-RTO: 1 /100 WBC
P AXIS (DEGREES): 57
PH UR STRIP: >8.5 [PH] (ref 5–7)
PLAT MORPH BLD: NORMAL
PLATELET # BLD AUTO: 99 K/MCL (ref 140–450)
POTASSIUM SERPL-SCNC: 4 MMOL/L (ref 3.4–5.1)
PR-INTERVAL (MSEC): 206
PROCALCITONIN SERPL IA-MCNC: 0.15 NG/ML
PROT SERPL-MCNC: 4.1 G/DL (ref 6.4–8.2)
PROT UR STRIP-MCNC: >=500 MG/DL
QRS-INTERVAL (MSEC): 96
QT-INTERVAL (MSEC): 376
QTC: 480
R AXIS (DEGREES): -9
RBC # BLD: 2.91 MIL/MCL (ref 4–5.2)
RBC #/AREA URNS HPF: ABNORMAL /HPF
REPORT TEXT: NORMAL
SARS-COV-2 N GENE CT SPEC QN NAA N2: 0
SARS-COV-2 RNA RESP QL NAA+PROBE: DETECTED
SERVICE CMNT-IMP: ABNORMAL
SODIUM SERPL-SCNC: 142 MMOL/L (ref 135–145)
SP GR UR STRIP: 1.02 (ref 1–1.03)
SQUAMOUS #/AREA URNS HPF: ABNORMAL /HPF
SYSTOLIC BLOOD PRESSURE: 127
T AXIS (DEGREES): 156
TEST LOT EXPIRATION DATE: NORMAL
TEST LOT NUMBER: NORMAL
TROPONIN I SERPL DL<=0.01 NG/ML-MCNC: 101 NG/L
TROPONIN I SERPL DL<=0.01 NG/ML-MCNC: 84 NG/L
UROBILINOGEN UR STRIP-MCNC: 0.2 MG/DL
VENTRICULAR RATE EKG/MIN (BPM): 98
WBC # BLD: 12.9 K/MCL (ref 4.2–11)
WBC #/AREA URNS HPF: ABNORMAL /HPF
WBC MORPH BLD: NORMAL

## 2022-07-31 PROCEDURE — G1004 CDSM NDSC: HCPCS

## 2022-07-31 PROCEDURE — 10002805 HB CONTRAST AGENT: Performed by: STUDENT IN AN ORGANIZED HEALTH CARE EDUCATION/TRAINING PROGRAM

## 2022-07-31 PROCEDURE — G0378 HOSPITAL OBSERVATION PER HR: HCPCS

## 2022-07-31 PROCEDURE — 83690 ASSAY OF LIPASE: CPT | Performed by: EMERGENCY MEDICINE

## 2022-07-31 PROCEDURE — 80053 COMPREHEN METABOLIC PANEL: CPT | Performed by: EMERGENCY MEDICINE

## 2022-07-31 PROCEDURE — 99285 EMERGENCY DEPT VISIT HI MDM: CPT

## 2022-07-31 PROCEDURE — 84145 PROCALCITONIN (PCT): CPT | Performed by: EMERGENCY MEDICINE

## 2022-07-31 PROCEDURE — 85027 COMPLETE CBC AUTOMATED: CPT | Performed by: EMERGENCY MEDICINE

## 2022-07-31 PROCEDURE — 10002807 HB RX 258: Performed by: EMERGENCY MEDICINE

## 2022-07-31 PROCEDURE — 99220 INITIAL OBSERVATION CARE,LEVL III: CPT | Performed by: INTERNAL MEDICINE

## 2022-07-31 PROCEDURE — 81001 URINALYSIS AUTO W/SCOPE: CPT | Performed by: EMERGENCY MEDICINE

## 2022-07-31 PROCEDURE — 84484 ASSAY OF TROPONIN QUANT: CPT | Performed by: INTERNAL MEDICINE

## 2022-07-31 PROCEDURE — 87635 SARS-COV-2 COVID-19 AMP PRB: CPT | Performed by: EMERGENCY MEDICINE

## 2022-07-31 PROCEDURE — 83605 ASSAY OF LACTIC ACID: CPT

## 2022-07-31 PROCEDURE — 82271 OCCULT BLOOD OTHER SOURCES: CPT | Performed by: EMERGENCY MEDICINE

## 2022-07-31 PROCEDURE — 10004651 HB RX, NO CHARGE ITEM: Performed by: INTERNAL MEDICINE

## 2022-07-31 PROCEDURE — 93005 ELECTROCARDIOGRAM TRACING: CPT | Performed by: EMERGENCY MEDICINE

## 2022-07-31 PROCEDURE — 87086 URINE CULTURE/COLONY COUNT: CPT | Performed by: EMERGENCY MEDICINE

## 2022-07-31 PROCEDURE — 74177 CT ABD & PELVIS W/CONTRAST: CPT

## 2022-07-31 PROCEDURE — 10004173 HB COUNTER-THERAPY VISIT PT

## 2022-07-31 PROCEDURE — 84484 ASSAY OF TROPONIN QUANT: CPT | Performed by: EMERGENCY MEDICINE

## 2022-07-31 PROCEDURE — 97161 PT EVAL LOW COMPLEX 20 MIN: CPT

## 2022-07-31 PROCEDURE — 10004157 XR CHEST AP OR PA

## 2022-07-31 PROCEDURE — 96360 HYDRATION IV INFUSION INIT: CPT

## 2022-07-31 PROCEDURE — 36415 COLL VENOUS BLD VENIPUNCTURE: CPT

## 2022-07-31 PROCEDURE — 97530 THERAPEUTIC ACTIVITIES: CPT

## 2022-07-31 PROCEDURE — 10002803 HB RX 637: Performed by: INTERNAL MEDICINE

## 2022-07-31 PROCEDURE — 36415 COLL VENOUS BLD VENIPUNCTURE: CPT | Performed by: INTERNAL MEDICINE

## 2022-07-31 PROCEDURE — 10002800 HB RX 250 W HCPCS: Performed by: INTERNAL MEDICINE

## 2022-07-31 PROCEDURE — 10002807 HB RX 258: Performed by: STUDENT IN AN ORGANIZED HEALTH CARE EDUCATION/TRAINING PROGRAM

## 2022-07-31 RX ORDER — ATORVASTATIN CALCIUM 10 MG/1
10 TABLET, FILM COATED ORAL DAILY
Status: DISCONTINUED | OUTPATIENT
Start: 2022-07-31 | End: 2022-09-02 | Stop reason: HOSPADM

## 2022-07-31 RX ORDER — FERROUS SULFATE 325(65) MG
325 TABLET ORAL
Status: DISCONTINUED | OUTPATIENT
Start: 2022-07-31 | End: 2022-09-02 | Stop reason: HOSPADM

## 2022-07-31 RX ORDER — ASPIRIN 81 MG/1
81 TABLET, CHEWABLE ORAL DAILY
Status: DISCONTINUED | OUTPATIENT
Start: 2022-07-31 | End: 2022-09-02 | Stop reason: HOSPADM

## 2022-07-31 RX ORDER — ACETAMINOPHEN 325 MG/1
650 TABLET ORAL EVERY 4 HOURS PRN
Status: DISCONTINUED | OUTPATIENT
Start: 2022-07-31 | End: 2022-09-02 | Stop reason: HOSPADM

## 2022-07-31 RX ORDER — POLYETHYLENE GLYCOL 3350 17 G/17G
17 POWDER, FOR SOLUTION ORAL DAILY PRN
Status: DISCONTINUED | OUTPATIENT
Start: 2022-07-31 | End: 2022-09-02 | Stop reason: HOSPADM

## 2022-07-31 RX ORDER — ACYCLOVIR 200 MG/5ML
400 SUSPENSION ORAL 2 TIMES DAILY
Status: DISCONTINUED | OUTPATIENT
Start: 2022-07-31 | End: 2022-09-02 | Stop reason: HOSPADM

## 2022-07-31 RX ORDER — 0.9 % SODIUM CHLORIDE 0.9 %
2 VIAL (ML) INJECTION EVERY 12 HOURS SCHEDULED
Status: DISCONTINUED | OUTPATIENT
Start: 2022-07-31 | End: 2022-09-02 | Stop reason: HOSPADM

## 2022-07-31 RX ORDER — ONDANSETRON 2 MG/ML
4 INJECTION INTRAMUSCULAR; INTRAVENOUS 2 TIMES DAILY PRN
Status: DISCONTINUED | OUTPATIENT
Start: 2022-07-31 | End: 2022-09-02 | Stop reason: HOSPADM

## 2022-07-31 RX ORDER — BISACODYL 10 MG
10 SUPPOSITORY, RECTAL RECTAL DAILY PRN
Status: DISCONTINUED | OUTPATIENT
Start: 2022-07-31 | End: 2022-09-02 | Stop reason: HOSPADM

## 2022-07-31 RX ADMIN — SODIUM CHLORIDE 100 ML: 9 INJECTION, SOLUTION INTRAVENOUS at 08:06

## 2022-07-31 RX ADMIN — CALCIUM CARBONATE 600 MG (1,500 MG)-VITAMIN D3 400 UNIT TABLET 1 TABLET: at 16:40

## 2022-07-31 RX ADMIN — ACYCLOVIR 400 MG: 200 SUSPENSION ORAL at 20:10

## 2022-07-31 RX ADMIN — IOHEXOL 50 ML: 350 INJECTION, SOLUTION INTRAVENOUS at 08:06

## 2022-07-31 RX ADMIN — SODIUM CHLORIDE 1000 ML: 9 INJECTION, SOLUTION INTRAVENOUS at 04:37

## 2022-07-31 RX ADMIN — ASPIRIN 81 MG CHEWABLE TABLET 81 MG: 81 TABLET CHEWABLE at 16:40

## 2022-07-31 RX ADMIN — Medication 25 MCG: at 16:40

## 2022-07-31 RX ADMIN — ACETAMINOPHEN 650 MG: 325 TABLET, FILM COATED ORAL at 20:07

## 2022-07-31 RX ADMIN — FERROUS SULFATE TAB 325 MG (65 MG ELEMENTAL FE) 325 MG: 325 (65 FE) TAB at 16:40

## 2022-07-31 RX ADMIN — SODIUM CHLORIDE, PRESERVATIVE FREE 2 ML: 5 INJECTION INTRAVENOUS at 11:51

## 2022-07-31 RX ADMIN — SODIUM CHLORIDE, PRESERVATIVE FREE 2 ML: 5 INJECTION INTRAVENOUS at 20:02

## 2022-07-31 RX ADMIN — ONDANSETRON 4 MG: 2 INJECTION INTRAMUSCULAR; INTRAVENOUS at 22:27

## 2022-07-31 RX ADMIN — ATORVASTATIN CALCIUM 10 MG: 10 TABLET, FILM COATED ORAL at 16:40

## 2022-07-31 ASSESSMENT — ACTIVITIES OF DAILY LIVING (ADL)
TRANSFERRING: INDEPENDENT
FEEDING YOURSELF: INDEPENDENT
PRIOR_ADL_TOILETING: MODIFIED INDEPENDENT
TOILETING: INDEPENDENT
DRESSING YOURSELF: INDEPENDENT
RECENT_DECLINE_ADL: NO
ADL_BEFORE_ADMISSION: NEEDS/REQUIRES ASSISTANCE
MOBILITY_ASSIST_DEVICES: FOUR WHEEL WALKER
BATHING: NEEDS ASSISTANCE
CONTINENCE: INDEPENDENT
CHRONIC_PAIN_PRESENT: YES, CANCER
PRIOR_ADL: MINIMAL ASSIST (MIN)
PRIOR_ADL_BATHING: MAXIMAL ASSIST (MAX)
ADL_SHORT_OF_BREATH: NO
ADL_SCORE: 11
DESCRIBE HOW PAIN IMPACTS YOUR LIFE: NONE/CAN MANAGE

## 2022-07-31 ASSESSMENT — COGNITIVE AND FUNCTIONAL STATUS - GENERAL
BECAUSE OF A PHYSICAL, MENTAL, OR EMOTIONAL CONDITION, DO YOU HAVE SERIOUS DIFFICULTY CONCENTRATING, REMEMBERING OR MAKING DECISIONS: NO
DO YOU HAVE DIFFICULTY DRESSING OR BATHING: YES
ARE YOU DEAF OR DO YOU HAVE SERIOUS DIFFICULTY  HEARING: NO
ARE YOU BLIND OR DO YOU HAVE SERIOUS DIFFICULTY SEEING, EVEN WHEN WEARING GLASSES: NO
BASIC_MOBILITY_CONVERTED_SCORE: 35.55
BASIC_MOBILITY_RAW_SCORE: 14
BECAUSE OF A PHYSICAL, MENTAL, OR EMOTIONAL CONDITION, DO YOU HAVE DIFFICULTY DOING ERRANDS ALONE: YES
DO YOU HAVE SERIOUS DIFFICULTY WALKING OR CLIMBING STAIRS: YES

## 2022-07-31 ASSESSMENT — LIFESTYLE VARIABLES
HOW OFTEN DO YOU HAVE 6 OR MORE DRINKS ON ONE OCCASION: NEVER
ALCOHOL_USE_STATUS: NO OR LOW RISK WITH VALIDATED TOOL
AUDIT-C TOTAL SCORE: 0
HOW OFTEN DO YOU HAVE A DRINK CONTAINING ALCOHOL: NEVER
HOW MANY STANDARD DRINKS CONTAINING ALCOHOL DO YOU HAVE ON A TYPICAL DAY: 0,1 OR 2

## 2022-07-31 ASSESSMENT — PAIN SCALES - GENERAL
PAINLEVEL_OUTOF10: 0
PAINLEVEL_OUTOF10: 4
PAINLEVEL_OUTOF10: 2
PAINLEVEL_OUTOF10: 5

## 2022-07-31 ASSESSMENT — PAIN DESCRIPTION - PAIN TYPE: TYPE: ACUTE PAIN

## 2022-08-01 ENCOUNTER — APPOINTMENT (OUTPATIENT)
Dept: REHABILITATION | Age: 82
End: 2022-08-01
Attending: NURSE PRACTITIONER

## 2022-08-01 LAB
ABO + RH BLD: NORMAL
ALBUMIN SERPL-MCNC: 1.7 G/DL (ref 3.6–5.1)
ALBUMIN/GLOB SERPL: 0.9 {RATIO} (ref 1–2.4)
ALP SERPL-CCNC: 116 UNITS/L (ref 45–117)
ALT SERPL-CCNC: 17 UNITS/L
ANION GAP SERPL CALC-SCNC: 9 MMOL/L (ref 7–19)
AST SERPL-CCNC: 22 UNITS/L
BASOPHILS # BLD: 0.1 K/MCL (ref 0–0.3)
BASOPHILS NFR BLD: 0 %
BILIRUB SERPL-MCNC: 0.4 MG/DL (ref 0.2–1)
BLD GP AB INVEST PLASRBC-IMP: NORMAL
BLD GP AB SCN SERPL QL GEL: POSITIVE
BLD GP AB SCN SERPL QL SALINE: NEGATIVE
BLOOD EXPIRATION DATE: NORMAL
BUN SERPL-MCNC: 37 MG/DL (ref 6–20)
BUN/CREAT SERPL: 53 (ref 7–25)
CALCIUM SERPL-MCNC: 7.8 MG/DL (ref 8.4–10.2)
CHLORIDE SERPL-SCNC: 111 MMOL/L (ref 97–110)
CHOLEST SERPL-MCNC: 106 MG/DL
CHOLEST/HDLC SERPL: 3.4 {RATIO}
CO2 SERPL-SCNC: 28 MMOL/L (ref 21–32)
CREAT SERPL-MCNC: 0.7 MG/DL (ref 0.51–0.95)
CROSSMATCH RESULT: NORMAL
CROSSMATCH RESULT: NORMAL
CRP SERPL-MCNC: 1.6 MG/DL
D DIMER PPP FEU-MCNC: 1.01 MG/L (FEU)
DAT POLY-SP REAG RBC QL: NEGATIVE
DEPRECATED RDW RBC: 63.6 FL (ref 39–50)
DISPENSE STATUS: NORMAL
EOSINOPHIL # BLD: 0.3 K/MCL (ref 0–0.5)
EOSINOPHIL NFR BLD: 3 %
ERYTHROCYTE [DISTWIDTH] IN BLOOD: 19.5 % (ref 11–15)
FASTING DURATION TIME PATIENT: ABNORMAL H
FASTING DURATION TIME PATIENT: ABNORMAL H
FERRITIN SERPL-MCNC: 196 NG/ML (ref 8–252)
GFR SERPLBLD BASED ON 1.73 SQ M-ARVRAT: 86 ML/MIN
GLOBULIN SER-MCNC: 1.9 G/DL (ref 2–4)
GLUCOSE SERPL-MCNC: 97 MG/DL (ref 70–99)
HCT VFR BLD CALC: 18.4 % (ref 36–46.5)
HCT VFR BLD CALC: 19.2 % (ref 36–46.5)
HCT VFR BLD CALC: 28 % (ref 36–46.5)
HCT VFR BLD CALC: 29.3 % (ref 36–46.5)
HDLC SERPL-MCNC: 31 MG/DL
HEMOCCULT STL QL: POSITIVE
HGB BLD-MCNC: 5.7 G/DL (ref 12–15.5)
HGB BLD-MCNC: 6.2 G/DL (ref 12–15.5)
HGB BLD-MCNC: 9.4 G/DL (ref 12–15.5)
HGB BLD-MCNC: 9.4 G/DL (ref 12–15.5)
IMM GRANULOCYTES # BLD AUTO: 0.1 K/MCL (ref 0–0.2)
IMM GRANULOCYTES # BLD: 1 %
INR PPP: 1.1
ISBT BLOOD TYPE: 9500
ISSUE DATE/TIME: NORMAL
ISSUE DATE/TIME: NORMAL
LACTATE BLDV-SCNC: 0.6 MMOL/L (ref 0–2)
LDH SERPL L TO P-CCNC: 203 UNITS/L (ref 82–240)
LDLC SERPL CALC-MCNC: 23 MG/DL
LYMPHOCYTES # BLD: 6.1 K/MCL (ref 1–4)
LYMPHOCYTES NFR BLD: 48 %
MAGNESIUM SERPL-MCNC: 1.8 MG/DL (ref 1.7–2.4)
MCH RBC QN AUTO: 29.7 PG (ref 26–34)
MCHC RBC AUTO-ENTMCNC: 32.3 G/DL (ref 32–36.5)
MCV RBC AUTO: 91.9 FL (ref 78–100)
MONOCYTES # BLD: 0.9 K/MCL (ref 0.3–0.9)
MONOCYTES NFR BLD: 7 %
NEUTROPHILS # BLD: 5.2 K/MCL (ref 1.8–7.7)
NEUTROPHILS NFR BLD: 41 %
NONHDLC SERPL-MCNC: 75 MG/DL
NRBC BLD MANUAL-RTO: 1 /100 WBC
PLATELET # BLD AUTO: 90 K/MCL (ref 140–450)
POTASSIUM SERPL-SCNC: 3.8 MMOL/L (ref 3.4–5.1)
PRODUCT CODE: NORMAL
PRODUCT DESCRIPTION: NORMAL
PRODUCT ID: NORMAL
PROT SERPL-MCNC: 3.6 G/DL (ref 6.4–8.2)
PROTHROMBIN TIME: 11.2 SEC (ref 9.7–11.8)
RAINBOW EXTRA TUBES HOLD SPECIMEN: NORMAL
RBC # BLD: 2.09 MIL/MCL (ref 4–5.2)
SODIUM SERPL-SCNC: 144 MMOL/L (ref 135–145)
TRANSFUSION SERVICES SAMPLE TRANSFER: NORMAL
TRIGL SERPL-MCNC: 260 MG/DL
TYPE AND SCREEN EXPIRATION DATE: NORMAL
UNIT BLOOD TYPE: NORMAL
UNIT NUMBER: NORMAL
WBC # BLD: 12.6 K/MCL (ref 4.2–11)

## 2022-08-01 PROCEDURE — 80061 LIPID PANEL: CPT | Performed by: INTERNAL MEDICINE

## 2022-08-01 PROCEDURE — 86850 RBC ANTIBODY SCREEN: CPT | Performed by: INTERNAL MEDICINE

## 2022-08-01 PROCEDURE — 86902 BLOOD TYPE ANTIGEN DONOR EA: CPT

## 2022-08-01 PROCEDURE — 10002800 HB RX 250 W HCPCS: Performed by: INTERNAL MEDICINE

## 2022-08-01 PROCEDURE — P9040 RBC LEUKOREDUCED IRRADIATED: HCPCS

## 2022-08-01 PROCEDURE — 36415 COLL VENOUS BLD VENIPUNCTURE: CPT | Performed by: INTERNAL MEDICINE

## 2022-08-01 PROCEDURE — 10004651 HB RX, NO CHARGE ITEM: Performed by: INTERNAL MEDICINE

## 2022-08-01 PROCEDURE — 86880 COOMBS TEST DIRECT: CPT | Performed by: INTERNAL MEDICINE

## 2022-08-01 PROCEDURE — 97165 OT EVAL LOW COMPLEX 30 MIN: CPT

## 2022-08-01 PROCEDURE — 99221 1ST HOSP IP/OBS SF/LOW 40: CPT | Performed by: INTERNAL MEDICINE

## 2022-08-01 PROCEDURE — G0378 HOSPITAL OBSERVATION PER HR: HCPCS

## 2022-08-01 PROCEDURE — 82272 OCCULT BLD FECES 1-3 TESTS: CPT | Performed by: INTERNAL MEDICINE

## 2022-08-01 PROCEDURE — 86140 C-REACTIVE PROTEIN: CPT | Performed by: INTERNAL MEDICINE

## 2022-08-01 PROCEDURE — 83735 ASSAY OF MAGNESIUM: CPT | Performed by: INTERNAL MEDICINE

## 2022-08-01 PROCEDURE — 86922 COMPATIBILITY TEST ANTIGLOB: CPT

## 2022-08-01 PROCEDURE — 85610 PROTHROMBIN TIME: CPT | Performed by: INTERNAL MEDICINE

## 2022-08-01 PROCEDURE — 99233 SBSQ HOSP IP/OBS HIGH 50: CPT | Performed by: INTERNAL MEDICINE

## 2022-08-01 PROCEDURE — 10000002 HB ROOM CHARGE MED SURG

## 2022-08-01 PROCEDURE — 83615 LACTATE (LD) (LDH) ENZYME: CPT | Performed by: INTERNAL MEDICINE

## 2022-08-01 PROCEDURE — 83605 ASSAY OF LACTIC ACID: CPT | Performed by: INTERNAL MEDICINE

## 2022-08-01 PROCEDURE — 82728 ASSAY OF FERRITIN: CPT | Performed by: INTERNAL MEDICINE

## 2022-08-01 PROCEDURE — 10002807 HB RX 258: Performed by: INTERNAL MEDICINE

## 2022-08-01 PROCEDURE — 85025 COMPLETE CBC W/AUTO DIFF WBC: CPT | Performed by: INTERNAL MEDICINE

## 2022-08-01 PROCEDURE — 85379 FIBRIN DEGRADATION QUANT: CPT | Performed by: INTERNAL MEDICINE

## 2022-08-01 PROCEDURE — 80053 COMPREHEN METABOLIC PANEL: CPT | Performed by: INTERNAL MEDICINE

## 2022-08-01 PROCEDURE — 86870 RBC ANTIBODY IDENTIFICATION: CPT | Performed by: INTERNAL MEDICINE

## 2022-08-01 PROCEDURE — 86920 COMPATIBILITY TEST SPIN: CPT

## 2022-08-01 PROCEDURE — 10002803 HB RX 637: Performed by: INTERNAL MEDICINE

## 2022-08-01 PROCEDURE — 81403 MOPATH PROCEDURE LEVEL 4: CPT | Performed by: INTERNAL MEDICINE

## 2022-08-01 PROCEDURE — 10004172 HB COUNTER-THERAPY VISIT OT

## 2022-08-01 PROCEDURE — 97530 THERAPEUTIC ACTIVITIES: CPT

## 2022-08-01 PROCEDURE — 36430 TRANSFUSION BLD/BLD COMPNT: CPT

## 2022-08-01 PROCEDURE — P9047 ALBUMIN (HUMAN), 25%, 50ML: HCPCS | Performed by: INTERNAL MEDICINE

## 2022-08-01 PROCEDURE — 85014 HEMATOCRIT: CPT | Performed by: INTERNAL MEDICINE

## 2022-08-01 RX ORDER — ALBUMIN (HUMAN) 12.5 G/50ML
25 SOLUTION INTRAVENOUS ONCE
Status: COMPLETED | OUTPATIENT
Start: 2022-08-01 | End: 2022-08-01

## 2022-08-01 RX ORDER — SODIUM CHLORIDE 9 MG/ML
INJECTION, SOLUTION INTRAVENOUS CONTINUOUS PRN
Status: DISCONTINUED | OUTPATIENT
Start: 2022-08-01 | End: 2022-08-10

## 2022-08-01 RX ADMIN — SODIUM CHLORIDE 1000 ML: 9 INJECTION, SOLUTION INTRAVENOUS at 03:33

## 2022-08-01 RX ADMIN — ACETAMINOPHEN 650 MG: 325 TABLET, FILM COATED ORAL at 14:37

## 2022-08-01 RX ADMIN — SODIUM CHLORIDE, PRESERVATIVE FREE 2 ML: 5 INJECTION INTRAVENOUS at 09:24

## 2022-08-01 RX ADMIN — CALCIUM CARBONATE 600 MG (1,500 MG)-VITAMIN D3 400 UNIT TABLET 1 TABLET: at 09:14

## 2022-08-01 RX ADMIN — SODIUM CHLORIDE, PRESERVATIVE FREE 2 ML: 5 INJECTION INTRAVENOUS at 20:20

## 2022-08-01 RX ADMIN — FERROUS SULFATE TAB 325 MG (65 MG ELEMENTAL FE) 325 MG: 325 (65 FE) TAB at 09:14

## 2022-08-01 RX ADMIN — SODIUM CHLORIDE 1000 ML: 9 INJECTION, SOLUTION INTRAVENOUS at 01:12

## 2022-08-01 RX ADMIN — ALBUMIN (HUMAN) 25 G: 0.25 INJECTION, SOLUTION INTRAVENOUS at 04:48

## 2022-08-01 RX ADMIN — Medication 25 MCG: at 09:14

## 2022-08-01 RX ADMIN — ATORVASTATIN CALCIUM 10 MG: 10 TABLET, FILM COATED ORAL at 09:14

## 2022-08-01 RX ADMIN — SODIUM CHLORIDE 500 ML: 9 INJECTION, SOLUTION INTRAVENOUS at 06:01

## 2022-08-01 RX ADMIN — ACYCLOVIR 400 MG: 200 SUSPENSION ORAL at 20:20

## 2022-08-01 RX ADMIN — ACYCLOVIR 400 MG: 200 SUSPENSION ORAL at 09:23

## 2022-08-01 ASSESSMENT — PAIN SCALES - GENERAL
PAINLEVEL_OUTOF10: 0
PAINLEVEL_OUTOF10: 2
PAINLEVEL_OUTOF10: 2
PAINLEVEL_OUTOF10: 4
PAINLEVEL_OUTOF10: 1

## 2022-08-01 ASSESSMENT — COGNITIVE AND FUNCTIONAL STATUS - GENERAL
HELP NEEDED FOR BATHING: A LOT
HELP NEEDED DRESSING REGULAR UPPER BODY CLOTHING: A LOT
HELP NEEDED DRESSING REGULAR LOWER BODY CLOTHING: A LOT
DAILY_ACTIVITY_RAW_SCORE: 13
HELP NEEDED FOR TOILETING: A LOT
HELP NEEDED FOR PERSONAL GROOMING: A LOT
DAILY_ACTIVITY_CONVERTED_SCORE: 32.03

## 2022-08-02 ENCOUNTER — APPOINTMENT (OUTPATIENT)
Dept: HEMATOLOGY/ONCOLOGY | Age: 82
End: 2022-08-02
Attending: INTERNAL MEDICINE

## 2022-08-02 LAB
ALBUMIN SERPL-MCNC: 2.4 G/DL (ref 3.6–5.1)
ALBUMIN/GLOB SERPL: 1 {RATIO} (ref 1–2.4)
ALP SERPL-CCNC: 142 UNITS/L (ref 45–117)
ALT SERPL-CCNC: 21 UNITS/L
ANION GAP SERPL CALC-SCNC: 10 MMOL/L (ref 7–19)
AST SERPL-CCNC: 31 UNITS/L
BACTERIA UR CULT: NORMAL
BASOPHILS # BLD: 0 K/MCL (ref 0–0.3)
BASOPHILS NFR BLD: 0 %
BILIRUB SERPL-MCNC: 0.9 MG/DL (ref 0.2–1)
BUN SERPL-MCNC: 29 MG/DL (ref 6–20)
BUN/CREAT SERPL: 45 (ref 7–25)
CALCIUM SERPL-MCNC: 7.7 MG/DL (ref 8.4–10.2)
CHLORIDE SERPL-SCNC: 114 MMOL/L (ref 97–110)
CO2 SERPL-SCNC: 24 MMOL/L (ref 21–32)
CREAT SERPL-MCNC: 0.64 MG/DL (ref 0.51–0.95)
DEPRECATED RDW RBC: 61.3 FL (ref 39–50)
EOSINOPHIL # BLD: 0.6 K/MCL (ref 0–0.5)
EOSINOPHIL NFR BLD: 4 %
ERYTHROCYTE [DISTWIDTH] IN BLOOD: 20 % (ref 11–15)
FASTING DURATION TIME PATIENT: ABNORMAL H
GFR SERPLBLD BASED ON 1.73 SQ M-ARVRAT: 88 ML/MIN
GLOBULIN SER-MCNC: 2.3 G/DL (ref 2–4)
GLUCOSE SERPL-MCNC: 78 MG/DL (ref 70–99)
HCT VFR BLD CALC: 29.3 % (ref 36–46.5)
HCT VFR BLD CALC: 30.6 % (ref 36–46.5)
HGB BLD-MCNC: 10.1 G/DL (ref 12–15.5)
HGB BLD-MCNC: 9.5 G/DL (ref 12–15.5)
IMM GRANULOCYTES # BLD AUTO: 0.1 K/MCL (ref 0–0.2)
IMM GRANULOCYTES # BLD: 0 %
LYMPHOCYTES # BLD: 6 K/MCL (ref 1–4)
LYMPHOCYTES NFR BLD: 41 %
MAGNESIUM SERPL-MCNC: 1.9 MG/DL (ref 1.7–2.4)
MCH RBC QN AUTO: 29.1 PG (ref 26–34)
MCHC RBC AUTO-ENTMCNC: 32.4 G/DL (ref 32–36.5)
MCV RBC AUTO: 89.9 FL (ref 78–100)
MONOCYTES # BLD: 1.1 K/MCL (ref 0.3–0.9)
MONOCYTES NFR BLD: 8 %
NEUTROPHILS # BLD: 6.7 K/MCL (ref 1.8–7.7)
NEUTROPHILS NFR BLD: 47 %
NRBC BLD MANUAL-RTO: 1 /100 WBC
PATH REV BLD -IMP: NORMAL
PLATELET # BLD AUTO: 101 K/MCL (ref 140–450)
POTASSIUM SERPL-SCNC: 3.7 MMOL/L (ref 3.4–5.1)
PROT SERPL-MCNC: 4.7 G/DL (ref 6.4–8.2)
RBC # BLD: 3.26 MIL/MCL (ref 4–5.2)
SODIUM SERPL-SCNC: 144 MMOL/L (ref 135–145)
TRANSFUSION SERVICES COMMENT: NORMAL
WBC # BLD: 14.5 K/MCL (ref 4.2–11)

## 2022-08-02 PROCEDURE — 99233 SBSQ HOSP IP/OBS HIGH 50: CPT | Performed by: HOSPITALIST

## 2022-08-02 PROCEDURE — 10002800 HB RX 250 W HCPCS: Performed by: INTERNAL MEDICINE

## 2022-08-02 PROCEDURE — 85014 HEMATOCRIT: CPT | Performed by: INTERNAL MEDICINE

## 2022-08-02 PROCEDURE — 80053 COMPREHEN METABOLIC PANEL: CPT | Performed by: INTERNAL MEDICINE

## 2022-08-02 PROCEDURE — 97535 SELF CARE MNGMENT TRAINING: CPT

## 2022-08-02 PROCEDURE — 10004172 HB COUNTER-THERAPY VISIT OT

## 2022-08-02 PROCEDURE — 83735 ASSAY OF MAGNESIUM: CPT | Performed by: INTERNAL MEDICINE

## 2022-08-02 PROCEDURE — 10002803 HB RX 637: Performed by: INTERNAL MEDICINE

## 2022-08-02 PROCEDURE — 10000002 HB ROOM CHARGE MED SURG

## 2022-08-02 PROCEDURE — 10004173 HB COUNTER-THERAPY VISIT PT

## 2022-08-02 PROCEDURE — 10004651 HB RX, NO CHARGE ITEM: Performed by: INTERNAL MEDICINE

## 2022-08-02 PROCEDURE — 10003445 HB TELEMETRY PER DAY

## 2022-08-02 PROCEDURE — 36415 COLL VENOUS BLD VENIPUNCTURE: CPT | Performed by: INTERNAL MEDICINE

## 2022-08-02 PROCEDURE — 85025 COMPLETE CBC W/AUTO DIFF WBC: CPT | Performed by: INTERNAL MEDICINE

## 2022-08-02 PROCEDURE — 97530 THERAPEUTIC ACTIVITIES: CPT

## 2022-08-02 PROCEDURE — 86860 RBC ANTIBODY ELUTION: CPT

## 2022-08-02 RX ADMIN — Medication 25 MCG: at 09:58

## 2022-08-02 RX ADMIN — SODIUM CHLORIDE, PRESERVATIVE FREE 2 ML: 5 INJECTION INTRAVENOUS at 20:12

## 2022-08-02 RX ADMIN — ATORVASTATIN CALCIUM 10 MG: 10 TABLET, FILM COATED ORAL at 09:58

## 2022-08-02 RX ADMIN — FERROUS SULFATE TAB 325 MG (65 MG ELEMENTAL FE) 325 MG: 325 (65 FE) TAB at 09:58

## 2022-08-02 RX ADMIN — CALCIUM CARBONATE 600 MG (1,500 MG)-VITAMIN D3 400 UNIT TABLET 1 TABLET: at 09:58

## 2022-08-02 RX ADMIN — ACETAMINOPHEN 650 MG: 325 TABLET, FILM COATED ORAL at 17:53

## 2022-08-02 RX ADMIN — ACETAMINOPHEN 650 MG: 325 TABLET, FILM COATED ORAL at 00:16

## 2022-08-02 RX ADMIN — SODIUM CHLORIDE, PRESERVATIVE FREE 2 ML: 5 INJECTION INTRAVENOUS at 09:58

## 2022-08-02 RX ADMIN — ACYCLOVIR 400 MG: 200 SUSPENSION ORAL at 09:58

## 2022-08-02 RX ADMIN — ACYCLOVIR 400 MG: 200 SUSPENSION ORAL at 20:12

## 2022-08-02 RX ADMIN — ACETAMINOPHEN 650 MG: 325 TABLET, FILM COATED ORAL at 23:03

## 2022-08-02 RX ADMIN — ACETAMINOPHEN 650 MG: 325 TABLET, FILM COATED ORAL at 10:02

## 2022-08-02 RX ADMIN — ONDANSETRON 4 MG: 2 INJECTION INTRAMUSCULAR; INTRAVENOUS at 23:01

## 2022-08-02 ASSESSMENT — PAIN SCALES - GENERAL
PAINLEVEL_OUTOF10: 5
PAINLEVEL_OUTOF10: 3
PAINLEVEL_OUTOF10: 2
PAINLEVEL_OUTOF10: 3
PAINLEVEL_OUTOF10: 1
PAINLEVEL_OUTOF10: 6
PAINLEVEL_OUTOF10: 2
PAINLEVEL_OUTOF10: 2

## 2022-08-02 ASSESSMENT — COGNITIVE AND FUNCTIONAL STATUS - GENERAL
HELP NEEDED FOR TOILETING: A LOT
HELP NEEDED DRESSING REGULAR LOWER BODY CLOTHING: A LOT
HELP NEEDED FOR BATHING: A LOT
BASIC_MOBILITY_CONVERTED_SCORE: 35.55
HELP NEEDED FOR PERSONAL GROOMING: A LOT
DAILY_ACTIVITY_CONVERTED_SCORE: 32.03
HELP NEEDED DRESSING REGULAR UPPER BODY CLOTHING: A LOT
DAILY_ACTIVITY_RAW_SCORE: 13
BASIC_MOBILITY_RAW_SCORE: 14

## 2022-08-02 ASSESSMENT — ACTIVITIES OF DAILY LIVING (ADL): HOME_MANAGEMENT_TIME_ENTRY: 15

## 2022-08-03 ENCOUNTER — APPOINTMENT (OUTPATIENT)
Dept: REHABILITATION | Age: 82
End: 2022-08-03
Attending: NURSE PRACTITIONER

## 2022-08-03 LAB
ALBUMIN SERPL-MCNC: 2.1 G/DL (ref 3.6–5.1)
ALBUMIN/GLOB SERPL: 1 {RATIO} (ref 1–2.4)
ALP SERPL-CCNC: 149 UNITS/L (ref 45–117)
ALT SERPL-CCNC: 21 UNITS/L
ANION GAP SERPL CALC-SCNC: 8 MMOL/L (ref 7–19)
AST SERPL-CCNC: 26 UNITS/L
BASOPHILS # BLD: 0.1 K/MCL (ref 0–0.3)
BASOPHILS NFR BLD: 0 %
BILIRUB SERPL-MCNC: 0.6 MG/DL (ref 0.2–1)
BUN SERPL-MCNC: 22 MG/DL (ref 6–20)
BUN/CREAT SERPL: 35 (ref 7–25)
CALCIUM SERPL-MCNC: 7.4 MG/DL (ref 8.4–10.2)
CHLORIDE SERPL-SCNC: 114 MMOL/L (ref 97–110)
CO2 SERPL-SCNC: 25 MMOL/L (ref 21–32)
CREAT SERPL-MCNC: 0.62 MG/DL (ref 0.51–0.95)
CRP SERPL-MCNC: 2.3 MG/DL
D DIMER PPP FEU-MCNC: 1.74 MG/L (FEU)
DEPRECATED RDW RBC: 63.2 FL (ref 39–50)
EOSINOPHIL # BLD: 0.7 K/MCL (ref 0–0.5)
EOSINOPHIL NFR BLD: 6 %
ERYTHROCYTE [DISTWIDTH] IN BLOOD: 20.1 % (ref 11–15)
FASTING DURATION TIME PATIENT: ABNORMAL H
GFR SERPLBLD BASED ON 1.73 SQ M-ARVRAT: 88 ML/MIN
GLOBULIN SER-MCNC: 2.2 G/DL (ref 2–4)
GLUCOSE SERPL-MCNC: 79 MG/DL (ref 70–99)
HCT VFR BLD CALC: 29.7 % (ref 36–46.5)
HGB BLD-MCNC: 9.6 G/DL (ref 12–15.5)
IMM GRANULOCYTES # BLD AUTO: 0 K/MCL (ref 0–0.2)
IMM GRANULOCYTES # BLD: 0 %
LYMPHOCYTES # BLD: 3.2 K/MCL (ref 1–4)
LYMPHOCYTES NFR BLD: 27 %
MCH RBC QN AUTO: 29.3 PG (ref 26–34)
MCHC RBC AUTO-ENTMCNC: 32.3 G/DL (ref 32–36.5)
MCV RBC AUTO: 90.5 FL (ref 78–100)
MONOCYTES # BLD: 1.1 K/MCL (ref 0.3–0.9)
MONOCYTES NFR BLD: 9 %
NEUTROPHILS # BLD: 6.7 K/MCL (ref 1.8–7.7)
NEUTROPHILS NFR BLD: 58 %
NRBC BLD MANUAL-RTO: 1 /100 WBC
PLATELET # BLD AUTO: 112 K/MCL (ref 140–450)
POTASSIUM SERPL-SCNC: 3.8 MMOL/L (ref 3.4–5.1)
PROT SERPL-MCNC: 4.3 G/DL (ref 6.4–8.2)
RBC # BLD: 3.28 MIL/MCL (ref 4–5.2)
SODIUM SERPL-SCNC: 143 MMOL/L (ref 135–145)
WBC # BLD: 11.8 K/MCL (ref 4.2–11)

## 2022-08-03 PROCEDURE — 99232 SBSQ HOSP IP/OBS MODERATE 35: CPT | Performed by: HOSPITALIST

## 2022-08-03 PROCEDURE — 10000002 HB ROOM CHARGE MED SURG

## 2022-08-03 PROCEDURE — 85025 COMPLETE CBC W/AUTO DIFF WBC: CPT | Performed by: INTERNAL MEDICINE

## 2022-08-03 PROCEDURE — 10002803 HB RX 637: Performed by: INTERNAL MEDICINE

## 2022-08-03 PROCEDURE — 10002800 HB RX 250 W HCPCS: Performed by: HOSPITALIST

## 2022-08-03 PROCEDURE — 10004172 HB COUNTER-THERAPY VISIT OT

## 2022-08-03 PROCEDURE — 10004651 HB RX, NO CHARGE ITEM: Performed by: INTERNAL MEDICINE

## 2022-08-03 PROCEDURE — 80053 COMPREHEN METABOLIC PANEL: CPT | Performed by: HOSPITALIST

## 2022-08-03 PROCEDURE — 97535 SELF CARE MNGMENT TRAINING: CPT

## 2022-08-03 PROCEDURE — 86140 C-REACTIVE PROTEIN: CPT | Performed by: INTERNAL MEDICINE

## 2022-08-03 PROCEDURE — 85379 FIBRIN DEGRADATION QUANT: CPT | Performed by: INTERNAL MEDICINE

## 2022-08-03 PROCEDURE — 10003445 HB TELEMETRY PER DAY

## 2022-08-03 PROCEDURE — 36415 COLL VENOUS BLD VENIPUNCTURE: CPT | Performed by: INTERNAL MEDICINE

## 2022-08-03 RX ORDER — FUROSEMIDE 10 MG/ML
20 INJECTION INTRAMUSCULAR; INTRAVENOUS ONCE
Status: COMPLETED | OUTPATIENT
Start: 2022-08-03 | End: 2022-08-03

## 2022-08-03 RX ADMIN — SODIUM CHLORIDE, PRESERVATIVE FREE 2 ML: 5 INJECTION INTRAVENOUS at 09:30

## 2022-08-03 RX ADMIN — ACETAMINOPHEN 650 MG: 325 TABLET, FILM COATED ORAL at 10:14

## 2022-08-03 RX ADMIN — FUROSEMIDE 20 MG: 10 INJECTION, SOLUTION INTRAVENOUS at 17:16

## 2022-08-03 RX ADMIN — ACETAMINOPHEN 650 MG: 325 TABLET, FILM COATED ORAL at 20:07

## 2022-08-03 RX ADMIN — ATORVASTATIN CALCIUM 10 MG: 10 TABLET, FILM COATED ORAL at 09:32

## 2022-08-03 RX ADMIN — ACYCLOVIR 400 MG: 200 SUSPENSION ORAL at 09:32

## 2022-08-03 RX ADMIN — CALCIUM CARBONATE 600 MG (1,500 MG)-VITAMIN D3 400 UNIT TABLET 1 TABLET: at 09:32

## 2022-08-03 RX ADMIN — Medication 25 MCG: at 09:32

## 2022-08-03 RX ADMIN — SODIUM CHLORIDE, PRESERVATIVE FREE 2 ML: 5 INJECTION INTRAVENOUS at 20:03

## 2022-08-03 RX ADMIN — ACYCLOVIR 400 MG: 200 SUSPENSION ORAL at 20:04

## 2022-08-03 RX ADMIN — FERROUS SULFATE TAB 325 MG (65 MG ELEMENTAL FE) 325 MG: 325 (65 FE) TAB at 09:32

## 2022-08-03 ASSESSMENT — PAIN SCALES - GENERAL
PAINLEVEL_OUTOF10: 0
PAINLEVEL_OUTOF10: 4
PAINLEVEL_OUTOF10: 2
PAINLEVEL_OUTOF10: 5

## 2022-08-03 ASSESSMENT — ACTIVITIES OF DAILY LIVING (ADL): HOME_MANAGEMENT_TIME_ENTRY: 28

## 2022-08-04 ENCOUNTER — APPOINTMENT (OUTPATIENT)
Dept: ULTRASOUND IMAGING | Age: 82
DRG: 377 | End: 2022-08-04
Attending: HOSPITALIST

## 2022-08-04 LAB
ALBUMIN SERPL-MCNC: 2.2 G/DL (ref 3.6–5.1)
ALBUMIN/GLOB SERPL: 0.9 {RATIO} (ref 1–2.4)
ALP SERPL-CCNC: 170 UNITS/L (ref 45–117)
ALT SERPL-CCNC: 18 UNITS/L
ANION GAP SERPL CALC-SCNC: 8 MMOL/L (ref 7–19)
AST SERPL-CCNC: 22 UNITS/L
BASOPHILS # BLD: 0.1 K/MCL (ref 0–0.3)
BASOPHILS NFR BLD: 0 %
BILIRUB SERPL-MCNC: 0.6 MG/DL (ref 0.2–1)
BUN SERPL-MCNC: 17 MG/DL (ref 6–20)
BUN/CREAT SERPL: 29 (ref 7–25)
CALCIUM SERPL-MCNC: 7.8 MG/DL (ref 8.4–10.2)
CHLORIDE SERPL-SCNC: 109 MMOL/L (ref 97–110)
CO2 SERPL-SCNC: 28 MMOL/L (ref 21–32)
CREAT SERPL-MCNC: 0.58 MG/DL (ref 0.51–0.95)
D DIMER PPP FEU-MCNC: 2.03 MG/L (FEU)
DEPRECATED RDW RBC: 65 FL (ref 39–50)
EOSINOPHIL # BLD: 0.9 K/MCL (ref 0–0.5)
EOSINOPHIL NFR BLD: 7 %
ERYTHROCYTE [DISTWIDTH] IN BLOOD: 20.1 % (ref 11–15)
FASTING DURATION TIME PATIENT: ABNORMAL H
GFR SERPLBLD BASED ON 1.73 SQ M-ARVRAT: 90 ML/MIN
GLOBULIN SER-MCNC: 2.4 G/DL (ref 2–4)
GLUCOSE SERPL-MCNC: 101 MG/DL (ref 70–99)
HCT VFR BLD CALC: 31.6 % (ref 36–46.5)
HGB BLD-MCNC: 10 G/DL (ref 12–15.5)
IMM GRANULOCYTES # BLD AUTO: 0 K/MCL (ref 0–0.2)
IMM GRANULOCYTES # BLD: 0 %
LYMPHOCYTES # BLD: 4.1 K/MCL (ref 1–4)
LYMPHOCYTES NFR BLD: 33 %
MCH RBC QN AUTO: 29.4 PG (ref 26–34)
MCHC RBC AUTO-ENTMCNC: 31.6 G/DL (ref 32–36.5)
MCV RBC AUTO: 92.9 FL (ref 78–100)
MONOCYTES # BLD: 1.3 K/MCL (ref 0.3–0.9)
MONOCYTES NFR BLD: 10 %
NEUTROPHILS # BLD: 6.3 K/MCL (ref 1.8–7.7)
NEUTROPHILS NFR BLD: 50 %
NRBC BLD MANUAL-RTO: 0 /100 WBC
PLATELET # BLD AUTO: 150 K/MCL (ref 140–450)
POTASSIUM SERPL-SCNC: 3.5 MMOL/L (ref 3.4–5.1)
PROT SERPL-MCNC: 4.6 G/DL (ref 6.4–8.2)
RBC # BLD: 3.4 MIL/MCL (ref 4–5.2)
SODIUM SERPL-SCNC: 141 MMOL/L (ref 135–145)
WBC # BLD: 12.6 K/MCL (ref 4.2–11)

## 2022-08-04 PROCEDURE — 85379 FIBRIN DEGRADATION QUANT: CPT | Performed by: HOSPITALIST

## 2022-08-04 PROCEDURE — 99232 SBSQ HOSP IP/OBS MODERATE 35: CPT | Performed by: HOSPITALIST

## 2022-08-04 PROCEDURE — 10000002 HB ROOM CHARGE MED SURG

## 2022-08-04 PROCEDURE — 97530 THERAPEUTIC ACTIVITIES: CPT

## 2022-08-04 PROCEDURE — 10004157 US LOWER EXTREMITY VENOUS DUPLEX BILATERAL

## 2022-08-04 PROCEDURE — 85025 COMPLETE CBC W/AUTO DIFF WBC: CPT | Performed by: HOSPITALIST

## 2022-08-04 PROCEDURE — 10002803 HB RX 637: Performed by: INTERNAL MEDICINE

## 2022-08-04 PROCEDURE — 10002803 HB RX 637: Performed by: HOSPITALIST

## 2022-08-04 PROCEDURE — 97129 THER IVNTJ 1ST 15 MIN: CPT

## 2022-08-04 PROCEDURE — 10004651 HB RX, NO CHARGE ITEM: Performed by: INTERNAL MEDICINE

## 2022-08-04 PROCEDURE — 36415 COLL VENOUS BLD VENIPUNCTURE: CPT | Performed by: HOSPITALIST

## 2022-08-04 PROCEDURE — 10004173 HB COUNTER-THERAPY VISIT PT

## 2022-08-04 PROCEDURE — 80053 COMPREHEN METABOLIC PANEL: CPT | Performed by: HOSPITALIST

## 2022-08-04 PROCEDURE — 97116 GAIT TRAINING THERAPY: CPT

## 2022-08-04 PROCEDURE — 10003445 HB TELEMETRY PER DAY

## 2022-08-04 PROCEDURE — 10004172 HB COUNTER-THERAPY VISIT OT

## 2022-08-04 PROCEDURE — 97110 THERAPEUTIC EXERCISES: CPT

## 2022-08-04 RX ORDER — FUROSEMIDE 20 MG/1
20 TABLET ORAL ONCE
Status: COMPLETED | OUTPATIENT
Start: 2022-08-04 | End: 2022-08-04

## 2022-08-04 RX ORDER — FUROSEMIDE 40 MG/1
40 TABLET ORAL DAILY
Status: DISCONTINUED | OUTPATIENT
Start: 2022-08-04 | End: 2022-08-14

## 2022-08-04 RX ADMIN — SODIUM CHLORIDE, PRESERVATIVE FREE 2 ML: 5 INJECTION INTRAVENOUS at 21:24

## 2022-08-04 RX ADMIN — ACETAMINOPHEN 650 MG: 325 TABLET, FILM COATED ORAL at 04:55

## 2022-08-04 RX ADMIN — FUROSEMIDE 20 MG: 20 TABLET ORAL at 16:09

## 2022-08-04 RX ADMIN — ACYCLOVIR 400 MG: 200 SUSPENSION ORAL at 09:14

## 2022-08-04 RX ADMIN — ACYCLOVIR 400 MG: 200 SUSPENSION ORAL at 21:24

## 2022-08-04 RX ADMIN — CALCIUM CARBONATE 600 MG (1,500 MG)-VITAMIN D3 400 UNIT TABLET 1 TABLET: at 09:14

## 2022-08-04 RX ADMIN — ASPIRIN 81 MG CHEWABLE TABLET 81 MG: 81 TABLET CHEWABLE at 09:14

## 2022-08-04 RX ADMIN — FERROUS SULFATE TAB 325 MG (65 MG ELEMENTAL FE) 325 MG: 325 (65 FE) TAB at 09:15

## 2022-08-04 RX ADMIN — Medication 25 MCG: at 09:15

## 2022-08-04 RX ADMIN — ACETAMINOPHEN 650 MG: 325 TABLET, FILM COATED ORAL at 21:24

## 2022-08-04 RX ADMIN — ATORVASTATIN CALCIUM 10 MG: 10 TABLET, FILM COATED ORAL at 09:14

## 2022-08-04 RX ADMIN — SODIUM CHLORIDE, PRESERVATIVE FREE 2 ML: 5 INJECTION INTRAVENOUS at 09:25

## 2022-08-04 RX ADMIN — FUROSEMIDE 40 MG: 40 TABLET ORAL at 09:14

## 2022-08-04 ASSESSMENT — PAIN SCALES - GENERAL
PAINLEVEL_OUTOF10: 5
PAINLEVEL_OUTOF10: 4
PAINLEVEL_OUTOF10: 0
PAINLEVEL_OUTOF10: 6

## 2022-08-04 ASSESSMENT — COGNITIVE AND FUNCTIONAL STATUS - GENERAL
BASIC_MOBILITY_CONVERTED_SCORE: 39.67
BASIC_MOBILITY_RAW_SCORE: 17

## 2022-08-05 ENCOUNTER — TELEPHONE (OUTPATIENT)
Dept: HEMATOLOGY/ONCOLOGY | Age: 82
End: 2022-08-05

## 2022-08-05 ENCOUNTER — APPOINTMENT (OUTPATIENT)
Dept: REHABILITATION | Age: 82
End: 2022-08-05
Attending: NURSE PRACTITIONER

## 2022-08-05 LAB
ANION GAP SERPL CALC-SCNC: 9 MMOL/L (ref 7–19)
BASOPHILS # BLD: 0.1 K/MCL (ref 0–0.3)
BASOPHILS NFR BLD: 0 %
BUN SERPL-MCNC: 13 MG/DL (ref 6–20)
BUN/CREAT SERPL: 25 (ref 7–25)
CALCIUM SERPL-MCNC: 7.5 MG/DL (ref 8.4–10.2)
CHLORIDE SERPL-SCNC: 106 MMOL/L (ref 97–110)
CO2 SERPL-SCNC: 26 MMOL/L (ref 21–32)
CREAT SERPL-MCNC: 0.53 MG/DL (ref 0.51–0.95)
DEPRECATED RDW RBC: 63.7 FL (ref 39–50)
EOSINOPHIL # BLD: 0.9 K/MCL (ref 0–0.5)
EOSINOPHIL NFR BLD: 7 %
ERYTHROCYTE [DISTWIDTH] IN BLOOD: 19.4 % (ref 11–15)
FASTING DURATION TIME PATIENT: ABNORMAL H
GFR SERPLBLD BASED ON 1.73 SQ M-ARVRAT: >90 ML/MIN
GLUCOSE SERPL-MCNC: 91 MG/DL (ref 70–99)
HCT VFR BLD CALC: 30.1 % (ref 36–46.5)
HGB BLD-MCNC: 9.5 G/DL (ref 12–15.5)
IMM GRANULOCYTES # BLD AUTO: 0.1 K/MCL (ref 0–0.2)
IMM GRANULOCYTES # BLD: 0 %
LYMPHOCYTES # BLD: 5.2 K/MCL (ref 1–4)
LYMPHOCYTES NFR BLD: 39 %
MAGNESIUM SERPL-MCNC: 1.8 MG/DL (ref 1.7–2.4)
MCH RBC QN AUTO: 28.8 PG (ref 26–34)
MCHC RBC AUTO-ENTMCNC: 31.6 G/DL (ref 32–36.5)
MCV RBC AUTO: 91.2 FL (ref 78–100)
MONOCYTES # BLD: 1 K/MCL (ref 0.3–0.9)
MONOCYTES NFR BLD: 8 %
NEUTROPHILS # BLD: 6.1 K/MCL (ref 1.8–7.7)
NEUTROPHILS NFR BLD: 46 %
NRBC BLD MANUAL-RTO: 0 /100 WBC
PLATELET # BLD AUTO: 184 K/MCL (ref 140–450)
POTASSIUM SERPL-SCNC: 3.3 MMOL/L (ref 3.4–5.1)
RBC # BLD: 3.3 MIL/MCL (ref 4–5.2)
SERVICE CMNT-IMP: NORMAL
SODIUM SERPL-SCNC: 138 MMOL/L (ref 135–145)
WBC # BLD: 13.4 K/MCL (ref 4.2–11)

## 2022-08-05 PROCEDURE — 97530 THERAPEUTIC ACTIVITIES: CPT

## 2022-08-05 PROCEDURE — 97535 SELF CARE MNGMENT TRAINING: CPT

## 2022-08-05 PROCEDURE — 10002807 HB RX 258: Performed by: HOSPITALIST

## 2022-08-05 PROCEDURE — 10002803 HB RX 637: Performed by: HOSPITALIST

## 2022-08-05 PROCEDURE — 10004651 HB RX, NO CHARGE ITEM: Performed by: INTERNAL MEDICINE

## 2022-08-05 PROCEDURE — 10002803 HB RX 637: Performed by: INTERNAL MEDICINE

## 2022-08-05 PROCEDURE — 10004172 HB COUNTER-THERAPY VISIT OT

## 2022-08-05 PROCEDURE — 83735 ASSAY OF MAGNESIUM: CPT | Performed by: INTERNAL MEDICINE

## 2022-08-05 PROCEDURE — 36415 COLL VENOUS BLD VENIPUNCTURE: CPT | Performed by: HOSPITALIST

## 2022-08-05 PROCEDURE — 10003445 HB TELEMETRY PER DAY

## 2022-08-05 PROCEDURE — 85025 COMPLETE CBC W/AUTO DIFF WBC: CPT | Performed by: HOSPITALIST

## 2022-08-05 PROCEDURE — 10002800 HB RX 250 W HCPCS: Performed by: HOSPITALIST

## 2022-08-05 PROCEDURE — 10000002 HB ROOM CHARGE MED SURG

## 2022-08-05 PROCEDURE — 99232 SBSQ HOSP IP/OBS MODERATE 35: CPT | Performed by: HOSPITALIST

## 2022-08-05 PROCEDURE — 80048 BASIC METABOLIC PNL TOTAL CA: CPT | Performed by: INTERNAL MEDICINE

## 2022-08-05 RX ORDER — CEFAZOLIN SODIUM/WATER 2 G/20 ML
2000 SYRINGE (ML) INTRAVENOUS DAILY
Status: DISCONTINUED | OUTPATIENT
Start: 2022-08-05 | End: 2022-08-07

## 2022-08-05 RX ORDER — LORAZEPAM 0.5 MG/1
0.5 TABLET ORAL EVERY 4 HOURS PRN
Status: DISCONTINUED | OUTPATIENT
Start: 2022-08-05 | End: 2022-09-02 | Stop reason: HOSPADM

## 2022-08-05 RX ORDER — FUROSEMIDE 10 MG/ML
40 INJECTION INTRAMUSCULAR; INTRAVENOUS ONCE
Status: COMPLETED | OUTPATIENT
Start: 2022-08-05 | End: 2022-08-05

## 2022-08-05 RX ORDER — POTASSIUM CHLORIDE 1.5 G/1.58G
40 POWDER, FOR SOLUTION ORAL
Status: COMPLETED | OUTPATIENT
Start: 2022-08-05 | End: 2022-08-05

## 2022-08-05 RX ORDER — POTASSIUM CHLORIDE 20 MEQ/1
40 TABLET, EXTENDED RELEASE ORAL
Status: DISCONTINUED | OUTPATIENT
Start: 2022-08-05 | End: 2022-08-05 | Stop reason: ALTCHOICE

## 2022-08-05 RX ADMIN — FUROSEMIDE 40 MG: 40 TABLET ORAL at 08:12

## 2022-08-05 RX ADMIN — ACYCLOVIR 400 MG: 200 SUSPENSION ORAL at 20:26

## 2022-08-05 RX ADMIN — ATORVASTATIN CALCIUM 10 MG: 10 TABLET, FILM COATED ORAL at 08:12

## 2022-08-05 RX ADMIN — SODIUM CHLORIDE, PRESERVATIVE FREE 2 ML: 5 INJECTION INTRAVENOUS at 08:28

## 2022-08-05 RX ADMIN — FUROSEMIDE 40 MG: 10 INJECTION, SOLUTION INTRAMUSCULAR; INTRAVENOUS at 16:49

## 2022-08-05 RX ADMIN — ASPIRIN 81 MG CHEWABLE TABLET 81 MG: 81 TABLET CHEWABLE at 08:12

## 2022-08-05 RX ADMIN — ACYCLOVIR 400 MG: 200 SUSPENSION ORAL at 08:11

## 2022-08-05 RX ADMIN — SODIUM CHLORIDE, PRESERVATIVE FREE 2 ML: 5 INJECTION INTRAVENOUS at 20:27

## 2022-08-05 RX ADMIN — ACETAMINOPHEN 650 MG: 325 TABLET, FILM COATED ORAL at 05:05

## 2022-08-05 RX ADMIN — POTASSIUM CHLORIDE 40 MEQ: 1.5 POWDER, FOR SOLUTION ORAL at 12:01

## 2022-08-05 RX ADMIN — METOPROLOL TARTRATE 25 MG: 25 TABLET, FILM COATED ORAL at 09:34

## 2022-08-05 RX ADMIN — FERROUS SULFATE TAB 325 MG (65 MG ELEMENTAL FE) 325 MG: 325 (65 FE) TAB at 08:12

## 2022-08-05 RX ADMIN — METOPROLOL TARTRATE 25 MG: 25 TABLET, FILM COATED ORAL at 20:26

## 2022-08-05 RX ADMIN — Medication 25 MCG: at 08:12

## 2022-08-05 RX ADMIN — CALCIUM CARBONATE 600 MG (1,500 MG)-VITAMIN D3 400 UNIT TABLET 1 TABLET: at 08:12

## 2022-08-05 RX ADMIN — ACETAMINOPHEN 650 MG: 325 TABLET, FILM COATED ORAL at 20:26

## 2022-08-05 RX ADMIN — CEFTRIAXONE SODIUM 2000 MG: 100 INJECTION, POWDER, FOR SOLUTION INTRAVENOUS at 08:10

## 2022-08-05 RX ADMIN — POTASSIUM CHLORIDE 40 MEQ: 1.5 POWDER, FOR SOLUTION ORAL at 08:11

## 2022-08-05 ASSESSMENT — PAIN SCALES - GENERAL
PAINLEVEL_OUTOF10: 3
PAINLEVEL_OUTOF10: 2
PAINLEVEL_OUTOF10: 4
PAINLEVEL_OUTOF10: 7

## 2022-08-05 ASSESSMENT — ACTIVITIES OF DAILY LIVING (ADL): HOME_MANAGEMENT_TIME_ENTRY: 19

## 2022-08-05 ASSESSMENT — COGNITIVE AND FUNCTIONAL STATUS - GENERAL
BASIC_MOBILITY_CONVERTED_SCORE: 39.67
BASIC_MOBILITY_RAW_SCORE: 17

## 2022-08-06 LAB
ANION GAP SERPL CALC-SCNC: 8 MMOL/L (ref 7–19)
BASOPHILS # BLD: 0.1 K/MCL (ref 0–0.3)
BASOPHILS NFR BLD: 1 %
BUN SERPL-MCNC: 10 MG/DL (ref 6–20)
BUN/CREAT SERPL: 16 (ref 7–25)
CALCIUM SERPL-MCNC: 7.9 MG/DL (ref 8.4–10.2)
CHLORIDE SERPL-SCNC: 107 MMOL/L (ref 97–110)
CO2 SERPL-SCNC: 28 MMOL/L (ref 21–32)
CREAT SERPL-MCNC: 0.62 MG/DL (ref 0.51–0.95)
D DIMER PPP FEU-MCNC: 2.24 MG/L (FEU)
DEPRECATED RDW RBC: 62 FL (ref 39–50)
EOSINOPHIL # BLD: 1 K/MCL (ref 0–0.5)
EOSINOPHIL NFR BLD: 8 %
ERYTHROCYTE [DISTWIDTH] IN BLOOD: 19.4 % (ref 11–15)
FASTING DURATION TIME PATIENT: ABNORMAL H
GFR SERPLBLD BASED ON 1.73 SQ M-ARVRAT: 88 ML/MIN
GLUCOSE SERPL-MCNC: 82 MG/DL (ref 70–99)
HCT VFR BLD CALC: 32.5 % (ref 36–46.5)
HGB BLD-MCNC: 10.5 G/DL (ref 12–15.5)
IMM GRANULOCYTES # BLD AUTO: 0.1 K/MCL (ref 0–0.2)
IMM GRANULOCYTES # BLD: 0 %
LYMPHOCYTES # BLD: 4.6 K/MCL (ref 1–4)
LYMPHOCYTES NFR BLD: 40 %
MCH RBC QN AUTO: 28.9 PG (ref 26–34)
MCHC RBC AUTO-ENTMCNC: 32.3 G/DL (ref 32–36.5)
MCV RBC AUTO: 89.5 FL (ref 78–100)
MONOCYTES # BLD: 0.8 K/MCL (ref 0.3–0.9)
MONOCYTES NFR BLD: 7 %
NEUTROPHILS # BLD: 5 K/MCL (ref 1.8–7.7)
NEUTROPHILS NFR BLD: 44 %
NRBC BLD MANUAL-RTO: 0 /100 WBC
PLATELET # BLD AUTO: 193 K/MCL (ref 140–450)
POTASSIUM SERPL-SCNC: 3.8 MMOL/L (ref 3.4–5.1)
RBC # BLD: 3.63 MIL/MCL (ref 4–5.2)
SODIUM SERPL-SCNC: 139 MMOL/L (ref 135–145)
WBC # BLD: 11.5 K/MCL (ref 4.2–11)

## 2022-08-06 PROCEDURE — 36415 COLL VENOUS BLD VENIPUNCTURE: CPT | Performed by: HOSPITALIST

## 2022-08-06 PROCEDURE — 99232 SBSQ HOSP IP/OBS MODERATE 35: CPT | Performed by: HOSPITALIST

## 2022-08-06 PROCEDURE — 80048 BASIC METABOLIC PNL TOTAL CA: CPT | Performed by: HOSPITALIST

## 2022-08-06 PROCEDURE — 85379 FIBRIN DEGRADATION QUANT: CPT | Performed by: HOSPITALIST

## 2022-08-06 PROCEDURE — 10004651 HB RX, NO CHARGE ITEM: Performed by: INTERNAL MEDICINE

## 2022-08-06 PROCEDURE — 85025 COMPLETE CBC W/AUTO DIFF WBC: CPT | Performed by: HOSPITALIST

## 2022-08-06 PROCEDURE — 10002800 HB RX 250 W HCPCS: Performed by: HOSPITALIST

## 2022-08-06 PROCEDURE — 10000002 HB ROOM CHARGE MED SURG

## 2022-08-06 PROCEDURE — 10003445 HB TELEMETRY PER DAY

## 2022-08-06 PROCEDURE — 10002803 HB RX 637: Performed by: INTERNAL MEDICINE

## 2022-08-06 PROCEDURE — 10002807 HB RX 258: Performed by: HOSPITALIST

## 2022-08-06 PROCEDURE — 10002803 HB RX 637: Performed by: HOSPITALIST

## 2022-08-06 RX ORDER — FUROSEMIDE 10 MG/ML
20 INJECTION INTRAMUSCULAR; INTRAVENOUS ONCE
Status: COMPLETED | OUTPATIENT
Start: 2022-08-06 | End: 2022-08-06

## 2022-08-06 RX ORDER — ENOXAPARIN SODIUM 100 MG/ML
40 INJECTION SUBCUTANEOUS DAILY
Status: DISCONTINUED | OUTPATIENT
Start: 2022-08-06 | End: 2022-09-02 | Stop reason: HOSPADM

## 2022-08-06 RX ADMIN — SODIUM CHLORIDE, PRESERVATIVE FREE 2 ML: 5 INJECTION INTRAVENOUS at 20:38

## 2022-08-06 RX ADMIN — FUROSEMIDE 20 MG: 10 INJECTION, SOLUTION INTRAVENOUS at 15:17

## 2022-08-06 RX ADMIN — CALCIUM CARBONATE 600 MG (1,500 MG)-VITAMIN D3 400 UNIT TABLET 1 TABLET: at 08:51

## 2022-08-06 RX ADMIN — ACYCLOVIR 400 MG: 200 SUSPENSION ORAL at 20:35

## 2022-08-06 RX ADMIN — ATORVASTATIN CALCIUM 10 MG: 10 TABLET, FILM COATED ORAL at 08:50

## 2022-08-06 RX ADMIN — FUROSEMIDE 40 MG: 40 TABLET ORAL at 08:51

## 2022-08-06 RX ADMIN — Medication 25 MCG: at 08:51

## 2022-08-06 RX ADMIN — FERROUS SULFATE TAB 325 MG (65 MG ELEMENTAL FE) 325 MG: 325 (65 FE) TAB at 08:51

## 2022-08-06 RX ADMIN — SODIUM CHLORIDE, PRESERVATIVE FREE 2 ML: 5 INJECTION INTRAVENOUS at 08:51

## 2022-08-06 RX ADMIN — ACYCLOVIR 400 MG: 200 SUSPENSION ORAL at 08:50

## 2022-08-06 RX ADMIN — METOPROLOL TARTRATE 25 MG: 25 TABLET, FILM COATED ORAL at 08:50

## 2022-08-06 RX ADMIN — METOPROLOL TARTRATE 25 MG: 25 TABLET, FILM COATED ORAL at 20:35

## 2022-08-06 RX ADMIN — ENOXAPARIN SODIUM 40 MG: 40 INJECTION SUBCUTANEOUS at 12:33

## 2022-08-06 RX ADMIN — ASPIRIN 81 MG CHEWABLE TABLET 81 MG: 81 TABLET CHEWABLE at 08:51

## 2022-08-06 RX ADMIN — CEFTRIAXONE SODIUM 2000 MG: 100 INJECTION, POWDER, FOR SOLUTION INTRAVENOUS at 08:51

## 2022-08-06 RX ADMIN — ACETAMINOPHEN 650 MG: 325 TABLET, FILM COATED ORAL at 20:35

## 2022-08-06 ASSESSMENT — PAIN SCALES - GENERAL
PAINLEVEL_OUTOF10: 5
PAINLEVEL_OUTOF10: 0
PAINLEVEL_OUTOF10: 1
PAINLEVEL_OUTOF10: 3
PAINLEVEL_OUTOF10: 0

## 2022-08-07 LAB
ANION GAP SERPL CALC-SCNC: 10 MMOL/L (ref 7–19)
BASOPHILS # BLD: 0.1 K/MCL (ref 0–0.3)
BASOPHILS NFR BLD: 1 %
BUN SERPL-MCNC: 11 MG/DL (ref 6–20)
BUN/CREAT SERPL: 18 (ref 7–25)
CALCIUM SERPL-MCNC: 8.3 MG/DL (ref 8.4–10.2)
CHLORIDE SERPL-SCNC: 105 MMOL/L (ref 97–110)
CO2 SERPL-SCNC: 28 MMOL/L (ref 21–32)
CREAT SERPL-MCNC: 0.61 MG/DL (ref 0.51–0.95)
DEPRECATED RDW RBC: 60.7 FL (ref 39–50)
EOSINOPHIL # BLD: 1 K/MCL (ref 0–0.5)
EOSINOPHIL NFR BLD: 8 %
ERYTHROCYTE [DISTWIDTH] IN BLOOD: 19 % (ref 11–15)
FASTING DURATION TIME PATIENT: ABNORMAL H
GFR SERPLBLD BASED ON 1.73 SQ M-ARVRAT: 89 ML/MIN
GLUCOSE SERPL-MCNC: 90 MG/DL (ref 70–99)
HCT VFR BLD CALC: 31 % (ref 36–46.5)
HGB BLD-MCNC: 10.2 G/DL (ref 12–15.5)
IMM GRANULOCYTES # BLD AUTO: 0.1 K/MCL (ref 0–0.2)
IMM GRANULOCYTES # BLD: 0 %
LYMPHOCYTES # BLD: 5.2 K/MCL (ref 1–4)
LYMPHOCYTES NFR BLD: 43 %
MCH RBC QN AUTO: 29.3 PG (ref 26–34)
MCHC RBC AUTO-ENTMCNC: 32.9 G/DL (ref 32–36.5)
MCV RBC AUTO: 89.1 FL (ref 78–100)
MONOCYTES # BLD: 0.9 K/MCL (ref 0.3–0.9)
MONOCYTES NFR BLD: 8 %
NEUTROPHILS # BLD: 4.8 K/MCL (ref 1.8–7.7)
NEUTROPHILS NFR BLD: 40 %
NRBC BLD MANUAL-RTO: 0 /100 WBC
PLATELET # BLD AUTO: 253 K/MCL (ref 140–450)
POTASSIUM SERPL-SCNC: 3.5 MMOL/L (ref 3.4–5.1)
RBC # BLD: 3.48 MIL/MCL (ref 4–5.2)
SODIUM SERPL-SCNC: 139 MMOL/L (ref 135–145)
WBC # BLD: 12 K/MCL (ref 4.2–11)

## 2022-08-07 PROCEDURE — 80048 BASIC METABOLIC PNL TOTAL CA: CPT | Performed by: HOSPITALIST

## 2022-08-07 PROCEDURE — 10002803 HB RX 637: Performed by: INTERNAL MEDICINE

## 2022-08-07 PROCEDURE — 10002800 HB RX 250 W HCPCS: Performed by: INTERNAL MEDICINE

## 2022-08-07 PROCEDURE — 10002800 HB RX 250 W HCPCS: Performed by: HOSPITALIST

## 2022-08-07 PROCEDURE — 10002803 HB RX 637: Performed by: HOSPITALIST

## 2022-08-07 PROCEDURE — 10002807 HB RX 258: Performed by: HOSPITALIST

## 2022-08-07 PROCEDURE — 36415 COLL VENOUS BLD VENIPUNCTURE: CPT | Performed by: HOSPITALIST

## 2022-08-07 PROCEDURE — 99232 SBSQ HOSP IP/OBS MODERATE 35: CPT | Performed by: HOSPITALIST

## 2022-08-07 PROCEDURE — 10000002 HB ROOM CHARGE MED SURG

## 2022-08-07 PROCEDURE — 10003445 HB TELEMETRY PER DAY

## 2022-08-07 PROCEDURE — 85025 COMPLETE CBC W/AUTO DIFF WBC: CPT | Performed by: HOSPITALIST

## 2022-08-07 PROCEDURE — 10004651 HB RX, NO CHARGE ITEM: Performed by: INTERNAL MEDICINE

## 2022-08-07 RX ORDER — CALCIUM CARBONATE 500 MG/1
500 TABLET, CHEWABLE ORAL EVERY 4 HOURS PRN
Status: DISCONTINUED | OUTPATIENT
Start: 2022-08-07 | End: 2022-09-02 | Stop reason: HOSPADM

## 2022-08-07 RX ORDER — POTASSIUM CHLORIDE 20 MEQ/1
40 TABLET, EXTENDED RELEASE ORAL EVERY 4 HOURS
Status: COMPLETED | OUTPATIENT
Start: 2022-08-07 | End: 2022-08-07

## 2022-08-07 RX ORDER — MAGNESIUM HYDROXIDE/ALUMINUM HYDROXICE/SIMETHICONE 120; 1200; 1200 MG/30ML; MG/30ML; MG/30ML
30 SUSPENSION ORAL EVERY 4 HOURS PRN
Status: DISCONTINUED | OUTPATIENT
Start: 2022-08-07 | End: 2022-09-02 | Stop reason: HOSPADM

## 2022-08-07 RX ADMIN — ACYCLOVIR 400 MG: 200 SUSPENSION ORAL at 08:16

## 2022-08-07 RX ADMIN — CALCIUM CARBONATE 600 MG (1,500 MG)-VITAMIN D3 400 UNIT TABLET 1 TABLET: at 08:16

## 2022-08-07 RX ADMIN — ACETAMINOPHEN 650 MG: 325 TABLET, FILM COATED ORAL at 20:15

## 2022-08-07 RX ADMIN — ACYCLOVIR 400 MG: 200 SUSPENSION ORAL at 20:15

## 2022-08-07 RX ADMIN — ANTACID TABLETS 500 MG: 500 TABLET, CHEWABLE ORAL at 08:57

## 2022-08-07 RX ADMIN — FUROSEMIDE 40 MG: 40 TABLET ORAL at 08:16

## 2022-08-07 RX ADMIN — ENOXAPARIN SODIUM 40 MG: 40 INJECTION SUBCUTANEOUS at 08:16

## 2022-08-07 RX ADMIN — POTASSIUM CHLORIDE 40 MEQ: 20 TABLET, EXTENDED RELEASE ORAL at 13:50

## 2022-08-07 RX ADMIN — Medication 25 MCG: at 08:16

## 2022-08-07 RX ADMIN — ANTACID TABLETS 500 MG: 500 TABLET, CHEWABLE ORAL at 17:13

## 2022-08-07 RX ADMIN — FERROUS SULFATE TAB 325 MG (65 MG ELEMENTAL FE) 325 MG: 325 (65 FE) TAB at 08:16

## 2022-08-07 RX ADMIN — ASPIRIN 81 MG CHEWABLE TABLET 81 MG: 81 TABLET CHEWABLE at 08:15

## 2022-08-07 RX ADMIN — CEFTRIAXONE SODIUM 2000 MG: 100 INJECTION, POWDER, FOR SOLUTION INTRAVENOUS at 08:16

## 2022-08-07 RX ADMIN — SODIUM CHLORIDE, PRESERVATIVE FREE 2 ML: 5 INJECTION INTRAVENOUS at 20:17

## 2022-08-07 RX ADMIN — ONDANSETRON 4 MG: 2 INJECTION INTRAMUSCULAR; INTRAVENOUS at 08:57

## 2022-08-07 RX ADMIN — METOPROLOL TARTRATE 25 MG: 25 TABLET, FILM COATED ORAL at 20:15

## 2022-08-07 RX ADMIN — POTASSIUM CHLORIDE 40 MEQ: 20 TABLET, EXTENDED RELEASE ORAL at 10:36

## 2022-08-07 RX ADMIN — SODIUM CHLORIDE, PRESERVATIVE FREE 2 ML: 5 INJECTION INTRAVENOUS at 08:57

## 2022-08-07 RX ADMIN — ATORVASTATIN CALCIUM 10 MG: 10 TABLET, FILM COATED ORAL at 08:15

## 2022-08-07 RX ADMIN — METOPROLOL TARTRATE 25 MG: 25 TABLET, FILM COATED ORAL at 08:15

## 2022-08-07 ASSESSMENT — PAIN SCALES - GENERAL
PAINLEVEL_OUTOF10: 3
PAINLEVEL_OUTOF10: 0
PAINLEVEL_OUTOF10: 5
PAINLEVEL_OUTOF10: 3

## 2022-08-08 ENCOUNTER — APPOINTMENT (OUTPATIENT)
Dept: HEMATOLOGY/ONCOLOGY | Age: 82
End: 2022-08-08
Attending: INTERNAL MEDICINE

## 2022-08-08 ENCOUNTER — APPOINTMENT (OUTPATIENT)
Dept: REHABILITATION | Age: 82
End: 2022-08-08
Attending: NURSE PRACTITIONER

## 2022-08-08 ENCOUNTER — TELEPHONE (OUTPATIENT)
Dept: NEPHROLOGY | Age: 82
End: 2022-08-08

## 2022-08-08 LAB
ALBUMIN SERPL-MCNC: 1.7 G/DL (ref 3.6–5.1)
ALBUMIN/GLOB SERPL: 0.7 {RATIO} (ref 1–2.4)
ALP SERPL-CCNC: 134 UNITS/L (ref 45–117)
ALT SERPL-CCNC: 13 UNITS/L
ANION GAP SERPL CALC-SCNC: 9 MMOL/L (ref 7–19)
AST SERPL-CCNC: 20 UNITS/L
BILIRUB SERPL-MCNC: 0.3 MG/DL (ref 0.2–1)
BUN SERPL-MCNC: 13 MG/DL (ref 6–20)
BUN/CREAT SERPL: 19 (ref 7–25)
CALCIUM SERPL-MCNC: 8.4 MG/DL (ref 8.4–10.2)
CHLORIDE SERPL-SCNC: 106 MMOL/L (ref 97–110)
CO2 SERPL-SCNC: 29 MMOL/L (ref 21–32)
CREAT SERPL-MCNC: 0.69 MG/DL (ref 0.51–0.95)
D DIMER PPP FEU-MCNC: 1.59 MG/L (FEU)
FASTING DURATION TIME PATIENT: ABNORMAL H
GFR SERPLBLD BASED ON 1.73 SQ M-ARVRAT: 86 ML/MIN
GLOBULIN SER-MCNC: 2.6 G/DL (ref 2–4)
GLUCOSE SERPL-MCNC: 87 MG/DL (ref 70–99)
MAGNESIUM SERPL-MCNC: 1.9 MG/DL (ref 1.7–2.4)
POTASSIUM SERPL-SCNC: 4.6 MMOL/L (ref 3.4–5.1)
PROT SERPL-MCNC: 4.3 G/DL (ref 6.4–8.2)
SODIUM SERPL-SCNC: 139 MMOL/L (ref 135–145)

## 2022-08-08 PROCEDURE — 80053 COMPREHEN METABOLIC PANEL: CPT | Performed by: HOSPITALIST

## 2022-08-08 PROCEDURE — 99357 PROLONG SERV INPT OR OBS REQ UNIT FLOOR TIME BEYND USUAL EA ADD 30 MIN: CPT | Performed by: NURSE PRACTITIONER

## 2022-08-08 PROCEDURE — 97530 THERAPEUTIC ACTIVITIES: CPT

## 2022-08-08 PROCEDURE — 10002803 HB RX 637: Performed by: INTERNAL MEDICINE

## 2022-08-08 PROCEDURE — 94640 AIRWAY INHALATION TREATMENT: CPT

## 2022-08-08 PROCEDURE — 10002016 HB COUNTER INCENTIVE SPIROMETRY

## 2022-08-08 PROCEDURE — 85379 FIBRIN DEGRADATION QUANT: CPT | Performed by: HOSPITALIST

## 2022-08-08 PROCEDURE — 10003445 HB TELEMETRY PER DAY

## 2022-08-08 PROCEDURE — 99232 SBSQ HOSP IP/OBS MODERATE 35: CPT | Performed by: HOSPITALIST

## 2022-08-08 PROCEDURE — 10004651 HB RX, NO CHARGE ITEM: Performed by: INTERNAL MEDICINE

## 2022-08-08 PROCEDURE — 36415 COLL VENOUS BLD VENIPUNCTURE: CPT | Performed by: HOSPITALIST

## 2022-08-08 PROCEDURE — 10002803 HB RX 637: Performed by: HOSPITALIST

## 2022-08-08 PROCEDURE — 83735 ASSAY OF MAGNESIUM: CPT | Performed by: HOSPITALIST

## 2022-08-08 PROCEDURE — 99356 PROLONGED SERV INPT OR OBS REQ UNIT FLOOR TIME BEYOND USUAL SER 1ST HR: CPT | Performed by: NURSE PRACTITIONER

## 2022-08-08 PROCEDURE — 10004172 HB COUNTER-THERAPY VISIT OT

## 2022-08-08 PROCEDURE — 94664 DEMO&/EVAL PT USE INHALER: CPT

## 2022-08-08 PROCEDURE — 99223 1ST HOSP IP/OBS HIGH 75: CPT | Performed by: NURSE PRACTITIONER

## 2022-08-08 PROCEDURE — 10002800 HB RX 250 W HCPCS: Performed by: HOSPITALIST

## 2022-08-08 PROCEDURE — 97535 SELF CARE MNGMENT TRAINING: CPT

## 2022-08-08 PROCEDURE — 10000002 HB ROOM CHARGE MED SURG

## 2022-08-08 RX ORDER — ALBUTEROL SULFATE 90 UG/1
2 AEROSOL, METERED RESPIRATORY (INHALATION)
Status: COMPLETED | OUTPATIENT
Start: 2022-08-08 | End: 2022-08-10

## 2022-08-08 RX ADMIN — ALBUTEROL SULFATE 2 PUFF: 90 AEROSOL, METERED RESPIRATORY (INHALATION) at 20:21

## 2022-08-08 RX ADMIN — ENOXAPARIN SODIUM 40 MG: 40 INJECTION SUBCUTANEOUS at 08:46

## 2022-08-08 RX ADMIN — ANTACID TABLETS 500 MG: 500 TABLET, CHEWABLE ORAL at 15:36

## 2022-08-08 RX ADMIN — SODIUM CHLORIDE, PRESERVATIVE FREE 2 ML: 5 INJECTION INTRAVENOUS at 08:55

## 2022-08-08 RX ADMIN — FERROUS SULFATE TAB 325 MG (65 MG ELEMENTAL FE) 325 MG: 325 (65 FE) TAB at 08:46

## 2022-08-08 RX ADMIN — METOPROLOL TARTRATE 25 MG: 25 TABLET, FILM COATED ORAL at 21:38

## 2022-08-08 RX ADMIN — CALCIUM CARBONATE 600 MG (1,500 MG)-VITAMIN D3 400 UNIT TABLET 1 TABLET: at 08:46

## 2022-08-08 RX ADMIN — ACYCLOVIR 400 MG: 200 SUSPENSION ORAL at 08:46

## 2022-08-08 RX ADMIN — ACYCLOVIR 400 MG: 200 SUSPENSION ORAL at 21:38

## 2022-08-08 RX ADMIN — METOPROLOL TARTRATE 25 MG: 25 TABLET, FILM COATED ORAL at 08:46

## 2022-08-08 RX ADMIN — ANTACID TABLETS 500 MG: 500 TABLET, CHEWABLE ORAL at 08:55

## 2022-08-08 RX ADMIN — Medication 25 MCG: at 08:46

## 2022-08-08 RX ADMIN — SODIUM CHLORIDE, PRESERVATIVE FREE 2 ML: 5 INJECTION INTRAVENOUS at 21:38

## 2022-08-08 RX ADMIN — ASPIRIN 81 MG CHEWABLE TABLET 81 MG: 81 TABLET CHEWABLE at 08:46

## 2022-08-08 RX ADMIN — FUROSEMIDE 40 MG: 40 TABLET ORAL at 08:46

## 2022-08-08 RX ADMIN — ATORVASTATIN CALCIUM 10 MG: 10 TABLET, FILM COATED ORAL at 08:46

## 2022-08-08 ASSESSMENT — COGNITIVE AND FUNCTIONAL STATUS - GENERAL
DAILY_ACTIVITY_CONVERTED_SCORE: 34.69
DAILY_ACTIVITY_RAW_SCORE: 15
HELP NEEDED DRESSING REGULAR LOWER BODY CLOTHING: A LOT
HELP NEEDED FOR BATHING: A LOT
HELP NEEDED FOR TOILETING: A LOT
HELP NEEDED DRESSING REGULAR UPPER BODY CLOTHING: A LITTLE
HELP NEEDED FOR PERSONAL GROOMING: A LITTLE

## 2022-08-08 ASSESSMENT — PAIN SCALES - GENERAL
PAINLEVEL_OUTOF10: 0

## 2022-08-08 ASSESSMENT — ACTIVITIES OF DAILY LIVING (ADL): HOME_MANAGEMENT_TIME_ENTRY: 20

## 2022-08-09 ENCOUNTER — APPOINTMENT (OUTPATIENT)
Dept: NEPHROLOGY | Age: 82
End: 2022-08-09
Attending: INTERNAL MEDICINE

## 2022-08-09 PROCEDURE — 10004651 HB RX, NO CHARGE ITEM: Performed by: INTERNAL MEDICINE

## 2022-08-09 PROCEDURE — 97530 THERAPEUTIC ACTIVITIES: CPT

## 2022-08-09 PROCEDURE — 94640 AIRWAY INHALATION TREATMENT: CPT

## 2022-08-09 PROCEDURE — 10003445 HB TELEMETRY PER DAY

## 2022-08-09 PROCEDURE — 10002016 HB COUNTER INCENTIVE SPIROMETRY

## 2022-08-09 PROCEDURE — 10002803 HB RX 637: Performed by: INTERNAL MEDICINE

## 2022-08-09 PROCEDURE — 10002803 HB RX 637: Performed by: HOSPITALIST

## 2022-08-09 PROCEDURE — 10000002 HB ROOM CHARGE MED SURG

## 2022-08-09 PROCEDURE — 97535 SELF CARE MNGMENT TRAINING: CPT

## 2022-08-09 PROCEDURE — 10004173 HB COUNTER-THERAPY VISIT PT

## 2022-08-09 PROCEDURE — 10004172 HB COUNTER-THERAPY VISIT OT

## 2022-08-09 PROCEDURE — 10002800 HB RX 250 W HCPCS: Performed by: HOSPITALIST

## 2022-08-09 PROCEDURE — 99232 SBSQ HOSP IP/OBS MODERATE 35: CPT | Performed by: INTERNAL MEDICINE

## 2022-08-09 RX ADMIN — ALBUTEROL SULFATE 2 PUFF: 90 AEROSOL, METERED RESPIRATORY (INHALATION) at 11:30

## 2022-08-09 RX ADMIN — LORAZEPAM 0.5 MG: 0.5 TABLET ORAL at 20:50

## 2022-08-09 RX ADMIN — GUAIFENESIN AND DEXTROMETHORPHAN HYDROBROMIDE 2 TABLET: 600; 30 TABLET, EXTENDED RELEASE ORAL at 08:57

## 2022-08-09 RX ADMIN — FUROSEMIDE 40 MG: 40 TABLET ORAL at 10:11

## 2022-08-09 RX ADMIN — ENOXAPARIN SODIUM 40 MG: 40 INJECTION SUBCUTANEOUS at 08:55

## 2022-08-09 RX ADMIN — Medication 25 MCG: at 08:58

## 2022-08-09 RX ADMIN — SODIUM CHLORIDE, PRESERVATIVE FREE 2 ML: 5 INJECTION INTRAVENOUS at 10:12

## 2022-08-09 RX ADMIN — ALBUTEROL SULFATE 2 PUFF: 90 AEROSOL, METERED RESPIRATORY (INHALATION) at 05:17

## 2022-08-09 RX ADMIN — ACYCLOVIR 400 MG: 200 SUSPENSION ORAL at 08:57

## 2022-08-09 RX ADMIN — FERROUS SULFATE TAB 325 MG (65 MG ELEMENTAL FE) 325 MG: 325 (65 FE) TAB at 08:58

## 2022-08-09 RX ADMIN — METOPROLOL TARTRATE 25 MG: 25 TABLET, FILM COATED ORAL at 20:50

## 2022-08-09 RX ADMIN — ALBUTEROL SULFATE 2 PUFF: 90 AEROSOL, METERED RESPIRATORY (INHALATION) at 19:24

## 2022-08-09 RX ADMIN — ALBUTEROL SULFATE 2 PUFF: 90 AEROSOL, METERED RESPIRATORY (INHALATION) at 15:10

## 2022-08-09 RX ADMIN — METOPROLOL TARTRATE 25 MG: 25 TABLET, FILM COATED ORAL at 08:58

## 2022-08-09 RX ADMIN — ASPIRIN 81 MG CHEWABLE TABLET 81 MG: 81 TABLET CHEWABLE at 08:58

## 2022-08-09 RX ADMIN — GUAIFENESIN 100 MG: 200 SOLUTION ORAL at 20:51

## 2022-08-09 RX ADMIN — ANTACID TABLETS 500 MG: 500 TABLET, CHEWABLE ORAL at 08:56

## 2022-08-09 RX ADMIN — SODIUM CHLORIDE, PRESERVATIVE FREE 2 ML: 5 INJECTION INTRAVENOUS at 20:45

## 2022-08-09 RX ADMIN — ACYCLOVIR 400 MG: 200 SUSPENSION ORAL at 20:53

## 2022-08-09 RX ADMIN — ATORVASTATIN CALCIUM 10 MG: 10 TABLET, FILM COATED ORAL at 08:56

## 2022-08-09 RX ADMIN — CALCIUM CARBONATE 600 MG (1,500 MG)-VITAMIN D3 400 UNIT TABLET 1 TABLET: at 08:58

## 2022-08-09 ASSESSMENT — COGNITIVE AND FUNCTIONAL STATUS - GENERAL
BASIC_MOBILITY_CONVERTED_SCORE: 39.67
BASIC_MOBILITY_RAW_SCORE: 17

## 2022-08-09 ASSESSMENT — PAIN SCALES - GENERAL
PAINLEVEL_OUTOF10: 0

## 2022-08-09 ASSESSMENT — ACTIVITIES OF DAILY LIVING (ADL): HOME_MANAGEMENT_TIME_ENTRY: 23

## 2022-08-10 ENCOUNTER — APPOINTMENT (OUTPATIENT)
Dept: REHABILITATION | Age: 82
End: 2022-08-10
Attending: NURSE PRACTITIONER

## 2022-08-10 LAB
ANION GAP SERPL CALC-SCNC: 7 MMOL/L (ref 7–19)
BUN SERPL-MCNC: 14 MG/DL (ref 6–20)
BUN/CREAT SERPL: 22 (ref 7–25)
CALCIUM SERPL-MCNC: 8.5 MG/DL (ref 8.4–10.2)
CHLORIDE SERPL-SCNC: 103 MMOL/L (ref 97–110)
CO2 SERPL-SCNC: 31 MMOL/L (ref 21–32)
CREAT SERPL-MCNC: 0.63 MG/DL (ref 0.51–0.95)
DEPRECATED RDW RBC: 58.6 FL (ref 39–50)
ERYTHROCYTE [DISTWIDTH] IN BLOOD: 17.9 % (ref 11–15)
FASTING DURATION TIME PATIENT: NORMAL H
GFR SERPLBLD BASED ON 1.73 SQ M-ARVRAT: 88 ML/MIN
GLUCOSE SERPL-MCNC: 93 MG/DL (ref 70–99)
HCT VFR BLD CALC: 27.8 % (ref 36–46.5)
HGB BLD-MCNC: 9.1 G/DL (ref 12–15.5)
MAGNESIUM SERPL-MCNC: 1.7 MG/DL (ref 1.7–2.4)
MCH RBC QN AUTO: 29.4 PG (ref 26–34)
MCHC RBC AUTO-ENTMCNC: 32.7 G/DL (ref 32–36.5)
MCV RBC AUTO: 90 FL (ref 78–100)
NRBC BLD MANUAL-RTO: 0 /100 WBC
PLATELET # BLD AUTO: 250 K/MCL (ref 140–450)
POTASSIUM SERPL-SCNC: 3.7 MMOL/L (ref 3.4–5.1)
RBC # BLD: 3.09 MIL/MCL (ref 4–5.2)
SODIUM SERPL-SCNC: 137 MMOL/L (ref 135–145)
WBC # BLD: 10.8 K/MCL (ref 4.2–11)

## 2022-08-10 PROCEDURE — 10004172 HB COUNTER-THERAPY VISIT OT

## 2022-08-10 PROCEDURE — 10002803 HB RX 637: Performed by: INTERNAL MEDICINE

## 2022-08-10 PROCEDURE — 97535 SELF CARE MNGMENT TRAINING: CPT

## 2022-08-10 PROCEDURE — 36415 COLL VENOUS BLD VENIPUNCTURE: CPT | Performed by: INTERNAL MEDICINE

## 2022-08-10 PROCEDURE — 80048 BASIC METABOLIC PNL TOTAL CA: CPT | Performed by: INTERNAL MEDICINE

## 2022-08-10 PROCEDURE — 83735 ASSAY OF MAGNESIUM: CPT | Performed by: INTERNAL MEDICINE

## 2022-08-10 PROCEDURE — 10003445 HB TELEMETRY PER DAY

## 2022-08-10 PROCEDURE — 99232 SBSQ HOSP IP/OBS MODERATE 35: CPT | Performed by: INTERNAL MEDICINE

## 2022-08-10 PROCEDURE — 94640 AIRWAY INHALATION TREATMENT: CPT

## 2022-08-10 PROCEDURE — 10000002 HB ROOM CHARGE MED SURG

## 2022-08-10 PROCEDURE — 10002803 HB RX 637: Performed by: HOSPITALIST

## 2022-08-10 PROCEDURE — 85027 COMPLETE CBC AUTOMATED: CPT | Performed by: INTERNAL MEDICINE

## 2022-08-10 PROCEDURE — 10004651 HB RX, NO CHARGE ITEM: Performed by: INTERNAL MEDICINE

## 2022-08-10 PROCEDURE — 10002800 HB RX 250 W HCPCS: Performed by: HOSPITALIST

## 2022-08-10 RX ORDER — POTASSIUM CHLORIDE 20 MEQ/1
40 TABLET, EXTENDED RELEASE ORAL ONCE
Status: DISCONTINUED | OUTPATIENT
Start: 2022-08-10 | End: 2022-08-10 | Stop reason: SDUPTHER

## 2022-08-10 RX ORDER — POTASSIUM CHLORIDE 1.5 G/1.58G
40 POWDER, FOR SOLUTION ORAL ONCE
Status: COMPLETED | OUTPATIENT
Start: 2022-08-10 | End: 2022-08-10

## 2022-08-10 RX ORDER — LANOLIN ALCOHOL/MO/W.PET/CERES
400 CREAM (GRAM) TOPICAL DAILY
Status: DISCONTINUED | OUTPATIENT
Start: 2022-08-10 | End: 2022-09-02 | Stop reason: HOSPADM

## 2022-08-10 RX ADMIN — POTASSIUM CHLORIDE 40 MEQ: 1.5 POWDER, FOR SOLUTION ORAL at 12:13

## 2022-08-10 RX ADMIN — CALCIUM CARBONATE 600 MG (1,500 MG)-VITAMIN D3 400 UNIT TABLET 1 TABLET: at 08:51

## 2022-08-10 RX ADMIN — Medication 25 MCG: at 08:51

## 2022-08-10 RX ADMIN — METOPROLOL TARTRATE 25 MG: 25 TABLET, FILM COATED ORAL at 08:50

## 2022-08-10 RX ADMIN — ENOXAPARIN SODIUM 40 MG: 40 INJECTION SUBCUTANEOUS at 08:50

## 2022-08-10 RX ADMIN — ALBUTEROL SULFATE 2 PUFF: 90 AEROSOL, METERED RESPIRATORY (INHALATION) at 07:41

## 2022-08-10 RX ADMIN — ALBUTEROL SULFATE 2 PUFF: 90 AEROSOL, METERED RESPIRATORY (INHALATION) at 15:01

## 2022-08-10 RX ADMIN — METOPROLOL TARTRATE 25 MG: 25 TABLET, FILM COATED ORAL at 20:47

## 2022-08-10 RX ADMIN — ANTACID TABLETS 500 MG: 500 TABLET, CHEWABLE ORAL at 09:06

## 2022-08-10 RX ADMIN — ASPIRIN 81 MG CHEWABLE TABLET 81 MG: 81 TABLET CHEWABLE at 08:51

## 2022-08-10 RX ADMIN — SODIUM CHLORIDE, PRESERVATIVE FREE 2 ML: 5 INJECTION INTRAVENOUS at 20:47

## 2022-08-10 RX ADMIN — Medication 400 MG: at 12:13

## 2022-08-10 RX ADMIN — ACYCLOVIR 400 MG: 200 SUSPENSION ORAL at 20:47

## 2022-08-10 RX ADMIN — FERROUS SULFATE TAB 325 MG (65 MG ELEMENTAL FE) 325 MG: 325 (65 FE) TAB at 08:51

## 2022-08-10 RX ADMIN — FUROSEMIDE 40 MG: 40 TABLET ORAL at 08:51

## 2022-08-10 RX ADMIN — SODIUM CHLORIDE, PRESERVATIVE FREE 2 ML: 5 INJECTION INTRAVENOUS at 08:51

## 2022-08-10 RX ADMIN — ACYCLOVIR 400 MG: 200 SUSPENSION ORAL at 08:50

## 2022-08-10 RX ADMIN — ATORVASTATIN CALCIUM 10 MG: 10 TABLET, FILM COATED ORAL at 08:51

## 2022-08-10 RX ADMIN — ALBUTEROL SULFATE 2 PUFF: 90 AEROSOL, METERED RESPIRATORY (INHALATION) at 11:19

## 2022-08-10 RX ADMIN — LORAZEPAM 0.5 MG: 0.5 TABLET ORAL at 20:52

## 2022-08-10 ASSESSMENT — COGNITIVE AND FUNCTIONAL STATUS - GENERAL
DAILY_ACTIVITY_RAW_SCORE: 15
HELP NEEDED FOR TOILETING: A LOT
HELP NEEDED FOR BATHING: A LOT
HELP NEEDED DRESSING REGULAR LOWER BODY CLOTHING: A LOT
HELP NEEDED DRESSING REGULAR UPPER BODY CLOTHING: A LITTLE
HELP NEEDED FOR PERSONAL GROOMING: A LITTLE
DAILY_ACTIVITY_CONVERTED_SCORE: 34.69

## 2022-08-10 ASSESSMENT — ACTIVITIES OF DAILY LIVING (ADL): HOME_MANAGEMENT_TIME_ENTRY: 30

## 2022-08-10 ASSESSMENT — PAIN SCALES - GENERAL
PAINLEVEL_OUTOF10: 0

## 2022-08-11 LAB
ANION GAP SERPL CALC-SCNC: 8 MMOL/L (ref 7–19)
BUN SERPL-MCNC: 12 MG/DL (ref 6–20)
BUN/CREAT SERPL: 22 (ref 7–25)
CALCIUM SERPL-MCNC: 8.6 MG/DL (ref 8.4–10.2)
CHLORIDE SERPL-SCNC: 103 MMOL/L (ref 97–110)
CO2 SERPL-SCNC: 32 MMOL/L (ref 21–32)
CREAT SERPL-MCNC: 0.54 MG/DL (ref 0.51–0.95)
DEPRECATED RDW RBC: 59.1 FL (ref 39–50)
ERYTHROCYTE [DISTWIDTH] IN BLOOD: 17.8 % (ref 11–15)
FASTING DURATION TIME PATIENT: NORMAL H
GFR SERPLBLD BASED ON 1.73 SQ M-ARVRAT: >90 ML/MIN
GLUCOSE SERPL-MCNC: 90 MG/DL (ref 70–99)
HCT VFR BLD CALC: 29.6 % (ref 36–46.5)
HGB BLD-MCNC: 9.2 G/DL (ref 12–15.5)
MAGNESIUM SERPL-MCNC: 1.9 MG/DL (ref 1.7–2.4)
MCH RBC QN AUTO: 28.5 PG (ref 26–34)
MCHC RBC AUTO-ENTMCNC: 31.1 G/DL (ref 32–36.5)
MCV RBC AUTO: 91.6 FL (ref 78–100)
NRBC BLD MANUAL-RTO: 0 /100 WBC
PLATELET # BLD AUTO: 233 K/MCL (ref 140–450)
POTASSIUM SERPL-SCNC: 3.9 MMOL/L (ref 3.4–5.1)
RBC # BLD: 3.23 MIL/MCL (ref 4–5.2)
SODIUM SERPL-SCNC: 139 MMOL/L (ref 135–145)
WBC # BLD: 10.9 K/MCL (ref 4.2–11)

## 2022-08-11 PROCEDURE — 10003445 HB TELEMETRY PER DAY

## 2022-08-11 PROCEDURE — 97530 THERAPEUTIC ACTIVITIES: CPT

## 2022-08-11 PROCEDURE — 10002803 HB RX 637: Performed by: HOSPITALIST

## 2022-08-11 PROCEDURE — 10004172 HB COUNTER-THERAPY VISIT OT

## 2022-08-11 PROCEDURE — 97535 SELF CARE MNGMENT TRAINING: CPT

## 2022-08-11 PROCEDURE — 10000002 HB ROOM CHARGE MED SURG

## 2022-08-11 PROCEDURE — 80048 BASIC METABOLIC PNL TOTAL CA: CPT | Performed by: INTERNAL MEDICINE

## 2022-08-11 PROCEDURE — 36415 COLL VENOUS BLD VENIPUNCTURE: CPT | Performed by: INTERNAL MEDICINE

## 2022-08-11 PROCEDURE — 10002800 HB RX 250 W HCPCS: Performed by: HOSPITALIST

## 2022-08-11 PROCEDURE — 10004651 HB RX, NO CHARGE ITEM: Performed by: INTERNAL MEDICINE

## 2022-08-11 PROCEDURE — 83735 ASSAY OF MAGNESIUM: CPT | Performed by: INTERNAL MEDICINE

## 2022-08-11 PROCEDURE — 85027 COMPLETE CBC AUTOMATED: CPT | Performed by: INTERNAL MEDICINE

## 2022-08-11 PROCEDURE — 99232 SBSQ HOSP IP/OBS MODERATE 35: CPT | Performed by: INTERNAL MEDICINE

## 2022-08-11 PROCEDURE — 10002803 HB RX 637: Performed by: INTERNAL MEDICINE

## 2022-08-11 RX ADMIN — ENOXAPARIN SODIUM 40 MG: 40 INJECTION SUBCUTANEOUS at 09:55

## 2022-08-11 RX ADMIN — CALCIUM CARBONATE 600 MG (1,500 MG)-VITAMIN D3 400 UNIT TABLET 1 TABLET: at 09:55

## 2022-08-11 RX ADMIN — METOPROLOL TARTRATE 25 MG: 25 TABLET, FILM COATED ORAL at 09:55

## 2022-08-11 RX ADMIN — ANTACID TABLETS 500 MG: 500 TABLET, CHEWABLE ORAL at 10:08

## 2022-08-11 RX ADMIN — SODIUM CHLORIDE, PRESERVATIVE FREE 2 ML: 5 INJECTION INTRAVENOUS at 09:55

## 2022-08-11 RX ADMIN — ATORVASTATIN CALCIUM 10 MG: 10 TABLET, FILM COATED ORAL at 09:54

## 2022-08-11 RX ADMIN — Medication 400 MG: at 09:55

## 2022-08-11 RX ADMIN — SODIUM CHLORIDE, PRESERVATIVE FREE 2 ML: 5 INJECTION INTRAVENOUS at 20:04

## 2022-08-11 RX ADMIN — FUROSEMIDE 40 MG: 40 TABLET ORAL at 09:55

## 2022-08-11 RX ADMIN — ACYCLOVIR 400 MG: 200 SUSPENSION ORAL at 09:55

## 2022-08-11 RX ADMIN — METOPROLOL TARTRATE 25 MG: 25 TABLET, FILM COATED ORAL at 20:04

## 2022-08-11 RX ADMIN — ASPIRIN 81 MG CHEWABLE TABLET 81 MG: 81 TABLET CHEWABLE at 09:54

## 2022-08-11 RX ADMIN — LORAZEPAM 0.5 MG: 0.5 TABLET ORAL at 23:20

## 2022-08-11 RX ADMIN — FERROUS SULFATE TAB 325 MG (65 MG ELEMENTAL FE) 325 MG: 325 (65 FE) TAB at 09:55

## 2022-08-11 RX ADMIN — Medication 25 MCG: at 09:55

## 2022-08-11 RX ADMIN — ACYCLOVIR 400 MG: 200 SUSPENSION ORAL at 20:04

## 2022-08-11 ASSESSMENT — PAIN SCALES - GENERAL
PAINLEVEL_OUTOF10: 0

## 2022-08-11 ASSESSMENT — COGNITIVE AND FUNCTIONAL STATUS - GENERAL
HELP NEEDED FOR PERSONAL GROOMING: A LITTLE
HELP NEEDED FOR BATHING: A LOT
HELP NEEDED FOR TOILETING: A LOT
DAILY_ACTIVITY_CONVERTED_SCORE: 34.69
HELP NEEDED DRESSING REGULAR LOWER BODY CLOTHING: A LOT
HELP NEEDED DRESSING REGULAR UPPER BODY CLOTHING: A LITTLE
DAILY_ACTIVITY_RAW_SCORE: 15

## 2022-08-11 ASSESSMENT — ACTIVITIES OF DAILY LIVING (ADL): HOME_MANAGEMENT_TIME_ENTRY: 20

## 2022-08-12 ENCOUNTER — TELEPHONE (OUTPATIENT)
Dept: HEMATOLOGY/ONCOLOGY | Age: 82
End: 2022-08-12

## 2022-08-12 ENCOUNTER — APPOINTMENT (OUTPATIENT)
Dept: REHABILITATION | Age: 82
End: 2022-08-12
Attending: NURSE PRACTITIONER

## 2022-08-12 PROCEDURE — 10004173 HB COUNTER-THERAPY VISIT PT: Performed by: PHYSICAL THERAPIST

## 2022-08-12 PROCEDURE — 10004651 HB RX, NO CHARGE ITEM: Performed by: INTERNAL MEDICINE

## 2022-08-12 PROCEDURE — 99232 SBSQ HOSP IP/OBS MODERATE 35: CPT | Performed by: INTERNAL MEDICINE

## 2022-08-12 PROCEDURE — 10002803 HB RX 637: Performed by: INTERNAL MEDICINE

## 2022-08-12 PROCEDURE — 10000002 HB ROOM CHARGE MED SURG

## 2022-08-12 PROCEDURE — 10002803 HB RX 637: Performed by: HOSPITALIST

## 2022-08-12 PROCEDURE — 97530 THERAPEUTIC ACTIVITIES: CPT | Performed by: PHYSICAL THERAPIST

## 2022-08-12 PROCEDURE — 10002800 HB RX 250 W HCPCS: Performed by: HOSPITALIST

## 2022-08-12 RX ORDER — CALCIUM CARBONATE 500 MG/1
500 TABLET, CHEWABLE ORAL
Status: DISCONTINUED | OUTPATIENT
Start: 2022-08-12 | End: 2022-09-02 | Stop reason: HOSPADM

## 2022-08-12 RX ADMIN — SODIUM CHLORIDE, PRESERVATIVE FREE 2 ML: 5 INJECTION INTRAVENOUS at 20:02

## 2022-08-12 RX ADMIN — FUROSEMIDE 40 MG: 40 TABLET ORAL at 09:01

## 2022-08-12 RX ADMIN — LORAZEPAM 0.5 MG: 0.5 TABLET ORAL at 20:04

## 2022-08-12 RX ADMIN — METOPROLOL TARTRATE 25 MG: 25 TABLET, FILM COATED ORAL at 20:04

## 2022-08-12 RX ADMIN — ASPIRIN 81 MG CHEWABLE TABLET 81 MG: 81 TABLET CHEWABLE at 09:00

## 2022-08-12 RX ADMIN — FERROUS SULFATE TAB 325 MG (65 MG ELEMENTAL FE) 325 MG: 325 (65 FE) TAB at 09:01

## 2022-08-12 RX ADMIN — ANTACID TABLETS 500 MG: 500 TABLET, CHEWABLE ORAL at 11:00

## 2022-08-12 RX ADMIN — METOPROLOL TARTRATE 25 MG: 25 TABLET, FILM COATED ORAL at 09:01

## 2022-08-12 RX ADMIN — CALCIUM CARBONATE 600 MG (1,500 MG)-VITAMIN D3 400 UNIT TABLET 1 TABLET: at 09:00

## 2022-08-12 RX ADMIN — ACYCLOVIR 400 MG: 200 SUSPENSION ORAL at 20:04

## 2022-08-12 RX ADMIN — SODIUM CHLORIDE, PRESERVATIVE FREE 2 ML: 5 INJECTION INTRAVENOUS at 09:08

## 2022-08-12 RX ADMIN — Medication 25 MCG: at 09:01

## 2022-08-12 RX ADMIN — Medication 400 MG: at 09:01

## 2022-08-12 RX ADMIN — ATORVASTATIN CALCIUM 10 MG: 10 TABLET, FILM COATED ORAL at 09:00

## 2022-08-12 RX ADMIN — ACYCLOVIR 400 MG: 200 SUSPENSION ORAL at 09:01

## 2022-08-12 RX ADMIN — ENOXAPARIN SODIUM 40 MG: 40 INJECTION SUBCUTANEOUS at 09:01

## 2022-08-12 ASSESSMENT — PAIN SCALES - GENERAL
PAINLEVEL_OUTOF10: 0

## 2022-08-12 ASSESSMENT — COGNITIVE AND FUNCTIONAL STATUS - GENERAL
BASIC_MOBILITY_RAW_SCORE: 17
BASIC_MOBILITY_CONVERTED_SCORE: 39.67

## 2022-08-13 PROCEDURE — 99231 SBSQ HOSP IP/OBS SF/LOW 25: CPT | Performed by: INTERNAL MEDICINE

## 2022-08-13 PROCEDURE — 10004651 HB RX, NO CHARGE ITEM: Performed by: INTERNAL MEDICINE

## 2022-08-13 PROCEDURE — 10002803 HB RX 637: Performed by: INTERNAL MEDICINE

## 2022-08-13 PROCEDURE — 10002803 HB RX 637: Performed by: HOSPITALIST

## 2022-08-13 PROCEDURE — 10002800 HB RX 250 W HCPCS: Performed by: HOSPITALIST

## 2022-08-13 PROCEDURE — 10000002 HB ROOM CHARGE MED SURG

## 2022-08-13 RX ADMIN — ENOXAPARIN SODIUM 40 MG: 40 INJECTION SUBCUTANEOUS at 08:52

## 2022-08-13 RX ADMIN — Medication 25 MCG: at 08:52

## 2022-08-13 RX ADMIN — FUROSEMIDE 40 MG: 40 TABLET ORAL at 08:52

## 2022-08-13 RX ADMIN — METOPROLOL TARTRATE 25 MG: 25 TABLET, FILM COATED ORAL at 08:52

## 2022-08-13 RX ADMIN — Medication 400 MG: at 08:52

## 2022-08-13 RX ADMIN — SODIUM CHLORIDE, PRESERVATIVE FREE 2 ML: 5 INJECTION INTRAVENOUS at 08:52

## 2022-08-13 RX ADMIN — ASPIRIN 81 MG CHEWABLE TABLET 81 MG: 81 TABLET CHEWABLE at 08:52

## 2022-08-13 RX ADMIN — SODIUM CHLORIDE, PRESERVATIVE FREE 2 ML: 5 INJECTION INTRAVENOUS at 20:12

## 2022-08-13 RX ADMIN — ACYCLOVIR 400 MG: 200 SUSPENSION ORAL at 08:52

## 2022-08-13 RX ADMIN — ANTACID TABLETS 500 MG: 500 TABLET, CHEWABLE ORAL at 10:48

## 2022-08-13 RX ADMIN — ACYCLOVIR 400 MG: 200 SUSPENSION ORAL at 20:13

## 2022-08-13 RX ADMIN — METOPROLOL TARTRATE 25 MG: 25 TABLET, FILM COATED ORAL at 20:13

## 2022-08-13 RX ADMIN — FERROUS SULFATE TAB 325 MG (65 MG ELEMENTAL FE) 325 MG: 325 (65 FE) TAB at 08:52

## 2022-08-13 RX ADMIN — CALCIUM CARBONATE 600 MG (1,500 MG)-VITAMIN D3 400 UNIT TABLET 1 TABLET: at 08:52

## 2022-08-13 RX ADMIN — ATORVASTATIN CALCIUM 10 MG: 10 TABLET, FILM COATED ORAL at 08:52

## 2022-08-13 RX ADMIN — LORAZEPAM 0.5 MG: 0.5 TABLET ORAL at 20:13

## 2022-08-13 ASSESSMENT — PAIN SCALES - GENERAL
PAINLEVEL_OUTOF10: 0

## 2022-08-14 ENCOUNTER — APPOINTMENT (OUTPATIENT)
Dept: GENERAL RADIOLOGY | Age: 82
DRG: 377 | End: 2022-08-14
Attending: INTERNAL MEDICINE

## 2022-08-14 PROCEDURE — 99232 SBSQ HOSP IP/OBS MODERATE 35: CPT | Performed by: INTERNAL MEDICINE

## 2022-08-14 PROCEDURE — 10002803 HB RX 637: Performed by: INTERNAL MEDICINE

## 2022-08-14 PROCEDURE — 10002800 HB RX 250 W HCPCS: Performed by: HOSPITALIST

## 2022-08-14 PROCEDURE — 10002800 HB RX 250 W HCPCS: Performed by: INTERNAL MEDICINE

## 2022-08-14 PROCEDURE — 10000002 HB ROOM CHARGE MED SURG

## 2022-08-14 PROCEDURE — 10002803 HB RX 637: Performed by: HOSPITALIST

## 2022-08-14 PROCEDURE — 10004157 XR CHEST AP OR PA

## 2022-08-14 PROCEDURE — 10004651 HB RX, NO CHARGE ITEM: Performed by: INTERNAL MEDICINE

## 2022-08-14 RX ORDER — FUROSEMIDE 10 MG/ML
20 INJECTION INTRAMUSCULAR; INTRAVENOUS
Status: DISCONTINUED | OUTPATIENT
Start: 2022-08-14 | End: 2022-08-18

## 2022-08-14 RX ADMIN — METOPROLOL TARTRATE 25 MG: 25 TABLET, FILM COATED ORAL at 20:02

## 2022-08-14 RX ADMIN — ANTACID TABLETS 500 MG: 500 TABLET, CHEWABLE ORAL at 11:18

## 2022-08-14 RX ADMIN — ASPIRIN 81 MG CHEWABLE TABLET 81 MG: 81 TABLET CHEWABLE at 08:05

## 2022-08-14 RX ADMIN — ACYCLOVIR 400 MG: 200 SUSPENSION ORAL at 08:04

## 2022-08-14 RX ADMIN — Medication 400 MG: at 08:05

## 2022-08-14 RX ADMIN — GUAIFENESIN 100 MG: 200 SOLUTION ORAL at 04:08

## 2022-08-14 RX ADMIN — METOPROLOL TARTRATE 25 MG: 25 TABLET, FILM COATED ORAL at 08:05

## 2022-08-14 RX ADMIN — Medication 25 MCG: at 08:05

## 2022-08-14 RX ADMIN — LORAZEPAM 0.5 MG: 0.5 TABLET ORAL at 20:02

## 2022-08-14 RX ADMIN — FERROUS SULFATE TAB 325 MG (65 MG ELEMENTAL FE) 325 MG: 325 (65 FE) TAB at 08:05

## 2022-08-14 RX ADMIN — ATORVASTATIN CALCIUM 10 MG: 10 TABLET, FILM COATED ORAL at 08:05

## 2022-08-14 RX ADMIN — FUROSEMIDE 40 MG: 40 TABLET ORAL at 08:05

## 2022-08-14 RX ADMIN — SODIUM CHLORIDE, PRESERVATIVE FREE 2 ML: 5 INJECTION INTRAVENOUS at 20:00

## 2022-08-14 RX ADMIN — CALCIUM CARBONATE 600 MG (1,500 MG)-VITAMIN D3 400 UNIT TABLET 1 TABLET: at 08:05

## 2022-08-14 RX ADMIN — ENOXAPARIN SODIUM 40 MG: 40 INJECTION SUBCUTANEOUS at 08:04

## 2022-08-14 RX ADMIN — SODIUM CHLORIDE, PRESERVATIVE FREE 2 ML: 5 INJECTION INTRAVENOUS at 08:10

## 2022-08-14 RX ADMIN — ACYCLOVIR 400 MG: 200 SUSPENSION ORAL at 20:00

## 2022-08-14 RX ADMIN — FUROSEMIDE 20 MG: 10 INJECTION, SOLUTION INTRAMUSCULAR; INTRAVENOUS at 16:02

## 2022-08-14 ASSESSMENT — PAIN SCALES - GENERAL
PAINLEVEL_OUTOF10: 0

## 2022-08-15 ENCOUNTER — APPOINTMENT (OUTPATIENT)
Dept: REHABILITATION | Age: 82
End: 2022-08-15
Attending: NURSE PRACTITIONER

## 2022-08-15 ENCOUNTER — APPOINTMENT (OUTPATIENT)
Dept: HEMATOLOGY/ONCOLOGY | Age: 82
End: 2022-08-15
Attending: INTERNAL MEDICINE

## 2022-08-15 LAB
ANION GAP SERPL CALC-SCNC: 7 MMOL/L (ref 7–19)
BUN SERPL-MCNC: 12 MG/DL (ref 6–20)
BUN/CREAT SERPL: 20 (ref 7–25)
CALCIUM SERPL-MCNC: 8.1 MG/DL (ref 8.4–10.2)
CHLORIDE SERPL-SCNC: 103 MMOL/L (ref 97–110)
CO2 SERPL-SCNC: 32 MMOL/L (ref 21–32)
CREAT SERPL-MCNC: 0.61 MG/DL (ref 0.51–0.95)
DEPRECATED RDW RBC: 57.4 FL (ref 39–50)
ERYTHROCYTE [DISTWIDTH] IN BLOOD: 17.4 % (ref 11–15)
FASTING DURATION TIME PATIENT: ABNORMAL H
GFR SERPLBLD BASED ON 1.73 SQ M-ARVRAT: 89 ML/MIN
GLUCOSE SERPL-MCNC: 87 MG/DL (ref 70–99)
HCT VFR BLD CALC: 31.7 % (ref 36–46.5)
HGB BLD-MCNC: 10 G/DL (ref 12–15.5)
MCH RBC QN AUTO: 28.7 PG (ref 26–34)
MCHC RBC AUTO-ENTMCNC: 31.5 G/DL (ref 32–36.5)
MCV RBC AUTO: 90.8 FL (ref 78–100)
NRBC BLD MANUAL-RTO: 0 /100 WBC
NT-PROBNP SERPL-MCNC: ABNORMAL PG/ML
PLATELET # BLD AUTO: 239 K/MCL (ref 140–450)
POTASSIUM SERPL-SCNC: 3.5 MMOL/L (ref 3.4–5.1)
PROCALCITONIN SERPL IA-MCNC: <0.05 NG/ML
RBC # BLD: 3.49 MIL/MCL (ref 4–5.2)
SODIUM SERPL-SCNC: 138 MMOL/L (ref 135–145)
WBC # BLD: 11.3 K/MCL (ref 4.2–11)

## 2022-08-15 PROCEDURE — 10002803 HB RX 637: Performed by: INTERNAL MEDICINE

## 2022-08-15 PROCEDURE — 10000002 HB ROOM CHARGE MED SURG

## 2022-08-15 PROCEDURE — 10004651 HB RX, NO CHARGE ITEM: Performed by: INTERNAL MEDICINE

## 2022-08-15 PROCEDURE — 97116 GAIT TRAINING THERAPY: CPT

## 2022-08-15 PROCEDURE — 97530 THERAPEUTIC ACTIVITIES: CPT | Performed by: OCCUPATIONAL THERAPIST

## 2022-08-15 PROCEDURE — 97110 THERAPEUTIC EXERCISES: CPT

## 2022-08-15 PROCEDURE — 99232 SBSQ HOSP IP/OBS MODERATE 35: CPT | Performed by: INTERNAL MEDICINE

## 2022-08-15 PROCEDURE — 83880 ASSAY OF NATRIURETIC PEPTIDE: CPT | Performed by: INTERNAL MEDICINE

## 2022-08-15 PROCEDURE — 80048 BASIC METABOLIC PNL TOTAL CA: CPT | Performed by: INTERNAL MEDICINE

## 2022-08-15 PROCEDURE — 10004173 HB COUNTER-THERAPY VISIT PT

## 2022-08-15 PROCEDURE — 10002800 HB RX 250 W HCPCS: Performed by: HOSPITALIST

## 2022-08-15 PROCEDURE — 85027 COMPLETE CBC AUTOMATED: CPT | Performed by: INTERNAL MEDICINE

## 2022-08-15 PROCEDURE — 10004172 HB COUNTER-THERAPY VISIT OT: Performed by: OCCUPATIONAL THERAPIST

## 2022-08-15 PROCEDURE — 84145 PROCALCITONIN (PCT): CPT | Performed by: INTERNAL MEDICINE

## 2022-08-15 PROCEDURE — 36415 COLL VENOUS BLD VENIPUNCTURE: CPT | Performed by: INTERNAL MEDICINE

## 2022-08-15 PROCEDURE — 10002800 HB RX 250 W HCPCS: Performed by: INTERNAL MEDICINE

## 2022-08-15 PROCEDURE — 10002803 HB RX 637: Performed by: HOSPITALIST

## 2022-08-15 RX ORDER — POTASSIUM CHLORIDE 20 MEQ/1
40 TABLET, EXTENDED RELEASE ORAL ONCE
Status: COMPLETED | OUTPATIENT
Start: 2022-08-15 | End: 2022-08-15

## 2022-08-15 RX ORDER — SIMETHICONE 125 MG
125 TABLET,CHEWABLE ORAL 4 TIMES DAILY PRN
Status: DISCONTINUED | OUTPATIENT
Start: 2022-08-15 | End: 2022-09-02 | Stop reason: HOSPADM

## 2022-08-15 RX ADMIN — ONDANSETRON 4 MG: 2 INJECTION INTRAMUSCULAR; INTRAVENOUS at 13:12

## 2022-08-15 RX ADMIN — FUROSEMIDE 20 MG: 10 INJECTION, SOLUTION INTRAMUSCULAR; INTRAVENOUS at 09:10

## 2022-08-15 RX ADMIN — Medication 400 MG: at 09:10

## 2022-08-15 RX ADMIN — ANTACID TABLETS 500 MG: 500 TABLET, CHEWABLE ORAL at 16:49

## 2022-08-15 RX ADMIN — ACYCLOVIR 400 MG: 200 SUSPENSION ORAL at 20:39

## 2022-08-15 RX ADMIN — ACYCLOVIR 400 MG: 200 SUSPENSION ORAL at 09:09

## 2022-08-15 RX ADMIN — ATORVASTATIN CALCIUM 10 MG: 10 TABLET, FILM COATED ORAL at 09:10

## 2022-08-15 RX ADMIN — FUROSEMIDE 20 MG: 10 INJECTION, SOLUTION INTRAMUSCULAR; INTRAVENOUS at 16:47

## 2022-08-15 RX ADMIN — SODIUM CHLORIDE, PRESERVATIVE FREE 2 ML: 5 INJECTION INTRAVENOUS at 20:41

## 2022-08-15 RX ADMIN — CALCIUM CARBONATE 600 MG (1,500 MG)-VITAMIN D3 400 UNIT TABLET 1 TABLET: at 09:11

## 2022-08-15 RX ADMIN — ANTACID TABLETS 500 MG: 500 TABLET, CHEWABLE ORAL at 11:10

## 2022-08-15 RX ADMIN — ASPIRIN 81 MG CHEWABLE TABLET 81 MG: 81 TABLET CHEWABLE at 09:10

## 2022-08-15 RX ADMIN — Medication 25 MCG: at 09:09

## 2022-08-15 RX ADMIN — SODIUM CHLORIDE, PRESERVATIVE FREE 2 ML: 5 INJECTION INTRAVENOUS at 09:24

## 2022-08-15 RX ADMIN — METOPROLOL TARTRATE 25 MG: 25 TABLET, FILM COATED ORAL at 09:10

## 2022-08-15 RX ADMIN — FERROUS SULFATE TAB 325 MG (65 MG ELEMENTAL FE) 325 MG: 325 (65 FE) TAB at 09:10

## 2022-08-15 RX ADMIN — ENOXAPARIN SODIUM 40 MG: 40 INJECTION SUBCUTANEOUS at 09:09

## 2022-08-15 RX ADMIN — POTASSIUM CHLORIDE 40 MEQ: 1500 TABLET, EXTENDED RELEASE ORAL at 11:09

## 2022-08-15 ASSESSMENT — PAIN SCALES - GENERAL
PAINLEVEL_OUTOF10: 0

## 2022-08-15 ASSESSMENT — COGNITIVE AND FUNCTIONAL STATUS - GENERAL
BASIC_MOBILITY_CONVERTED_SCORE: 39.67
HELP NEEDED FOR BATHING: A LOT
HELP NEEDED FOR TOILETING: A LOT
HELP NEEDED FOR PERSONAL GROOMING: A LITTLE
HELP NEEDED DRESSING REGULAR LOWER BODY CLOTHING: A LOT
BASIC_MOBILITY_RAW_SCORE: 17
DAILY_ACTIVITY_RAW_SCORE: 15
DAILY_ACTIVITY_CONVERTED_SCORE: 34.69
HELP NEEDED DRESSING REGULAR UPPER BODY CLOTHING: A LITTLE

## 2022-08-16 PROCEDURE — 10002801 HB RX 250 W/O HCPCS: Performed by: HOSPITALIST

## 2022-08-16 PROCEDURE — 10002803 HB RX 637: Performed by: HOSPITALIST

## 2022-08-16 PROCEDURE — 10002803 HB RX 637: Performed by: INTERNAL MEDICINE

## 2022-08-16 PROCEDURE — 10002800 HB RX 250 W HCPCS: Performed by: HOSPITALIST

## 2022-08-16 PROCEDURE — C9113 INJ PANTOPRAZOLE SODIUM, VIA: HCPCS | Performed by: HOSPITALIST

## 2022-08-16 PROCEDURE — 10000002 HB ROOM CHARGE MED SURG

## 2022-08-16 PROCEDURE — 10004651 HB RX, NO CHARGE ITEM: Performed by: INTERNAL MEDICINE

## 2022-08-16 PROCEDURE — 99232 SBSQ HOSP IP/OBS MODERATE 35: CPT | Performed by: HOSPITALIST

## 2022-08-16 PROCEDURE — 99232 SBSQ HOSP IP/OBS MODERATE 35: CPT | Performed by: NURSE PRACTITIONER

## 2022-08-16 PROCEDURE — 10002800 HB RX 250 W HCPCS: Performed by: INTERNAL MEDICINE

## 2022-08-16 RX ORDER — PANTOPRAZOLE SODIUM 40 MG/10ML
40 INJECTION, POWDER, LYOPHILIZED, FOR SOLUTION INTRAVENOUS DAILY
Status: DISCONTINUED | OUTPATIENT
Start: 2022-08-16 | End: 2022-08-17 | Stop reason: ALTCHOICE

## 2022-08-16 RX ADMIN — ACYCLOVIR 400 MG: 200 SUSPENSION ORAL at 20:51

## 2022-08-16 RX ADMIN — SODIUM CHLORIDE, PRESERVATIVE FREE 2 ML: 5 INJECTION INTRAVENOUS at 09:35

## 2022-08-16 RX ADMIN — SODIUM CHLORIDE, PRESERVATIVE FREE 2 ML: 5 INJECTION INTRAVENOUS at 20:59

## 2022-08-16 RX ADMIN — Medication 15 ML: at 09:29

## 2022-08-16 RX ADMIN — PANTOPRAZOLE SODIUM 40 MG: 40 INJECTION, POWDER, FOR SOLUTION INTRAVENOUS at 11:42

## 2022-08-16 RX ADMIN — PANTOPRAZOLE 40 MG: 40 TABLET, DELAYED RELEASE ORAL at 09:31

## 2022-08-16 RX ADMIN — POLYETHYLENE GLYCOL (3350) 17 G: 17 POWDER, FOR SOLUTION ORAL at 09:46

## 2022-08-16 RX ADMIN — METOPROLOL TARTRATE 25 MG: 25 TABLET, FILM COATED ORAL at 20:52

## 2022-08-16 RX ADMIN — FUROSEMIDE 20 MG: 10 INJECTION, SOLUTION INTRAMUSCULAR; INTRAVENOUS at 16:38

## 2022-08-16 RX ADMIN — FUROSEMIDE 20 MG: 10 INJECTION, SOLUTION INTRAMUSCULAR; INTRAVENOUS at 09:34

## 2022-08-16 RX ADMIN — ENOXAPARIN SODIUM 40 MG: 40 INJECTION SUBCUTANEOUS at 09:44

## 2022-08-16 RX ADMIN — BISACODYL 10 MG: 10 SUPPOSITORY RECTAL at 11:43

## 2022-08-16 RX ADMIN — LORAZEPAM 0.5 MG: 0.5 TABLET ORAL at 20:55

## 2022-08-16 ASSESSMENT — PAIN SCALES - GENERAL
PAINLEVEL_OUTOF10: 0

## 2022-08-17 ENCOUNTER — APPOINTMENT (OUTPATIENT)
Dept: REHABILITATION | Age: 82
End: 2022-08-17
Attending: NURSE PRACTITIONER

## 2022-08-17 PROCEDURE — 99232 SBSQ HOSP IP/OBS MODERATE 35: CPT | Performed by: NURSE PRACTITIONER

## 2022-08-17 PROCEDURE — 10002803 HB RX 637: Performed by: INTERNAL MEDICINE

## 2022-08-17 PROCEDURE — 10002803 HB RX 637: Performed by: HOSPITALIST

## 2022-08-17 PROCEDURE — 97530 THERAPEUTIC ACTIVITIES: CPT

## 2022-08-17 PROCEDURE — 10004173 HB COUNTER-THERAPY VISIT PT: Performed by: PHYSICAL THERAPIST

## 2022-08-17 PROCEDURE — 97530 THERAPEUTIC ACTIVITIES: CPT | Performed by: PHYSICAL THERAPIST

## 2022-08-17 PROCEDURE — 10004172 HB COUNTER-THERAPY VISIT OT

## 2022-08-17 PROCEDURE — 99232 SBSQ HOSP IP/OBS MODERATE 35: CPT | Performed by: HOSPITALIST

## 2022-08-17 PROCEDURE — 10000002 HB ROOM CHARGE MED SURG

## 2022-08-17 PROCEDURE — 10002800 HB RX 250 W HCPCS: Performed by: HOSPITALIST

## 2022-08-17 PROCEDURE — 10002800 HB RX 250 W HCPCS: Performed by: INTERNAL MEDICINE

## 2022-08-17 PROCEDURE — 10004651 HB RX, NO CHARGE ITEM: Performed by: INTERNAL MEDICINE

## 2022-08-17 PROCEDURE — C9113 INJ PANTOPRAZOLE SODIUM, VIA: HCPCS | Performed by: HOSPITALIST

## 2022-08-17 RX ORDER — POLYETHYLENE GLYCOL 3350 17 G/17G
17 POWDER, FOR SOLUTION ORAL DAILY
Status: DISCONTINUED | OUTPATIENT
Start: 2022-08-17 | End: 2022-08-17

## 2022-08-17 RX ORDER — SENNOSIDES A AND B 8.6 MG/1
1 TABLET, FILM COATED ORAL NIGHTLY
Status: DISCONTINUED | OUTPATIENT
Start: 2022-08-17 | End: 2022-09-02 | Stop reason: HOSPADM

## 2022-08-17 RX ORDER — DOCUSATE SODIUM 100 MG/1
100 CAPSULE, LIQUID FILLED ORAL DAILY
Status: DISCONTINUED | OUTPATIENT
Start: 2022-08-17 | End: 2022-08-22

## 2022-08-17 RX ADMIN — PANTOPRAZOLE SODIUM 40 MG: 40 INJECTION, POWDER, FOR SOLUTION INTRAVENOUS at 08:56

## 2022-08-17 RX ADMIN — Medication 25 MCG: at 09:06

## 2022-08-17 RX ADMIN — SODIUM CHLORIDE, PRESERVATIVE FREE 2 ML: 5 INJECTION INTRAVENOUS at 09:12

## 2022-08-17 RX ADMIN — METOPROLOL TARTRATE 25 MG: 25 TABLET, FILM COATED ORAL at 09:06

## 2022-08-17 RX ADMIN — FUROSEMIDE 20 MG: 10 INJECTION, SOLUTION INTRAMUSCULAR; INTRAVENOUS at 17:11

## 2022-08-17 RX ADMIN — ATORVASTATIN CALCIUM 10 MG: 10 TABLET, FILM COATED ORAL at 09:06

## 2022-08-17 RX ADMIN — CALCIUM CARBONATE 600 MG (1,500 MG)-VITAMIN D3 400 UNIT TABLET 1 TABLET: at 09:06

## 2022-08-17 RX ADMIN — FUROSEMIDE 20 MG: 10 INJECTION, SOLUTION INTRAMUSCULAR; INTRAVENOUS at 08:56

## 2022-08-17 RX ADMIN — ENOXAPARIN SODIUM 40 MG: 40 INJECTION SUBCUTANEOUS at 09:05

## 2022-08-17 RX ADMIN — Medication 400 MG: at 09:06

## 2022-08-17 RX ADMIN — METOPROLOL TARTRATE 25 MG: 25 TABLET, FILM COATED ORAL at 20:28

## 2022-08-17 RX ADMIN — ACYCLOVIR 400 MG: 200 SUSPENSION ORAL at 09:05

## 2022-08-17 RX ADMIN — ACYCLOVIR 400 MG: 200 SUSPENSION ORAL at 20:27

## 2022-08-17 RX ADMIN — ANTACID TABLETS 500 MG: 500 TABLET, CHEWABLE ORAL at 11:07

## 2022-08-17 RX ADMIN — SENNOSIDES 8.6 MG: 8.6 TABLET ORAL at 20:28

## 2022-08-17 RX ADMIN — DOCUSATE SODIUM 100 MG: 100 CAPSULE, LIQUID FILLED ORAL at 09:06

## 2022-08-17 RX ADMIN — FERROUS SULFATE TAB 325 MG (65 MG ELEMENTAL FE) 325 MG: 325 (65 FE) TAB at 09:06

## 2022-08-17 RX ADMIN — SODIUM CHLORIDE, PRESERVATIVE FREE 2 ML: 5 INJECTION INTRAVENOUS at 20:33

## 2022-08-17 RX ADMIN — LORAZEPAM 0.5 MG: 0.5 TABLET ORAL at 20:29

## 2022-08-17 RX ADMIN — ASPIRIN 81 MG CHEWABLE TABLET 81 MG: 81 TABLET CHEWABLE at 09:06

## 2022-08-17 ASSESSMENT — PAIN SCALES - GENERAL
PAINLEVEL_OUTOF10: 0

## 2022-08-17 ASSESSMENT — COGNITIVE AND FUNCTIONAL STATUS - GENERAL
HELP NEEDED DRESSING REGULAR LOWER BODY CLOTHING: A LOT
DAILY_ACTIVITY_RAW_SCORE: 16
BASIC_MOBILITY_RAW_SCORE: 17
HELP NEEDED FOR TOILETING: A LOT
HELP NEEDED FOR BATHING: A LOT
HELP NEEDED DRESSING REGULAR UPPER BODY CLOTHING: A LITTLE
DAILY_ACTIVITY_CONVERTED_SCORE: 35.96
HELP NEEDED FOR PERSONAL GROOMING: A LITTLE
BASIC_MOBILITY_CONVERTED_SCORE: 39.67

## 2022-08-18 ENCOUNTER — ANESTHESIA (OUTPATIENT)
Dept: SURGERY | Age: 82
DRG: 377 | End: 2022-08-18

## 2022-08-18 ENCOUNTER — ANESTHESIA EVENT (OUTPATIENT)
Dept: SURGERY | Age: 82
DRG: 377 | End: 2022-08-18

## 2022-08-18 LAB
ANION GAP SERPL CALC-SCNC: 5 MMOL/L (ref 7–19)
BASOPHILS # BLD: 0.1 K/MCL (ref 0–0.3)
BASOPHILS NFR BLD: 1 %
BUN SERPL-MCNC: 13 MG/DL (ref 6–20)
BUN/CREAT SERPL: 21 (ref 7–25)
CALCIUM SERPL-MCNC: 8.3 MG/DL (ref 8.4–10.2)
CHLORIDE SERPL-SCNC: 101 MMOL/L (ref 97–110)
CO2 SERPL-SCNC: 35 MMOL/L (ref 21–32)
CREAT SERPL-MCNC: 0.63 MG/DL (ref 0.51–0.95)
DEPRECATED RDW RBC: 58.2 FL (ref 39–50)
EOSINOPHIL # BLD: 0.8 K/MCL (ref 0–0.5)
EOSINOPHIL NFR BLD: 9 %
ERYTHROCYTE [DISTWIDTH] IN BLOOD: 17.2 % (ref 11–15)
FASTING DURATION TIME PATIENT: ABNORMAL H
GFR SERPLBLD BASED ON 1.73 SQ M-ARVRAT: 88 ML/MIN
GLUCOSE SERPL-MCNC: 104 MG/DL (ref 70–99)
HCT VFR BLD CALC: 31.9 % (ref 36–46.5)
HGB BLD-MCNC: 10 G/DL (ref 12–15.5)
IMM GRANULOCYTES # BLD AUTO: 0 K/MCL (ref 0–0.2)
IMM GRANULOCYTES # BLD: 0 %
LYMPHOCYTES # BLD: 4.2 K/MCL (ref 1–4)
LYMPHOCYTES NFR BLD: 45 %
MCH RBC QN AUTO: 29 PG (ref 26–34)
MCHC RBC AUTO-ENTMCNC: 31.3 G/DL (ref 32–36.5)
MCV RBC AUTO: 92.5 FL (ref 78–100)
MONOCYTES # BLD: 0.9 K/MCL (ref 0.3–0.9)
MONOCYTES NFR BLD: 10 %
NEUTROPHILS # BLD: 3.2 K/MCL (ref 1.8–7.7)
NEUTROPHILS NFR BLD: 35 %
NRBC BLD MANUAL-RTO: 0 /100 WBC
PLATELET # BLD AUTO: 228 K/MCL (ref 140–450)
POTASSIUM SERPL-SCNC: 3.4 MMOL/L (ref 3.4–5.1)
RBC # BLD: 3.45 MIL/MCL (ref 4–5.2)
SODIUM SERPL-SCNC: 138 MMOL/L (ref 135–145)
WBC # BLD: 9.3 K/MCL (ref 4.2–11)

## 2022-08-18 PROCEDURE — 99232 SBSQ HOSP IP/OBS MODERATE 35: CPT | Performed by: HOSPITALIST

## 2022-08-18 PROCEDURE — 10002807 HB RX 258: Performed by: INTERNAL MEDICINE

## 2022-08-18 PROCEDURE — 10003436 HB UPPER GI ENDOSCOPY: Performed by: INTERNAL MEDICINE

## 2022-08-18 PROCEDURE — 10002807 HB RX 258

## 2022-08-18 PROCEDURE — 85025 COMPLETE CBC W/AUTO DIFF WBC: CPT | Performed by: HOSPITALIST

## 2022-08-18 PROCEDURE — 10002362 HB ANESTH MAC IV 1ST 1/2 HR: Performed by: INTERNAL MEDICINE

## 2022-08-18 PROCEDURE — A43239 ANES UGI ENDO; W/BX 1/MX

## 2022-08-18 PROCEDURE — 10002800 HB RX 250 W HCPCS: Performed by: INTERNAL MEDICINE

## 2022-08-18 PROCEDURE — 10004651 HB RX, NO CHARGE ITEM: Performed by: INTERNAL MEDICINE

## 2022-08-18 PROCEDURE — 88305 TISSUE EXAM BY PATHOLOGIST: CPT | Performed by: INTERNAL MEDICINE

## 2022-08-18 PROCEDURE — 36415 COLL VENOUS BLD VENIPUNCTURE: CPT | Performed by: HOSPITALIST

## 2022-08-18 PROCEDURE — 10002803 HB RX 637: Performed by: HOSPITALIST

## 2022-08-18 PROCEDURE — 10002800 HB RX 250 W HCPCS

## 2022-08-18 PROCEDURE — 10002803 HB RX 637: Performed by: INTERNAL MEDICINE

## 2022-08-18 PROCEDURE — 43239 EGD BIOPSY SINGLE/MULTIPLE: CPT | Performed by: INTERNAL MEDICINE

## 2022-08-18 PROCEDURE — 10000002 HB ROOM CHARGE MED SURG

## 2022-08-18 PROCEDURE — 80048 BASIC METABOLIC PNL TOTAL CA: CPT | Performed by: HOSPITALIST

## 2022-08-18 PROCEDURE — 10002800 HB RX 250 W HCPCS: Performed by: HOSPITALIST

## 2022-08-18 PROCEDURE — 87077 CULTURE AEROBIC IDENTIFY: CPT | Performed by: INTERNAL MEDICINE

## 2022-08-18 PROCEDURE — 0DB68ZX EXCISION OF STOMACH, VIA NATURAL OR ARTIFICIAL OPENING ENDOSCOPIC, DIAGNOSTIC: ICD-10-PCS | Performed by: INTERNAL MEDICINE

## 2022-08-18 RX ORDER — PROPOFOL 10 MG/ML
INJECTION, EMULSION INTRAVENOUS PRN
Status: DISCONTINUED | OUTPATIENT
Start: 2022-08-18 | End: 2022-08-18

## 2022-08-18 RX ORDER — SODIUM CHLORIDE 9 MG/ML
INJECTION, SOLUTION INTRAVENOUS CONTINUOUS PRN
Status: DISCONTINUED | OUTPATIENT
Start: 2022-08-18 | End: 2022-08-18

## 2022-08-18 RX ORDER — SODIUM CHLORIDE 9 MG/ML
INJECTION, SOLUTION INTRAVENOUS
Status: COMPLETED | OUTPATIENT
Start: 2022-08-18 | End: 2022-08-19

## 2022-08-18 RX ORDER — FUROSEMIDE 10 MG/ML
20 INJECTION INTRAMUSCULAR; INTRAVENOUS DAILY
Status: DISCONTINUED | OUTPATIENT
Start: 2022-08-19 | End: 2022-09-01

## 2022-08-18 RX ADMIN — PROPOFOL 30 MG: 10 INJECTION, EMULSION INTRAVENOUS at 14:35

## 2022-08-18 RX ADMIN — METOPROLOL TARTRATE 25 MG: 25 TABLET, FILM COATED ORAL at 08:47

## 2022-08-18 RX ADMIN — PROPOFOL 30 MG: 10 INJECTION, EMULSION INTRAVENOUS at 14:31

## 2022-08-18 RX ADMIN — SODIUM CHLORIDE, PRESERVATIVE FREE 2 ML: 5 INJECTION INTRAVENOUS at 19:52

## 2022-08-18 RX ADMIN — PROPOFOL 30 MG: 10 INJECTION, EMULSION INTRAVENOUS at 14:33

## 2022-08-18 RX ADMIN — ENOXAPARIN SODIUM 40 MG: 40 INJECTION SUBCUTANEOUS at 08:48

## 2022-08-18 RX ADMIN — FUROSEMIDE 20 MG: 10 INJECTION, SOLUTION INTRAMUSCULAR; INTRAVENOUS at 08:47

## 2022-08-18 RX ADMIN — ACYCLOVIR 400 MG: 200 SUSPENSION ORAL at 08:47

## 2022-08-18 RX ADMIN — SODIUM CHLORIDE, PRESERVATIVE FREE 2 ML: 5 INJECTION INTRAVENOUS at 09:37

## 2022-08-18 RX ADMIN — ASPIRIN 81 MG CHEWABLE TABLET 81 MG: 81 TABLET CHEWABLE at 08:47

## 2022-08-18 RX ADMIN — SENNOSIDES 8.6 MG: 8.6 TABLET ORAL at 19:42

## 2022-08-18 RX ADMIN — LORAZEPAM 0.5 MG: 0.5 TABLET ORAL at 19:42

## 2022-08-18 RX ADMIN — PANTOPRAZOLE 40 MG: 40 TABLET, DELAYED RELEASE ORAL at 08:47

## 2022-08-18 RX ADMIN — METOPROLOL TARTRATE 25 MG: 25 TABLET, FILM COATED ORAL at 19:43

## 2022-08-18 RX ADMIN — ACYCLOVIR 400 MG: 200 SUSPENSION ORAL at 19:41

## 2022-08-18 RX ADMIN — SODIUM CHLORIDE: 9 INJECTION, SOLUTION INTRAVENOUS at 14:25

## 2022-08-18 RX ADMIN — SODIUM CHLORIDE: 9 INJECTION, SOLUTION INTRAVENOUS at 14:02

## 2022-08-18 ASSESSMENT — PAIN SCALES - GENERAL
PAINLEVEL_OUTOF10: 0

## 2022-08-18 ASSESSMENT — ENCOUNTER SYMPTOMS: EXERCISE TOLERANCE: GOOD (>4 METS)

## 2022-08-19 ENCOUNTER — APPOINTMENT (OUTPATIENT)
Dept: REHABILITATION | Age: 82
End: 2022-08-19
Attending: NURSE PRACTITIONER

## 2022-08-19 LAB
ASR DISCLAIMER: NORMAL
CASE RPRT: NORMAL
CLINICAL INFO: NORMAL
PATH REPORT.FINAL DX SPEC: NORMAL
PATH REPORT.GROSS SPEC: NORMAL
PATH REPORT.MICROSCOPIC SPEC OTHER STN: NORMAL
UREASE TISS QL: NEGATIVE

## 2022-08-19 PROCEDURE — 10002800 HB RX 250 W HCPCS: Performed by: HOSPITALIST

## 2022-08-19 PROCEDURE — 97110 THERAPEUTIC EXERCISES: CPT

## 2022-08-19 PROCEDURE — 10002803 HB RX 637: Performed by: INTERNAL MEDICINE

## 2022-08-19 PROCEDURE — 10004173 HB COUNTER-THERAPY VISIT PT

## 2022-08-19 PROCEDURE — 10002803 HB RX 637: Performed by: HOSPITALIST

## 2022-08-19 PROCEDURE — 99231 SBSQ HOSP IP/OBS SF/LOW 25: CPT | Performed by: NURSE PRACTITIONER

## 2022-08-19 PROCEDURE — 10000002 HB ROOM CHARGE MED SURG

## 2022-08-19 PROCEDURE — 99231 SBSQ HOSP IP/OBS SF/LOW 25: CPT | Performed by: HOSPITALIST

## 2022-08-19 PROCEDURE — 10004651 HB RX, NO CHARGE ITEM: Performed by: INTERNAL MEDICINE

## 2022-08-19 RX ADMIN — Medication 400 MG: at 09:16

## 2022-08-19 RX ADMIN — CALCIUM CARBONATE 600 MG (1,500 MG)-VITAMIN D3 400 UNIT TABLET 1 TABLET: at 09:15

## 2022-08-19 RX ADMIN — FUROSEMIDE 20 MG: 10 INJECTION, SOLUTION INTRAMUSCULAR; INTRAVENOUS at 09:16

## 2022-08-19 RX ADMIN — PANTOPRAZOLE 40 MG: 40 TABLET, DELAYED RELEASE ORAL at 09:16

## 2022-08-19 RX ADMIN — ACYCLOVIR 400 MG: 200 SUSPENSION ORAL at 09:15

## 2022-08-19 RX ADMIN — SODIUM CHLORIDE, PRESERVATIVE FREE 2 ML: 5 INJECTION INTRAVENOUS at 20:08

## 2022-08-19 RX ADMIN — ENOXAPARIN SODIUM 40 MG: 40 INJECTION SUBCUTANEOUS at 09:15

## 2022-08-19 RX ADMIN — SODIUM CHLORIDE, PRESERVATIVE FREE 2 ML: 5 INJECTION INTRAVENOUS at 09:16

## 2022-08-19 RX ADMIN — Medication 25 MCG: at 09:16

## 2022-08-19 RX ADMIN — SENNOSIDES 8.6 MG: 8.6 TABLET ORAL at 20:06

## 2022-08-19 RX ADMIN — ASPIRIN 81 MG CHEWABLE TABLET 81 MG: 81 TABLET CHEWABLE at 09:16

## 2022-08-19 RX ADMIN — ANTACID TABLETS 500 MG: 500 TABLET, CHEWABLE ORAL at 11:43

## 2022-08-19 RX ADMIN — ATORVASTATIN CALCIUM 10 MG: 10 TABLET, FILM COATED ORAL at 09:15

## 2022-08-19 RX ADMIN — FERROUS SULFATE TAB 325 MG (65 MG ELEMENTAL FE) 325 MG: 325 (65 FE) TAB at 09:16

## 2022-08-19 RX ADMIN — METOPROLOL TARTRATE 25 MG: 25 TABLET, FILM COATED ORAL at 20:07

## 2022-08-19 RX ADMIN — ACYCLOVIR 400 MG: 200 SUSPENSION ORAL at 20:07

## 2022-08-19 RX ADMIN — LORAZEPAM 0.5 MG: 0.5 TABLET ORAL at 20:13

## 2022-08-19 RX ADMIN — METOPROLOL TARTRATE 25 MG: 25 TABLET, FILM COATED ORAL at 09:16

## 2022-08-19 ASSESSMENT — COGNITIVE AND FUNCTIONAL STATUS - GENERAL
BASIC_MOBILITY_CONVERTED_SCORE: 39.67
BASIC_MOBILITY_RAW_SCORE: 17

## 2022-08-19 ASSESSMENT — PAIN SCALES - GENERAL
PAINLEVEL_OUTOF10: 4
PAINLEVEL_OUTOF10: 4
PAINLEVEL_OUTOF10: 0

## 2022-08-20 PROCEDURE — 10002803 HB RX 637: Performed by: HOSPITALIST

## 2022-08-20 PROCEDURE — 99231 SBSQ HOSP IP/OBS SF/LOW 25: CPT | Performed by: HOSPITALIST

## 2022-08-20 PROCEDURE — 10000002 HB ROOM CHARGE MED SURG

## 2022-08-20 PROCEDURE — 10002803 HB RX 637: Performed by: INTERNAL MEDICINE

## 2022-08-20 PROCEDURE — 10002800 HB RX 250 W HCPCS: Performed by: HOSPITALIST

## 2022-08-20 PROCEDURE — 10004651 HB RX, NO CHARGE ITEM: Performed by: INTERNAL MEDICINE

## 2022-08-20 RX ADMIN — FUROSEMIDE 20 MG: 10 INJECTION, SOLUTION INTRAMUSCULAR; INTRAVENOUS at 08:25

## 2022-08-20 RX ADMIN — LORAZEPAM 0.5 MG: 0.5 TABLET ORAL at 20:10

## 2022-08-20 RX ADMIN — SENNOSIDES 8.6 MG: 8.6 TABLET ORAL at 20:10

## 2022-08-20 RX ADMIN — ACYCLOVIR 400 MG: 200 SUSPENSION ORAL at 08:25

## 2022-08-20 RX ADMIN — PANTOPRAZOLE 40 MG: 40 TABLET, DELAYED RELEASE ORAL at 08:25

## 2022-08-20 RX ADMIN — METOPROLOL TARTRATE 25 MG: 25 TABLET, FILM COATED ORAL at 20:10

## 2022-08-20 RX ADMIN — Medication 400 MG: at 08:26

## 2022-08-20 RX ADMIN — SODIUM CHLORIDE, PRESERVATIVE FREE 2 ML: 5 INJECTION INTRAVENOUS at 08:27

## 2022-08-20 RX ADMIN — ASPIRIN 81 MG CHEWABLE TABLET 81 MG: 81 TABLET CHEWABLE at 08:25

## 2022-08-20 RX ADMIN — CALCIUM CARBONATE 600 MG (1,500 MG)-VITAMIN D3 400 UNIT TABLET 1 TABLET: at 08:25

## 2022-08-20 RX ADMIN — ATORVASTATIN CALCIUM 10 MG: 10 TABLET, FILM COATED ORAL at 08:25

## 2022-08-20 RX ADMIN — ENOXAPARIN SODIUM 40 MG: 40 INJECTION SUBCUTANEOUS at 08:24

## 2022-08-20 RX ADMIN — METOPROLOL TARTRATE 25 MG: 25 TABLET, FILM COATED ORAL at 08:26

## 2022-08-20 RX ADMIN — ACYCLOVIR 400 MG: 200 SUSPENSION ORAL at 20:10

## 2022-08-20 RX ADMIN — Medication 25 MCG: at 08:26

## 2022-08-20 RX ADMIN — SODIUM CHLORIDE, PRESERVATIVE FREE 2 ML: 5 INJECTION INTRAVENOUS at 20:11

## 2022-08-20 RX ADMIN — ANTACID TABLETS 500 MG: 500 TABLET, CHEWABLE ORAL at 12:09

## 2022-08-20 RX ADMIN — FERROUS SULFATE TAB 325 MG (65 MG ELEMENTAL FE) 325 MG: 325 (65 FE) TAB at 08:26

## 2022-08-20 ASSESSMENT — PAIN SCALES - GENERAL
PAINLEVEL_OUTOF10: 0

## 2022-08-21 LAB
ANION GAP SERPL CALC-SCNC: 7 MMOL/L (ref 7–19)
BASOPHILS # BLD: 0.1 K/MCL (ref 0–0.3)
BASOPHILS NFR BLD: 1 %
BUN SERPL-MCNC: 12 MG/DL (ref 6–20)
BUN/CREAT SERPL: 19 (ref 7–25)
CALCIUM SERPL-MCNC: 8.3 MG/DL (ref 8.4–10.2)
CHLORIDE SERPL-SCNC: 103 MMOL/L (ref 97–110)
CO2 SERPL-SCNC: 32 MMOL/L (ref 21–32)
CREAT SERPL-MCNC: 0.64 MG/DL (ref 0.51–0.95)
DEPRECATED RDW RBC: 56.6 FL (ref 39–50)
EOSINOPHIL # BLD: 0.7 K/MCL (ref 0–0.5)
EOSINOPHIL NFR BLD: 7 %
ERYTHROCYTE [DISTWIDTH] IN BLOOD: 17 % (ref 11–15)
FASTING DURATION TIME PATIENT: ABNORMAL H
GFR SERPLBLD BASED ON 1.73 SQ M-ARVRAT: 88 ML/MIN
GLUCOSE SERPL-MCNC: 112 MG/DL (ref 70–99)
HCT VFR BLD CALC: 32.5 % (ref 36–46.5)
HGB BLD-MCNC: 10.3 G/DL (ref 12–15.5)
IMM GRANULOCYTES # BLD AUTO: 0 K/MCL (ref 0–0.2)
IMM GRANULOCYTES # BLD: 0 %
LYMPHOCYTES # BLD: 4.9 K/MCL (ref 1–4)
LYMPHOCYTES NFR BLD: 47 %
MCH RBC QN AUTO: 28.6 PG (ref 26–34)
MCHC RBC AUTO-ENTMCNC: 31.7 G/DL (ref 32–36.5)
MCV RBC AUTO: 90.3 FL (ref 78–100)
MONOCYTES # BLD: 0.9 K/MCL (ref 0.3–0.9)
MONOCYTES NFR BLD: 9 %
NEUTROPHILS # BLD: 3.7 K/MCL (ref 1.8–7.7)
NEUTROPHILS NFR BLD: 36 %
NRBC BLD MANUAL-RTO: 0 /100 WBC
PLATELET # BLD AUTO: 234 K/MCL (ref 140–450)
POTASSIUM SERPL-SCNC: 3.1 MMOL/L (ref 3.4–5.1)
RBC # BLD: 3.6 MIL/MCL (ref 4–5.2)
SODIUM SERPL-SCNC: 139 MMOL/L (ref 135–145)
WBC # BLD: 10.4 K/MCL (ref 4.2–11)

## 2022-08-21 PROCEDURE — 10002803 HB RX 637: Performed by: HOSPITALIST

## 2022-08-21 PROCEDURE — 10000002 HB ROOM CHARGE MED SURG

## 2022-08-21 PROCEDURE — 10002800 HB RX 250 W HCPCS: Performed by: HOSPITALIST

## 2022-08-21 PROCEDURE — 99232 SBSQ HOSP IP/OBS MODERATE 35: CPT | Performed by: HOSPITALIST

## 2022-08-21 PROCEDURE — 85025 COMPLETE CBC W/AUTO DIFF WBC: CPT | Performed by: HOSPITALIST

## 2022-08-21 PROCEDURE — 10004651 HB RX, NO CHARGE ITEM: Performed by: INTERNAL MEDICINE

## 2022-08-21 PROCEDURE — 10002803 HB RX 637: Performed by: INTERNAL MEDICINE

## 2022-08-21 PROCEDURE — 36415 COLL VENOUS BLD VENIPUNCTURE: CPT | Performed by: HOSPITALIST

## 2022-08-21 PROCEDURE — 80048 BASIC METABOLIC PNL TOTAL CA: CPT | Performed by: HOSPITALIST

## 2022-08-21 RX ORDER — POTASSIUM CHLORIDE 1.5 G/1.58G
40 POWDER, FOR SOLUTION ORAL ONCE
Status: COMPLETED | OUTPATIENT
Start: 2022-08-21 | End: 2022-08-21

## 2022-08-21 RX ADMIN — FERROUS SULFATE TAB 325 MG (65 MG ELEMENTAL FE) 325 MG: 325 (65 FE) TAB at 09:54

## 2022-08-21 RX ADMIN — FUROSEMIDE 20 MG: 10 INJECTION, SOLUTION INTRAMUSCULAR; INTRAVENOUS at 09:54

## 2022-08-21 RX ADMIN — Medication 25 MCG: at 09:54

## 2022-08-21 RX ADMIN — ATORVASTATIN CALCIUM 10 MG: 10 TABLET, FILM COATED ORAL at 09:54

## 2022-08-21 RX ADMIN — PANTOPRAZOLE 40 MG: 40 TABLET, DELAYED RELEASE ORAL at 09:53

## 2022-08-21 RX ADMIN — ASPIRIN 81 MG CHEWABLE TABLET 81 MG: 81 TABLET CHEWABLE at 09:54

## 2022-08-21 RX ADMIN — SENNOSIDES 8.6 MG: 8.6 TABLET ORAL at 20:02

## 2022-08-21 RX ADMIN — ACYCLOVIR 400 MG: 200 SUSPENSION ORAL at 09:59

## 2022-08-21 RX ADMIN — LORAZEPAM 0.5 MG: 0.5 TABLET ORAL at 20:02

## 2022-08-21 RX ADMIN — ACYCLOVIR 400 MG: 200 SUSPENSION ORAL at 20:01

## 2022-08-21 RX ADMIN — CALCIUM CARBONATE 600 MG (1,500 MG)-VITAMIN D3 400 UNIT TABLET 1 TABLET: at 09:54

## 2022-08-21 RX ADMIN — METOPROLOL TARTRATE 25 MG: 25 TABLET, FILM COATED ORAL at 09:54

## 2022-08-21 RX ADMIN — SODIUM CHLORIDE, PRESERVATIVE FREE 2 ML: 5 INJECTION INTRAVENOUS at 20:12

## 2022-08-21 RX ADMIN — POLYETHYLENE GLYCOL (3350) 17 G: 17 POWDER, FOR SOLUTION ORAL at 09:54

## 2022-08-21 RX ADMIN — ENOXAPARIN SODIUM 40 MG: 40 INJECTION SUBCUTANEOUS at 09:54

## 2022-08-21 RX ADMIN — POTASSIUM CHLORIDE 40 MEQ: 1.5 POWDER, FOR SOLUTION ORAL at 09:59

## 2022-08-21 RX ADMIN — ANTACID TABLETS 500 MG: 500 TABLET, CHEWABLE ORAL at 10:20

## 2022-08-21 RX ADMIN — Medication 400 MG: at 09:54

## 2022-08-21 RX ADMIN — METOPROLOL TARTRATE 25 MG: 25 TABLET, FILM COATED ORAL at 20:02

## 2022-08-21 RX ADMIN — SODIUM CHLORIDE, PRESERVATIVE FREE 2 ML: 5 INJECTION INTRAVENOUS at 09:55

## 2022-08-21 RX ADMIN — DOCUSATE SODIUM 100 MG: 100 CAPSULE, LIQUID FILLED ORAL at 09:54

## 2022-08-21 ASSESSMENT — PAIN SCALES - GENERAL
PAINLEVEL_OUTOF10: 0

## 2022-08-22 ENCOUNTER — APPOINTMENT (OUTPATIENT)
Dept: HEMATOLOGY/ONCOLOGY | Age: 82
End: 2022-08-22
Attending: INTERNAL MEDICINE

## 2022-08-22 PROCEDURE — 10002800 HB RX 250 W HCPCS: Performed by: HOSPITALIST

## 2022-08-22 PROCEDURE — 10004173 HB COUNTER-THERAPY VISIT PT

## 2022-08-22 PROCEDURE — 10004172 HB COUNTER-THERAPY VISIT OT

## 2022-08-22 PROCEDURE — 10002803 HB RX 637: Performed by: INTERNAL MEDICINE

## 2022-08-22 PROCEDURE — 10002803 HB RX 637: Performed by: HOSPITALIST

## 2022-08-22 PROCEDURE — 97535 SELF CARE MNGMENT TRAINING: CPT

## 2022-08-22 PROCEDURE — 10004651 HB RX, NO CHARGE ITEM: Performed by: INTERNAL MEDICINE

## 2022-08-22 PROCEDURE — 97110 THERAPEUTIC EXERCISES: CPT

## 2022-08-22 PROCEDURE — 99231 SBSQ HOSP IP/OBS SF/LOW 25: CPT | Performed by: HOSPITALIST

## 2022-08-22 PROCEDURE — 10000002 HB ROOM CHARGE MED SURG

## 2022-08-22 PROCEDURE — 97116 GAIT TRAINING THERAPY: CPT

## 2022-08-22 RX ADMIN — ATORVASTATIN CALCIUM 10 MG: 10 TABLET, FILM COATED ORAL at 09:33

## 2022-08-22 RX ADMIN — METOPROLOL TARTRATE 25 MG: 25 TABLET, FILM COATED ORAL at 20:35

## 2022-08-22 RX ADMIN — ACYCLOVIR 400 MG: 200 SUSPENSION ORAL at 20:35

## 2022-08-22 RX ADMIN — CALCIUM CARBONATE 600 MG (1,500 MG)-VITAMIN D3 400 UNIT TABLET 1 TABLET: at 09:33

## 2022-08-22 RX ADMIN — SENNOSIDES 8.6 MG: 8.6 TABLET ORAL at 20:35

## 2022-08-22 RX ADMIN — FUROSEMIDE 20 MG: 10 INJECTION, SOLUTION INTRAMUSCULAR; INTRAVENOUS at 09:33

## 2022-08-22 RX ADMIN — ENOXAPARIN SODIUM 40 MG: 40 INJECTION SUBCUTANEOUS at 09:32

## 2022-08-22 RX ADMIN — SODIUM CHLORIDE, PRESERVATIVE FREE 2 ML: 5 INJECTION INTRAVENOUS at 21:11

## 2022-08-22 RX ADMIN — ACYCLOVIR 400 MG: 200 SUSPENSION ORAL at 09:32

## 2022-08-22 RX ADMIN — ASPIRIN 81 MG CHEWABLE TABLET 81 MG: 81 TABLET CHEWABLE at 09:34

## 2022-08-22 RX ADMIN — ANTACID TABLETS 500 MG: 500 TABLET, CHEWABLE ORAL at 11:57

## 2022-08-22 RX ADMIN — PANTOPRAZOLE 40 MG: 40 TABLET, DELAYED RELEASE ORAL at 09:33

## 2022-08-22 RX ADMIN — Medication 25 MCG: at 09:33

## 2022-08-22 RX ADMIN — FERROUS SULFATE TAB 325 MG (65 MG ELEMENTAL FE) 325 MG: 325 (65 FE) TAB at 09:33

## 2022-08-22 RX ADMIN — SODIUM CHLORIDE, PRESERVATIVE FREE 2 ML: 5 INJECTION INTRAVENOUS at 09:47

## 2022-08-22 RX ADMIN — POLYETHYLENE GLYCOL (3350) 17 G: 17 POWDER, FOR SOLUTION ORAL at 15:28

## 2022-08-22 RX ADMIN — LORAZEPAM 0.5 MG: 0.5 TABLET ORAL at 20:41

## 2022-08-22 RX ADMIN — METOPROLOL TARTRATE 25 MG: 25 TABLET, FILM COATED ORAL at 09:33

## 2022-08-22 RX ADMIN — Medication 400 MG: at 09:33

## 2022-08-22 ASSESSMENT — PAIN SCALES - GENERAL
PAINLEVEL_OUTOF10: 0

## 2022-08-22 ASSESSMENT — COGNITIVE AND FUNCTIONAL STATUS - GENERAL
DAILY_ACTIVITY_CONVERTED_SCORE: 42.03
DAILY_ACTIVITY_RAW_SCORE: 20
BASIC_MOBILITY_RAW_SCORE: 18
HELP NEEDED FOR BATHING: A LOT
HELP NEEDED FOR PERSONAL GROOMING: A LITTLE
HELP NEEDED FOR TOILETING: A LITTLE
BASIC_MOBILITY_CONVERTED_SCORE: 41.05

## 2022-08-22 ASSESSMENT — ACTIVITIES OF DAILY LIVING (ADL): HOME_MANAGEMENT_TIME_ENTRY: 28

## 2022-08-23 ENCOUNTER — TELEPHONE (OUTPATIENT)
Dept: GASTROENTEROLOGY | Age: 82
End: 2022-08-23

## 2022-08-23 LAB
ALBUMIN SERPL-MCNC: 1.9 G/DL (ref 3.6–5.1)
ALBUMIN/GLOB SERPL: 0.7 {RATIO} (ref 1–2.4)
ALP SERPL-CCNC: 135 UNITS/L (ref 45–117)
ALT SERPL-CCNC: 13 UNITS/L
ANION GAP SERPL CALC-SCNC: 10 MMOL/L (ref 7–19)
AST SERPL-CCNC: 22 UNITS/L
BILIRUB SERPL-MCNC: 0.4 MG/DL (ref 0.2–1)
BUN SERPL-MCNC: 12 MG/DL (ref 6–20)
BUN/CREAT SERPL: 18 (ref 7–25)
CALCIUM SERPL-MCNC: 8.6 MG/DL (ref 8.4–10.2)
CHLORIDE SERPL-SCNC: 102 MMOL/L (ref 97–110)
CO2 SERPL-SCNC: 31 MMOL/L (ref 21–32)
CREAT SERPL-MCNC: 0.66 MG/DL (ref 0.51–0.95)
FASTING DURATION TIME PATIENT: ABNORMAL H
GFR SERPLBLD BASED ON 1.73 SQ M-ARVRAT: 87 ML/MIN
GLOBULIN SER-MCNC: 2.8 G/DL (ref 2–4)
GLUCOSE SERPL-MCNC: 95 MG/DL (ref 70–99)
MAGNESIUM SERPL-MCNC: 1.9 MG/DL (ref 1.7–2.4)
POTASSIUM SERPL-SCNC: 3.5 MMOL/L (ref 3.4–5.1)
PROT SERPL-MCNC: 4.7 G/DL (ref 6.4–8.2)
SODIUM SERPL-SCNC: 139 MMOL/L (ref 135–145)

## 2022-08-23 PROCEDURE — 10002803 HB RX 637: Performed by: HOSPITALIST

## 2022-08-23 PROCEDURE — 83735 ASSAY OF MAGNESIUM: CPT | Performed by: HOSPITALIST

## 2022-08-23 PROCEDURE — 10002803 HB RX 637: Performed by: INTERNAL MEDICINE

## 2022-08-23 PROCEDURE — 10002800 HB RX 250 W HCPCS: Performed by: HOSPITALIST

## 2022-08-23 PROCEDURE — 10000002 HB ROOM CHARGE MED SURG

## 2022-08-23 PROCEDURE — 10004651 HB RX, NO CHARGE ITEM: Performed by: INTERNAL MEDICINE

## 2022-08-23 PROCEDURE — 97530 THERAPEUTIC ACTIVITIES: CPT

## 2022-08-23 PROCEDURE — 99231 SBSQ HOSP IP/OBS SF/LOW 25: CPT | Performed by: HOSPITALIST

## 2022-08-23 PROCEDURE — 80053 COMPREHEN METABOLIC PANEL: CPT | Performed by: HOSPITALIST

## 2022-08-23 PROCEDURE — 36415 COLL VENOUS BLD VENIPUNCTURE: CPT | Performed by: HOSPITALIST

## 2022-08-23 PROCEDURE — 10004172 HB COUNTER-THERAPY VISIT OT

## 2022-08-23 PROCEDURE — 97535 SELF CARE MNGMENT TRAINING: CPT

## 2022-08-23 RX ADMIN — CALCIUM CARBONATE 600 MG (1,500 MG)-VITAMIN D3 400 UNIT TABLET 1 TABLET: at 08:26

## 2022-08-23 RX ADMIN — ASPIRIN 81 MG CHEWABLE TABLET 81 MG: 81 TABLET CHEWABLE at 08:25

## 2022-08-23 RX ADMIN — SODIUM CHLORIDE, PRESERVATIVE FREE 2 ML: 5 INJECTION INTRAVENOUS at 20:08

## 2022-08-23 RX ADMIN — ANTACID TABLETS 500 MG: 500 TABLET, CHEWABLE ORAL at 11:22

## 2022-08-23 RX ADMIN — SENNOSIDES 8.6 MG: 8.6 TABLET ORAL at 20:04

## 2022-08-23 RX ADMIN — FERROUS SULFATE TAB 325 MG (65 MG ELEMENTAL FE) 325 MG: 325 (65 FE) TAB at 08:26

## 2022-08-23 RX ADMIN — ATORVASTATIN CALCIUM 10 MG: 10 TABLET, FILM COATED ORAL at 08:26

## 2022-08-23 RX ADMIN — METOPROLOL TARTRATE 25 MG: 25 TABLET, FILM COATED ORAL at 08:25

## 2022-08-23 RX ADMIN — FUROSEMIDE 20 MG: 10 INJECTION, SOLUTION INTRAMUSCULAR; INTRAVENOUS at 08:22

## 2022-08-23 RX ADMIN — ACYCLOVIR 400 MG: 200 SUSPENSION ORAL at 08:26

## 2022-08-23 RX ADMIN — METOPROLOL TARTRATE 25 MG: 25 TABLET, FILM COATED ORAL at 20:03

## 2022-08-23 RX ADMIN — PANTOPRAZOLE 40 MG: 40 TABLET, DELAYED RELEASE ORAL at 08:25

## 2022-08-23 RX ADMIN — LORAZEPAM 0.5 MG: 0.5 TABLET ORAL at 20:04

## 2022-08-23 RX ADMIN — SODIUM CHLORIDE, PRESERVATIVE FREE 2 ML: 5 INJECTION INTRAVENOUS at 08:23

## 2022-08-23 RX ADMIN — ACYCLOVIR 400 MG: 200 SUSPENSION ORAL at 20:03

## 2022-08-23 RX ADMIN — ENOXAPARIN SODIUM 40 MG: 40 INJECTION SUBCUTANEOUS at 08:24

## 2022-08-23 RX ADMIN — Medication 400 MG: at 08:26

## 2022-08-23 RX ADMIN — Medication 25 MCG: at 08:26

## 2022-08-23 ASSESSMENT — PAIN SCALES - GENERAL
PAINLEVEL_OUTOF10: 0

## 2022-08-23 ASSESSMENT — COGNITIVE AND FUNCTIONAL STATUS - GENERAL
DAILY_ACTIVITY_CONVERTED_SCORE: 42.03
HELP NEEDED FOR TOILETING: A LITTLE
DAILY_ACTIVITY_RAW_SCORE: 20
HELP NEEDED FOR PERSONAL GROOMING: A LITTLE
HELP NEEDED FOR BATHING: A LOT

## 2022-08-23 ASSESSMENT — ACTIVITIES OF DAILY LIVING (ADL): HOME_MANAGEMENT_TIME_ENTRY: 15

## 2022-08-24 PROCEDURE — 10002803 HB RX 637: Performed by: HOSPITALIST

## 2022-08-24 PROCEDURE — 99231 SBSQ HOSP IP/OBS SF/LOW 25: CPT | Performed by: HOSPITALIST

## 2022-08-24 PROCEDURE — 10002800 HB RX 250 W HCPCS: Performed by: HOSPITALIST

## 2022-08-24 PROCEDURE — 10004172 HB COUNTER-THERAPY VISIT OT

## 2022-08-24 PROCEDURE — 97530 THERAPEUTIC ACTIVITIES: CPT

## 2022-08-24 PROCEDURE — 97116 GAIT TRAINING THERAPY: CPT

## 2022-08-24 PROCEDURE — 10002803 HB RX 637: Performed by: INTERNAL MEDICINE

## 2022-08-24 PROCEDURE — 10000002 HB ROOM CHARGE MED SURG

## 2022-08-24 PROCEDURE — 10004651 HB RX, NO CHARGE ITEM: Performed by: INTERNAL MEDICINE

## 2022-08-24 PROCEDURE — 10004173 HB COUNTER-THERAPY VISIT PT

## 2022-08-24 PROCEDURE — 97110 THERAPEUTIC EXERCISES: CPT

## 2022-08-24 RX ORDER — PANTOPRAZOLE SODIUM 40 MG/1
40 TABLET, DELAYED RELEASE ORAL
Status: DISCONTINUED | OUTPATIENT
Start: 2022-08-24 | End: 2022-08-24 | Stop reason: SDUPTHER

## 2022-08-24 RX ORDER — PANTOPRAZOLE SODIUM 40 MG/1
40 TABLET, DELAYED RELEASE ORAL
Status: DISCONTINUED | OUTPATIENT
Start: 2022-08-24 | End: 2022-08-24

## 2022-08-24 RX ADMIN — ENOXAPARIN SODIUM 40 MG: 40 INJECTION SUBCUTANEOUS at 07:47

## 2022-08-24 RX ADMIN — Medication 400 MG: at 07:42

## 2022-08-24 RX ADMIN — SODIUM CHLORIDE, PRESERVATIVE FREE 2 ML: 5 INJECTION INTRAVENOUS at 07:38

## 2022-08-24 RX ADMIN — ATORVASTATIN CALCIUM 10 MG: 10 TABLET, FILM COATED ORAL at 07:41

## 2022-08-24 RX ADMIN — Medication 25 MCG: at 07:42

## 2022-08-24 RX ADMIN — ASPIRIN 81 MG CHEWABLE TABLET 81 MG: 81 TABLET CHEWABLE at 07:42

## 2022-08-24 RX ADMIN — METOPROLOL TARTRATE 25 MG: 25 TABLET, FILM COATED ORAL at 20:04

## 2022-08-24 RX ADMIN — FERROUS SULFATE TAB 325 MG (65 MG ELEMENTAL FE) 325 MG: 325 (65 FE) TAB at 07:42

## 2022-08-24 RX ADMIN — ANTACID TABLETS 500 MG: 500 TABLET, CHEWABLE ORAL at 11:27

## 2022-08-24 RX ADMIN — CALCIUM CARBONATE 600 MG (1,500 MG)-VITAMIN D3 400 UNIT TABLET 1 TABLET: at 07:42

## 2022-08-24 RX ADMIN — ACYCLOVIR 400 MG: 200 SUSPENSION ORAL at 20:02

## 2022-08-24 RX ADMIN — PANTOPRAZOLE 40 MG: 40 TABLET, DELAYED RELEASE ORAL at 07:41

## 2022-08-24 RX ADMIN — FUROSEMIDE 20 MG: 10 INJECTION, SOLUTION INTRAMUSCULAR; INTRAVENOUS at 07:39

## 2022-08-24 RX ADMIN — SODIUM CHLORIDE, PRESERVATIVE FREE 2 ML: 5 INJECTION INTRAVENOUS at 20:02

## 2022-08-24 RX ADMIN — PANTOPRAZOLE 40 MG: 40 TABLET, DELAYED RELEASE ORAL at 20:03

## 2022-08-24 RX ADMIN — ACYCLOVIR 400 MG: 200 SUSPENSION ORAL at 07:41

## 2022-08-24 RX ADMIN — METOPROLOL TARTRATE 25 MG: 25 TABLET, FILM COATED ORAL at 07:42

## 2022-08-24 RX ADMIN — LORAZEPAM 0.5 MG: 0.5 TABLET ORAL at 20:07

## 2022-08-24 ASSESSMENT — PAIN SCALES - GENERAL
PAINLEVEL_OUTOF10: 0

## 2022-08-24 ASSESSMENT — COGNITIVE AND FUNCTIONAL STATUS - GENERAL
BASIC_MOBILITY_CONVERTED_SCORE: 41.05
BASIC_MOBILITY_RAW_SCORE: 18

## 2022-08-25 LAB
ANION GAP SERPL CALC-SCNC: 12 MMOL/L (ref 7–19)
BASOPHILS # BLD: 0.1 K/MCL (ref 0–0.3)
BASOPHILS NFR BLD: 1 %
BUN SERPL-MCNC: 14 MG/DL (ref 6–20)
BUN/CREAT SERPL: 17 (ref 7–25)
CALCIUM SERPL-MCNC: 8.5 MG/DL (ref 8.4–10.2)
CHLORIDE SERPL-SCNC: 103 MMOL/L (ref 97–110)
CO2 SERPL-SCNC: 30 MMOL/L (ref 21–32)
CREAT SERPL-MCNC: 0.81 MG/DL (ref 0.51–0.95)
DEPRECATED RDW RBC: 56.8 FL (ref 39–50)
EOSINOPHIL # BLD: 0.7 K/MCL (ref 0–0.5)
EOSINOPHIL NFR BLD: 7 %
ERYTHROCYTE [DISTWIDTH] IN BLOOD: 17 % (ref 11–15)
FASTING DURATION TIME PATIENT: ABNORMAL H
GFR SERPLBLD BASED ON 1.73 SQ M-ARVRAT: 72 ML/MIN
GLUCOSE SERPL-MCNC: 104 MG/DL (ref 70–99)
HCT VFR BLD CALC: 35 % (ref 36–46.5)
HGB BLD-MCNC: 10.9 G/DL (ref 12–15.5)
IMM GRANULOCYTES # BLD AUTO: 0 K/MCL (ref 0–0.2)
IMM GRANULOCYTES # BLD: 0 %
LYMPHOCYTES # BLD: 4.4 K/MCL (ref 1–4)
LYMPHOCYTES NFR BLD: 46 %
MAGNESIUM SERPL-MCNC: 1.9 MG/DL (ref 1.7–2.4)
MCH RBC QN AUTO: 28.4 PG (ref 26–34)
MCHC RBC AUTO-ENTMCNC: 31.1 G/DL (ref 32–36.5)
MCV RBC AUTO: 91.1 FL (ref 78–100)
MONOCYTES # BLD: 0.9 K/MCL (ref 0.3–0.9)
MONOCYTES NFR BLD: 9 %
NEUTROPHILS # BLD: 3.5 K/MCL (ref 1.8–7.7)
NEUTROPHILS NFR BLD: 37 %
NRBC BLD MANUAL-RTO: 0 /100 WBC
PLATELET # BLD AUTO: 254 K/MCL (ref 140–450)
POTASSIUM SERPL-SCNC: 3.5 MMOL/L (ref 3.4–5.1)
RBC # BLD: 3.84 MIL/MCL (ref 4–5.2)
SODIUM SERPL-SCNC: 141 MMOL/L (ref 135–145)
WBC # BLD: 9.5 K/MCL (ref 4.2–11)

## 2022-08-25 PROCEDURE — 10002800 HB RX 250 W HCPCS: Performed by: HOSPITALIST

## 2022-08-25 PROCEDURE — 36415 COLL VENOUS BLD VENIPUNCTURE: CPT | Performed by: HOSPITALIST

## 2022-08-25 PROCEDURE — 10004651 HB RX, NO CHARGE ITEM: Performed by: INTERNAL MEDICINE

## 2022-08-25 PROCEDURE — 10002803 HB RX 637: Performed by: INTERNAL MEDICINE

## 2022-08-25 PROCEDURE — 10004173 HB COUNTER-THERAPY VISIT PT

## 2022-08-25 PROCEDURE — 10000002 HB ROOM CHARGE MED SURG

## 2022-08-25 PROCEDURE — 97530 THERAPEUTIC ACTIVITIES: CPT

## 2022-08-25 PROCEDURE — 10002803 HB RX 637: Performed by: HOSPITALIST

## 2022-08-25 PROCEDURE — 85025 COMPLETE CBC W/AUTO DIFF WBC: CPT | Performed by: HOSPITALIST

## 2022-08-25 PROCEDURE — 99232 SBSQ HOSP IP/OBS MODERATE 35: CPT | Performed by: HOSPITALIST

## 2022-08-25 PROCEDURE — 97116 GAIT TRAINING THERAPY: CPT

## 2022-08-25 PROCEDURE — 80048 BASIC METABOLIC PNL TOTAL CA: CPT | Performed by: HOSPITALIST

## 2022-08-25 PROCEDURE — 83735 ASSAY OF MAGNESIUM: CPT | Performed by: HOSPITALIST

## 2022-08-25 RX ADMIN — FUROSEMIDE 20 MG: 10 INJECTION, SOLUTION INTRAMUSCULAR; INTRAVENOUS at 09:48

## 2022-08-25 RX ADMIN — CALCIUM CARBONATE 600 MG (1,500 MG)-VITAMIN D3 400 UNIT TABLET 1 TABLET: at 09:48

## 2022-08-25 RX ADMIN — ASPIRIN 81 MG CHEWABLE TABLET 81 MG: 81 TABLET CHEWABLE at 09:48

## 2022-08-25 RX ADMIN — ACYCLOVIR 400 MG: 200 SUSPENSION ORAL at 20:05

## 2022-08-25 RX ADMIN — ACYCLOVIR 400 MG: 200 SUSPENSION ORAL at 09:47

## 2022-08-25 RX ADMIN — SODIUM CHLORIDE, PRESERVATIVE FREE 2 ML: 5 INJECTION INTRAVENOUS at 10:40

## 2022-08-25 RX ADMIN — FERROUS SULFATE TAB 325 MG (65 MG ELEMENTAL FE) 325 MG: 325 (65 FE) TAB at 09:48

## 2022-08-25 RX ADMIN — Medication 25 MCG: at 09:48

## 2022-08-25 RX ADMIN — ATORVASTATIN CALCIUM 10 MG: 10 TABLET, FILM COATED ORAL at 09:47

## 2022-08-25 RX ADMIN — Medication 400 MG: at 09:48

## 2022-08-25 RX ADMIN — ANTACID TABLETS 500 MG: 500 TABLET, CHEWABLE ORAL at 11:09

## 2022-08-25 RX ADMIN — PANTOPRAZOLE 40 MG: 40 TABLET, DELAYED RELEASE ORAL at 20:04

## 2022-08-25 RX ADMIN — SODIUM CHLORIDE, PRESERVATIVE FREE 2 ML: 5 INJECTION INTRAVENOUS at 20:03

## 2022-08-25 RX ADMIN — PANTOPRAZOLE 40 MG: 40 TABLET, DELAYED RELEASE ORAL at 06:16

## 2022-08-25 RX ADMIN — METOPROLOL TARTRATE 25 MG: 25 TABLET, FILM COATED ORAL at 09:48

## 2022-08-25 RX ADMIN — ENOXAPARIN SODIUM 40 MG: 40 INJECTION SUBCUTANEOUS at 09:46

## 2022-08-25 RX ADMIN — METOPROLOL TARTRATE 25 MG: 25 TABLET, FILM COATED ORAL at 20:04

## 2022-08-25 ASSESSMENT — COGNITIVE AND FUNCTIONAL STATUS - GENERAL
BASIC_MOBILITY_CONVERTED_SCORE: 41.05
BASIC_MOBILITY_RAW_SCORE: 18

## 2022-08-25 ASSESSMENT — PAIN SCALES - GENERAL
PAINLEVEL_OUTOF10: 0

## 2022-08-26 PROCEDURE — 97535 SELF CARE MNGMENT TRAINING: CPT

## 2022-08-26 PROCEDURE — 10004172 HB COUNTER-THERAPY VISIT OT

## 2022-08-26 PROCEDURE — 10004651 HB RX, NO CHARGE ITEM: Performed by: INTERNAL MEDICINE

## 2022-08-26 PROCEDURE — 10002800 HB RX 250 W HCPCS: Performed by: HOSPITALIST

## 2022-08-26 PROCEDURE — 10000002 HB ROOM CHARGE MED SURG

## 2022-08-26 PROCEDURE — 99231 SBSQ HOSP IP/OBS SF/LOW 25: CPT | Performed by: HOSPITALIST

## 2022-08-26 PROCEDURE — 10002803 HB RX 637: Performed by: HOSPITALIST

## 2022-08-26 PROCEDURE — 10002803 HB RX 637: Performed by: INTERNAL MEDICINE

## 2022-08-26 RX ADMIN — ANTACID TABLETS 500 MG: 500 TABLET, CHEWABLE ORAL at 11:17

## 2022-08-26 RX ADMIN — SENNOSIDES 8.6 MG: 8.6 TABLET ORAL at 20:30

## 2022-08-26 RX ADMIN — LORAZEPAM 0.5 MG: 0.5 TABLET ORAL at 20:30

## 2022-08-26 RX ADMIN — Medication 25 MCG: at 09:05

## 2022-08-26 RX ADMIN — METOPROLOL TARTRATE 25 MG: 25 TABLET, FILM COATED ORAL at 20:30

## 2022-08-26 RX ADMIN — SODIUM CHLORIDE, PRESERVATIVE FREE 2 ML: 5 INJECTION INTRAVENOUS at 09:34

## 2022-08-26 RX ADMIN — ENOXAPARIN SODIUM 40 MG: 40 INJECTION SUBCUTANEOUS at 09:05

## 2022-08-26 RX ADMIN — ASPIRIN 81 MG CHEWABLE TABLET 81 MG: 81 TABLET CHEWABLE at 09:05

## 2022-08-26 RX ADMIN — ACYCLOVIR 400 MG: 200 SUSPENSION ORAL at 20:30

## 2022-08-26 RX ADMIN — Medication 400 MG: at 09:05

## 2022-08-26 RX ADMIN — FUROSEMIDE 20 MG: 10 INJECTION, SOLUTION INTRAMUSCULAR; INTRAVENOUS at 09:05

## 2022-08-26 RX ADMIN — SODIUM CHLORIDE, PRESERVATIVE FREE 2 ML: 5 INJECTION INTRAVENOUS at 20:34

## 2022-08-26 RX ADMIN — FERROUS SULFATE TAB 325 MG (65 MG ELEMENTAL FE) 325 MG: 325 (65 FE) TAB at 09:05

## 2022-08-26 RX ADMIN — CALCIUM CARBONATE 600 MG (1,500 MG)-VITAMIN D3 400 UNIT TABLET 1 TABLET: at 09:05

## 2022-08-26 RX ADMIN — PANTOPRAZOLE 40 MG: 40 TABLET, DELAYED RELEASE ORAL at 06:26

## 2022-08-26 RX ADMIN — ATORVASTATIN CALCIUM 10 MG: 10 TABLET, FILM COATED ORAL at 09:05

## 2022-08-26 RX ADMIN — METOPROLOL TARTRATE 25 MG: 25 TABLET, FILM COATED ORAL at 09:05

## 2022-08-26 RX ADMIN — PANTOPRAZOLE 40 MG: 40 TABLET, DELAYED RELEASE ORAL at 20:30

## 2022-08-26 RX ADMIN — ACYCLOVIR 400 MG: 200 SUSPENSION ORAL at 09:05

## 2022-08-26 ASSESSMENT — ACTIVITIES OF DAILY LIVING (ADL): HOME_MANAGEMENT_TIME_ENTRY: 40

## 2022-08-26 ASSESSMENT — PAIN SCALES - GENERAL
PAINLEVEL_OUTOF10: 0

## 2022-08-26 ASSESSMENT — COGNITIVE AND FUNCTIONAL STATUS - GENERAL
DAILY_ACTIVITY_CONVERTED_SCORE: 47.10
HELP NEEDED FOR BATHING: A LITTLE
HELP NEEDED DRESSING REGULAR LOWER BODY CLOTHING: A LITTLE
DAILY_ACTIVITY_RAW_SCORE: 22

## 2022-08-27 PROCEDURE — 10000002 HB ROOM CHARGE MED SURG

## 2022-08-27 PROCEDURE — 10002803 HB RX 637: Performed by: INTERNAL MEDICINE

## 2022-08-27 PROCEDURE — 10004651 HB RX, NO CHARGE ITEM: Performed by: INTERNAL MEDICINE

## 2022-08-27 PROCEDURE — 10002800 HB RX 250 W HCPCS: Performed by: HOSPITALIST

## 2022-08-27 PROCEDURE — 99231 SBSQ HOSP IP/OBS SF/LOW 25: CPT | Performed by: HOSPITALIST

## 2022-08-27 PROCEDURE — 10002803 HB RX 637: Performed by: HOSPITALIST

## 2022-08-27 RX ADMIN — ANTACID TABLETS 500 MG: 500 TABLET, CHEWABLE ORAL at 12:00

## 2022-08-27 RX ADMIN — SODIUM CHLORIDE, PRESERVATIVE FREE 2 ML: 5 INJECTION INTRAVENOUS at 20:03

## 2022-08-27 RX ADMIN — ASPIRIN 81 MG CHEWABLE TABLET 81 MG: 81 TABLET CHEWABLE at 08:33

## 2022-08-27 RX ADMIN — LORAZEPAM 0.5 MG: 0.5 TABLET ORAL at 20:03

## 2022-08-27 RX ADMIN — PANTOPRAZOLE 40 MG: 40 TABLET, DELAYED RELEASE ORAL at 08:33

## 2022-08-27 RX ADMIN — METOPROLOL TARTRATE 25 MG: 25 TABLET, FILM COATED ORAL at 08:32

## 2022-08-27 RX ADMIN — Medication 25 MCG: at 08:33

## 2022-08-27 RX ADMIN — FERROUS SULFATE TAB 325 MG (65 MG ELEMENTAL FE) 325 MG: 325 (65 FE) TAB at 08:33

## 2022-08-27 RX ADMIN — METOPROLOL TARTRATE 25 MG: 25 TABLET, FILM COATED ORAL at 20:03

## 2022-08-27 RX ADMIN — ENOXAPARIN SODIUM 40 MG: 40 INJECTION SUBCUTANEOUS at 08:33

## 2022-08-27 RX ADMIN — ACYCLOVIR 400 MG: 200 SUSPENSION ORAL at 08:42

## 2022-08-27 RX ADMIN — Medication 400 MG: at 08:32

## 2022-08-27 RX ADMIN — CALCIUM CARBONATE 600 MG (1,500 MG)-VITAMIN D3 400 UNIT TABLET 1 TABLET: at 08:32

## 2022-08-27 RX ADMIN — PANTOPRAZOLE 40 MG: 40 TABLET, DELAYED RELEASE ORAL at 20:03

## 2022-08-27 RX ADMIN — SODIUM CHLORIDE, PRESERVATIVE FREE 2 ML: 5 INJECTION INTRAVENOUS at 08:31

## 2022-08-27 RX ADMIN — ATORVASTATIN CALCIUM 10 MG: 10 TABLET, FILM COATED ORAL at 08:33

## 2022-08-27 RX ADMIN — ACYCLOVIR 400 MG: 200 SUSPENSION ORAL at 20:03

## 2022-08-27 RX ADMIN — FUROSEMIDE 20 MG: 10 INJECTION, SOLUTION INTRAMUSCULAR; INTRAVENOUS at 08:31

## 2022-08-27 RX ADMIN — SENNOSIDES 8.6 MG: 8.6 TABLET ORAL at 20:03

## 2022-08-27 ASSESSMENT — PAIN SCALES - GENERAL
PAINLEVEL_OUTOF10: 0

## 2022-08-28 PROCEDURE — 10000002 HB ROOM CHARGE MED SURG

## 2022-08-28 PROCEDURE — 10002803 HB RX 637: Performed by: HOSPITALIST

## 2022-08-28 PROCEDURE — 10004651 HB RX, NO CHARGE ITEM: Performed by: INTERNAL MEDICINE

## 2022-08-28 PROCEDURE — 10002803 HB RX 637: Performed by: INTERNAL MEDICINE

## 2022-08-28 PROCEDURE — 10002800 HB RX 250 W HCPCS: Performed by: HOSPITALIST

## 2022-08-28 PROCEDURE — 99231 SBSQ HOSP IP/OBS SF/LOW 25: CPT | Performed by: HOSPITALIST

## 2022-08-28 RX ADMIN — CALCIUM CARBONATE 600 MG (1,500 MG)-VITAMIN D3 400 UNIT TABLET 1 TABLET: at 08:14

## 2022-08-28 RX ADMIN — Medication 400 MG: at 08:14

## 2022-08-28 RX ADMIN — PANTOPRAZOLE 40 MG: 40 TABLET, DELAYED RELEASE ORAL at 06:00

## 2022-08-28 RX ADMIN — FUROSEMIDE 20 MG: 10 INJECTION, SOLUTION INTRAMUSCULAR; INTRAVENOUS at 08:11

## 2022-08-28 RX ADMIN — ASPIRIN 81 MG CHEWABLE TABLET 81 MG: 81 TABLET CHEWABLE at 08:14

## 2022-08-28 RX ADMIN — SENNOSIDES 8.6 MG: 8.6 TABLET ORAL at 20:55

## 2022-08-28 RX ADMIN — METOPROLOL TARTRATE 25 MG: 25 TABLET, FILM COATED ORAL at 20:54

## 2022-08-28 RX ADMIN — FERROUS SULFATE TAB 325 MG (65 MG ELEMENTAL FE) 325 MG: 325 (65 FE) TAB at 08:14

## 2022-08-28 RX ADMIN — Medication 25 MCG: at 08:14

## 2022-08-28 RX ADMIN — LORAZEPAM 0.5 MG: 0.5 TABLET ORAL at 20:55

## 2022-08-28 RX ADMIN — PANTOPRAZOLE 40 MG: 40 TABLET, DELAYED RELEASE ORAL at 20:56

## 2022-08-28 RX ADMIN — ATORVASTATIN CALCIUM 10 MG: 10 TABLET, FILM COATED ORAL at 08:14

## 2022-08-28 RX ADMIN — METOPROLOL TARTRATE 25 MG: 25 TABLET, FILM COATED ORAL at 08:14

## 2022-08-28 RX ADMIN — ACYCLOVIR 400 MG: 200 SUSPENSION ORAL at 20:54

## 2022-08-28 RX ADMIN — SODIUM CHLORIDE, PRESERVATIVE FREE 2 ML: 5 INJECTION INTRAVENOUS at 20:55

## 2022-08-28 RX ADMIN — ENOXAPARIN SODIUM 40 MG: 40 INJECTION SUBCUTANEOUS at 08:18

## 2022-08-28 RX ADMIN — ANTACID TABLETS 500 MG: 500 TABLET, CHEWABLE ORAL at 11:30

## 2022-08-28 RX ADMIN — SODIUM CHLORIDE, PRESERVATIVE FREE 2 ML: 5 INJECTION INTRAVENOUS at 08:11

## 2022-08-28 RX ADMIN — ACYCLOVIR 400 MG: 200 SUSPENSION ORAL at 08:10

## 2022-08-28 ASSESSMENT — PAIN SCALES - GENERAL
PAINLEVEL_OUTOF10: 0

## 2022-08-29 ENCOUNTER — APPOINTMENT (OUTPATIENT)
Dept: HYPERBARIC MEDICINE | Age: 82
DRG: 377 | End: 2022-08-29

## 2022-08-29 PROCEDURE — 97116 GAIT TRAINING THERAPY: CPT

## 2022-08-29 PROCEDURE — 97530 THERAPEUTIC ACTIVITIES: CPT

## 2022-08-29 PROCEDURE — 10002803 HB RX 637: Performed by: INTERNAL MEDICINE

## 2022-08-29 PROCEDURE — 10003716 HB NURSING WOUND CARE PER 15 MIN

## 2022-08-29 PROCEDURE — 10004185 HB COUNTER-VISIT  CENSUS

## 2022-08-29 PROCEDURE — 99231 SBSQ HOSP IP/OBS SF/LOW 25: CPT | Performed by: HOSPITALIST

## 2022-08-29 PROCEDURE — 10002803 HB RX 637: Performed by: HOSPITALIST

## 2022-08-29 PROCEDURE — 10002800 HB RX 250 W HCPCS: Performed by: HOSPITALIST

## 2022-08-29 PROCEDURE — 10004173 HB COUNTER-THERAPY VISIT PT

## 2022-08-29 PROCEDURE — 10004172 HB COUNTER-THERAPY VISIT OT

## 2022-08-29 PROCEDURE — 10000002 HB ROOM CHARGE MED SURG

## 2022-08-29 PROCEDURE — 10004651 HB RX, NO CHARGE ITEM: Performed by: INTERNAL MEDICINE

## 2022-08-29 RX ADMIN — CALCIUM CARBONATE 600 MG (1,500 MG)-VITAMIN D3 400 UNIT TABLET 1 TABLET: at 09:04

## 2022-08-29 RX ADMIN — FUROSEMIDE 20 MG: 10 INJECTION, SOLUTION INTRAMUSCULAR; INTRAVENOUS at 09:14

## 2022-08-29 RX ADMIN — ATORVASTATIN CALCIUM 10 MG: 10 TABLET, FILM COATED ORAL at 09:04

## 2022-08-29 RX ADMIN — METOPROLOL TARTRATE 25 MG: 25 TABLET, FILM COATED ORAL at 21:18

## 2022-08-29 RX ADMIN — FERROUS SULFATE TAB 325 MG (65 MG ELEMENTAL FE) 325 MG: 325 (65 FE) TAB at 09:05

## 2022-08-29 RX ADMIN — ANTACID TABLETS 500 MG: 500 TABLET, CHEWABLE ORAL at 11:52

## 2022-08-29 RX ADMIN — Medication 25 MCG: at 09:04

## 2022-08-29 RX ADMIN — SODIUM CHLORIDE, PRESERVATIVE FREE 2 ML: 5 INJECTION INTRAVENOUS at 09:14

## 2022-08-29 RX ADMIN — ACYCLOVIR 400 MG: 200 SUSPENSION ORAL at 21:18

## 2022-08-29 RX ADMIN — PANTOPRAZOLE 40 MG: 40 TABLET, DELAYED RELEASE ORAL at 05:29

## 2022-08-29 RX ADMIN — SENNOSIDES 8.6 MG: 8.6 TABLET ORAL at 21:18

## 2022-08-29 RX ADMIN — LORAZEPAM 0.5 MG: 0.5 TABLET ORAL at 21:18

## 2022-08-29 RX ADMIN — METOPROLOL TARTRATE 25 MG: 25 TABLET, FILM COATED ORAL at 09:05

## 2022-08-29 RX ADMIN — Medication 400 MG: at 09:04

## 2022-08-29 RX ADMIN — ASPIRIN 81 MG CHEWABLE TABLET 81 MG: 81 TABLET CHEWABLE at 09:04

## 2022-08-29 RX ADMIN — ACYCLOVIR 400 MG: 200 SUSPENSION ORAL at 09:12

## 2022-08-29 RX ADMIN — ENOXAPARIN SODIUM 40 MG: 40 INJECTION SUBCUTANEOUS at 09:21

## 2022-08-29 RX ADMIN — PANTOPRAZOLE 40 MG: 40 TABLET, DELAYED RELEASE ORAL at 21:20

## 2022-08-29 RX ADMIN — SODIUM CHLORIDE, PRESERVATIVE FREE 2 ML: 5 INJECTION INTRAVENOUS at 21:15

## 2022-08-29 ASSESSMENT — COGNITIVE AND FUNCTIONAL STATUS - GENERAL
DAILY_ACTIVITY_RAW_SCORE: 22
BASIC_MOBILITY_CONVERTED_SCORE: 41.05
HELP NEEDED DRESSING REGULAR LOWER BODY CLOTHING: A LITTLE
BASIC_MOBILITY_RAW_SCORE: 18
HELP NEEDED FOR BATHING: A LITTLE
DAILY_ACTIVITY_CONVERTED_SCORE: 47.10

## 2022-08-29 ASSESSMENT — PAIN SCALES - GENERAL
PAINLEVEL_OUTOF10: 0

## 2022-08-30 ENCOUNTER — APPOINTMENT (OUTPATIENT)
Dept: HEMATOLOGY/ONCOLOGY | Age: 82
End: 2022-08-30
Attending: INTERNAL MEDICINE

## 2022-08-30 PROCEDURE — 10002800 HB RX 250 W HCPCS: Performed by: HOSPITALIST

## 2022-08-30 PROCEDURE — 99232 SBSQ HOSP IP/OBS MODERATE 35: CPT | Performed by: HOSPITALIST

## 2022-08-30 PROCEDURE — 10000002 HB ROOM CHARGE MED SURG

## 2022-08-30 PROCEDURE — 10002803 HB RX 637: Performed by: INTERNAL MEDICINE

## 2022-08-30 PROCEDURE — 10002803 HB RX 637: Performed by: HOSPITALIST

## 2022-08-30 PROCEDURE — 10004651 HB RX, NO CHARGE ITEM: Performed by: INTERNAL MEDICINE

## 2022-08-30 RX ADMIN — ATORVASTATIN CALCIUM 10 MG: 10 TABLET, FILM COATED ORAL at 08:08

## 2022-08-30 RX ADMIN — Medication 400 MG: at 08:08

## 2022-08-30 RX ADMIN — ACYCLOVIR 400 MG: 200 SUSPENSION ORAL at 20:20

## 2022-08-30 RX ADMIN — ACYCLOVIR 400 MG: 200 SUSPENSION ORAL at 08:08

## 2022-08-30 RX ADMIN — SODIUM CHLORIDE, PRESERVATIVE FREE 2 ML: 5 INJECTION INTRAVENOUS at 08:08

## 2022-08-30 RX ADMIN — FUROSEMIDE 20 MG: 10 INJECTION, SOLUTION INTRAMUSCULAR; INTRAVENOUS at 08:08

## 2022-08-30 RX ADMIN — ENOXAPARIN SODIUM 40 MG: 40 INJECTION SUBCUTANEOUS at 08:08

## 2022-08-30 RX ADMIN — LORAZEPAM 0.5 MG: 0.5 TABLET ORAL at 20:19

## 2022-08-30 RX ADMIN — SENNOSIDES 8.6 MG: 8.6 TABLET ORAL at 20:19

## 2022-08-30 RX ADMIN — PANTOPRAZOLE 40 MG: 40 TABLET, DELAYED RELEASE ORAL at 05:17

## 2022-08-30 RX ADMIN — CALCIUM CARBONATE 600 MG (1,500 MG)-VITAMIN D3 400 UNIT TABLET 1 TABLET: at 08:08

## 2022-08-30 RX ADMIN — ANTACID TABLETS 500 MG: 500 TABLET, CHEWABLE ORAL at 12:05

## 2022-08-30 RX ADMIN — Medication 25 MCG: at 08:08

## 2022-08-30 RX ADMIN — PANTOPRAZOLE 40 MG: 40 TABLET, DELAYED RELEASE ORAL at 20:20

## 2022-08-30 RX ADMIN — ASPIRIN 81 MG CHEWABLE TABLET 81 MG: 81 TABLET CHEWABLE at 08:08

## 2022-08-30 RX ADMIN — SODIUM CHLORIDE, PRESERVATIVE FREE 2 ML: 5 INJECTION INTRAVENOUS at 20:21

## 2022-08-30 RX ADMIN — FERROUS SULFATE TAB 325 MG (65 MG ELEMENTAL FE) 325 MG: 325 (65 FE) TAB at 08:08

## 2022-08-30 RX ADMIN — METOPROLOL TARTRATE 25 MG: 25 TABLET, FILM COATED ORAL at 20:20

## 2022-08-30 RX ADMIN — METOPROLOL TARTRATE 25 MG: 25 TABLET, FILM COATED ORAL at 08:08

## 2022-08-30 ASSESSMENT — PAIN SCALES - GENERAL
PAINLEVEL_OUTOF10: 0

## 2022-08-31 PROCEDURE — 10002800 HB RX 250 W HCPCS: Performed by: HOSPITALIST

## 2022-08-31 PROCEDURE — 97530 THERAPEUTIC ACTIVITIES: CPT

## 2022-08-31 PROCEDURE — 97116 GAIT TRAINING THERAPY: CPT

## 2022-08-31 PROCEDURE — 10002803 HB RX 637: Performed by: HOSPITALIST

## 2022-08-31 PROCEDURE — 99232 SBSQ HOSP IP/OBS MODERATE 35: CPT | Performed by: HOSPITALIST

## 2022-08-31 PROCEDURE — 10000002 HB ROOM CHARGE MED SURG

## 2022-08-31 PROCEDURE — 10004172 HB COUNTER-THERAPY VISIT OT

## 2022-08-31 PROCEDURE — 10004173 HB COUNTER-THERAPY VISIT PT

## 2022-08-31 PROCEDURE — 10002803 HB RX 637: Performed by: INTERNAL MEDICINE

## 2022-08-31 PROCEDURE — 10004651 HB RX, NO CHARGE ITEM: Performed by: INTERNAL MEDICINE

## 2022-08-31 RX ADMIN — METOPROLOL TARTRATE 25 MG: 25 TABLET, FILM COATED ORAL at 10:18

## 2022-08-31 RX ADMIN — ANTACID TABLETS 500 MG: 500 TABLET, CHEWABLE ORAL at 12:03

## 2022-08-31 RX ADMIN — LORAZEPAM 0.5 MG: 0.5 TABLET ORAL at 20:26

## 2022-08-31 RX ADMIN — METOPROLOL TARTRATE 25 MG: 25 TABLET, FILM COATED ORAL at 20:26

## 2022-08-31 RX ADMIN — ENOXAPARIN SODIUM 40 MG: 40 INJECTION SUBCUTANEOUS at 10:27

## 2022-08-31 RX ADMIN — PANTOPRAZOLE 40 MG: 40 TABLET, DELAYED RELEASE ORAL at 20:25

## 2022-08-31 RX ADMIN — FERROUS SULFATE TAB 325 MG (65 MG ELEMENTAL FE) 325 MG: 325 (65 FE) TAB at 10:18

## 2022-08-31 RX ADMIN — Medication 400 MG: at 10:18

## 2022-08-31 RX ADMIN — ACYCLOVIR 400 MG: 200 SUSPENSION ORAL at 10:18

## 2022-08-31 RX ADMIN — SENNOSIDES 8.6 MG: 8.6 TABLET ORAL at 20:26

## 2022-08-31 RX ADMIN — CALCIUM CARBONATE 600 MG (1,500 MG)-VITAMIN D3 400 UNIT TABLET 1 TABLET: at 10:18

## 2022-08-31 RX ADMIN — SODIUM CHLORIDE, PRESERVATIVE FREE 2 ML: 5 INJECTION INTRAVENOUS at 10:19

## 2022-08-31 RX ADMIN — PANTOPRAZOLE 40 MG: 40 TABLET, DELAYED RELEASE ORAL at 06:02

## 2022-08-31 RX ADMIN — SODIUM CHLORIDE, PRESERVATIVE FREE 2 ML: 5 INJECTION INTRAVENOUS at 20:25

## 2022-08-31 RX ADMIN — ACYCLOVIR 400 MG: 200 SUSPENSION ORAL at 20:26

## 2022-08-31 RX ADMIN — Medication 25 MCG: at 10:18

## 2022-08-31 RX ADMIN — ASPIRIN 81 MG CHEWABLE TABLET 81 MG: 81 TABLET CHEWABLE at 10:18

## 2022-08-31 RX ADMIN — FUROSEMIDE 20 MG: 10 INJECTION, SOLUTION INTRAMUSCULAR; INTRAVENOUS at 10:18

## 2022-08-31 RX ADMIN — ATORVASTATIN CALCIUM 10 MG: 10 TABLET, FILM COATED ORAL at 10:18

## 2022-08-31 ASSESSMENT — PAIN SCALES - GENERAL
PAINLEVEL_OUTOF10: 0

## 2022-08-31 ASSESSMENT — COGNITIVE AND FUNCTIONAL STATUS - GENERAL
DAILY_ACTIVITY_CONVERTED_SCORE: 47.10
DAILY_ACTIVITY_RAW_SCORE: 22
HELP NEEDED DRESSING REGULAR LOWER BODY CLOTHING: A LITTLE
HELP NEEDED FOR BATHING: A LITTLE
BASIC_MOBILITY_CONVERTED_SCORE: 45.55
BASIC_MOBILITY_RAW_SCORE: 21

## 2022-09-01 PROCEDURE — 10002803 HB RX 637: Performed by: HOSPITALIST

## 2022-09-01 PROCEDURE — 99232 SBSQ HOSP IP/OBS MODERATE 35: CPT | Performed by: HOSPITALIST

## 2022-09-01 PROCEDURE — 97530 THERAPEUTIC ACTIVITIES: CPT

## 2022-09-01 PROCEDURE — 10004651 HB RX, NO CHARGE ITEM: Performed by: INTERNAL MEDICINE

## 2022-09-01 PROCEDURE — 10002800 HB RX 250 W HCPCS: Performed by: HOSPITALIST

## 2022-09-01 PROCEDURE — 10000002 HB ROOM CHARGE MED SURG

## 2022-09-01 PROCEDURE — 10004172 HB COUNTER-THERAPY VISIT OT

## 2022-09-01 PROCEDURE — 10004173 HB COUNTER-THERAPY VISIT PT

## 2022-09-01 PROCEDURE — 10002803 HB RX 637: Performed by: INTERNAL MEDICINE

## 2022-09-01 PROCEDURE — 97116 GAIT TRAINING THERAPY: CPT

## 2022-09-01 RX ORDER — FUROSEMIDE 20 MG/1
20 TABLET ORAL DAILY
Status: DISCONTINUED | OUTPATIENT
Start: 2022-09-02 | End: 2022-09-02 | Stop reason: HOSPADM

## 2022-09-01 RX ADMIN — FERROUS SULFATE TAB 325 MG (65 MG ELEMENTAL FE) 325 MG: 325 (65 FE) TAB at 08:49

## 2022-09-01 RX ADMIN — ANTACID TABLETS 500 MG: 500 TABLET, CHEWABLE ORAL at 10:59

## 2022-09-01 RX ADMIN — PANTOPRAZOLE 40 MG: 40 TABLET, DELAYED RELEASE ORAL at 20:51

## 2022-09-01 RX ADMIN — ASPIRIN 81 MG CHEWABLE TABLET 81 MG: 81 TABLET CHEWABLE at 08:49

## 2022-09-01 RX ADMIN — SENNOSIDES 8.6 MG: 8.6 TABLET ORAL at 20:52

## 2022-09-01 RX ADMIN — CALCIUM CARBONATE 600 MG (1,500 MG)-VITAMIN D3 400 UNIT TABLET 1 TABLET: at 08:49

## 2022-09-01 RX ADMIN — FUROSEMIDE 20 MG: 10 INJECTION, SOLUTION INTRAMUSCULAR; INTRAVENOUS at 08:52

## 2022-09-01 RX ADMIN — ENOXAPARIN SODIUM 40 MG: 40 INJECTION SUBCUTANEOUS at 08:48

## 2022-09-01 RX ADMIN — PANTOPRAZOLE 40 MG: 40 TABLET, DELAYED RELEASE ORAL at 05:26

## 2022-09-01 RX ADMIN — METOPROLOL TARTRATE 25 MG: 25 TABLET, FILM COATED ORAL at 20:52

## 2022-09-01 RX ADMIN — ATORVASTATIN CALCIUM 10 MG: 10 TABLET, FILM COATED ORAL at 08:49

## 2022-09-01 RX ADMIN — SODIUM CHLORIDE, PRESERVATIVE FREE 2 ML: 5 INJECTION INTRAVENOUS at 20:53

## 2022-09-01 RX ADMIN — LORAZEPAM 0.5 MG: 0.5 TABLET ORAL at 20:52

## 2022-09-01 RX ADMIN — ACYCLOVIR 400 MG: 200 SUSPENSION ORAL at 08:49

## 2022-09-01 RX ADMIN — Medication 25 MCG: at 08:49

## 2022-09-01 RX ADMIN — Medication 400 MG: at 08:49

## 2022-09-01 RX ADMIN — SODIUM CHLORIDE, PRESERVATIVE FREE 2 ML: 5 INJECTION INTRAVENOUS at 08:52

## 2022-09-01 RX ADMIN — ACYCLOVIR 400 MG: 200 SUSPENSION ORAL at 20:51

## 2022-09-01 RX ADMIN — METOPROLOL TARTRATE 25 MG: 25 TABLET, FILM COATED ORAL at 08:49

## 2022-09-01 ASSESSMENT — COGNITIVE AND FUNCTIONAL STATUS - GENERAL
HELP NEEDED DRESSING REGULAR LOWER BODY CLOTHING: A LITTLE
DAILY_ACTIVITY_RAW_SCORE: 22
BASIC_MOBILITY_RAW_SCORE: 21
BASIC_MOBILITY_CONVERTED_SCORE: 45.55
DAILY_ACTIVITY_CONVERTED_SCORE: 47.10
HELP NEEDED FOR BATHING: A LITTLE

## 2022-09-01 ASSESSMENT — PAIN SCALES - GENERAL
PAINLEVEL_OUTOF10: 0

## 2022-09-02 VITALS
HEART RATE: 76 BPM | WEIGHT: 136.02 LBS | SYSTOLIC BLOOD PRESSURE: 136 MMHG | BODY MASS INDEX: 27.42 KG/M2 | DIASTOLIC BLOOD PRESSURE: 67 MMHG | HEIGHT: 59 IN | OXYGEN SATURATION: 96 % | TEMPERATURE: 98.2 F | RESPIRATION RATE: 14 BRPM

## 2022-09-02 PROCEDURE — 10002803 HB RX 637: Performed by: HOSPITALIST

## 2022-09-02 PROCEDURE — 99239 HOSP IP/OBS DSCHRG MGMT >30: CPT | Performed by: HOSPITALIST

## 2022-09-02 PROCEDURE — 10002800 HB RX 250 W HCPCS: Performed by: HOSPITALIST

## 2022-09-02 PROCEDURE — 10002803 HB RX 637: Performed by: INTERNAL MEDICINE

## 2022-09-02 PROCEDURE — 10004651 HB RX, NO CHARGE ITEM: Performed by: INTERNAL MEDICINE

## 2022-09-02 RX ORDER — AZITHROMYCIN 250 MG/1
500 TABLET, FILM COATED ORAL ONCE
Status: COMPLETED | OUTPATIENT
Start: 2022-09-02 | End: 2022-09-02

## 2022-09-02 RX ORDER — AZITHROMYCIN 250 MG/1
250 TABLET, FILM COATED ORAL DAILY
Status: SHIPPED | INPATIENT
Start: 2022-09-02

## 2022-09-02 RX ORDER — PANTOPRAZOLE SODIUM 40 MG/1
40 TABLET, DELAYED RELEASE ORAL 2 TIMES DAILY
Refills: 0 | Status: SHIPPED | INPATIENT
Start: 2022-09-02

## 2022-09-02 RX ORDER — FUROSEMIDE 20 MG/1
20 TABLET ORAL DAILY
Qty: 180 TABLET | Refills: 3 | Status: SHIPPED | INPATIENT
Start: 2022-09-02

## 2022-09-02 RX ADMIN — PANTOPRAZOLE 40 MG: 40 TABLET, DELAYED RELEASE ORAL at 06:00

## 2022-09-02 RX ADMIN — Medication 25 MCG: at 08:01

## 2022-09-02 RX ADMIN — CALCIUM CARBONATE 600 MG (1,500 MG)-VITAMIN D3 400 UNIT TABLET 1 TABLET: at 08:01

## 2022-09-02 RX ADMIN — Medication 400 MG: at 08:00

## 2022-09-02 RX ADMIN — AZITHROMYCIN MONOHYDRATE 500 MG: 250 TABLET ORAL at 10:37

## 2022-09-02 RX ADMIN — FERROUS SULFATE TAB 325 MG (65 MG ELEMENTAL FE) 325 MG: 325 (65 FE) TAB at 08:01

## 2022-09-02 RX ADMIN — ENOXAPARIN SODIUM 40 MG: 40 INJECTION SUBCUTANEOUS at 08:01

## 2022-09-02 RX ADMIN — ASPIRIN 81 MG CHEWABLE TABLET 81 MG: 81 TABLET CHEWABLE at 08:01

## 2022-09-02 RX ADMIN — ATORVASTATIN CALCIUM 10 MG: 10 TABLET, FILM COATED ORAL at 08:00

## 2022-09-02 RX ADMIN — METOPROLOL TARTRATE 25 MG: 25 TABLET, FILM COATED ORAL at 08:01

## 2022-09-02 RX ADMIN — ACYCLOVIR 400 MG: 200 SUSPENSION ORAL at 08:00

## 2022-09-02 ASSESSMENT — PAIN SCALES - GENERAL
PAINLEVEL_OUTOF10: 0

## 2022-09-09 ENCOUNTER — TELEPHONE (OUTPATIENT)
Dept: HEMATOLOGY/ONCOLOGY | Age: 82
End: 2022-09-09

## 2022-09-14 ENCOUNTER — TELEPHONE (OUTPATIENT)
Dept: SCHEDULING | Age: 82
End: 2022-09-14

## 2022-09-27 ENCOUNTER — TELEPHONE (OUTPATIENT)
Dept: NEPHROLOGY | Facility: CLINIC | Age: 82
End: 2022-09-27

## 2022-09-27 ENCOUNTER — TELEPHONE (OUTPATIENT)
Dept: INTERNAL MEDICINE | Age: 82
End: 2022-09-27

## 2022-09-27 DIAGNOSIS — N17.0 ACUTE KIDNEY INJURY (AKI) WITH ACUTE TUBULAR NECROSIS (ATN) (H): Primary | ICD-10-CM

## 2022-09-27 NOTE — TELEPHONE ENCOUNTER
[facility] Call Center    Phone Message    May a detailed message be left on voicemail: yes     Reason for Call: Order(s): Other:   Reason for requested: labs   Date needed: 10/25/22  Provider name: Dr. Yi      Action Taken: Message routed to:  [facility] ([facility] Neph    Travel Screening: Not Applicable

## 2022-09-27 NOTE — TELEPHONE ENCOUNTER
"Called and spoke with pt.  Quested if pt has been seen by Nephrology in the past.  Pt was unsure of location.  She will consult with her Daughter \"Gege\" and call this clinic back.    Maegan Heaton LPN    "

## 2022-09-30 PROBLEM — R33.9 RETENTION OF URINE: Status: ACTIVE | Noted: 2021-02-21

## 2022-09-30 PROBLEM — E83.52 HYPERCALCEMIA: Status: ACTIVE | Noted: 2021-03-11

## 2022-09-30 PROBLEM — E85.81 LIGHT CHAIN (AL) AMYLOIDOSIS (H): Status: ACTIVE | Noted: 2022-05-12

## 2022-09-30 PROBLEM — D72.829 LEUKOCYTOSIS: Status: ACTIVE | Noted: 2020-12-22

## 2022-09-30 PROBLEM — E86.1 HYPOTENSION DUE TO HYPOVOLEMIA: Status: ACTIVE | Noted: 2022-06-02

## 2022-09-30 PROBLEM — M80.08XA: Status: ACTIVE | Noted: 2021-02-03

## 2022-09-30 PROBLEM — R79.89 ELEVATED TROPONIN: Status: ACTIVE | Noted: 2022-06-03

## 2022-09-30 PROBLEM — N39.0 RECURRENT URINARY TRACT INFECTION: Status: ACTIVE | Noted: 2021-02-03

## 2022-09-30 PROBLEM — M54.6 THORACIC BACK PAIN: Status: ACTIVE | Noted: 2020-12-22

## 2022-09-30 PROBLEM — D32.0 INTRACRANIAL MENINGIOMA (H): Status: ACTIVE | Noted: 2021-03-11

## 2022-09-30 PROBLEM — D47.2 MONOCLONAL GAMMOPATHY OF UNKNOWN SIGNIFICANCE (MGUS): Status: ACTIVE | Noted: 2021-02-04

## 2022-09-30 PROBLEM — E87.1 HYPONATREMIA: Status: ACTIVE | Noted: 2020-12-22

## 2022-09-30 PROBLEM — M80.00XA OSTEOPOROSIS WITH CURRENT PATHOLOGICAL FRACTURE: Status: ACTIVE | Noted: 2021-10-04

## 2022-09-30 PROBLEM — E05.90 SUBCLINICAL HYPERTHYROIDISM: Status: ACTIVE | Noted: 2021-03-10

## 2022-09-30 PROBLEM — M81.0 POST-MENOPAUSAL OSTEOPOROSIS: Status: ACTIVE | Noted: 2021-11-15

## 2022-09-30 PROBLEM — E78.2 MIXED HYPERLIPIDEMIA: Status: ACTIVE | Noted: 2021-10-04

## 2022-09-30 PROBLEM — N94.89 OTHER SPECIFIED CONDITIONS ASSOCIATED WITH FEMALE GENITAL ORGANS AND MENSTRUAL CYCLE: Status: ACTIVE | Noted: 2020-12-22

## 2022-09-30 PROBLEM — G93.40 ENCEPHALOPATHY: Status: ACTIVE | Noted: 2021-03-11

## 2022-09-30 PROBLEM — Z91.81 HISTORY OF FALLING: Status: ACTIVE | Noted: 2021-03-08

## 2022-10-05 ENCOUNTER — OFFICE VISIT (OUTPATIENT)
Dept: PODIATRY | Facility: CLINIC | Age: 82
End: 2022-10-05
Payer: MEDICARE

## 2022-10-05 VITALS
SYSTOLIC BLOOD PRESSURE: 153 MMHG | WEIGHT: 146 LBS | BODY MASS INDEX: 27.59 KG/M2 | DIASTOLIC BLOOD PRESSURE: 77 MMHG | HEART RATE: 81 BPM

## 2022-10-05 DIAGNOSIS — M79.89 PAIN AND SWELLING OF TOE, UNSPECIFIED LATERALITY: ICD-10-CM

## 2022-10-05 DIAGNOSIS — B35.1 ONYCHOMYCOSIS: Primary | ICD-10-CM

## 2022-10-05 DIAGNOSIS — M79.676 PAIN AND SWELLING OF TOE, UNSPECIFIED LATERALITY: ICD-10-CM

## 2022-10-05 PROBLEM — N94.89 ADNEXAL MASS: Status: ACTIVE | Noted: 2020-12-22

## 2022-10-05 PROBLEM — R60.0 BILATERAL LEG EDEMA: Status: ACTIVE | Noted: 2021-10-04

## 2022-10-05 PROBLEM — F03.90 DEMENTIA (H): Status: ACTIVE | Noted: 2021-02-06

## 2022-10-05 PROBLEM — R41.0 DELIRIUM: Status: ACTIVE | Noted: 2021-03-02

## 2022-10-05 PROBLEM — R53.1 WEAKNESS: Status: ACTIVE | Noted: 2022-07-31

## 2022-10-05 PROBLEM — K57.92 DIVERTICULITIS: Status: ACTIVE | Noted: 2021-03-10

## 2022-10-05 PROBLEM — D53.9 ANEMIA, MACROCYTIC: Status: ACTIVE | Noted: 2021-02-03

## 2022-10-05 PROBLEM — K86.9 DISORDER OF PANCREAS: Status: ACTIVE | Noted: 2020-12-22

## 2022-10-05 PROBLEM — N17.0 ACUTE KIDNEY INJURY (AKI) WITH ACUTE TUBULAR NECROSIS (ATN) (H): Status: ACTIVE | Noted: 2022-06-03

## 2022-10-05 PROCEDURE — 99202 OFFICE O/P NEW SF 15 MIN: CPT | Mod: 25 | Performed by: PODIATRIST

## 2022-10-05 PROCEDURE — 11720 DEBRIDE NAIL 1-5: CPT | Mod: Q8 | Performed by: PODIATRIST

## 2022-10-05 RX ORDER — LOSARTAN POTASSIUM 25 MG/1
25 TABLET ORAL 2 TIMES DAILY
COMMUNITY
Start: 2022-04-20 | End: 2022-10-11

## 2022-10-05 RX ORDER — ASCORBIC ACID/VITAMIN E/BIOTIN 7.5MG-125
2500 TABLET,CHEWABLE ORAL
COMMUNITY
End: 2022-10-11

## 2022-10-05 RX ORDER — CHOLECALCIFEROL (VITAMIN D3) 25 MCG
TABLET ORAL
COMMUNITY
Start: 2022-09-26 | End: 2022-10-11

## 2022-10-05 RX ORDER — FUROSEMIDE 20 MG
20 TABLET ORAL DAILY
COMMUNITY
Start: 2022-09-26 | End: 2022-10-24

## 2022-10-05 NOTE — PROGRESS NOTES
Patient complains of thick nail on right and left first toe.  Has had this for years and recently more painful..  It is slowly getting worse.  Has had pain in the past which is aggravated by activity and relieved by rest.  Describes as a burning pain.  Hard to walk in shoes now because of the pain.  Patient states it is hard for her to put on her shoes now.  Denies erythema or drainage.  Was hospitalized recently and transferred to TCU.    ROS:  See above       Allergies   Allergen Reactions     Carvedilol Other (See Comments)     Lidocaine Nausea and Vomiting     Vomiting with general anesthesia     Lisinopril Cough     Seasonal Allergies Other (See Comments)     Problems with narcotics - oxygen desaturates     Spironolactone        Current Outpatient Medications   Medication Sig Dispense Refill     AMLODIPINE BESYLATE PO Take 2.5 mg by mouth every evening       aspirin 81 MG EC tablet Take 81 mg by mouth       atenolol (TENORMIN) 25 MG tablet Please take 25 mg tablet in the morning and 50 mg tablet in the evening as ordered. 270 tablet 0     atorvastatin (LIPITOR) 10 MG tablet Take 1 tablet (10 mg) by mouth daily 90 tablet 3     Biotin w/ Vitamins C & E (HAIR SKIN & NAILS GUMMIES) 1250-7.5-7.5 MCG-MG-UNT CHEW Take 2,500 mcg by mouth       Cholecalciferol (VITAMIN D) 2000 units tablet Take 2,000 Units by mouth       fluticasone (FLONASE) 50 MCG/ACT spray SHAKE LQ AND U 1 SPR IEN QD  2     furosemide (LASIX) 20 MG tablet Take 20 mg by mouth daily       hydrochlorothiazide (HYDRODIURIL) 25 MG tablet Take 1 tablet (25 mg) by mouth daily 30 tablet      losartan (COZAAR) 100 MG tablet Take 0.5 tablets (50 mg) by mouth 2 times daily 90 tablet 3     losartan (COZAAR) 25 MG tablet Take 25 mg by mouth 2 times daily       Multiple Vitamins-Minerals (HAIR SKIN AND NAILS FORMULA) TABS Take by mouth daily       VITAMIN D3 25 MCG (1000 UT) tablet TAKE 2 TABLET BY MOUTH ONCE A DAY         Patient Active Problem List   Diagnosis      Patient is Followed by [facility]     Elevated troponin     Thoracic back pain     Subclinical hyperthyroidism     Severe aortic stenosis     S/P AVR (aortic valve replacement)     Retention of urine     Recurrent urinary tract infection     Presence of prosthetic heart valve     Prediabetes     Postoperative retention of urine     Post-menopausal osteoporosis     Pathological fracture of vertebra due to osteoporosis (H)     Other specified conditions associated with female genital organs and menstrual cycle     Osteoporosis with current pathological fracture     Monoclonal gammopathy of unknown significance (MGUS)     Mixed hyperlipidemia     Light chain (AL) amyloidosis (H)     Leukocytosis     Intracranial meningioma (H)     Hypotension due to hypovolemia     Hyponatremia     Hypercalcemia     History of falling     Heart palpitations     Encephalopathy     Dementia (H)     Disorder of pancreas     Diverticulitis     Dysphagia     Adnexal mass     Acute kidney injury (DINA) with acute tubular necrosis (ATN) (H)     Anemia, macrocytic     Osteoarthritis of hip     Benign neoplasm of meninges (H)     Bicuspid aortic valve     Bilateral leg edema     Weakness     Delirium       Past Medical History:   Diagnosis Date     Congenital bicuspid aortic valve      H/O aortic valve replacement     23 mm Magna Ease      Hyperlipidemia      Hypertension      Hyperthyroidism      Severe aortic stenosis      Thyroiditis        No past surgical history on file.    No family history on file.    Social History     Tobacco Use     Smoking status: Never Smoker     Smokeless tobacco: Never Used   Substance Use Topics     Alcohol use: No         Exam:    Vitals: BP (!) 153/77   Pulse 81   Wt 66.2 kg (146 lb)   BMI 27.59 kg/m    BMI: Body mass index is 27.59 kg/m .  Height: Data Unavailable    Constitutional/ general:  Pt is in no apparent distress, appears well-nourished.  Cooperative with  history and physical exam.  Seen with daughter today    Psych:  The patient answered questions appropriately.  Normal affect.  Seems to have reasonable expectations, in terms of treatment.     Lungs:  Non labored breathing, non labored speech. No cough.  No audible wheezing. Even, quiet breathing.       Vascular: Significant foot ankle and leg edema.  Weakly palpable dorsalis pedis pulses bilaterally.    Neuro: Light touch intact on digits    Derm: Thin and shiny    Musculoskeletal:    Lower extremity muscle strength is normal.  Patient is ambulatory without an assistive device or brace.  No gross deformities.  Normal arch.        right and left first nail thickened, elongated, discolored, with subungual debris.  No erythema, edema, drainage, pain on palpation.  No signs of subungual masses or exostosis and nailbed healthy.    A/P  Onychomycosis          pain    Discussed all options with patient.  Mycotic nail manually debrided with a .      Patient will keep this debrided/filed down.  Discussed with patient that medicare will not pay for this service in the future and that I do not do this in my practice unless patient at significant risk of limb loss.  Will refer to foot care nurse.  RETURN TO CLINIC prn.    Delano Romero DPM, FACFAS

## 2022-10-05 NOTE — LETTER
10/5/2022         RE: Kandis Gallagher  [address]  [address]  [place] [address]        Dear Colleague,    Thank you for referring your patient, Kandis Gallagher, to the [facility]. Please see a copy of my visit note below.    Patient complains of thick nail on right and left first toe.  Has had this for years and recently more painful..  It is slowly getting worse.  Has had pain in the past which is aggravated by activity and relieved by rest.  Describes as a burning pain.  Hard to walk in shoes now because of the pain.  Patient states it is hard for her to put on her shoes now.  Denies erythema or drainage.  Was hospitalized recently and transferred to TCU.    ROS:  See above       Allergies   Allergen Reactions     Carvedilol Other (See Comments)     Lidocaine Nausea and Vomiting     Vomiting with general anesthesia     Lisinopril Cough     Seasonal Allergies Other (See Comments)     Problems with narcotics - oxygen desaturates     Spironolactone        Current Outpatient Medications   Medication Sig Dispense Refill     AMLODIPINE BESYLATE PO Take 2.5 mg by mouth every evening       aspirin 81 MG EC tablet Take 81 mg by mouth       atenolol (TENORMIN) 25 MG tablet Please take 25 mg tablet in the morning and 50 mg tablet in the evening as ordered. 270 tablet 0     atorvastatin (LIPITOR) 10 MG tablet Take 1 tablet (10 mg) by mouth daily 90 tablet 3     Biotin w/ Vitamins C & E (HAIR SKIN & NAILS GUMMIES) 1250-7.5-7.5 MCG-MG-UNT CHEW Take 2,500 mcg by mouth       Cholecalciferol (VITAMIN D) 2000 units tablet Take 2,000 Units by mouth       fluticasone (FLONASE) 50 MCG/ACT spray SHAKE LQ AND U 1 SPR IEN QD  2     furosemide (LASIX) 20 MG tablet Take 20 mg by mouth daily       hydrochlorothiazide (HYDRODIURIL) 25 MG tablet Take 1 tablet (25 mg) by mouth daily 30 tablet      losartan (COZAAR) 100 MG tablet Take 0.5 tablets (50 mg) by mouth 2 times daily 90 tablet 3     losartan  (COZAAR) 25 MG tablet Take 25 mg by mouth 2 times daily       Multiple Vitamins-Minerals (HAIR SKIN AND NAILS FORMULA) TABS Take by mouth daily       VITAMIN D3 25 MCG (1000 UT) tablet TAKE 2 TABLET BY MOUTH ONCE A DAY         Patient Active Problem List   Diagnosis     Patient is Followed by [facility]     Elevated troponin     Thoracic back pain     Subclinical hyperthyroidism     Severe aortic stenosis     S/P AVR (aortic valve replacement)     Retention of urine     Recurrent urinary tract infection     Presence of prosthetic heart valve     Prediabetes     Postoperative retention of urine     Post-menopausal osteoporosis     Pathological fracture of vertebra due to osteoporosis (H)     Other specified conditions associated with female genital organs and menstrual cycle     Osteoporosis with current pathological fracture     Monoclonal gammopathy of unknown significance (MGUS)     Mixed hyperlipidemia     Light chain (AL) amyloidosis (H)     Leukocytosis     Intracranial meningioma (H)     Hypotension due to hypovolemia     Hyponatremia     Hypercalcemia     History of falling     Heart palpitations     Encephalopathy     Dementia (H)     Disorder of pancreas     Diverticulitis     Dysphagia     Adnexal mass     Acute kidney injury (DINA) with acute tubular necrosis (ATN) (H)     Anemia, macrocytic     Osteoarthritis of hip     Benign neoplasm of meninges (H)     Bicuspid aortic valve     Bilateral leg edema     Weakness     Delirium       Past Medical History:   Diagnosis Date     Congenital bicuspid aortic valve      H/O aortic valve replacement     23 mm Magna Ease      Hyperlipidemia      Hypertension      Hyperthyroidism      Severe aortic stenosis      Thyroiditis        No past surgical history on file.    No family history on file.    Social History     Tobacco Use     Smoking status: Never Smoker     Smokeless tobacco: Never Used   Substance Use Topics     Alcohol use: No          Exam:    Vitals: BP (!) 153/77   Pulse 81   Wt 66.2 kg (146 lb)   BMI 27.59 kg/m    BMI: Body mass index is 27.59 kg/m .  Height: Data Unavailable    Constitutional/ general:  Pt is in no apparent distress, appears well-nourished.  Cooperative with history and physical exam.  Seen with daughter today    Psych:  The patient answered questions appropriately.  Normal affect.  Seems to have reasonable expectations, in terms of treatment.     Lungs:  Non labored breathing, non labored speech. No cough.  No audible wheezing. Even, quiet breathing.       Vascular: Significant foot ankle and leg edema.  Weakly palpable dorsalis pedis pulses bilaterally.    Neuro: Light touch intact on digits    Derm: Thin and shiny    Musculoskeletal:    Lower extremity muscle strength is normal.  Patient is ambulatory without an assistive device or brace.  No gross deformities.  Normal arch.        right and left first nail thickened, elongated, discolored, with subungual debris.  No erythema, edema, drainage, pain on palpation.  No signs of subungual masses or exostosis and nailbed healthy.    A/P  Onychomycosis          pain    Discussed all options with patient.  Mycotic nail manually debrided with a .      Patient will keep this debrided/filed down.  Discussed with patient that medicare will not pay for this service in the future and that I do not do this in my practice unless patient at significant risk of limb loss.  Will refer to foot care nurse.  RETURN TO CLINIC prn.    Delano Romero DPM, FACFAS          Again, thank you for allowing me to participate in the care of your patient.        Sincerely,        Delano Romero DPM

## 2022-10-05 NOTE — PATIENT INSTRUCTIONS
"We wish you continued good healing. If you have any questions or concerns, please do not hesitate to contact us at  [phone]    [facility] (secure e-mail communication and access to your chart) to send a message or to make an appointment.    Please remember to call and schedule a follow up appointment if one was recommended at your earliest convenience.     PODIATRY CLINIC HOURS  TELEPHONE NUMBER    Dr. Delano Romero D.P.M [facility]        Clinics:  Arian Salas CMA   Tuesday 1PM-6PM  [place]  Wednesday 745AM-330PM  [facility]  Thursday/Friday 745AM-230PM  [place]     [place] APPOINTMENTS  [phone]    [place] APPOINTMENTS  [phone]        If you need a medication refill, please contact us you may need lab work and/or a follow up visit prior to your refill (i.e. Antifungal medications).  If MRI needed please call Imaging at [phone] or [phone]  HOW DO I GET MY KNEE SCOOTER? Knee scooters can be picked up at ANY Medical Supply stores with your knee scooter Prescription.  OR  Bring your signed prescription to an [facility] Medical [facility] showroom.         [facility].........................[phone]    They travel to the following [facility].   Need to call [facility] to set up appointments.     [facility]    Galina Kamara [phone]    The [facility] Care Nurse    Any Sotomayor RN, BSN, [phone]    [facility]:    [place].......[phone]    [place]................[phone]    [place]-.....[phone]    [place]........................[phone]    [facility].................[phone]      ** YOU MUST CALL FOR INFORMATION ON DAYS, TIMES AND COST OF SERVICE**      For up to date list of Foot Care Rn's. Visit the website    American [employer] Nurses Association website    Afcna.org    Click on the \"Find a foot care provider\" " Link      Roxanne Li,RN,[facility] [phone] (Text or call) Has limited home visit.    Clarita Mahan,RN,[facility] [phone]    Yolanda [phone]    Jennie Novoa,RN,BSN,[facility] [phone]    Miguelina [phone]    Ana Fontanez [phone]    Dorene Garrison [phone]    Arabella [phone]    **THIS IS A PRIVATE PAY SERVICE- NOT BILLABLE TO MEDICARE OR INSURANCE** ROUTINE FOOT CARE (NAIL TRIMMING / CALLUSES)    Go to afcna.org (American Foot Care Nurses Association) and search for providers near you.  Otherwise, this is a list we have gathered of  recommended locations/providers in [place].    [facility]   [phone]   [facility]  [phone]  [email or web address]   [facility], [facility]  [phone]  [facility] 15 mile radius [place]  [phone]  [email or web address]   [facility]  [address] [place] [address] [phone] Mathew Yang DPM  [address] [place] [address] [phone]   [facility]  [phone] [address] [place] [address]  [facility]  Dr. Cortes Waters  [phone]  [facility]  [phone]  [facility] [facility] [employer] [facility]  (does not take BCBS) FYI:  *Some providers accept insurance while others are out of pocket. Please contact them for details*

## 2022-10-09 ENCOUNTER — HEALTH MAINTENANCE LETTER (OUTPATIENT)
Age: 82
End: 2022-10-09

## 2022-10-11 ENCOUNTER — OFFICE VISIT (OUTPATIENT)
Dept: CARDIOLOGY | Facility: CLINIC | Age: 82
End: 2022-10-11
Payer: MEDICARE

## 2022-10-11 VITALS
HEIGHT: 59 IN | BODY MASS INDEX: 30.36 KG/M2 | SYSTOLIC BLOOD PRESSURE: 138 MMHG | HEART RATE: 83 BPM | DIASTOLIC BLOOD PRESSURE: 79 MMHG | WEIGHT: 150.6 LBS

## 2022-10-11 DIAGNOSIS — E78.2 MIXED HYPERLIPIDEMIA: ICD-10-CM

## 2022-10-11 DIAGNOSIS — I50.33 ACUTE ON CHRONIC DIASTOLIC HEART FAILURE (H): Primary | ICD-10-CM

## 2022-10-11 DIAGNOSIS — Z95.2 S/P AVR (AORTIC VALVE REPLACEMENT): ICD-10-CM

## 2022-10-11 DIAGNOSIS — I89.0 LYMPHEDEMA: ICD-10-CM

## 2022-10-11 DIAGNOSIS — C90.01 MULTIPLE MYELOMA IN REMISSION (H): ICD-10-CM

## 2022-10-11 DIAGNOSIS — Q23.81 BICUSPID AORTIC VALVE: ICD-10-CM

## 2022-10-11 PROCEDURE — 99215 OFFICE O/P EST HI 40 MIN: CPT | Performed by: INTERNAL MEDICINE

## 2022-10-11 RX ORDER — ACYCLOVIR 200 MG/5ML
200 SUSPENSION ORAL
COMMUNITY
Start: 2022-07-18 | End: 2022-10-17 | Stop reason: ALTCHOICE

## 2022-10-11 RX ORDER — POTASSIUM CHLORIDE 3 G/15ML
20 SOLUTION ORAL
COMMUNITY
End: 2022-10-22

## 2022-10-11 RX ORDER — METOPROLOL TARTRATE 25 MG/1
25 TABLET, FILM COATED ORAL 2 TIMES DAILY
COMMUNITY
End: 2022-10-11

## 2022-10-11 RX ORDER — FERROUS SULFATE 324(65)MG
TABLET, DELAYED RELEASE (ENTERIC COATED) ORAL
COMMUNITY
End: 2023-03-08

## 2022-10-11 RX ORDER — PANTOPRAZOLE SODIUM 40 MG/1
1 FOR SUSPENSION ORAL DAILY
COMMUNITY

## 2022-10-11 NOTE — PROGRESS NOTES
CARDIOLOGY CLINIC FOLLOW-UP NOTE      REASON FOR VISIT:   Follow-up bicuspid aortic valve s/p AVR    PRIMARY CARE PHYSICIAN:  Noemi Sanchez        History of Present Illness   Kandis Gallagher is an extremely pleasant 82 year old female who has followed with Dr. London in our clinic in the past, here to reestablish care.  She had last seen Dr. London in November 2020, and then had moved out to the [facility] and received care there, but has now moved back and wants to reestablish with us.  Her medical history is significant for a bicuspid aortic valve which progressed to severe aortic stenosis for which she underwent surgical AVR with a 23 mm Magna Ease bioprosthesis in August 2016 at [facility].  Her aortic root diameters have been within normal limits in the past.  She also has HFpEF, dyslipidemia, anemia, and has been undergoing chemotherapy for multiple myeloma, though this had to be put on hold due to her intolerance of the chemotherapy.    I reviewed her outside records, and she unfortunately did not tolerate the chemotherapy regimen well for her multiple myeloma, and had several hospitalizations for either syncope or presyncope.  During 1, she was found to have significant bradycardia and her beta-blocker (unclear if this was atenolol or metoprolol at the time) was supposed to be discontinued.  She also had several other antihypertensive agents that she is taking at various points over the past few years including HCTZ, losartan, and amlodipine, and it appears that all of these were eventually discontinued as well.  At present, she and her daughter tell me that her only cardiac medications are a baby aspirin 10 mg daily and Lipitor, 20 mg daily Lasix (with 20 M EQ daily of potassium), and for unclear reasons 25 mg twice daily of Lopressor.  Again, reviewing her outside records, it appears that the last time that she saw a cardiologist as part of the [facility], it was  recommended that she not be placed on a beta-blocker, but at some point this must have been restarted.      Symptomatically, she overall feels okay at present except for severe bilateral lower extremity swelling.  This somewhat improves at night when she elevates her legs, but does not completely resolve.  She takes Lasix 20 mg daily and has been working with physical therapy and occupational Therapy and feels that the swelling is somewhat improved compared to a few months ago, but is still far off her baseline.  She denies any significant dyspnea on exertion or orthopnea.  No chest pains.  She has not dealt with significant presyncope or syncope recently since the chemotherapy was put on hold, but she will now be meeting with oncology again in November to discuss potentially restarting chemotherapy, either the same regimen or a different management.      During today's visit, I reviewed multiple outside records, including her most recent outside BMP, CBC, magnesium, and NT proBNP.  In August 2022, her renal function was normal, hemoglobin was 10.9, and NT proBNP was 10,438, though it had been up to 25,000 in July 2022 and 51,000 in June 2022.  I also tried to review her most recent echocardiogram from 6/2022 in Care Everywhere, but the report was unavailable.  Prior to that, her 3/2021 TTE showed normal LV function with normal valve gradients.      Assessment & Plan     1. Acute on chronic HFpEF, at least moderately volume overloaded, NYHA III  2. Severe bilateral lymphedema  3. Bicuspid aortic valve c/b severe aortic stenosis, s/p SAVR  a. 23 mm Magna Ease bioprosthesis in August 2016 at [facility]  4. History of reported symptomatic bradycardia with beta-blockers  5. Hypertension  6. Dyslipidemia  7. Anemia  8. Multiple myeloma  a. Chemotherapy currently on hold due to patient intolerance        It was a pleasure to meet with Kandis and her daughter in clinic today.  I see that the most  pressing issue currently is her severe bilateral lower extremity swelling.  As we have not had an echocardiogram in our system for several years and the most recent outside report from 6/2022 is not viewable in Care Everywhere, I recommended a repeat TTE as well as basic lab work including a CBC, chemistry panel, magnesium, and NT proBNP.  I suspect that this will confirm significant intravascular volume overload, though given her lack of clear orthopnea and weight that is actually somewhat down from prior, I want to hold off on increasing her diuretics until after this lab work is back.  Regardless of what the lab work shows, I think that conservative management of her lymphedema would certainly be helpful with compression stockings, leg elevation, etc.  We talked about this in clinic, and she tells me that she had tried compression stockings in the past, but the ones that she had were so painful that she cannot wear them.  I suggested a referral to the lymphedema clinic so that they can work with her on optimizing her compression devices, and she is interested in this.    As far as her medications, I am not entirely clear why she is on the regimen that she currently is on.  It appears that her primary cardiologist from [place] had recommended that she not restart a beta-blocker, and I do not see a strong reason for her to need a beta-blocker at present.  At most, she has mild hypertension in clinic on this low-dose metoprolol at present, and given her symptoms of frequent lightheadedness, her home blood pressures might be even lower.  I recommended that she wean herself off the metoprolol by going to 12.5 mg twice daily for 1 week and then stopping, and that she log her home blood pressures once she is off the metoprolol and send these to us.  If they are significantly elevated we will potentially reinstitute another antihypertensive agent, probably losartan.      -TTE  -BMP, magnesium, NT proBNP, CBC, lipid  panel  -Lymphedema clinic referral  -Continue Lasix 20 mg and potassium 20 M EQ daily for now, though may increase this based on above test results  -Decrease metoprolol to tartrate from 25 mg twice daily to 12.5 mg twice daily for 1 week, then stop entirely  -Home BP log for 1 week after discontinuing metoprolol  -We will start additional antihypertensive agent if needed based on home BP log results        Follow-up: 1-2 months, or sooner as needed          On the date of the patient's visit, I spent a total of 50 minutes reviewing the patient's chart; interviewing, examining, and counseling the patient; coordinating with other providers as necessary, entering orders, and documenting in the medical chart.      Charlie Herzog MD  Interventional Cardiology  October 11, 2022        Medications   Current Outpatient Medications   Medication     aspirin 81 MG EC tablet     atorvastatin (LIPITOR) 10 MG tablet     Cholecalciferol (VITAMIN D) 2000 units tablet     Ferrous Sulfate 324 (65 Fe) MG TBEC     furosemide (LASIX) 20 MG tablet     pantoprazole sodium (PROTONIX) 40 MG packet     Potassium Chloride 40 MEQ/15ML (20%) SOLN     acyclovir (ZOVIRAX) 200 MG/5ML suspension     No current facility-administered medications for this visit.     Allergies   Allergies   Allergen Reactions     Carvedilol Other (See Comments)     Lidocaine Nausea and Vomiting     Vomiting with general anesthesia     Lisinopril Cough     Seasonal Allergies Other (See Comments)     Problems with narcotics - oxygen desaturates     Spironolactone          Physical Exam       BP: 138/79 Pulse: 83            Vital Signs with Ranges  Pulse:  [83] 83  BP: (138)/(79) 138/79  150 lbs 9.6 oz    Constitutional: Well-appearing, no acute distress  Respiratory: Normal respiratory effort, CTAB  Cardiovascular: RRR, 1/6 systolic murmur at the RUSB.  JVP appears about 10 cm H2O.  There is 3+ bilateral LE edema.  Normal carotid upstrokes, no carotid bruits.   Statement Selected

## 2022-10-11 NOTE — LETTER
10/11/2022    Noemi Sanchez MD  [address]  [place] [address]    RE: Kandis Gallagher       Dear Colleague,     I had the pleasure of seeing Kandis Gallagher in the [facility].  CARDIOLOGY CLINIC FOLLOW-UP NOTE      REASON FOR VISIT:   Follow-up bicuspid aortic valve s/p AVR    PRIMARY CARE PHYSICIAN:  Noemi Sanchez        History of Present Illness   Kandis Gallagher is an extremely pleasant 82 year old female who has followed with Dr. London in our clinic in the past, here to reestablish care.  She had last seen Dr. London in November 2020, and then had moved out to the [facility] and received care there, but has now moved back and wants to reestablish with us.  Her medical history is significant for a bicuspid aortic valve which progressed to severe aortic stenosis for which she underwent surgical AVR with a 23 mm Magna Ease bioprosthesis in August 2016 at [facility].  Her aortic root diameters have been within normal limits in the past.  She also has HFpEF, dyslipidemia, anemia, and has been undergoing chemotherapy for multiple myeloma, though this had to be put on hold due to her intolerance of the chemotherapy.    I reviewed her outside records, and she unfortunately did not tolerate the chemotherapy regimen well for her multiple myeloma, and had several hospitalizations for either syncope or presyncope.  During 1, she was found to have significant bradycardia and her beta-blocker (unclear if this was atenolol or metoprolol at the time) was supposed to be discontinued.  She also had several other antihypertensive agents that she is taking at various points over the past few years including HCTZ, losartan, and amlodipine, and it appears that all of these were eventually discontinued as well.  At present, she and her daughter tell me that her only cardiac medications are a baby aspirin 10 mg daily and Lipitor, 20 mg daily Lasix (with 20 M EQ  daily of potassium), and for unclear reasons 25 mg twice daily of Lopressor.  Again, reviewing her outside records, it appears that the last time that she saw a cardiologist as part of the [facility] system, it was recommended that she not be placed on a beta-blocker, but at some point this must have been restarted.      Symptomatically, she overall feels okay at present except for severe bilateral lower extremity swelling.  This somewhat improves at night when she elevates her legs, but does not completely resolve.  She takes Lasix 20 mg daily and has been working with physical therapy and occupational Therapy and feels that the swelling is somewhat improved compared to a few months ago, but is still far off her baseline.  She denies any significant dyspnea on exertion or orthopnea.  No chest pains.  She has not dealt with significant presyncope or syncope recently since the chemotherapy was put on hold, but she will now be meeting with oncology again in November to discuss potentially restarting chemotherapy, either the same regimen or a different management.      During today's visit, I reviewed multiple outside records, including her most recent outside BMP, CBC, magnesium, and NT proBNP.  In August 2022, her renal function was normal, hemoglobin was 10.9, and NT proBNP was 10,438, though it had been up to 25,000 in July 2022 and 51,000 in June 2022.  I also tried to review her most recent echocardiogram from 6/2022 in Care Everywhere, but the report was unavailable.  Prior to that, her 3/2021 TTE showed normal LV function with normal valve gradients.      Assessment & Plan     1. Acute on chronic HFpEF, at least moderately volume overloaded, NYHA III  2. Severe bilateral lymphedema  3. Bicuspid aortic valve c/b severe aortic stenosis, s/p SAVR  a. 23 mm Magna Ease bioprosthesis in August 2016 at [facility]  4. History of reported symptomatic bradycardia with  beta-blockers  5. Hypertension  6. Dyslipidemia  7. Anemia  8. Multiple myeloma  a. Chemotherapy currently on hold due to patient intolerance        It was a pleasure to meet with Kandis and her daughter in clinic today.  I see that the most pressing issue currently is her severe bilateral lower extremity swelling.  As we have not had an echocardiogram in our system for several years and the most recent outside report from 6/2022 is not viewable in Care Everywhere, I recommended a repeat TTE as well as basic lab work including a CBC, chemistry panel, magnesium, and NT proBNP.  I suspect that this will confirm significant intravascular volume overload, though given her lack of clear orthopnea and weight that is actually somewhat down from prior, I want to hold off on increasing her diuretics until after this lab work is back.  Regardless of what the lab work shows, I think that conservative management of her lymphedema would certainly be helpful with compression stockings, leg elevation, etc.  We talked about this in clinic, and she tells me that she had tried compression stockings in the past, but the ones that she had were so painful that she cannot wear them.  I suggested a referral to the lymphedema clinic so that they can work with her on optimizing her compression devices, and she is interested in this.    As far as her medications, I am not entirely clear why she is on the regimen that she currently is on.  It appears that her primary cardiologist from [place] had recommended that she not restart a beta-blocker, and I do not see a strong reason for her to need a beta-blocker at present.  At most, she has mild hypertension in clinic on this low-dose metoprolol at present, and given her symptoms of frequent lightheadedness, her home blood pressures might be even lower.  I recommended that she wean herself off the metoprolol by going to 12.5 mg twice daily for 1 week and then stopping, and that she log her  home blood pressures once she is off the metoprolol and send these to us.  If they are significantly elevated we will potentially reinstitute another antihypertensive agent, probably losartan.      -TTE  -BMP, magnesium, NT proBNP, CBC, lipid panel  -Lymphedema clinic referral  -Continue Lasix 20 mg and potassium 20 M EQ daily for now, though may increase this based on above test results  -Decrease metoprolol to tartrate from 25 mg twice daily to 12.5 mg twice daily for 1 week, then stop entirely  -Home BP log for 1 week after discontinuing metoprolol  -We will start additional antihypertensive agent if needed based on home BP log results        Follow-up: 1-2 months, or sooner as needed          On the date of the patient's visit, I spent a total of 50 minutes reviewing the patient's chart; interviewing, examining, and counseling the patient; coordinating with other providers as necessary, entering orders, and documenting in the medical chart.      Charlie Herzog MD  Interventional Cardiology  October 11, 2022        Medications   Current Outpatient Medications   Medication     aspirin 81 MG EC tablet     atorvastatin (LIPITOR) 10 MG tablet     Cholecalciferol (VITAMIN D) 2000 units tablet     Ferrous Sulfate 324 (65 Fe) MG TBEC     furosemide (LASIX) 20 MG tablet     pantoprazole sodium (PROTONIX) 40 MG packet     Potassium Chloride 40 MEQ/15ML (20%) SOLN     acyclovir (ZOVIRAX) 200 MG/5ML suspension     No current facility-administered medications for this visit.     Allergies   Allergies   Allergen Reactions     Carvedilol Other (See Comments)     Lidocaine Nausea and Vomiting     Vomiting with general anesthesia     Lisinopril Cough     Seasonal Allergies Other (See Comments)     Problems with narcotics - oxygen desaturates     Spironolactone          Physical Exam       BP: 138/79 Pulse: 83            Vital Signs with Ranges  Pulse:  [83] 83  BP: (138)/(79) 138/79  150 lbs 9.6 oz    Constitutional:  Well-appearing, no acute distress  Respiratory: Normal respiratory effort, CTAB  Cardiovascular: RRR, 1/6 systolic murmur at the RUSB.  JVP appears about 10 cm H2O.  There is 3+ bilateral LE edema.  Normal carotid upstrokes, no carotid bruits.    Thank you for allowing me to participate in the care of your patient.      Sincerely,     Charlie Herzog MD     [facility]  cc:   No referring provider defined for this encounter.

## 2022-10-11 NOTE — PATIENT INSTRUCTIONS
- Check your blood pressure twice per day, once about an hour after taking your morning meds, and once anytime else in the day.  Write them down for a week, and then you can call us with the results, or send a [employer] message.    - Schedule a lab draw and ultrasound of the heart    - Schedule a visit with the Lymphedema clinic.    - Take 1/2 tablet of metoprolol in the morning and evening for 1 week, then discontinue  - Continue all of your other medications unchanged for now, but we may call you with further adjustments

## 2022-10-17 ENCOUNTER — OFFICE VISIT (OUTPATIENT)
Dept: FAMILY MEDICINE | Facility: CLINIC | Age: 82
End: 2022-10-17
Payer: MEDICARE

## 2022-10-17 VITALS
SYSTOLIC BLOOD PRESSURE: 162 MMHG | WEIGHT: 150.4 LBS | HEART RATE: 92 BPM | TEMPERATURE: 98.1 F | RESPIRATION RATE: 18 BRPM | OXYGEN SATURATION: 95 % | HEIGHT: 59 IN | BODY MASS INDEX: 30.32 KG/M2 | DIASTOLIC BLOOD PRESSURE: 74 MMHG

## 2022-10-17 DIAGNOSIS — E78.2 MIXED HYPERLIPIDEMIA: ICD-10-CM

## 2022-10-17 DIAGNOSIS — Z23 NEED FOR PROPHYLACTIC VACCINATION AND INOCULATION AGAINST INFLUENZA: ICD-10-CM

## 2022-10-17 DIAGNOSIS — C90.00 MULTIPLE MYELOMA, REMISSION STATUS UNSPECIFIED (H): ICD-10-CM

## 2022-10-17 DIAGNOSIS — I50.32 CHRONIC HEART FAILURE WITH PRESERVED EJECTION FRACTION (HFPEF) (H): ICD-10-CM

## 2022-10-17 DIAGNOSIS — E05.90 SUBCLINICAL HYPERTHYROIDISM: ICD-10-CM

## 2022-10-17 DIAGNOSIS — Z95.2 S/P AVR (AORTIC VALVE REPLACEMENT): ICD-10-CM

## 2022-10-17 DIAGNOSIS — G30.9 ALZHEIMER'S DEMENTIA WITHOUT BEHAVIORAL DISTURBANCE, PSYCHOTIC DISTURBANCE, MOOD DISTURBANCE, OR ANXIETY, UNSPECIFIED DEMENTIA SEVERITY, UNSPECIFIED TIMING OF DEMENTIA ONSET (H): ICD-10-CM

## 2022-10-17 DIAGNOSIS — M81.0 AGE RELATED OSTEOPOROSIS, UNSPECIFIED PATHOLOGICAL FRACTURE PRESENCE: ICD-10-CM

## 2022-10-17 DIAGNOSIS — R73.03 PREDIABETES: ICD-10-CM

## 2022-10-17 DIAGNOSIS — E85.81 AL AMYLOIDOSIS (H): ICD-10-CM

## 2022-10-17 DIAGNOSIS — F02.80 ALZHEIMER'S DEMENTIA WITHOUT BEHAVIORAL DISTURBANCE, PSYCHOTIC DISTURBANCE, MOOD DISTURBANCE, OR ANXIETY, UNSPECIFIED DEMENTIA SEVERITY, UNSPECIFIED TIMING OF DEMENTIA ONSET (H): ICD-10-CM

## 2022-10-17 DIAGNOSIS — Z00.00 ENCOUNTER FOR MEDICARE ANNUAL WELLNESS EXAM: Primary | ICD-10-CM

## 2022-10-17 DIAGNOSIS — Z23 HIGH PRIORITY FOR 2019-NCOV VACCINE: ICD-10-CM

## 2022-10-17 PROCEDURE — 0134A COVID-19,PF,MODERNA BIVALENT: CPT | Performed by: FAMILY MEDICINE

## 2022-10-17 PROCEDURE — G0439 PPPS, SUBSEQ VISIT: HCPCS | Performed by: FAMILY MEDICINE

## 2022-10-17 PROCEDURE — G0009 ADMIN PNEUMOCOCCAL VACCINE: HCPCS | Performed by: FAMILY MEDICINE

## 2022-10-17 PROCEDURE — 90677 PCV20 VACCINE IM: CPT | Performed by: FAMILY MEDICINE

## 2022-10-17 PROCEDURE — G0008 ADMIN INFLUENZA VIRUS VAC: HCPCS | Performed by: FAMILY MEDICINE

## 2022-10-17 PROCEDURE — 90662 IIV NO PRSV INCREASED AG IM: CPT | Performed by: FAMILY MEDICINE

## 2022-10-17 PROCEDURE — 99204 OFFICE O/P NEW MOD 45 MIN: CPT | Mod: 25 | Performed by: FAMILY MEDICINE

## 2022-10-17 PROCEDURE — 91313 COVID-19,PF,MODERNA BIVALENT: CPT | Performed by: FAMILY MEDICINE

## 2022-10-17 RX ORDER — ACYCLOVIR 400 MG/1
400 TABLET ORAL EVERY 12 HOURS
Qty: 180 TABLET | Refills: 0 | Status: SHIPPED | OUTPATIENT
Start: 2022-10-17 | End: 2023-01-10

## 2022-10-17 ASSESSMENT — ENCOUNTER SYMPTOMS
FEVER: 0
NAUSEA: 0
SHORTNESS OF BREATH: 0
HEARTBURN: 0
COUGH: 0
NERVOUS/ANXIOUS: 0
HEADACHES: 0
JOINT SWELLING: 0
DIZZINESS: 0
HEMATOCHEZIA: 0
WEAKNESS: 1
CONSTIPATION: 0
SORE THROAT: 0
ABDOMINAL PAIN: 0
HEMATURIA: 0
CHILLS: 0
MYALGIAS: 0
EYE PAIN: 0
PARESTHESIAS: 0
DIARRHEA: 0
FREQUENCY: 1
BREAST MASS: 0
PALPITATIONS: 0
DYSURIA: 0
ARTHRALGIAS: 0

## 2022-10-17 ASSESSMENT — PAIN SCALES - GENERAL: PAINLEVEL: NO PAIN (0)

## 2022-10-17 ASSESSMENT — ACTIVITIES OF DAILY LIVING (ADL)
CURRENT_FUNCTION: TRANSPORTATION REQUIRES ASSISTANCE
CURRENT_FUNCTION: SHOPPING REQUIRES ASSISTANCE
CURRENT_FUNCTION: MONEY MANAGEMENT REQUIRES ASSISTANCE

## 2022-10-17 NOTE — NURSING NOTE
Prior to immunization administration, verified patients identity using patient s name and date of birth. Please see Immunization Activity for additional information.     Screening Questionnaire for Adult Immunization    Are you sick today?   No   Do you have allergies to medications, food, a vaccine component or latex?   Yes   Have you ever had a serious reaction after receiving a vaccination?   No   Do you have a long-term health problem with heart, lung, kidney, or metabolic disease (e.g., diabetes), asthma, a blood disorder, no spleen, complement component deficiency, a cochlear implant, or a spinal fluid leak?  Are you on long-term aspirin therapy?   Yes   Do you have cancer, leukemia, HIV/AIDS, or any other immune system problem?   Yes   Do you have a parent, brother, or sister with an immune system problem?   No   In the past 3 months, have you taken medications that affect  your immune system, such as prednisone, other steroids, or anticancer drugs; drugs for the treatment of rheumatoid arthritis, Crohn s disease, or psoriasis; or have you had radiation treatments?   No   Have you had a seizure, or a brain or other nervous system problem?   No   During the past year, have you received a transfusion of blood or blood    products, or been given immune (gamma) globulin or antiviral drug?   Yes   For women: Are you pregnant or is there a chance you could become       pregnant during the next month?   No   Have you received any vaccinations in the past 4 weeks?   No     Immunization questionnaire was positive for at least one answer.  Notified Dr. Sanchez.        Per orders of Dr. Sanchez, injection of [id number], Flu, Moderna given by Amina Gerardo MA. Patient instructed to remain in clinic for 15 minutes afterwards, and to report any adverse reaction to me immediately.       Screening performed by Amina Gerardo MA on 10/17/2022 at 3:16 PM.

## 2022-10-17 NOTE — PROGRESS NOTES
"SUBJECTIVE:   Kandis is a 82 year old who presents for Preventive Visit.    {  Patient has been advised of split billing requirements and indicates understanding: Yes  Are you in the first 12 months of your Medicare coverage?  No    Healthy Habits:     In general, how would you rate your overall health?  Fair    Frequency of exercise:  2-3 days/week    Duration of exercise:  15-30 minutes    Do you usually eat at least 4 servings of fruit and vegetables a day, include whole grains    & fiber and avoid regularly eating high fat or \"junk\" foods?  Yes    Taking medications regularly:  No    Barriers to taking medications:  Other    Medication side effects:  None    Ability to successfully perform activities of daily living:  Transportation requires assistance, shopping requires assistance and money management requires assistance    Home Safety:  No safety concerns identified    Hearing Impairment:  Difficulty understanding soft or whispered speech    In the past 6 months, have you been bothered by leaking of urine? Yes    In general, how would you rate your overall mental or emotional health?  Good      PHQ-2 Total Score: 0    Additional concerns today:  No  PHYSICAL THERAPY and OT -  Started in NH.  Out for 3 weeks.      Do you feel safe in your environment? Yes    Have you ever done Advance Care Planning? (For example, a Health Directive, POLST, or a discussion with a medical provider or your loved ones about your wishes): Yes, patient states has an Advance Care Planning document and will bring a copy to the clinic.      Fall risk  Fallen 2 or more times in the past year?: Yes  Any fall with injury in the past year?: No    Cognitive Screening   1) Repeat 3 items (Leader, Season, Table)    2) Clock draw: NORMAL  3) 3 item recall: Recalls NO objects   Results: 0 items recalled: PROBABLE COGNITIVE IMPAIRMENT, **INFORM PROVIDER**  living in controlled environment, medication and dietary assistance.    Mini-CogTM " Copyright LATRICE Brizuela. Licensed by the author for use in [facility]; reprinted with permission (antoine@[facility]). All rights reserved.      Do you have sleep apnea, excessive snoring or daytime drowsiness?: yes    Reviewed and updated as needed this visit by clinical staff   Tobacco  Allergies  Meds   Med Hx  Surg Hx  Fam Hx  Soc Hx        Reviewed and updated as needed this visit by Provider   Tobacco  Allergies    Med Hx  Surg Hx  Fam Hx  Soc Hx       Social History     Tobacco Use     Smoking status: Never     Smokeless tobacco: Never   Substance Use Topics     Alcohol use: No     If you drink alcohol do you typically have >3 drinks per day or >7 drinks per week? No    Alcohol Use 10/17/2022   Prescreen: >3 drinks/day or >7 drinks/week? Not Applicable   Prescreen: >3 drinks/day or >7 drinks/week? -   No flowsheet data found.        Hyperlipidemia Follow-Up      Are you regularly taking any medication or supplement to lower your cholesterol?   Yes- :Lipitor     Are you having muscle aches or other side effects that you think could be caused by your cholesterol lowering medication?  No    Hypertension Follow-up      Do you check your blood pressure regularly outside of the clinic? Yes     Are you following a low salt diet? Yes    Are your blood pressures ever more than 140 on the top number (systolic) OR more   than 90 on the bottom number (diastolic), for example 140/90? No    Heart Failure Follow-up    Are you experiencing any shortness of breath? No    Are you experiencing any swelling in your legs or feet?  Stable    Are you using more pillows than usual? No    Do you cough at night?  No    Do you check your weight daily?  No    Have you had a weight change recently?  No    Are you having any of the following side effects from your medications? (Select all that apply)  The patient does not report symptoms of dizziness, fatigue, cough, swelling, or slow heart beat.   Weaning from Metoprolol per  cardiology due to side effects in past.     Since your last visit, how many times have you gone to the cardiologist, urgent care, emergency room, or hospital because of your heart failure?   1 time     S/P AVR (aortic valve replacement)  -   Follow up DAMARI planned by cardiology.     Multiple myeloma, remission status unspecified (H) , AL amyloidosis (H)- appointment with [facility] to continue treatment/evaluation.  Treatment was previously interrupted due to intolerance.  Was begin treated with Lumoid previously.      Low TSH level - review of medication list indicates vitamin for hair/nails.  Will plan to confirm if this vitamin has biotin in it.  Reassess free T4 with next lab draw.     Osteoporosis with current pathological fracture - found on diagnosis of the amyloidiosis.  No current symptoms.     Prediabetes         Current providers sharing in care for this patient include:   Patient Care Team:  Noemi Sanchez MD as PCP - General (Family Medicine)  Regis Ramirez, RN as Nurse Coordinator (Cardiology)  Denae London MD as MD (Cardiology)  Karen Kaplan, MYRON as Specialty Care Coordinator (Cardiology)  Shy Quintero NP as Nurse Practitioner (Cardiology)    The following health maintenance items are reviewed in Epic and correct as of today:  Health Maintenance   Topic Date Due     DEXA  Never done     ANNUAL REVIEW OF  ORDERS  Never done     HEPATITIS B IMMUNIZATION (1 of 3 - 3-dose series) Never done     ZOSTER IMMUNIZATION (1 of 2) 09/24/2009     Pneumococcal Vaccine: 65+ Years (3 - PCV) 03/12/2011     COVID-19 Vaccine (5 - Booster for Moderna series) 07/19/2022     INFLUENZA VACCINE (1) 09/01/2022     MEDICARE ANNUAL WELLNESS VISIT  11/22/2022     FALL RISK ASSESSMENT  10/17/2023     DTAP/TDAP/TD IMMUNIZATION (2 - Td or Tdap) 06/02/2026     ADVANCE CARE PLANNING  10/17/2027     PHQ-2 (once per calendar year)  Completed     IPV IMMUNIZATION  Aged Out     MENINGITIS IMMUNIZATION  Aged Out  "    BP Readings from Last 3 Encounters:   10/17/22 (!) 162/74   10/11/22 138/79   10/05/22 (!) 153/77    Wt Readings from Last 3 Encounters:   10/17/22 68.2 kg (150 lb 6.4 oz)   10/11/22 68.3 kg (150 lb 9.6 oz)   10/05/22 66.2 kg (146 lb)                  Pneumonia Vaccine:  Complete series today.   Mammogram Screening: Mammogram Screening - Patient over age 75, has elected to continue with screening.      Pertinent mammograms are reviewed under the imaging tab.    Review of Systems   Constitutional: Negative for chills and fever.   HENT: Negative for congestion, ear pain, hearing loss and sore throat.    Eyes: Negative for pain and visual disturbance.   Respiratory: Negative for cough and shortness of breath.    Cardiovascular: Positive for peripheral edema. Negative for chest pain and palpitations.   Gastrointestinal: Negative for abdominal pain, constipation, diarrhea, heartburn, hematochezia and nausea.   Breasts:  Negative for tenderness, breast mass and discharge.   Genitourinary: Positive for frequency and urgency. Negative for dysuria, genital sores, hematuria, pelvic pain, vaginal bleeding and vaginal discharge.   Musculoskeletal: Negative for arthralgias, joint swelling and myalgias.   Skin: Negative for rash.   Neurological: Positive for weakness. Negative for dizziness, headaches and paresthesias.   Psychiatric/Behavioral: Negative for mood changes. The patient is not nervous/anxious.        Bleeding ulcer in August  required   OBJECTIVE:   BP (!) 162/74 (BP Location: Right arm, Patient Position: Sitting, Cuff Size: Adult Regular)   Pulse 92   Temp 98.1  F (36.7  C) (Oral)   Resp 18   Ht 1.499 m (4' 11\")   Wt 68.2 kg (150 lb 6.4 oz)   SpO2 95%   BMI 30.38 kg/m   Estimated body mass index is 30.38 kg/m  as calculated from the following:    Height as of this encounter: 1.499 m (4' 11\").    Weight as of this encounter: 68.2 kg (150 lb 6.4 oz).  Physical Exam  GENERAL APPEARANCE: alert and no " distress  EYES: Eyes grossly normal to inspection, PERRL and conjunctivae and sclerae normal  HENT: ear canals and TM's normal, nose and mouth without ulcers or lesions, oropharynx clear and oral mucous membranes moist  NECK: no adenopathy, no asymmetry, masses, or scars and thyroid normal to palpation  RESP: lungs clear to auscultation - no rales, rhonchi or wheezes  CV: regular rate and rhythm, normal S1 S2, no S3 or S4, no murmur, click or rub, no peripheral edema and peripheral pulses strong  MS: no musculoskeletal defects are noted and gait is age appropriate without ataxia  SKIN: no suspicious lesions or rashes  NEURO: Normal strength and tone, sensory exam grossly normal, mentation intact and speech normal  PSYCH: mentation appears normal and affect normal/bright    Diagnostic Test Results:  Labs reviewed in Epic    ASSESSMENT / PLAN:   (Z00.00) Encounter for Medicare annual wellness exam  (primary encounter diagnosis)  Comment:   Plan: screening and preventative care discussed.    (I50.32) Chronic heart failure with preserved ejection fraction (HFpEF) (H)  (Z95.2) S/P AVR (aortic valve replacement)  Comment: Care with cardiology  Plan: Upcoming DAMARI.  Adjustment of medication to discontinue beta-blocker has been recommended by cardiology and she is in the process of weaning that now.    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  Low TSH related to biotin use?  Plan: T4, free        Will assess free T4 for overall thyroid function.    (E78.2) Mixed hyperlipidemia  Comment:   Plan: Continues on Lipitor for cholesterol management    (M80.00XA) Osteoporosis with current pathological fracture  Comment:   Plan: Obtain yearly Reclast.  Review of chart indicates this was last received in June  "2022    (R73.03) Prediabetes  Comment:   Plan: Has lab orders available to include blood sugar from cardiology.    Dementia:  She is residing in a safe location with support as needed.      (Z23) High priority for 2019-nCoV vaccine  Comment:   Plan: COVID-19,PF,MODERNA BIVALENT 18+Yrs        Booster    (Z23) Need for prophylactic vaccination and inoculation against influenza  Comment:   Plan: Seasonal vaccine given    Patient has been advised of split billing requirements and indicates understanding: Yes    COUNSELING:  Reviewed preventive health counseling, as reflected in patient instructions       Regular exercise       Healthy diet/nutrition       Vision screening       Hearing screening       Dental care       Bladder control       Fall risk prevention       Immunizations    Vaccinated for: Influenza and Pneumococcal and Moderna bivalent booster           Osteoporosis prevention/bone health    Estimated body mass index is 30.38 kg/m  as calculated from the following:    Height as of this encounter: 1.499 m (4' 11\").    Weight as of this encounter: 68.2 kg (150 lb 6.4 oz).    Weight management plan: Discussed healthy diet and exercise guidelines    She reports that she has never smoked. She has never used smokeless tobacco.      Appropriate preventive services were discussed with this patient, including applicable screening as appropriate for cardiovascular disease, diabetes, osteopenia/osteoporosis, and glaucoma.  As appropriate for age/gender, discussed screening for colorectal cancer, prostate cancer, breast cancer, and cervical cancer. Checklist reviewing preventive services available has been given to the patient.    Reviewed patients plan of care and provided an AVS. The Basic Care Plan (routine screening as documented in Health Maintenance) for Kandis meets the Care Plan requirement. This Care Plan has been established and reviewed with the Patient and daughter.    Counseling Resources:  ATP IV " Guidelines  Pooled Cohorts Equation Calculator  Breast Cancer Risk Calculator  Breast Cancer: Medication to Reduce Risk  FRAX Risk Assessment  ICSI Preventive Guidelines  Dietary Guidelines for Americans, 2010  USDA's MyPlate  ASA Prophylaxis  Lung CA Screening    Noemi Sanchez MD  [facility]    Identified Health Risks:    The patient was provided with suggestions to help her develop a healthy physical lifestyle.  The patient reports that she has difficulty with activities of daily living. I have asked that the patient make a follow up appointment in 53 weeks where this issue will be further evaluated and addressed.  She is in a secure location with assistance as needed.  The patient was provided with written information regarding signs of hearing loss.  Information on urinary incontinence and treatment options given to patient.  She is at risk for falling and has been provided with information to reduce the risk of falling at home.      Patient Instructions     I will update prescriptions for you.    For the Vitamin D use 2000 international unit(s) daily.    Lab testing next as planned.  I will look at those results and let you know if anything there is impacting the hair loss.      Patient Education   Personalized Prevention Plan  You are due for the preventive services outlined below.  Your care team is available to assist you in scheduling these services.  If you have already completed any of these items, please share that information with your care team to update in your medical record.  Health Maintenance Due   Topic Date Due     Osteoporosis Screening  Never done     Hepatitis B Vaccine (1 of 3 - 3-dose series) Never done     Zoster (Shingles) Vaccine (1 of 2) 09/24/2009     Pneumococcal Vaccine (3 - PCV) 03/12/2011     COVID-19 Vaccine (5 - Booster for Moderna series) 07/19/2022     Flu Vaccine (1) 09/01/2022

## 2022-10-17 NOTE — PATIENT INSTRUCTIONS
I will update prescriptions for you.    For the Vitamin D use 2000 international unit(s) daily.    Lab testing next as planned.  I will look at those results and let you know if anything there is impacting the hair loss.      Patient Education   Personalized Prevention Plan  You are due for the preventive services outlined below.  Your care team is available to assist you in scheduling these services.  If you have already completed any of these items, please share that information with your care team to update in your medical record.  Health Maintenance Due   Topic Date Due    Osteoporosis Screening  Never done    Hepatitis B Vaccine (1 of 3 - 3-dose series) Never done    Zoster (Shingles) Vaccine (1 of 2) 09/24/2009    Pneumococcal Vaccine (3 - PCV) 03/12/2011    COVID-19 Vaccine (5 - Booster for Moderna series) 07/19/2022    Flu Vaccine (1) 09/01/2022     Your Health Risk Assessment indicates you feel you are not in good health    A healthy lifestyle helps keep the body fit and the mind alert. It helps protect you from disease, helps you fight disease, and helps prevent chronic disease (disease that doesn't go away) from getting worse. This is important as you get older and begin to notice twinges in muscles and joints and a decline in the strength and stamina you once took for granted. A healthy lifestyle includes good healthcare, good nutrition, weight control, recreation, and regular exercise. Avoid harmful substances and do what you can to keep safe. Another part of a healthy lifestyle is stay mentally active and socially involved.    Good healthcare   Have a wellness visit every year.   If you have new symptoms, let us know right away. Don't wait until the next checkup.   Take medicines exactly as prescribed and keep your medicines in a safe place. Tell us if your medicine causes problems.   Healthy diet and weight control   Eat 3 or 4 small, nutritious, low-fat, high-fiber meals a day. Include a variety of  fruits, vegetables, and whole-grain foods.   Make sure you get enough calcium in your diet. Calcium, vitamin D, and exercise help prevent osteoporosis (bone thinning).   If you live alone, try eating with others when you can. That way you get a good meal and have company while you eat it.   Try to keep a healthy weight. If you eat more calories than your body uses for energy, it will be stored as fat and you will gain weight.     Recreation   Recreation is not limited to sports and team events. It includes any activity that provides relaxation, interest, enjoyment, and exercise. Recreation provides an outlet for physical, mental, and social energy. It can give a sense of worth and achievement. It can help you stay healthy.    Mental Exercise and Social Involvement  Mental and emotional health is as important as physical health. Keep in touch with friends and family. Stay as active as possible. Continue to learn and challenge yourself.   Things you can do to stay mentally active are:  Learn something new, like a foreign language or musical instrument.   Play SCRABBLE or do crossword puzzles. If you cannot find people to play these games with you at home, you can play them with others on your computer through the Internet.   Join a games club--anything from card games to chess or checkers or lawn bowling.   Start a new hobby.   Go back to school.   Volunteer.   Read.   Keep up with world events.  Activities of Daily Living    Your Health Risk Assessment indicates you have difficulties with activities of daily living such as housework, bathing, preparing meals, taking medication, etc. Please make a follow up appointment for us to address this issue in more detail.    Signs of Hearing Loss      Hearing much better with one ear can be a sign of hearing loss.   Hearing loss is a problem shared by many people. In fact, it is one of the most common health problems, particularly as people age. Most people age 65 and older  have some hearing loss. By age 80, almost everyone does. Hearing loss often occurs slowly over the years. So you may not realize your hearing has gotten worse.  Have your hearing checked  Call your healthcare provider if you:  Have to strain to hear normal conversation  Have to watch other people s faces very carefully to follow what they re saying  Need to ask people to repeat what they ve said  Often misunderstand what people are saying  Turn the volume of the television or radio up so high that others complain  Feel that people are mumbling when they re talking to you  Find that the effort to hear leaves you feeling tired and irritated  Notice, when using the phone, that you hear better with one ear than the other  [employer] last reviewed this educational content on 1/1/2020 2000-2021 [employer]. All rights reserved. This information is not intended as a substitute for professional medical care. Always follow your healthcare professional's instructions.          Urinary Incontinence, Female (Adult)   Urinary incontinence means loss of bladder control. This problem affects many women, especially as they get older. If you have incontinence, you may be embarrassed to ask for help. But know that this problem can be treated.   Types of Incontinence  There are different types of incontinence. Two of the main types are described here. You can have more than one type.   Stress incontinence. With this type, urine leaks when pressure (stress) is put on the bladder. This may happen when you cough, sneeze, or laugh. Stress incontinence most often occurs because the pelvic floor muscles that support the bladder and urethra are weak. This can happen after pregnancy and vaginal childbirth or a hysterectomy. It can also be due to excess body weight or hormone changes.  Urge incontinence (also called overactive bladder). With this type, a sudden urge to urinate is felt often. This may happen even though there may  not be much urine in the bladder. The need to urinate often during the night is common. Urge incontinence most often occurs because of bladder spasms. This may be due to bladder irritation or infection. Damage to bladder nerves or pelvic muscles, constipation, and certain medicines can also lead to urge incontinence.  Treatment depends on the cause. Further evaluation is needed to find the type you have. This will likely include an exam and certain tests. Based on the results, you and your healthcare provider can then plan treatment. Until a diagnosis is made, the home care tips below can help ease symptoms.   Home care  Do pelvic floor muscle exercises, if they are prescribed. The pelvic floor muscles help support the bladder and urethra. Many women find that their symptoms improve when doing special exercises that strengthen these muscles. To do the exercises, contract the muscles you would use to stop your stream of urine. But do this when you re not urinating. Hold for 10 seconds, then relax. Repeat 10 to 20 times in a row, at least 3 times a day. Your healthcare provider may give you other instructions for how to do the exercises and how often.  Keep a bladder diary. This helps track how often and how much you urinate over a set period of time. Bring this diary with you to your next visit with the provider. The information can help your provider learn more about your bladder problem.  Lose weight, if advised to by your provider. Extra weight puts pressure on the bladder. Your provider can help you create a weight-loss plan that s right for you. This may include exercising more and making certain diet changes.  Don't have foods and drinks that may irritate the bladder. These can include alcohol and caffeinated drinks.  Quit smoking. Smoking and other tobacco use can lead to a long-term (chronic) cough that strains the pelvic floor muscles. Smoking may also damage the bladder and urethra. Talk with your provider  about treatments or methods you can use to quit smoking.  If drinking large amounts of fluid makes you have symptoms, you may be advised to limit your fluid intake. You may also be advised to drink most of your fluids during the day and to limit fluids at night.  If you re worried about urine leakage or accidents, you may wear absorbent pads to catch urine. Change the pads often. This helps reduce discomfort. It may also reduce the risk of skin or bladder infections.    Follow-up care  Follow up with your healthcare provider, or as directed. It may take some to find the right treatment for your problem. But healthy lifestyle changes can be made right away. These include such things as exercising on a regular basis, eating a healthy diet, losing weight (if needed), and quitting smoking. Your treatment plan may include special therapies or medicines. Certain procedures or surgery may also be options. Talk about any questions you have with your provider.   When to seek medical advice  Call the healthcare provider right away if any of these occur:  Fever of 100.4 F (38 C) or higher, or as directed by your provider  Bladder pain or fullness  Belly swelling  Nausea or vomiting  Back pain  Weakness, dizziness, or fainting  [employer] last reviewed this educational content on 1/1/2020 2000-2021 [employer]. All rights reserved. This information is not intended as a substitute for professional medical care. Always follow your healthcare professional's instructions.          Preventing Falls at Home  A person can fall for many reasons. Older adults may fall because reaction time slows down as we age. Your muscles and joints may get stiff, weak, or less flexible because of illness, medicines, or a physical condition.   Other health problems that make falls more likely include:   Arthritis  Dizziness or lightheadedness when you stand up (orthostatic hypotension)  History of a stroke  Dizziness  Anemia  Certain  medicines taken for mental illness or to control blood pressure.  Problems with balance or gait  Bladder or urinary problems  History of falling  Changes in vision (vision impairment)  Changes in thinking skills and memory (cognitive impairment)  Injuries from a fall can include serious injuries such as broken bones, dislocated joints, internal bleeding and cuts. Injuries like these can limit your independence.   Prevention tips  To help prevent falls and fall-related injuries, follow the tips below.    Floors  To make floors safer:   Put nonskid pads under area rugs.  Remove small rugs.  Replace worn floor coverings.  Tack carpets firmly to each step on carpeted stairs. Put nonskid strips on the edges of uncarpeted stairs.  Keep floors and stairs free of clutter and cords.  Arrange furniture so there are clear pathways.  Clean up any spills right away.  Bathrooms    To make bathrooms safer:   Install grab bars in the tub or shower.  Apply nonskid strips or put a nonskid rubber mat in the tub or shower.  Sit on a bath chair to bathe.  Use bathmats with nonskid backing.  Lighting  To improve visibility in your home:    Keep a flashlight in each room. Or put a lamp next to the bed within easy reach.  Put nightlights in the bedrooms, hallways, kitchen, and bathrooms.  Make sure all stairways have good lighting.  Take your time when going up and down stairs.  Put handrails on both sides of stairs and in walkways for more support. To prevent injury to your wrist or arm, don t use handrails to pull yourself up.  Install grab bars to pull yourself up.  Move or rearrange items that you use often. This will make them easier to find or reach.  Look at your home to find any safety hazards. Especially look at doorways, walkways, and the driveway. Remove or repair any safety problems that you find.  Other changes to make  Look around to find any safety hazards. Look closely at doorways, walkways, and the driveway. Remove or  repair any safety problems that you find.  Wear shoes that fit well.  Take your time when going up and down stairs.  Put handrails on both sides of stairs and in walkways for more support. To prevent injury to your wrist or arm, don t use handrails to pull yourself up.  Install grab bars wherever needed to pull yourself up.  Arrange items that you use often. This will make them easier to find or reach.    [employer] last reviewed this educational content on 3/1/2020    0844-9781 [employer]. All rights reserved. This information is not intended as a substitute for professional medical care. Always follow your healthcare professional's instructions.

## 2022-10-19 ENCOUNTER — HOSPITAL ENCOUNTER (OUTPATIENT)
Dept: OCCUPATIONAL THERAPY | Facility: CLINIC | Age: 82
Setting detail: THERAPIES SERIES
Discharge: HOME | End: 2022-10-19
Attending: INTERNAL MEDICINE
Payer: MEDICARE

## 2022-10-19 DIAGNOSIS — R60.0 BILATERAL LEG EDEMA: Primary | ICD-10-CM

## 2022-10-19 DIAGNOSIS — I50.33 ACUTE ON CHRONIC DIASTOLIC HEART FAILURE (H): ICD-10-CM

## 2022-10-19 DIAGNOSIS — I89.0 LYMPHEDEMA: ICD-10-CM

## 2022-10-19 PROCEDURE — 97165 OT EVAL LOW COMPLEX 30 MIN: CPT | Mod: GO

## 2022-10-19 PROCEDURE — 97140 MANUAL THERAPY 1/> REGIONS: CPT | Mod: GO

## 2022-10-19 NOTE — PROGRESS NOTES
Rehabilitation Services        OUTPATIENT OCCUPATIONAL THERAPY EDEMA EVALUATION  PLAN OF TREATMENT FOR OUTPATIENT REHABILITATION  (COMPLETE FOR INITIAL CLAIMS ONLY)  Patient's Last Name, First Name, XAVIIrene Garzalotte  AI                           Provider s Name:   Shy Caruso OT Medical Record No.  [id number]     Start of Care Date:  10/19/22   Onset Date:  07/31/22   Type:  OT   Medical Diagnosis:  Lymphedema   Therapy Diagnosis:  BLE lymphedema Visits from SOC:  1                                     __________________________________________________________________________________   Plan of Treatment/Functional Goals:    Manual lymph drainage, Gradient compression bandaging, Fit for compression garment, Exercises, Precautions to prevent infection / exacerbation, Education, Manual therapy, ADL training, Skin care / precautions, Soft tissue mobilization, Home management program development        GOALS  1. Goal description: Pt will demonstrate a reduction of at least 200 mL in BLE to improve skin integrity, safety with mobility and fit of clothing       Target date: 01/17/23  2. Goal description: Pt will be fit for and demonstrate independence using new compression garments for long term edema management as evidenced by a no more than 50 mL increase in volume after transitioning into garments.       Target date: 01/17/23  3. Goal description: Pt will verbalize understanding of long term edema management including following recommended wear schedule for compression garments, manual techniques, skin care and exercise.       Target date: 01/17/23      Treatment Frequency: Other (see comments)   Treatment duration: Pt has an OT at her [facility] that can reapply bandages until next visit. If edema has not improved, recommend 2x a week for 4 weeks.    Shy Caruso OT                                    I  CERTIFY THE NEED FOR THESE SERVICES FURNISHED UNDER        THIS PLAN OF TREATMENT AND WHILE UNDER MY CARE     (Physician co-signature of this document indicates review and certification of the therapy plan).                   Certification date from: 10/19/22       Certification date to: 01/17/23           Referring physician: Dr Charlie Herzog   Initial Assessment  See Epic Evaluation- Start of care: 10/19/22

## 2022-10-19 NOTE — PROGRESS NOTES
10/19/22 1300   Quick Adds   Quick Adds Certification   Rehab Discipline   Discipline OT   Type of Visit   Type of visit Initial Edema Evaluation   General Information   Start of care 10/19/22   Referring physician Dr Charlie Herzog   Orders Evaluate and treat as indicated   Order date 10/11/22   Medical diagnosis Lymphedema, Acute on chronic diastolic heart failure   Onset of illness / date of surgery 10/11/22   Edema onset 07/31/22   Affected body parts LLE;RLE   Edema etiology Unknown   Edema etiology comments onset after pt stopped chemo due to intolerance, pt also has a HF diagnosis but has not had edema in the past. Pt has upcoming labs to explore further.   Pertinent history of current problem (PT: include personal factors and/or comorbidities that impact the POC; OT: include additional occupational profile info) Pt is an 81 yo female referred to lymphedema therapy for BLE edema. Pt reported that swelling first started when she stopped chemo at the end of July, as she had a bad reaction that ended with a hospital stay. Since then, pt has been overall less mobile with deconditioning. She was put on a diuretic, which helped reduce swelling some, but she continues to have significant edema with skin changes. Pt lives in an [facility] and just started PT and OT there, her OT offered to reapply bandages if needed.   Surgical / medical history reviewed Yes   Prior level of functional mobility mod I with device   Prior treatment RYAN hose;Elevation;Diuretics   Community support Family / friend caregiver;Home health aid   Living environment Assisted living   Current assistive devices 4 wheeled walker   Fall Risk Screen   Fall screen completed by OT   Have you fallen 2 or more times in the past year? No   Have you fallen and had an injury in the past year? Yes  (due to syncope)   Is patient a fall risk? Yes   Fall screen comments overall deconditioned, edema affecting mobilty   Abuse Screen (yes response referral  indicated)   Feels Unsafe at Home or Work/School no   Feels Threatened by Someone no   Does Anyone Try to Keep You From Having Contact with Others or Doing Things Outside Your Home? no   Physical Signs of Abuse Present no   Cognitive Status   Orientation Orientation to person, place and time   Level of consciousness Alert   Edema Exam / Assessment   Skin condition comments BLE with skin intact, dry scaly skin from ankle to mid shin with some erythema as well. Trace edema on the dorsum of the feet, 2-3 plus pitting edema from ankle to mid thigh.   Girth Measurements   Girth Measurements Refer to separate girth measurement flowsheet   Volume LE   Right LE (mL) 2950   Left LE (mL) 2980   Strength   Strength comments overall deconditioned   Palpation   Palpation some tenderness on RLE upper shin with palpation   Activities of Daily Living   Activities of Daily Living needs assist for LB dressing   Transfers   Transfers CGA   Gait / Locomotion   Gait / Locomotion Mod I   Planned Edema Interventions   Planned edema interventions Manual lymph drainage;Gradient compression bandaging;Fit for compression garment;Exercises;Precautions to prevent infection / exacerbation;Education;Manual therapy;ADL training;Skin care / precautions;Soft tissue mobilization;Home management program development   Clinical Impression   Criteria for skilled therapeutic intervention met Yes   Therapy diagnosis BLE lymphedema   Assessment of Occupational Performance 1-3 Performance Deficits   Identified Performance Deficits impaired skin integrity, impaired mobility, impaired fit of clothes/shoes   Clinical Decision Making (Complexity) Low complexity   Treatment Frequency Other (see comments)   Treatment duration Pt has an OT at her [facility] that can reapply bandages until next visit. If edema has not improved, recommend 2x a week for 4 weeks.   Patient / family and/or staff in agreement with plan of care Yes   Risks and benefits of therapy have been  explained Yes   Clinical impression comments Pt presents with significant BLE edema which is impairing her safety with mobility, skin integrity and fit of clothes/shoes. Pt is at risk for progression/worsening of edema and would benefit from skilled treatment to reduce edema and fit for compression garment for long term management.   Goals   Edema Eval Goals 1;2;3   Goal 1   Goal identifier Volume   Goal description Pt will demonstrate a reduction of at least 200 mL in BLE to improve skin integrity, safety with mobility and fit of clothing   Target date 01/17/23   Goal 2   Goal identifier Garments   Goal description Pt will be fit for and demonstrate independence using new compression garments for long term edema management as evidenced by a no more than 50 mL increase in volume after transitioning into garments.   Target date 01/17/23   Goal 3   Goal identifier Home program   Goal description Pt will verbalize understanding of long term edema management including following recommended wear schedule for compression garments, manual techniques, skin care and exercise.   Target date 01/17/23   Total Evaluation Time   OT Eval, Low Complexity Minutes (64762) 15   Certification   Certification date from 10/19/22   Certification date to 01/17/23   Medical Diagnosis Lymphedema   Certification I certify the need for these services furnished under this plan of treatment and while under my care.  (Physician co-signature of this document indicates review and certification of the therapy plan).

## 2022-10-22 RX ORDER — POTASSIUM CHLORIDE 1.5 G/1.58G
20 POWDER, FOR SOLUTION ORAL DAILY
COMMUNITY
Start: 2022-10-22 | End: 2023-04-04

## 2022-10-22 RX ORDER — METOPROLOL TARTRATE 25 MG/1
25 TABLET, FILM COATED ORAL 2 TIMES DAILY
COMMUNITY
Start: 2022-10-22 | End: 2022-10-22

## 2022-10-24 ENCOUNTER — TELEPHONE (OUTPATIENT)
Dept: FAMILY MEDICINE | Facility: CLINIC | Age: 82
End: 2022-10-24

## 2022-10-24 DIAGNOSIS — K26.4 GASTROINTESTINAL HEMORRHAGE ASSOCIATED WITH DUODENAL ULCER: ICD-10-CM

## 2022-10-24 DIAGNOSIS — E55.9 VITAMIN D DEFICIENCY: ICD-10-CM

## 2022-10-24 DIAGNOSIS — I50.32 CHRONIC HEART FAILURE WITH PRESERVED EJECTION FRACTION (HFPEF) (H): ICD-10-CM

## 2022-10-24 DIAGNOSIS — E78.5 HYPERLIPIDEMIA LDL GOAL <100: ICD-10-CM

## 2022-10-24 DIAGNOSIS — E61.1 IRON DEFICIENCY: ICD-10-CM

## 2022-10-24 DIAGNOSIS — M81.0 POST-MENOPAUSAL OSTEOPOROSIS: Primary | ICD-10-CM

## 2022-10-24 RX ORDER — FERROUS SULFATE 325(65) MG
325 TABLET ORAL
Qty: 90 TABLET | Refills: 1 | Status: SHIPPED | OUTPATIENT
Start: 2022-10-24 | End: 2023-06-20

## 2022-10-24 RX ORDER — PANTOPRAZOLE SODIUM 40 MG/1
40 TABLET, DELAYED RELEASE ORAL DAILY
Qty: 90 TABLET | Refills: 1 | Status: SHIPPED | OUTPATIENT
Start: 2022-10-24 | End: 2023-03-08 | Stop reason: ALTCHOICE

## 2022-10-24 RX ORDER — ATORVASTATIN CALCIUM 10 MG/1
10 TABLET, FILM COATED ORAL DAILY
Qty: 90 TABLET | Refills: 3 | Status: SHIPPED | OUTPATIENT
Start: 2022-10-24 | End: 2023-09-20

## 2022-10-24 RX ORDER — FUROSEMIDE 20 MG
20 TABLET ORAL DAILY
Qty: 90 TABLET | Refills: 1 | Status: SHIPPED | OUTPATIENT
Start: 2022-10-24 | End: 2023-04-17

## 2022-10-24 NOTE — TELEPHONE ENCOUNTER
"Patient calling in asking for refills on historically reported medications.    Patient states that she recently had a visit with Dr. Sanchez on 10/17/22 to establish care, as she recently moved to the area. Patient states that she is running out of quite a few of her medications and thought that Dr. Sanchez would be her new prescriber since she established care. Explained to patient that we can take a list of medications that she is requesting to be refilled and route it to her provider, but are unsure if she would be willing to write for some of these medications. Patient verbalized understanding, but stated that she will be \"out of medications\" in 2 days and needs \"someone to fill it or I'll be in trouble.\"     Requested medications are as follows (writer had patient read out medication bottles for exact prescriptions):   - Ferrous sulfate 325 mg tab, take 1 tab by mouth daily  - Vitamin D3 2000 units, take two 1000 unit tablets by mouth daily  - Atorvastatin 10 mg tablet, take 1 tablet by mouth daily  - Furosemide (Lasix) 20 mg tab, take 1 tablet by mouth daily  - Pantoprazole 40 mg tablet, take 1 tablet by mouth daily      Will route to provider to review and advise.        Latosha Del Valle, RAGHUN, RN  [facility]    "

## 2022-10-25 ENCOUNTER — LAB (OUTPATIENT)
Dept: LAB | Facility: CLINIC | Age: 82
End: 2022-10-25
Payer: MEDICARE

## 2022-10-25 ENCOUNTER — OFFICE VISIT (OUTPATIENT)
Dept: NEPHROLOGY | Facility: CLINIC | Age: 82
End: 2022-10-25
Payer: MEDICARE

## 2022-10-25 VITALS
HEART RATE: 90 BPM | WEIGHT: 146 LBS | SYSTOLIC BLOOD PRESSURE: 147 MMHG | BODY MASS INDEX: 29.49 KG/M2 | DIASTOLIC BLOOD PRESSURE: 82 MMHG | OXYGEN SATURATION: 97 %

## 2022-10-25 DIAGNOSIS — N18.9 CHRONIC KIDNEY DISEASE, UNSPECIFIED CKD STAGE: ICD-10-CM

## 2022-10-25 DIAGNOSIS — N18.9 CHRONIC KIDNEY DISEASE, UNSPECIFIED CKD STAGE: Primary | ICD-10-CM

## 2022-10-25 DIAGNOSIS — N17.0 ACUTE KIDNEY INJURY (AKI) WITH ACUTE TUBULAR NECROSIS (ATN) (H): ICD-10-CM

## 2022-10-25 DIAGNOSIS — E85.81 LIGHT CHAIN (AL) AMYLOIDOSIS (H): ICD-10-CM

## 2022-10-25 LAB
ALBUMIN SERPL-MCNC: 2.8 G/DL (ref 3.4–5)
ALBUMIN UR-MCNC: 300 MG/DL
ANION GAP SERPL CALCULATED.3IONS-SCNC: 2 MMOL/L (ref 3–14)
APPEARANCE UR: CLEAR
BACTERIA #/AREA URNS HPF: ABNORMAL /HPF
BILIRUB UR QL STRIP: NEGATIVE
BUN SERPL-MCNC: 15 MG/DL (ref 7–30)
CALCIUM SERPL-MCNC: 9.4 MG/DL (ref 8.5–10.1)
CHLORIDE BLD-SCNC: 111 MMOL/L (ref 94–109)
CO2 SERPL-SCNC: 28 MMOL/L (ref 20–32)
COLOR UR AUTO: ABNORMAL
CREAT SERPL-MCNC: 0.89 MG/DL (ref 0.52–1.04)
ERYTHROCYTE [DISTWIDTH] IN BLOOD BY AUTOMATED COUNT: 15.1 % (ref 10–15)
GFR SERPL CREATININE-BSD FRML MDRD: 64 ML/MIN/1.73M2
GLUCOSE BLD-MCNC: 101 MG/DL (ref 70–99)
GLUCOSE UR STRIP-MCNC: NEGATIVE MG/DL
HCT VFR BLD AUTO: 41.6 % (ref 35–47)
HGB BLD-MCNC: 13.5 G/DL (ref 11.7–15.7)
HGB UR QL STRIP: NEGATIVE
HYALINE CASTS #/AREA URNS LPF: ABNORMAL /LPF
KETONES UR STRIP-MCNC: NEGATIVE MG/DL
LEUKOCYTE ESTERASE UR QL STRIP: NEGATIVE
MCH RBC QN AUTO: 28.7 PG (ref 26.5–33)
MCHC RBC AUTO-ENTMCNC: 32.5 G/DL (ref 31.5–36.5)
MCV RBC AUTO: 88 FL (ref 78–100)
MUCOUS THREADS #/AREA URNS LPF: PRESENT /LPF
NITRATE UR QL: NEGATIVE
PH UR STRIP: 6.5 [PH] (ref 5–7)
PHOSPHATE SERPL-MCNC: 3.5 MG/DL (ref 2.5–4.5)
PLATELET # BLD AUTO: 218 10E3/UL (ref 150–450)
POTASSIUM BLD-SCNC: 4.2 MMOL/L (ref 3.4–5.3)
PTH-INTACT SERPL-MCNC: 45 PG/ML (ref 15–65)
RBC # BLD AUTO: 4.71 10E6/UL (ref 3.8–5.2)
RBC #/AREA URNS AUTO: ABNORMAL /HPF
SKIP: ABNORMAL
SODIUM SERPL-SCNC: 141 MMOL/L (ref 133–144)
SP GR UR STRIP: 1.01 (ref 1–1.03)
SQUAMOUS #/AREA URNS AUTO: ABNORMAL /LPF
UROBILINOGEN UR STRIP-MCNC: NORMAL MG/DL
WBC # BLD AUTO: 10.1 10E3/UL (ref 4–11)
WBC #/AREA URNS AUTO: ABNORMAL /HPF

## 2022-10-25 PROCEDURE — 85027 COMPLETE CBC AUTOMATED: CPT

## 2022-10-25 PROCEDURE — 36415 COLL VENOUS BLD VENIPUNCTURE: CPT

## 2022-10-25 PROCEDURE — 83970 ASSAY OF PARATHORMONE: CPT

## 2022-10-25 PROCEDURE — 80061 LIPID PANEL: CPT | Performed by: FAMILY MEDICINE

## 2022-10-25 PROCEDURE — 82728 ASSAY OF FERRITIN: CPT | Performed by: FAMILY MEDICINE

## 2022-10-25 PROCEDURE — 84439 ASSAY OF FREE THYROXINE: CPT | Performed by: FAMILY MEDICINE

## 2022-10-25 PROCEDURE — 80069 RENAL FUNCTION PANEL: CPT

## 2022-10-25 PROCEDURE — 84156 ASSAY OF PROTEIN URINE: CPT

## 2022-10-25 PROCEDURE — 82043 UR ALBUMIN QUANTITATIVE: CPT

## 2022-10-25 PROCEDURE — 99205 OFFICE O/P NEW HI 60 MIN: CPT | Performed by: INTERNAL MEDICINE

## 2022-10-25 PROCEDURE — 81001 URINALYSIS AUTO W/SCOPE: CPT

## 2022-10-25 PROCEDURE — 82610 CYSTATIN C: CPT

## 2022-10-25 ASSESSMENT — PAIN SCALES - GENERAL: PAINLEVEL: NO PAIN (0)

## 2022-10-25 NOTE — NURSING NOTE
Kandis Gallagher's goals for this visit include: NONE  She requests these members of her care team be copied on today's visit information: YES    PCP: Noemi Sanchez    Referring Provider:  Referred Self, MD  No address on file    BP (!) 147/82 (BP Location: Left arm, Patient Position: Sitting, Cuff Size: Adult Regular)   Pulse 90   Wt 66.2 kg (146 lb)   SpO2 97%   BMI 29.49 kg/m      Do you need any medication refills at today's visit? NONE    Ernesto Denney, EMT

## 2022-10-25 NOTE — PROGRESS NOTES
I was asked to see this patient by Noemi Owens    CC: Proteinuria, AL Amyloidosis, Multiple Mylopma    HPI:   Thank you for the referral. I had the pleasure today of seeing Kandis Gallagher  She is a 82 year old female  who presents to [facility]. She is here with her sister. She lives at an assisted nursing facility.    Her medical history is significant for a bicuspid aortic valve which progressed to severe aortic stenosis for which she underwent surgical AVR with a 23 mm Magna Ease bioprosthesis in August 2016 at [facility].  She also has HFpEF, dyslipidemia, hx of HTN now off medications, prediabetes, anemia, and has been undergoing chemotherapy for multiple myeloma, though this had to be put on hold due to her intolerance of the chemotherapy.    Myeloma History:  She presented with several vertebral fractures in Oct 2020 and in Dec 2020,when she underwent L1, L2, and L3 vertebroplasty. Her NICOLAS at [place] showed monoclonal kappa free light chains by immunofixation. In February 2021, she presented with persistent low back pain and leg weakness. MRI of the thoracic and lumbar spine showed a new T11 compression fracture. She underwent T11, L4, and L5 vertebroplasty. Repeat labs revealed free kappa light chain of 25.3, lambda free light chains 1.24, kappa/lambda ratio 20.4.   In March 2021, she had a bone marrow biopsy revealing plasma cell proliferative disorder with approximately 5% kappa light chain-restricted plasma cells. Slightly hypercellular bone marrow with mild panhyperplasia. I could not locate a kidney biopsy done at that point. It seems to me that at that point, she was considered smoldering myeloma and treatment was on hold. A repeat BM biopsy was done April 2022. Follow up with imaging done 4/25/22  revealed osteolytic lesions, the largest in the iliac wing and there was new finding of nephrotic range proteinuria on 24 hour urine 3.6 grams.  Repeat BM Biopsy in April  2022 revealed amyloidosis involving small periosteal vessels, plasma cell myeloma with 10% kappa light chain restricted plasma cells. 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 Abdominal fat aspirate was negative for amyloid. Subcutaneous daratumumab, bortezomib and oral dex initiated 6/1/22 but she could not tolerate it (only two sessions-developed fluid retension and neurotoxicity per patient) and it is currently on hold.     She was admitted in July with severe anemia with Hb of 2; found to have acute GI bleed secondary to duodenal ulcer.    Overall, she is stable; she raps her legs everyday because of the swelling. She takes lasix 20 mg daily. She is satisfied with her new home. She has two sisters close by in the area. She denies any SOB, chest pain, dizziness, change in bowel habits.     Social history:  Retired    Has 4 children and 6 grandkids    Wt Readings from Last 4 Encounters:   10/25/22 66.2 kg (146 lb)   10/17/22 68.2 kg (150 lb 6.4 oz)   10/11/22 68.3 kg (150 lb 9.6 oz)   10/05/22 66.2 kg (146 lb)       Allergies   Allergen Reactions     Carvedilol Other (See Comments)     Lidocaine Nausea and Vomiting     Vomiting with general anesthesia     Lisinopril Cough     Seasonal Allergies Other (See Comments)     Problems with narcotics - oxygen desaturates     Spironolactone        acyclovir (ZOVIRAX) 400 MG tablet, Take 1 tablet (400 mg) by mouth every 12 hours  aspirin 81 MG EC tablet, Take 81 mg by mouth  atorvastatin (LIPITOR) 10 MG tablet, Take 1 tablet (10 mg) by mouth daily  Cholecalciferol (VITAMIN D) 2000 units tablet, Take 2,000 Units by mouth  Cholecalciferol (VITAMIN D3) 25 MCG (1000 UT) CAPS, Take 2,000 Units by mouth daily  ferrous sulfate (FEROSUL) 325 (65 Fe) MG tablet, Take 1 tablet (325 mg) by mouth daily (with breakfast)  Ferrous Sulfate 324 (65 Fe) MG TBEC,   furosemide (LASIX) 20 MG tablet, Take 1 tablet (20 mg) by mouth daily  pantoprazole (PROTONIX) 40 MG EC  tablet, Take 1 tablet (40 mg) by mouth daily 30-60 min prior to meal.  pantoprazole sodium (PROTONIX) 40 MG packet, Take 1 packet (40 mg) by mouth daily  potassium chloride (KLOR-CON) 20 MEQ packet, Take 20 mEq by mouth daily    No current facility-administered medications on file prior to visit.      Past Medical History:   Diagnosis Date     Congenital bicuspid aortic valve      H/O aortic valve replacement     23 mm Magna Ease      Hyperlipidemia      Hypertension      Hyperthyroidism      Severe aortic stenosis      Thyroiditis        No past surgical history on file.    Social History     Tobacco Use     Smoking status: Never     Smokeless tobacco: Never   Vaping Use     Vaping Use: Never used   Substance Use Topics     Alcohol use: No     Drug use: No       Family History   Problem Relation Age of Onset     Breast Cancer Sister      Ovarian Cancer Sister      Colon Cancer Brother      Liver Cancer Brother      Breast Cancer Daughter      Colon Cancer Daughter        ROS: A 12 system review of systems was negative other than noted here or above.     Exam:  BP (!) 147/82 (BP Location: Left arm, Patient Position: Sitting, Cuff Size: Adult Regular)   Pulse 90   Wt 66.2 kg (146 lb)   SpO2 97%   BMI 29.49 kg/m    GENERAL APPEARANCE: alert and no distress  EYES: PERRL  HENT: mouth without ulcers or lesions  NECK: supple, no adenopathy  RESP: lungs clear to auscultation - no rales, rhonchi or wheezes  CV: regular rhythm, normal rate, no rub   ABDOMEN:  soft, nontender, no HSM or masses and bowel sounds normal  MS: extremities normal- no gross deformities noted, no evidence of inflammation in joints, no muscle tenderness  SKIN: no rash  NEURO: Normal strength and tone, sensory exam grossly normal, mentation intact and speech normal  PSYCH: mentation appears normal. and affect normal/bright    Bilateral +4 Le edema      Results:reviewed in details with the patient and her sister    Assessment/Plan:  Problem #1  nephrotic range proteinuria: Her urine protein to creat ratio is almost  9 g/g.  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  Her serum albumin is 2.8g/dL.  Her creatinine is stable at this time but it is likely dilutional given her volume overload and small muscle mass.  She has started on chemotherapy with daratumumab, bortezomib, and oral Dex but only received 2 cycles because of intolerance.  She reported fluid retention and neurotoxicity with these medications.  It seems that she rather want to focus on her quality of life at this stage.  I explained to her that her kidney disease is very correlated with her multiple myeloma and treatment is the same.  If she elects to pursue further treatments, then she needs urgent referral to hematology oncology.    Problem #2 likely [place] CKD: Her creatinine likely overestimates her GFR given her increased total body water and small muscle mass.  Will order a cystatin C.    Problem #3: Volume overload/edema: I instructed her to increase her Lasix 20 mg twice daily.  She seems to be disturbed of going frequently to the bathroom after taking Lasix.  She denies any shortness of breath.  We discussed that all interventions should aim towards what she really wants and she seems to prefer a quality of life above anything else.     Problem #4: Anticoagulation: Given her hypoalbuminemia and nephrotic range proteinuria, she is rather at high risk for thromboembolism.  The cutoff is usually when albumin falls below 2.5g/dl.  Her proteinuria and thus her serum albumin might improve if she decided to try chemotherapy again.  Thus we will hold on anticoagulation for now and repeat her labs in 1 months.    Problem #4 left adnexal mass: An incidental finding on her recent CT, 5.6 cm in diameter.  This correlates with CT findings from 2/15/2021. This is probably benign. Radiology recommend one year   follow-up pelvic ultrasound to ensure stability.    The total time of this  encounter amounted to 80 minutes on the day of the encounter 10/25/2022. This time included face to face time spent with the patient, preparatory work and chart review, ordering tests, and performing post visit documentation.    Susan Yi MD   [facility]   Department of Medicine   Division of Renal Disease and Hypertension

## 2022-10-25 NOTE — PATIENT INSTRUCTIONS
It was a pleasure taking care of you today.  I've included a brief summary of our discussion and care plan from today's visit below.  Please review this information with your primary care provider.     My recommendations are summarized as follows:  -you can increase your lasix to 20 mg twice daily; one in the morning and one pill at 2 pm.  -I will call you with the urine test result when they are out  -Your primary care will refer you to oncology to follow up on multiple Myloma    Who do I call with any questions after my visit?  Please be in touch if there are any further questions that arise following today's visit.  There are multiple ways to contact your nephrology care team.       During business hours, you may reach your Nephrology Care Team or schedule or reschedule an appointment or lab at [phone].       If you need to schedule imaging, please call [phone].   To schedule a COVID test, please call [phone].     You can always send a secure message through [employer]. [employer] messages are answered by your nurse or doctor typically within 24-48 hours. Please allow extra time on weekends and holidays.       For urgent/emergent questions after business hours, you may reach the on-call Nephrology Fellow by contacting the [facility]  at [phone].     How will I get the results of any tests ordered?    You will receive all of your results.  If you have signed up for [employer], any tests ordered at your visit will be available to you once resulted on [employer]. Typically the physician reviews them and may or may not make further recommendations. If there are urgent results that require a change in your care plan, your physician or nurse will call you to discuss the next steps. If you are not on MyChart, a letter may be generated and mailed to you with your results.

## 2022-10-26 DIAGNOSIS — E78.5 HYPERLIPIDEMIA LDL GOAL <100: Primary | ICD-10-CM

## 2022-10-26 DIAGNOSIS — E61.1 IRON DEFICIENCY: ICD-10-CM

## 2022-10-26 DIAGNOSIS — E05.90 SUBCLINICAL HYPERTHYROIDISM: ICD-10-CM

## 2022-10-26 LAB
ALBUMIN MFR UR ELPH: 325 MG/DL
CHOLEST SERPL-MCNC: 163 MG/DL
CREAT UR-MCNC: 37 MG/DL
CREAT UR-MCNC: 37.2 MG/DL
CYSTATIN C (ROCHE): 1.7 MG/L (ref 0.6–1)
FERRITIN SERPL-MCNC: 44 NG/ML (ref 11–328)
GFR SERPL CREATININE-BSD FRML MDRD: 33 ML/MIN/1.73M2
HDLC SERPL-MCNC: 45 MG/DL
LDLC SERPL CALC-MCNC: 74 MG/DL
MICROALBUMIN UR-MCNC: 2593 MG/L
MICROALBUMIN/CREAT UR: 6970.43 MG/G CR (ref 0–25)
NONHDLC SERPL-MCNC: 118 MG/DL
PROT/CREAT 24H UR: 8.78 MG/MG CR (ref 0–0.2)
T4 FREE SERPL-MCNC: 1.05 NG/DL (ref 0.9–1.7)
TRIGL SERPL-MCNC: 221 MG/DL

## 2022-10-26 NOTE — RESULT ENCOUNTER NOTE
Patient and Patient daughter were called regarding worsening proteinuria, to emphasize the importance of seeing an oncologist for her amyloidosis/MM in case she wanted to pursue further treatment/chemotherapy. She has an appointment with an oncologist ([facility]) on Nov 7 and then at [facility] Nov 5.

## 2022-10-27 NOTE — RESULT ENCOUNTER NOTE
Your cholesterol is under good control.  The triglyceride level was elevated but this would be expected if you were not fasting for the lab appointment.  Your iron stores are normal.  The thyroid testing is normal.   There is nothing on this testing to suggest a particular cause for the thinning of your hair/hair loss.  Please call or MyChart message me if you have any questions.      PSK

## 2022-10-31 ENCOUNTER — HOSPITAL ENCOUNTER (OUTPATIENT)
Dept: CARDIOLOGY | Facility: CLINIC | Age: 82
Discharge: HOME | End: 2022-10-31
Attending: INTERNAL MEDICINE | Admitting: INTERNAL MEDICINE
Payer: MEDICARE

## 2022-10-31 ENCOUNTER — MYC MEDICAL ADVICE (OUTPATIENT)
Dept: FAMILY MEDICINE | Facility: CLINIC | Age: 82
End: 2022-10-31

## 2022-10-31 ENCOUNTER — LAB (OUTPATIENT)
Dept: LAB | Facility: CLINIC | Age: 82
End: 2022-10-31
Payer: MEDICARE

## 2022-10-31 DIAGNOSIS — I50.33 ACUTE ON CHRONIC DIASTOLIC HEART FAILURE (H): ICD-10-CM

## 2022-10-31 DIAGNOSIS — D47.2 MONOCLONAL GAMMOPATHY OF UNKNOWN SIGNIFICANCE (MGUS): ICD-10-CM

## 2022-10-31 DIAGNOSIS — Z95.2 S/P AVR (AORTIC VALVE REPLACEMENT): Primary | ICD-10-CM

## 2022-10-31 DIAGNOSIS — C90.01 MULTIPLE MYELOMA IN REMISSION (H): ICD-10-CM

## 2022-10-31 DIAGNOSIS — E05.90 SUBCLINICAL HYPERTHYROIDISM: ICD-10-CM

## 2022-10-31 LAB
ANION GAP SERPL CALCULATED.3IONS-SCNC: 7 MMOL/L (ref 3–14)
BUN SERPL-MCNC: 15 MG/DL (ref 7–30)
CALCIUM SERPL-MCNC: 9.5 MG/DL (ref 8.5–10.1)
CHLORIDE BLD-SCNC: 104 MMOL/L (ref 94–109)
CHOLEST SERPL-MCNC: 153 MG/DL
CO2 SERPL-SCNC: 29 MMOL/L (ref 20–32)
CREAT SERPL-MCNC: 0.9 MG/DL (ref 0.52–1.04)
ERYTHROCYTE [DISTWIDTH] IN BLOOD BY AUTOMATED COUNT: 15 % (ref 10–15)
FASTING STATUS PATIENT QL REPORTED: NO
GFR SERPL CREATININE-BSD FRML MDRD: 63 ML/MIN/1.73M2
GLUCOSE BLD-MCNC: 99 MG/DL (ref 70–99)
HCT VFR BLD AUTO: 43.4 % (ref 35–47)
HDLC SERPL-MCNC: 50 MG/DL
HGB BLD-MCNC: 13.5 G/DL (ref 11.7–15.7)
LDLC SERPL CALC-MCNC: 60 MG/DL
LVEF ECHO: NORMAL
MAGNESIUM SERPL-MCNC: 1.9 MG/DL (ref 1.6–2.3)
MCH RBC QN AUTO: 28.2 PG (ref 26.5–33)
MCHC RBC AUTO-ENTMCNC: 31.1 G/DL (ref 31.5–36.5)
MCV RBC AUTO: 91 FL (ref 78–100)
NONHDLC SERPL-MCNC: 103 MG/DL
NT-PROBNP SERPL-MCNC: 2218 PG/ML (ref 0–1800)
PLATELET # BLD AUTO: 219 10E3/UL (ref 150–450)
POTASSIUM BLD-SCNC: 3.8 MMOL/L (ref 3.4–5.3)
RBC # BLD AUTO: 4.78 10E6/UL (ref 3.8–5.2)
SODIUM SERPL-SCNC: 140 MMOL/L (ref 133–144)
T4 FREE SERPL-MCNC: 1.04 NG/DL (ref 0.76–1.46)
TRIGL SERPL-MCNC: 214 MG/DL
WBC # BLD AUTO: 10.3 10E3/UL (ref 4–11)

## 2022-10-31 PROCEDURE — 36415 COLL VENOUS BLD VENIPUNCTURE: CPT | Performed by: INTERNAL MEDICINE

## 2022-10-31 PROCEDURE — 255N000002 HC RX 255 OP 636: Performed by: INTERNAL MEDICINE

## 2022-10-31 PROCEDURE — 93306 TTE W/DOPPLER COMPLETE: CPT | Mod: 26 | Performed by: INTERNAL MEDICINE

## 2022-10-31 PROCEDURE — 84439 ASSAY OF FREE THYROXINE: CPT | Performed by: INTERNAL MEDICINE

## 2022-10-31 PROCEDURE — 83735 ASSAY OF MAGNESIUM: CPT | Performed by: INTERNAL MEDICINE

## 2022-10-31 PROCEDURE — 80048 BASIC METABOLIC PNL TOTAL CA: CPT | Performed by: INTERNAL MEDICINE

## 2022-10-31 PROCEDURE — 83880 ASSAY OF NATRIURETIC PEPTIDE: CPT | Performed by: INTERNAL MEDICINE

## 2022-10-31 PROCEDURE — 80061 LIPID PANEL: CPT | Performed by: INTERNAL MEDICINE

## 2022-10-31 PROCEDURE — 85027 COMPLETE CBC AUTOMATED: CPT | Performed by: INTERNAL MEDICINE

## 2022-10-31 PROCEDURE — 999N000208 ECHOCARDIOGRAM COMPLETE

## 2022-10-31 RX ADMIN — HUMAN ALBUMIN MICROSPHERES AND PERFLUTREN 9 ML: 10; .22 INJECTION, SOLUTION INTRAVENOUS at 13:28

## 2022-11-02 ENCOUNTER — HOSPITAL ENCOUNTER (OUTPATIENT)
Dept: OCCUPATIONAL THERAPY | Facility: CLINIC | Age: 82
Setting detail: THERAPIES SERIES
Discharge: HOME | End: 2022-11-02
Attending: INTERNAL MEDICINE
Payer: MEDICARE

## 2022-11-02 PROCEDURE — 97140 MANUAL THERAPY 1/> REGIONS: CPT | Mod: GO,KX

## 2022-11-03 NOTE — RESULT ENCOUNTER NOTE
Your active thyroid hormone level is normal.  Please call or MyChart message me if you have any questions.      PSK

## 2022-11-04 ENCOUNTER — TELEPHONE (OUTPATIENT)
Dept: FAMILY MEDICINE | Facility: CLINIC | Age: 82
End: 2022-11-04

## 2022-11-04 NOTE — TELEPHONE ENCOUNTER
Patient notified of provider's message as written below. She asks if she can crush this and when to take this medication.  The patient was advised to speak with the pharmacist to answer her questions. Patient verbalized good understanding, had no further questions and needed no further support. Cayla Sparks R.N.

## 2022-11-04 NOTE — TELEPHONE ENCOUNTER
pcp out of the office now and all next week.     Looks like protonix is prescribed just once daily  Reviewed chart and also feel this med is important for her to take to prevent future ulcers  She can certainly schedule visit with pcp to review if she has further questions.

## 2022-11-04 NOTE — TELEPHONE ENCOUNTER
"Pt is calling with concerns with protonix 40mg.     Pt states she read handout that came with prescription and has concerns regarding the side effects noted in the printout. Pt states she had blood in the stool, stomach ulcer 1.5 years ago and \"didn't take anything then.\"  Pt states ulcer was 1.5 years ago doesn't understand why she needs to take this now, especially not twice per day.     Writer provided education at length regarding PPI's and reduction of stomach acid to prevent future ulcer occurrence.    Pt states she doesn't understand why she needs to take medicine now if she doesn't have an ulcer.     Writer again provided education to pt about ulcer prevention. Pt would like provider to advise.     Pt also states she wants a refill of metoprolol. Writer was able to locate in pt's chart that metoprolol was discontinued by cardiology due to side effects. Instructed pt to call cardiology if further questions about this.    Misa Brice RN  [facility]        "

## 2022-11-07 ENCOUNTER — TRANSFERRED RECORDS (OUTPATIENT)
Dept: HEALTH INFORMATION MANAGEMENT | Facility: CLINIC | Age: 82
End: 2022-11-07

## 2022-11-11 ENCOUNTER — TELEPHONE (OUTPATIENT)
Dept: FAMILY MEDICINE | Facility: CLINIC | Age: 82
End: 2022-11-11

## 2022-11-11 NOTE — TELEPHONE ENCOUNTER
Forms/Letter Request    Type of form/letter: forms recieved from [facility]   placed on providers desk for response     Have you been seen for this request:  Do we have the form/letter:  When is form/letter needed by:    How would you like the form/letter returned: fax to [phone]  Patient Notified form requests are processed in 3-5 business days    Could we [facility] this information [facility] you [facility] or would you prefer to receive a phone call?:

## 2022-11-14 ENCOUNTER — OFFICE VISIT (OUTPATIENT)
Dept: CARDIOLOGY | Facility: CLINIC | Age: 82
End: 2022-11-14
Attending: INTERNAL MEDICINE
Payer: MEDICARE

## 2022-11-14 VITALS
SYSTOLIC BLOOD PRESSURE: 140 MMHG | BODY MASS INDEX: 29.01 KG/M2 | DIASTOLIC BLOOD PRESSURE: 74 MMHG | WEIGHT: 143.9 LBS | HEIGHT: 59 IN | OXYGEN SATURATION: 97 % | HEART RATE: 81 BPM

## 2022-11-14 DIAGNOSIS — I50.33 ACUTE ON CHRONIC DIASTOLIC HEART FAILURE (H): ICD-10-CM

## 2022-11-14 DIAGNOSIS — I10 BENIGN ESSENTIAL HYPERTENSION: Primary | ICD-10-CM

## 2022-11-14 PROCEDURE — 99214 OFFICE O/P EST MOD 30 MIN: CPT | Performed by: NURSE PRACTITIONER

## 2022-11-14 RX ORDER — METOPROLOL TARTRATE 25 MG/1
25 TABLET, FILM COATED ORAL EVERY 12 HOURS
Qty: 90 TABLET | Refills: 1 | Status: SHIPPED | OUTPATIENT
Start: 2022-11-14 | End: 2023-03-08

## 2022-11-14 RX ORDER — METOPROLOL TARTRATE 25 MG/1
25 TABLET, FILM COATED ORAL EVERY 12 HOURS
COMMUNITY
Start: 2022-09-02 | End: 2022-11-14

## 2022-11-14 NOTE — LETTER
11/14/2022    Noemi Sanchez MD  [address]  [place] [address]    RE: Kandis Gallagher       Dear Colleague,     I had the pleasure of seeing Kandis Gallagher in the [facility].  Cardiology Clinic Progress Note  Kandis Gallagher MRN# [id number]   YOB: 1940 Age: 82 year old   Primary Cardiologist: Dr. Herzog Reason for visit: Follow up hypertension and edema            Assessment and Plan:       1.  Chronic HFpEF, NYHA class III symptoms  Patient is on lasix 20mg daily. Her weight, symptoms, and edema have continued to improve.     2.  Severe bilateral lymphedema  Her lymphedema and lag pain have significantly improved since working with the lymphedema clinic.     3.  Bicuspid aortic valve with severe aortic stenosis, status post surgical AVR (23 mm Magna Ease bioprosthesis)  4/12/2022 echocardiogram shows stable valve gradient.    4.  Hypertension, borderline    5.  Dyslipidemia    6.  Anemia, baseline 10-11 range  Most recent Hgb 11/9/22 10.0    7.  Multiple myeloma  Plan is for patient to resume chemotherapy this week on Wednesday with [place] oncology at a reduced dose of bortezomib. Will follow with [place] [facility] in [facility] in collaboration with [facility] oncology.     8. 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         Changes today: ***    Follow up plan: Will discuss a possible cardiac MRI and continuing metoprolol at low dose considering palpitations and borderline hypertension with Dr. Herzog.         History of Presenting Illness:    Kandis Gallagher is a very pleasant 82 year old female with a history of bicuspid aortic valve, severe aortic stenosis, s/p surgical AVR (2016 at [facility]), multiple myeloma not currently undergoing chemotherapy due to intolerance, HFpEF dyslipidemia, anemia.    Patient was seen by Dr. Herzog to  reestablish care.  She had previously been seen by  March November 2020, however moved up to the [facility] and recently returned to [place].  At that time she was having issues with severe bilateral lower extremity swelling.  She was taking Lasix 20 mg daily work with physical and Occupational Therapy, which somewhat improved her edema, however she still felt was significantly off her baseline. He referred her to the lymphedema clinic to work on optimizing her compression devices as she was unable to tolerate compression stockings. He tapered her metoprolol to discontinue, ordered an echocardiogram and several labs.     Her labs from 10/31/2022 showed normal sodium, 140, potassium 3.8, BUN 15, creatinine 0.90, GFR 60.  Her pro T BNP was 2218.  Total cholesterol 153, HDL 50, LDL 60, triglycerides 214.  Free T4 was 1.04.  Her CBC showed a normal WBC at 10.3, hemoglobin 13.5, platelet 219.    Transthoracic echocardiogram done 10/21/2022 showed mild to moderate concentric LVH, LVEF 50 to 55%, mild left atrial dilation, mild mitral and tricuspid regurgitation, elevated RVSP.  Her bioprosthetic aortic valve is well-seated with normal gradients.    She was seen by nephrology in late October, they increased her Lasix to 20 mg twice daily and held her anticoagulation with repeat labs due in 1 month.    Patient is here today for follow up on her edema and hypertension. Her daughter Rashmi is present with her today. She reduced her dose of metoprolol to 12.5mg twice daily but she did not stop it as she experienced palpitations.     Patient reports feeling good. She is working with lymphedema specialist on her compression wraps. Her weight is down 3 lbs since her last visit. She did not increase her lasix as she does not remember was not told to.    Patient denies chest pain or chest tightness. Denies dizziness, lightheadedness or other presyncopal symptoms. Denies tachycardia or palpitations.     She saw hematology at  [facility] on 11/9/22. Note is not yet available however her daughter reports she is resuming chemotherapy this week on Wednesday with . She had a whole body MRI.     Blood pressure 143/84 and HR 81 in clinic today.        Recent Hospitalizations     August 2022 GI bleeding, gastric ulcer        Social History    *** , ***children, ***grandchildren, ***living, ***enjoys *** in their spare time.   Social History     Socioeconomic History     Marital status:      Spouse name: Not on file     Number of children: Not on file     Years of education: Not on file     Highest education level: Not on file   Occupational History     Not on file   Tobacco Use     Smoking status: Never     Smokeless tobacco: Never   Vaping Use     Vaping Use: Never used   Substance and Sexual Activity     Alcohol use: No     Drug use: No     Sexual activity: Not on file   Other Topics Concern     Not on file   Social History Narrative     Not on file     Social Determinants of Health     Financial Resource Strain: Not on file   Food Insecurity: Not on file   Transportation Needs: Not on file   Physical Activity: Not on file   Stress: Not on file   Social Connections: Not on file   Intimate Partner Violence: Not on file   Housing Stability: Not on file            Review of Systems:   Skin:        Eyes:       ENT:       Respiratory:       Cardiovascular:       Gastroenterology:      Genitourinary:       Musculoskeletal:       Neurologic:       Psychiatric:       Heme/Lymph/Imm:       Endocrine:              Physical Exam:   Vitals: There were no vitals taken for this visit.   Wt Readings from Last 4 Encounters:   10/25/22 66.2 kg (146 lb)   10/17/22 68.2 kg (150 lb 6.4 oz)   10/11/22 68.3 kg (150 lb 9.6 oz)   10/05/22 66.2 kg (146 lb)     GEN: well nourished, in no acute distress.  HEENT:  Pupils equal, round. Sclerae nonicteric.   NECK: Supple, no masses appreciated. JVD with patient __.  C/V:  Regular rate and rhythm, no murmur,  rub or gallop. __  RESP: Respirations are unlabored. Clear to auscultation bilaterally without wheezing, rales, or rhonchi.  GI: Abdomen soft, nontender.  EXTREM:__ LE edema.  NEURO: Alert and oriented, cooperative.  SKIN: Warm and dry.__       Data:   ECHO     Cardiac catheterization   LIPID RESULTS:  Lab Results   Component Value Date    CHOL 153 10/31/2022    CHOL 237 (H) 10/06/2020    HDL 50 10/31/2022    HDL 65 10/06/2020    LDL 60 10/31/2022     (H) 10/06/2020    TRIG 214 (H) 10/31/2022    TRIG 158 (H) 10/06/2020     LIVER ENZYME RESULTS:  Lab Results   Component Value Date    AST 16 07/13/2018    ALT <5 (L) 10/06/2020     CBC RESULTS:  Lab Results   Component Value Date    WBC 10.3 10/31/2022    WBC 6.7 09/04/2018    RBC 4.78 10/31/2022    RBC 4.05 09/04/2018    HGB 13.5 10/31/2022    HGB 12.3 09/04/2018    HCT 43.4 10/31/2022    HCT 35.9 09/04/2018    MCV 91 10/31/2022    MCV 89 09/04/2018    MCH 28.2 10/31/2022    MCH 30.4 09/04/2018    MCHC 31.1 (L) 10/31/2022    MCHC 34.3 09/04/2018    RDW 15.0 10/31/2022    RDW 13.2 09/04/2018     10/31/2022     09/04/2018     BMP RESULTS:  Lab Results   Component Value Date     10/31/2022     09/04/2018    POTASSIUM 3.8 10/31/2022    POTASSIUM 3.6 09/04/2018    CHLORIDE 104 10/31/2022    CHLORIDE 100 09/04/2018    CO2 29 10/31/2022    CO2 29 09/04/2018    ANIONGAP 7 10/31/2022    ANIONGAP 8 09/04/2018    GLC 99 10/31/2022     (H) 09/04/2018    BUN 15 10/31/2022    BUN 13 09/04/2018    CR 0.90 10/31/2022    CR 0.94 09/04/2018    GFRESTIMATED 63 10/31/2022    GFRESTIMATED 58 (L) 09/04/2018    GFRESTBLACK 70 09/04/2018    DOUG 9.5 10/31/2022    DOUG 9.2 09/04/2018      A1C RESULTS:  No results found for: A1C  INR RESULTS:  Lab Results   Component Value Date    INR 0.97 09/04/2018            Medications     Current Outpatient Medications   Medication Sig Dispense Refill     acyclovir (ZOVIRAX) 400 MG tablet Take 1 tablet (400 mg) by  mouth every 12 hours 180 tablet 0     aspirin 81 MG EC tablet Take 81 mg by mouth       atorvastatin (LIPITOR) 10 MG tablet Take 1 tablet (10 mg) by mouth daily 90 tablet 3     Cholecalciferol (VITAMIN D) 2000 units tablet Take 2,000 Units by mouth       Cholecalciferol (VITAMIN D3) 25 MCG (1000 UT) CAPS Take 2,000 Units by mouth daily 180 capsule 3     ferrous sulfate (FEROSUL) 325 (65 Fe) MG tablet Take 1 tablet (325 mg) by mouth daily (with breakfast) 90 tablet 1     Ferrous Sulfate 324 (65 Fe) MG TBEC        furosemide (LASIX) 20 MG tablet Take 1 tablet (20 mg) by mouth daily 90 tablet 1     pantoprazole (PROTONIX) 40 MG EC tablet Take 1 tablet (40 mg) by mouth daily 30-60 min prior to meal. 90 tablet 1     pantoprazole sodium (PROTONIX) 40 MG packet Take 1 packet (40 mg) by mouth daily       potassium chloride (KLOR-CON) 20 MEQ packet Take 20 mEq by mouth daily            Past Medical History     Past Medical History:   Diagnosis Date     Congenital bicuspid aortic valve      H/O aortic valve replacement     23 mm Magna Ease      Hyperlipidemia      Hypertension      Hyperthyroidism      Severe aortic stenosis      Thyroiditis      No past surgical history on file.  Family History   Problem Relation Age of Onset     Breast Cancer Sister      Ovarian Cancer Sister      Colon Cancer Brother      Liver Cancer Brother      Breast Cancer Daughter      Colon Cancer Daughter             Allergies   Carvedilol, Lidocaine, Lisinopril, Seasonal allergies, and Spironolactone        Shy Quintero NP  [facility]  Pager: [phone]      Thank you for allowing me to participate in the care of your patient.      Sincerely,     Shy Quintero NP     [facility] [place] [facility]  cc:   Charlie Herzog MD  [address] [address]

## 2022-11-14 NOTE — PATIENT INSTRUCTIONS
Today's Recommendations    Your heart ultrasound showed low normal pumping function and no significant valvular abnormalities  Continue your metoprolol 12.5mg twice a day for now, I will talk with Dr. Herzog about continuing this or increasing the dose considering you are resuming chemotherapy this week  I will talk with Dr. Herzog if doing a cardiac MRI is appropriate, I will sent you a message on My chart after I talk to him and discuss when you should follow up    Please send a [employer] message or call [phone] to the RN team with questions or concerns.     Scheduling number [phone]  JOANN Olvera, CNP      Latest Reference Range & Units 10/31/22 12:05   Sodium 133 - 144 mmol/L 140   Potassium 3.4 - 5.3 mmol/L 3.8   Chloride 94 - 109 mmol/L 104   Carbon Dioxide 20 - 32 mmol/L 29   Urea Nitrogen 7 - 30 mg/dL 15   Creatinine 0.52 - 1.04 mg/dL 0.90   GFR Estimate >60 mL/min/1.73m2 63   Calcium 8.5 - 10.1 mg/dL 9.5   Anion Gap 3 - 14 mmol/L 7   Magnesium 1.6 - 2.3 mg/dL 1.9   Cholesterol <200 mg/dL 153   Patient Fasting > 8hrs?  No   HDL Cholesterol >=50 mg/dL 50   LDL Cholesterol Calculated <=100 mg/dL 60   Non HDL Cholesterol <130 mg/dL 103   N-Terminal Pro Bnp 0 - 1,800 pg/mL 2,218 (H)   T4 Free 0.76 - 1.46 ng/dL 1.04   Triglycerides <150 mg/dL 214 (H)   Glucose 70 - 99 mg/dL 99   WBC 4.0 - 11.0 10e3/uL 10.3   Hemoglobin 11.7 - 15.7 g/dL 13.5   Hematocrit 35.0 - 47.0 % 43.4   Platelet Count 150 - 450 10e3/uL 219   RBC Count 3.80 - 5.20 10e6/uL 4.78   MCV 78 - 100 fL 91   MCH 26.5 - 33.0 pg 28.2   MCHC 31.5 - 36.5 g/dL 31.1 (L)   RDW 10.0 - 15.0 % 15.0   (H): Data is abnormally high  (L): Data is abnormally low

## 2022-11-14 NOTE — TELEPHONE ENCOUNTER
Forms/Letter Request    Type of form/letter: [employer] Forms for OT signed and completed faxed to [phone]    Have you been seen for this request: N/A    Do we have the form/letter: Yes:     When is form/letter needed by: asap    How would you like the form/letter returned: Fax to [phone]

## 2022-11-14 NOTE — PROGRESS NOTES
Cardiology Clinic Progress Note  Kandis Gallagher MRN# [id number]   YOB: 1940 Age: 82 year old   Primary Cardiologist: Dr. Herzog Reason for visit: Follow up hypertension and edema            Assessment and Plan:       1.  Chronic HFpEF, NYHA class III symptoms  -BNP done 10/21/22 2218  -Patient is on lasix 20mg daily. Her weight, symptoms, and edema have continued to improve over the last month.     2.  Severe bilateral lymphedema  Her lymphedema and leg pain have significantly improved since working with the lymphedema clinic.     3.  Bicuspid aortic valve with severe aortic stenosis, status post surgical AVR (23 mm Magna Ease bioprosthesis)  -10/31/2022 echocardiogram shows stable aortic valve gradient, normal aortic dimensions    4.  Hypertension, borderline control  -She has a history of symptomatic bradycardia while taking metoprolol and undergoing chemotherapy in June 2022 requiring atropine and admission to the ICU.    -Patient would like to continue metoprolol 12.5mg daily as she experiences palpitations when she has been off this before. With history of syncope during chemotherapy and plan to resume chemotherapy, will not uptitrate antihypertensive regimen at this time to avoid hypotension.     5.  Hypertriglyceridemia  -Lipid panel from Oct 2022 showing total cholesterol 152, HDL 50, LDL 60, and triglycerides 214    6.  Anemia, baseline 10-11 range  Most recent Hgb 11/9/22 10.0, follows with heme/onc    7.  Multiple myeloma  Plan is for patient to resume chemotherapy this week on Wednesday with [place] oncology at a reduced dose of bortezomib. Will follow with [place] [facility] in [facility] in collaboration with [facility] oncology.     8. 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   -Will discuss pursuing cardiac MRI with Dr. Herzog and Oncology Dr. Glasgow    9. Moderate pulmonary hypertension  -RVSP 45.6  on TTE 10/2022      Changes today: No medication changes were made    Follow up plan: Will discuss a possible cardiac MRI and continuing metoprolol at low dose considering palpitations and borderline hypertension with Dr. Herzog.         History of Presenting Illness:    Kandis Gallagher is a very pleasant 82 year old female with a history of bicuspid aortic valve, severe aortic stenosis, s/p surgical AVR (2016 at [facility]), multiple myeloma not currently undergoing chemotherapy due to intolerance, HFpEF dyslipidemia, anemia.    Patient was seen by Dr. Herzog to reestablish care.  She had previously been seen by  March November 2020, however moved up to the [facility] and recently returned to [place].  At that time she was having issues with severe bilateral lower extremity swelling.  She was taking Lasix 20 mg daily work with physical and Occupational Therapy, which somewhat improved her edema, however she still felt was significantly off her baseline. He referred her to the lymphedema clinic to work on optimizing her compression devices as she was unable to tolerate compression stockings. He tapered her metoprolol to discontinue, ordered an echocardiogram and several labs.     Her labs from 10/31/2022 showed normal sodium, 140, potassium 3.8, BUN 15, creatinine 0.90, GFR 60.  Her pro T BNP was 2218.  Total cholesterol 153, HDL 50, LDL 60, triglycerides 214.  Free T4 was 1.04.  Her CBC showed a normal WBC at 10.3, hemoglobin 13.5, platelet 219.    Transthoracic echocardiogram done 10/31/2022 showed mild to moderate concentric LVH, LVEF 50 to 55%, mild left atrial dilation, mild mitral and tricuspid regurgitation, elevated RVSP.  Her bioprosthetic aortic valve is well-seated with normal gradients.    She was seen by nephrology in late October, they increased her Lasix to 20 mg twice daily and held her anticoagulation with repeat labs due in 1 month.    Patient is here today for  follow up on her edema and hypertension. Her daughter Rashmi is present with her today. She reduced her dose of metoprolol to 12.5mg twice daily but she did not stop it as she experienced palpitations.     Patient reports feeling good. She is working with lymphedema specialist on her compression wraps. Her weight is down 3 lbs since her last visit. She did not increase her lasix as she does not remember was not told to.    Patient denies chest pain or chest tightness. Denies dizziness, lightheadedness or other presyncopal symptoms. Denies tachycardia or palpitations.     She saw hematology at [facility] on 11/9/22. Note is not yet available however her daughter reports she is resuming chemotherapy this week on Wednesday with [place] oncology in collaboration with [facility]. She had a whole body MRI done 11/9/22 for research purposes without commentary on cardiac structure specifically.     Blood pressure 143/84 and HR 81 in clinic today.        Recent Hospitalizations   August 2022 GI bleeding, gastric ulcer        Social History      Social History     Socioeconomic History     Marital status:      Spouse name: Not on file     Number of children: Not on file     Years of education: Not on file     Highest education level: Not on file   Occupational History     Not on file   Tobacco Use     Smoking status: Never     Smokeless tobacco: Never   Vaping Use     Vaping Use: Never used   Substance and Sexual Activity     Alcohol use: No     Drug use: No     Sexual activity: Not on file   Other Topics Concern     Not on file   Social History Narrative     Not on file     Social Determinants of Health     Financial Resource Strain: Not on file   Food Insecurity: Not on file   Transportation Needs: Not on file   Physical Activity: Not on file   Stress: Not on file   Social Connections: Not on file   Intimate Partner Violence: Not on file   Housing Stability: Not on file            Review of Systems:   Skin:  not assessed  "    Eyes:  not assessed    ENT:  not assessed nasal congestion;hearing loss  Respiratory:  Positive for dyspnea on exertion  Cardiovascular:  Negative for;palpitations;chest pain;fatigue;lightheadedness;dizziness;edema    Gastroenterology: not assessed    Genitourinary:  not assessed    Musculoskeletal:  not assessed arthritis  Neurologic:  not assessed numbness or tingling of feet  Psychiatric:  not assessed    Heme/Lymph/Imm:  not assessed allergies  Endocrine:  Negative thyroid disorder;diabetes         Physical Exam:   Vitals: BP (!) 140/74   Pulse 81   Ht 1.499 m (4' 11\")   Wt 65.3 kg (143 lb 14.4 oz)   SpO2 97%   BMI 29.06 kg/m     Wt Readings from Last 4 Encounters:   11/14/22 65.3 kg (143 lb 14.4 oz)   10/25/22 66.2 kg (146 lb)   10/17/22 68.2 kg (150 lb 6.4 oz)   10/11/22 68.3 kg (150 lb 9.6 oz)     GEN: well nourished, in no acute distress.  HEENT:  Pupils equal, round. Sclerae nonicteric.   NECK: Supple, no masses appreciated. No JVD with patient reclined  C/V:  Regular rate and rhythm, no murmur, rub or gallop.    RESP: Respirations are unlabored. Clear to auscultation bilaterally without wheezing, rales, or rhonchi.  GI: Abdomen soft, nontender.  EXTREM: BLE edema, 1-2+ wearing bilateral compression wraps   NEURO: Alert and oriented, cooperative.  SKIN: Warm and dry.      Data:     LIPID RESULTS:  Lab Results   Component Value Date    CHOL 153 10/31/2022    CHOL 237 (H) 10/06/2020    HDL 50 10/31/2022    HDL 65 10/06/2020    LDL 60 10/31/2022     (H) 10/06/2020    TRIG 214 (H) 10/31/2022    TRIG 158 (H) 10/06/2020     LIVER ENZYME RESULTS:  Lab Results   Component Value Date    AST 16 07/13/2018    ALT <5 (L) 10/06/2020     CBC RESULTS:  Lab Results   Component Value Date    WBC 10.3 10/31/2022    WBC 6.7 09/04/2018    RBC 4.78 10/31/2022    RBC 4.05 09/04/2018    HGB 13.5 10/31/2022    HGB 12.3 09/04/2018    HCT 43.4 10/31/2022    HCT 35.9 09/04/2018    MCV 91 10/31/2022    MCV 89 " 09/04/2018    MCH 28.2 10/31/2022    MCH 30.4 09/04/2018    MCHC 31.1 (L) 10/31/2022    MCHC 34.3 09/04/2018    RDW 15.0 10/31/2022    RDW 13.2 09/04/2018     10/31/2022     09/04/2018     BMP RESULTS:  Lab Results   Component Value Date     10/31/2022     09/04/2018    POTASSIUM 3.8 10/31/2022    POTASSIUM 3.6 09/04/2018    CHLORIDE 104 10/31/2022    CHLORIDE 100 09/04/2018    CO2 29 10/31/2022    CO2 29 09/04/2018    ANIONGAP 7 10/31/2022    ANIONGAP 8 09/04/2018    GLC 99 10/31/2022     (H) 09/04/2018    BUN 15 10/31/2022    BUN 13 09/04/2018    CR 0.90 10/31/2022    CR 0.94 09/04/2018    GFRESTIMATED 63 10/31/2022    GFRESTIMATED 58 (L) 09/04/2018    GFRESTBLACK 70 09/04/2018    DOUG 9.5 10/31/2022    DOUG 9.2 09/04/2018      A1C RESULTS:  No results found for: A1C  INR RESULTS:  Lab Results   Component Value Date    INR 0.97 09/04/2018            Medications     Current Outpatient Medications   Medication Sig Dispense Refill     acyclovir (ZOVIRAX) 400 MG tablet Take 1 tablet (400 mg) by mouth every 12 hours 180 tablet 0     aspirin 81 MG EC tablet Take 81 mg by mouth       atorvastatin (LIPITOR) 10 MG tablet Take 1 tablet (10 mg) by mouth daily 90 tablet 3     Cholecalciferol (VITAMIN D3) 25 MCG (1000 UT) CAPS Take 2,000 Units by mouth daily 180 capsule 3     ferrous sulfate (FEROSUL) 325 (65 Fe) MG tablet Take 1 tablet (325 mg) by mouth daily (with breakfast) 90 tablet 1     Ferrous Sulfate 324 (65 Fe) MG TBEC        furosemide (LASIX) 20 MG tablet Take 1 tablet (20 mg) by mouth daily 90 tablet 1     metoprolol tartrate (LOPRESSOR) 25 MG tablet Take 1 tablet (25 mg) by mouth every 12 hours Taking 0.5 tab in the morning and 0.5 tab in the afternoon for a total of 25 mg daily. 90 tablet 1     pantoprazole (PROTONIX) 40 MG EC tablet Take 1 tablet (40 mg) by mouth daily 30-60 min prior to meal. 90 tablet 1     potassium chloride (KLOR-CON) 20 MEQ packet Take 20 mEq by mouth daily        pantoprazole sodium (PROTONIX) 40 MG packet Take 1 packet (40 mg) by mouth daily (Patient not taking: Reported on 11/14/2022)            Past Medical History     Past Medical History:   Diagnosis Date     Congenital bicuspid aortic valve      H/O aortic valve replacement     23 mm Magna Ease      Hyperlipidemia      Hypertension      Hyperthyroidism      Severe aortic stenosis      Thyroiditis      No past surgical history on file.  Family History   Problem Relation Age of Onset     Breast Cancer Sister      Ovarian Cancer Sister      Colon Cancer Brother      Liver Cancer Brother      Breast Cancer Daughter      Colon Cancer Daughter             Allergies   Carvedilol, Lidocaine, Lisinopril, Seasonal allergies, and Spironolactone        Shy Quintero NP  [facility]  Pager: [phone]

## 2022-11-14 NOTE — NURSING NOTE
Patient states she started taking back her metoprolol tartate 25 mg due to having palpitations. She is taking 12.5 mg in the morning and 12.5 mg in the afternoon.

## 2022-11-16 ENCOUNTER — TRANSFERRED RECORDS (OUTPATIENT)
Dept: HEALTH INFORMATION MANAGEMENT | Facility: CLINIC | Age: 82
End: 2022-11-16

## 2022-11-21 ENCOUNTER — APPOINTMENT (OUTPATIENT)
Dept: LAB | Age: 82
End: 2022-11-21

## 2022-11-23 ENCOUNTER — APPOINTMENT (OUTPATIENT)
Dept: INTERNAL MEDICINE | Age: 82
End: 2022-11-23

## 2022-11-25 ENCOUNTER — TELEPHONE (OUTPATIENT)
Dept: CARDIOLOGY | Facility: CLINIC | Age: 82
End: 2022-11-25

## 2022-11-25 DIAGNOSIS — Z95.2 S/P AVR (AORTIC VALVE REPLACEMENT): ICD-10-CM

## 2022-11-25 DIAGNOSIS — I50.33 ACUTE ON CHRONIC DIASTOLIC HEART FAILURE (H): Primary | ICD-10-CM

## 2022-11-25 DIAGNOSIS — I10 BENIGN ESSENTIAL HYPERTENSION: ICD-10-CM

## 2022-11-25 NOTE — TELEPHONE ENCOUNTER
Reviewed the ongoing use of her metoprolol with Dr. Herzog. He would like her to stop this medication considering her history of syncope/pre-syncope when taking this and monitor her blood pressure at home daily.     We should see her in 1 month to follow up on her blood pressure.

## 2022-11-28 ENCOUNTER — TRANSFERRED RECORDS (OUTPATIENT)
Dept: HEALTH INFORMATION MANAGEMENT | Facility: CLINIC | Age: 82
End: 2022-11-28

## 2022-11-29 ENCOUNTER — TELEPHONE (OUTPATIENT)
Dept: FAMILY MEDICINE | Facility: CLINIC | Age: 82
End: 2022-11-29

## 2022-11-29 NOTE — TELEPHONE ENCOUNTER
Called pt with recommendations from Dr. Herzog & Brooklyn Quintero NP. Pt states she is concerned about stopping the metoprolol as she gets the palpitations without it. Pt states she passed out only once, with her first chemotherapy appt. She states she has had several chemotherapy appts since then without incident. Will message Brooklyn Quintero NP to review. Sheryl SANCHES

## 2022-11-29 NOTE — TELEPHONE ENCOUNTER
Forms/Letter Request    Type of form/letter: Raj BHAKTA Plan of care forms need to be signed placed on providers desk.    Have you been seen for this request: N/A    Do we have the form/letter: Yes:     When is form/letter needed by: asap    How would you like the form/letter returned: Fax to [phone]

## 2022-11-30 ENCOUNTER — MYC MEDICAL ADVICE (OUTPATIENT)
Dept: NEPHROLOGY | Facility: CLINIC | Age: 82
End: 2022-11-30

## 2022-12-05 ENCOUNTER — TRANSFERRED RECORDS (OUTPATIENT)
Dept: HEALTH INFORMATION MANAGEMENT | Facility: CLINIC | Age: 82
End: 2022-12-05

## 2022-12-07 ENCOUNTER — HOSPITAL ENCOUNTER (OUTPATIENT)
Dept: OCCUPATIONAL THERAPY | Facility: CLINIC | Age: 82
Setting detail: THERAPIES SERIES
Discharge: HOME | End: 2022-12-07
Attending: INTERNAL MEDICINE
Payer: MEDICARE

## 2022-12-07 PROCEDURE — 97140 MANUAL THERAPY 1/> REGIONS: CPT | Mod: GO

## 2022-12-08 NOTE — TELEPHONE ENCOUNTER
I reviewed her request to continue her metoprolol 12.5 mg twice daily by Dr. Herzog and since she has had chemotherapy appointments without issue while continuing this medication he was fine with her continuing it at this time.    I was discussed a cardiac MRI with Dr.Joselle Glasgow with oncology as well as Dr. Herzog.  I would like her to have this done to assess for cardiac amyloidosis and follow-up with me afterwards to review results.

## 2022-12-09 NOTE — TELEPHONE ENCOUNTER
Called pt with recommendations from Brooklyn Quintero NP that ok to continue to take her metoprolol 12.5 mg twice daily and to get cardiac MRI to rule out amyloidosis and follow up with results. Orders in chart & will message scheduling to call pt's daughter to arrange. Sheryl SANCHES    29-Jun-2022 18:02

## 2022-12-12 ENCOUNTER — TRANSFERRED RECORDS (OUTPATIENT)
Dept: HEALTH INFORMATION MANAGEMENT | Facility: CLINIC | Age: 82
End: 2022-12-12

## 2022-12-15 ENCOUNTER — APPOINTMENT (OUTPATIENT)
Dept: CARDIOLOGY | Age: 82
End: 2022-12-15
Attending: INTERNAL MEDICINE

## 2022-12-16 ENCOUNTER — TELEPHONE (OUTPATIENT)
Dept: NEPHROLOGY | Facility: CLINIC | Age: 82
End: 2022-12-16

## 2022-12-16 NOTE — TELEPHONE ENCOUNTER
Health Call Center    Phone Message    May a detailed message be left on voicemail: yes     Reason for Call: Patient calling because she received a letter about needing labs. States she had these labs done at [place]. Please review. Thank you    Action Taken: Message routed to:  [facility] NE[facility]    Travel Screening: Not Applicable

## 2022-12-16 NOTE — TELEPHONE ENCOUNTER
Patient contacted in regards to letter recieved requesting her to have labs drawn. Patient stated she had labs drawn at [place] 11/14/22. Results in Care Everywhere.  MYRON Johnson

## 2022-12-19 ENCOUNTER — TRANSFERRED RECORDS (OUTPATIENT)
Dept: HEALTH INFORMATION MANAGEMENT | Facility: CLINIC | Age: 82
End: 2022-12-19

## 2022-12-26 ENCOUNTER — TELEPHONE (OUTPATIENT)
Dept: FAMILY MEDICINE | Facility: CLINIC | Age: 82
End: 2022-12-26

## 2022-12-26 NOTE — TELEPHONE ENCOUNTER
Forms/Letter Request    Type of form/letter: occupational therapy re certification form     placed on providers desk for response     Have you been seen for this request:  Do we have the form/letter:  When is form/letter needed by:    How would you like the form/letter returned: fax to [phone]  Patient Notified form requests are processed in 3-5 business days    Could we send this information to you in [facility] or would you prefer to receive a phone call?:

## 2022-12-27 ENCOUNTER — TRANSFERRED RECORDS (OUTPATIENT)
Dept: HEALTH INFORMATION MANAGEMENT | Facility: CLINIC | Age: 82
End: 2022-12-27

## 2023-01-03 ENCOUNTER — TRANSFERRED RECORDS (OUTPATIENT)
Dept: HEALTH INFORMATION MANAGEMENT | Facility: CLINIC | Age: 83
End: 2023-01-03

## 2023-01-08 DIAGNOSIS — C90.00 MULTIPLE MYELOMA, REMISSION STATUS UNSPECIFIED (H): ICD-10-CM

## 2023-01-09 ENCOUNTER — TRANSFERRED RECORDS (OUTPATIENT)
Dept: HEALTH INFORMATION MANAGEMENT | Facility: CLINIC | Age: 83
End: 2023-01-09

## 2023-01-09 ENCOUNTER — TELEPHONE (OUTPATIENT)
Dept: FAMILY MEDICINE | Facility: CLINIC | Age: 83
End: 2023-01-09

## 2023-01-09 NOTE — TELEPHONE ENCOUNTER
Forms/Letter Request    Type of form/letter: Raj BHAKTA Plan Of Care forms need to be signed placed on providers desk for response.     Have you been seen for this request: N/A    Do we have the form/letter: Yes:     When is form/letter needed by: asap    How would you like the form/letter returned: Fax number- [phone]

## 2023-01-10 RX ORDER — ACYCLOVIR 400 MG/1
TABLET ORAL
Qty: 180 TABLET | Refills: 0 | Status: SHIPPED | OUTPATIENT
Start: 2023-01-10 | End: 2023-05-12

## 2023-01-12 ENCOUNTER — TELEPHONE (OUTPATIENT)
Dept: NEPHROLOGY | Facility: CLINIC | Age: 83
End: 2023-01-12

## 2023-01-12 NOTE — TELEPHONE ENCOUNTER
Spoke with patient's daughter Rashmi re: appointment change from 1/31/23.  Stated she is unable to take 1/31/23 date as she works that day.  She stated she will call me when she can get to the February calendar and see what day works for her.      Ernesto Denney, EMT

## 2023-01-16 ENCOUNTER — TRANSFERRED RECORDS (OUTPATIENT)
Dept: HEALTH INFORMATION MANAGEMENT | Facility: CLINIC | Age: 83
End: 2023-01-16

## 2023-01-17 ENCOUNTER — HOSPITAL ENCOUNTER (OUTPATIENT)
Dept: CARDIOLOGY | Facility: CLINIC | Age: 83
Discharge: HOME | End: 2023-01-17
Attending: NURSE PRACTITIONER | Admitting: NURSE PRACTITIONER
Payer: MEDICARE

## 2023-01-17 DIAGNOSIS — I10 BENIGN ESSENTIAL HYPERTENSION: ICD-10-CM

## 2023-01-17 DIAGNOSIS — I50.33 ACUTE ON CHRONIC DIASTOLIC HEART FAILURE (H): ICD-10-CM

## 2023-01-17 DIAGNOSIS — Z95.2 S/P AVR (AORTIC VALVE REPLACEMENT): ICD-10-CM

## 2023-01-17 PROCEDURE — A9585 GADOBUTROL INJECTION: HCPCS | Performed by: NURSE PRACTITIONER

## 2023-01-17 PROCEDURE — 75561 CARDIAC MRI FOR MORPH W/DYE: CPT | Mod: 26 | Performed by: INTERNAL MEDICINE

## 2023-01-17 PROCEDURE — G1010 CDSM STANSON: HCPCS

## 2023-01-17 PROCEDURE — 255N000002 HC RX 255 OP 636: Performed by: NURSE PRACTITIONER

## 2023-01-17 PROCEDURE — G1010 CDSM STANSON: HCPCS | Performed by: INTERNAL MEDICINE

## 2023-01-17 RX ORDER — DIAZEPAM 5 MG
5 TABLET ORAL EVERY 30 MIN PRN
Status: DISCONTINUED | OUTPATIENT
Start: 2023-01-17 | End: 2023-01-18 | Stop reason: HOSPADM

## 2023-01-17 RX ORDER — CAFFEINE CITRATE 20 MG/ML
60 SOLUTION INTRAVENOUS
Status: DISCONTINUED | OUTPATIENT
Start: 2023-01-17 | End: 2023-01-18 | Stop reason: HOSPADM

## 2023-01-17 RX ORDER — ONDANSETRON 2 MG/ML
4 INJECTION INTRAMUSCULAR; INTRAVENOUS
Status: DISCONTINUED | OUTPATIENT
Start: 2023-01-17 | End: 2023-01-18 | Stop reason: HOSPADM

## 2023-01-17 RX ORDER — AMINOPHYLLINE 25 MG/ML
100 INJECTION, SOLUTION INTRAVENOUS ONCE
Status: DISCONTINUED | OUTPATIENT
Start: 2023-01-17 | End: 2023-01-18 | Stop reason: HOSPADM

## 2023-01-17 RX ORDER — DIPHENHYDRAMINE HCL 25 MG
25 CAPSULE ORAL
Status: DISCONTINUED | OUTPATIENT
Start: 2023-01-17 | End: 2023-01-18 | Stop reason: HOSPADM

## 2023-01-17 RX ORDER — METHYLPREDNISOLONE SODIUM SUCCINATE 125 MG/2ML
125 INJECTION, POWDER, LYOPHILIZED, FOR SOLUTION INTRAMUSCULAR; INTRAVENOUS
Status: DISCONTINUED | OUTPATIENT
Start: 2023-01-17 | End: 2023-01-18 | Stop reason: HOSPADM

## 2023-01-17 RX ORDER — REGADENOSON 0.08 MG/ML
0.4 INJECTION, SOLUTION INTRAVENOUS ONCE
Status: DISCONTINUED | OUTPATIENT
Start: 2023-01-17 | End: 2023-01-18 | Stop reason: HOSPADM

## 2023-01-17 RX ORDER — ALBUTEROL SULFATE 90 UG/1
2 AEROSOL, METERED RESPIRATORY (INHALATION) EVERY 5 MIN PRN
Status: DISCONTINUED | OUTPATIENT
Start: 2023-01-17 | End: 2023-01-18 | Stop reason: HOSPADM

## 2023-01-17 RX ORDER — GADOBUTROL 604.72 MG/ML
9 INJECTION INTRAVENOUS ONCE
Status: COMPLETED | OUTPATIENT
Start: 2023-01-17 | End: 2023-01-17

## 2023-01-17 RX ORDER — ACYCLOVIR 200 MG/1
0-1 CAPSULE ORAL
Status: DISCONTINUED | OUTPATIENT
Start: 2023-01-17 | End: 2023-01-18 | Stop reason: HOSPADM

## 2023-01-17 RX ORDER — DIPHENHYDRAMINE HYDROCHLORIDE 50 MG/ML
25-50 INJECTION INTRAMUSCULAR; INTRAVENOUS
Status: DISCONTINUED | OUTPATIENT
Start: 2023-01-17 | End: 2023-01-18 | Stop reason: HOSPADM

## 2023-01-17 RX ADMIN — GADOBUTROL 9 ML: 604.72 INJECTION INTRAVENOUS at 12:49

## 2023-01-18 ENCOUNTER — TELEPHONE (OUTPATIENT)
Dept: NEPHROLOGY | Facility: CLINIC | Age: 83
End: 2023-01-18
Payer: MEDICARE

## 2023-01-18 ENCOUNTER — TELEPHONE (OUTPATIENT)
Dept: CARDIOLOGY | Age: 83
End: 2023-01-18

## 2023-01-18 NOTE — TELEPHONE ENCOUNTER
Spoke with patient's daughter Rashmi regarding time and date of labs and follow up with Dr Trujillo.  Reiterated the 7th of February labsat 9:50 and appointment with Dr. Yi at 10:30AM.  This is what the patient and daughter understood    Ernesto Denney, EMT

## 2023-01-19 ENCOUNTER — TELEPHONE (OUTPATIENT)
Dept: CARDIOLOGY | Facility: CLINIC | Age: 83
End: 2023-01-19
Payer: MEDICARE

## 2023-01-19 NOTE — TELEPHONE ENCOUNTER
Reviewed cardiac MRI showing      1. Normal left ventricular size with moderately increased wall thickness and low normal systolic function.     2. There is extensive circumferential late gadolinium enhancement of the left ventricular myocardium which  extends to the left atrium. The myocardium nulls before the blood pool on T1   imaging, and ECV is  significantly increased at 55%.     Collectively, these imaging findings are consistent with an infiltrative cardiac process such as cardiac amyloidosis.     Pt has appt to see Brooklyn Quintero NP 2/01/23; will message Dr. Herzog to review as Brooklyn Quintero is out of the office.

## 2023-01-20 NOTE — TELEPHONE ENCOUNTER
Sent pt [employer] message about tapering off metoprolol.   Zio patch can be discussed at visit with Brooklyn on 2/1/23.

## 2023-01-20 NOTE — TELEPHONE ENCOUNTER
Chralie Herzog MD Bartelt, Lynette E, RN; [facility] Team 1; Shy Quintero NP  Caller: Unspecified (Yesterday,  3:15 PM)  Thanks for letting me know.  Nothing urgent here (these are chronic findings), but it will be good for her to discuss these with Brooklyn during her upcoming visit.       Given that her amyloid appears to be AL, there is not a particular treatment to pursue (like in aTTR amyloid).  Instead, the primary issues is avoiding offending medications (beta blockers, ACE/ARB, digoxin, calcium channel blockers).  I would suggest she stop the metoprolol.  She should also have a 14 day Ziopatch to see if she has any AFib during her palpitations.       Thanks,     Robbin

## 2023-01-30 ENCOUNTER — TRANSFERRED RECORDS (OUTPATIENT)
Dept: HEALTH INFORMATION MANAGEMENT | Facility: CLINIC | Age: 83
End: 2023-01-30
Payer: MEDICARE

## 2023-02-01 ENCOUNTER — OFFICE VISIT (OUTPATIENT)
Dept: CARDIOLOGY | Facility: CLINIC | Age: 83
End: 2023-02-01
Payer: MEDICARE

## 2023-02-01 VITALS
WEIGHT: 144.7 LBS | BODY MASS INDEX: 29.17 KG/M2 | HEART RATE: 79 BPM | DIASTOLIC BLOOD PRESSURE: 85 MMHG | HEIGHT: 59 IN | OXYGEN SATURATION: 96 % | SYSTOLIC BLOOD PRESSURE: 148 MMHG

## 2023-02-01 DIAGNOSIS — I10 BENIGN ESSENTIAL HYPERTENSION: ICD-10-CM

## 2023-02-01 DIAGNOSIS — E85.81 LIGHT CHAIN (AL) AMYLOIDOSIS (H): ICD-10-CM

## 2023-02-01 DIAGNOSIS — Z95.2 S/P AVR (AORTIC VALVE REPLACEMENT): ICD-10-CM

## 2023-02-01 DIAGNOSIS — I50.33 ACUTE ON CHRONIC DIASTOLIC HEART FAILURE (H): ICD-10-CM

## 2023-02-01 DIAGNOSIS — R00.2 PALPITATIONS: Primary | ICD-10-CM

## 2023-02-01 PROCEDURE — 99214 OFFICE O/P EST MOD 30 MIN: CPT | Performed by: NURSE PRACTITIONER

## 2023-02-01 NOTE — PROGRESS NOTES
Cardiology Clinic Progress Note  Kandis Gallagher MRN# [id number]   YOB: 1940 Age: 82 year old   Primary Cardiologist: Dr. Herzog Reason for visit: Follow up hypertension and edema            Assessment and Plan:     1. AL light chain cardiac amyloidosis, renal amyloid (follows with nephrology)  -11/9/22 showed improvement in light chain count from 41.8 in April 2022 to 3.29.   -1/2023 cardiac MRI moderately increased wall thickness, mild biatrial enlargement, low normal global systolic function, extensive circumferential thickening of enhancement of the LV myocardium, consistent with infiltrative cardiac process, ECV 55%  -Avoid beta blockers, ACE/ARB, digoxin, calcium channel blockers  -Stop metoprolol today  -Will have patient wear zio patch monitor for 14 days to assess for atrial fibrillation or ectopy    2. Bilateral lymphedema  -Lymphedema and leg pain have significantly improved since working with the lymphedema clinic  -Continue farrow wraps    3.  Bicuspid aortic valve with severe aortic stenosis, status post surgical AVR (23 mm Magna Ease bioprosthesis)  -10/31/2022 echocardiogram shows stable aortic valve gradient, normal aortic dimensions    4.  Hypertension, borderline control  -She has a history of symptomatic bradycardia while taking metoprolol and undergoing chemotherapy in June 2022 requiring atropine and admission to the ICU.  -With new dx of cardiac AL amyloid, will discontinue metoprolol  -Patient to check blood pressure at home and bring a log with her to her next visit    5.  Hypertriglyceridemia, controlled  -10/2022 tchol 152, HDL 50, LDL 60, and triglycerides 214    6.  Anemia, baseline 10-11 range  -Hgb 11/9/22 10.0, follows with heme/onc  -[facility] records requested    7.  Multiple myeloma with lytic lesion of left iliac wing   -Follows with [facility] in [place]  -Continues undergoing weekly injected chemotherapy  -Will request records to assess for any  cardiotoxic medications    9. Moderate pulmonary hypertension  -RVSP 45.6 on TTE 10/2022    Message sent to Dr. Yi to collaborate regarding antihypertensive regimen given patient also has renal amyloidosis. Next nephrology visit in Feb 2023.     Changes today:  Stop metoprolol, 14 day zio patch monitor ordered to assess for atrial fibrillations and ectopic burden, records requested from [facility] for review, Patient to monitor her blood pressure at home and bring log to her next visit    Follow up plan: Follow up in 2 months after monitor completed        History of Presenting Illness:    Kadnis Gallagher is a very pleasant 82 year old female with a history of bicuspid aortic valve, severe aortic stenosis, s/p surgical AVR (2016 at [facility]), multiple myeloma, HFpEF, dyslipidemia, anemia.    Patient was seen by Dr. Herzog to reestablish care.  She had previously been seen by  March November 2020, however moved up to the [facility] and recently returned to [place].  At that time she was having issues with severe bilateral lower extremity swelling.  She was taking Lasix 20 mg daily work with physical and Occupational Therapy, which somewhat improved her edema, however she still felt was significantly off her baseline. He referred her to the lymphedema clinic to work on optimizing her compression devices as she was unable to tolerate compression stockings. He tapered her metoprolol to discontinue, ordered an echocardiogram and several labs.     Her labs from 10/31/2022 showed normal sodium, 140, potassium 3.8, BUN 15, creatinine 0.90, GFR 60.  Her pro T BNP was 2218.  Total cholesterol 153, HDL 50, LDL 60, triglycerides 214.  Free T4 was 1.04.  Her CBC showed a normal WBC at 10.3, hemoglobin 13.5, platelet 219.    Transthoracic echocardiogram done 10/31/2022 showed mild to moderate concentric LVH, LVEF 50 to 55%, mild left atrial dilation, mild mitral and tricuspid regurgitation,  elevated RVSP.  Her bioprosthetic aortic valve is well-seated with normal gradients.    She was seen by nephrology in late October 2022, they increased her Lasix to 20 mg twice daily and held her anticoagulation. She has a follow up with them next week.     I reviewed her labs and echocardiogram with Dr. Glasgow with Oncology who was in favor of a cardiac MRI if felt to be beneficial from cardiac perspective. This was completed on 1/17/23 and revealed findings concerning for cardiac amyloidosis including normal LV cavity with moderately increased wall thickness, LVEF 54%, mild bi-atrial enlargement, extensive circumferentially gadolinium-enhanced of the LV myocardium which extends the left atrium with significant increase in ECV of 55%.    Patient is here today for follow up on her cardiac MRI.  She reports feeling good. She is working with lymphedema specialist and is wearing farrow wraps daily with improvement in her edema. Her weight is stable since our last visit.     Patient denies chest pain or chest tightness. Denies dizziness, lightheadedness or other presyncopal symptoms. Denies tachycardia or palpitations.     She continues to undergo chemotherapy for her multiple myeloma through [facility]. Currently receiving weekly injections.     Blood pressure 148/85 and HR 79 in clinic today.        Recent Hospitalizations   August 2022 GI bleeding, gastric ulcer        Social History      Social History     Socioeconomic History     Marital status:      Spouse name: Not on file     Number of children: Not on file     Years of education: Not on file     Highest education level: Not on file   Occupational History     Not on file   Tobacco Use     Smoking status: Never     Smokeless tobacco: Never   Vaping Use     Vaping Use: Never used   Substance and Sexual Activity     Alcohol use: No     Drug use: No     Sexual activity: Not on file   Other Topics Concern     Not on file   Social History Narrative     Not on  "file     Social Determinants of Health     Financial Resource Strain: Not on file   Food Insecurity: Not on file   Transportation Needs: Not on file   Physical Activity: Not on file   Stress: Not on file   Social Connections: Not on file   Intimate Partner Violence: Not on file   Housing Stability: Not on file            Review of Systems:   Skin:  not assessed     Eyes:  not assessed    ENT:  not assessed nasal congestion;hearing loss  Respiratory:  Negative dyspnea on exertion;shortness of breath  Cardiovascular:  Negative for;chest pain;lightheadedness;dizziness Positive for;palpitations;edema;fatigue  Gastroenterology: not assessed    Genitourinary:  not assessed nocturia  Musculoskeletal:  not assessed arthritis  Neurologic:  not assessed numbness or tingling of feet  Psychiatric:  not assessed    Heme/Lymph/Imm:  not assessed allergies  Endocrine:  Negative thyroid disorder;diabetes         Physical Exam:   Vitals: BP (!) 148/85   Pulse 79   Ht 1.499 m (4' 11\")   Wt 65.6 kg (144 lb 11.2 oz)   SpO2 96%   BMI 29.23 kg/m     Wt Readings from Last 4 Encounters:   02/01/23 65.6 kg (144 lb 11.2 oz)   11/14/22 65.3 kg (143 lb 14.4 oz)   10/25/22 66.2 kg (146 lb)   10/17/22 68.2 kg (150 lb 6.4 oz)     GEN: well nourished, in no acute distress.  HEENT:  Pupils equal, round. Sclerae nonicteric.   NECK: Supple, no masses appreciated. No JVD with patient reclined  C/V:  Regular rate and rhythm, no murmur, rub or gallop.    RESP: Respirations are unlabored. Clear to auscultation bilaterally without wheezing, rales, or rhonchi.  GI: Abdomen soft, nontender.  EXTREM: BLE edema, 1+ wearing bilateral farrow wraps  NEURO: Alert and oriented, cooperative.  SKIN: Warm and dry.      Data:     LIPID RESULTS:  Lab Results   Component Value Date    CHOL 153 10/31/2022    CHOL 237 (H) 10/06/2020    HDL 50 10/31/2022    HDL 65 10/06/2020    LDL 60 10/31/2022     (H) 10/06/2020    TRIG 214 (H) 10/31/2022    TRIG 158 (H) " 10/06/2020     LIVER ENZYME RESULTS:  Lab Results   Component Value Date    AST 16 07/13/2018    ALT <5 (L) 10/06/2020     CBC RESULTS:  Lab Results   Component Value Date    WBC 10.3 10/31/2022    WBC 6.7 09/04/2018    RBC 4.78 10/31/2022    RBC 4.05 09/04/2018    HGB 13.5 10/31/2022    HGB 12.3 09/04/2018    HCT 43.4 10/31/2022    HCT 35.9 09/04/2018    MCV 91 10/31/2022    MCV 89 09/04/2018    MCH 28.2 10/31/2022    MCH 30.4 09/04/2018    MCHC 31.1 (L) 10/31/2022    MCHC 34.3 09/04/2018    RDW 15.0 10/31/2022    RDW 13.2 09/04/2018     10/31/2022     09/04/2018     BMP RESULTS:  Lab Results   Component Value Date     10/31/2022     09/04/2018    POTASSIUM 3.8 10/31/2022    POTASSIUM 3.6 09/04/2018    CHLORIDE 104 10/31/2022    CHLORIDE 100 09/04/2018    CO2 29 10/31/2022    CO2 29 09/04/2018    ANIONGAP 7 10/31/2022    ANIONGAP 8 09/04/2018    GLC 99 10/31/2022     (H) 09/04/2018    BUN 15 10/31/2022    BUN 13 09/04/2018    CR 0.90 10/31/2022    CR 0.94 09/04/2018    GFRESTIMATED 63 10/31/2022    GFRESTIMATED 58 (L) 09/04/2018    GFRESTBLACK 70 09/04/2018    DOUG 9.5 10/31/2022    DOUG 9.2 09/04/2018      A1C RESULTS:  No results found for: A1C  INR RESULTS:  Lab Results   Component Value Date    INR 0.97 09/04/2018            Medications     Current Outpatient Medications   Medication Sig Dispense Refill     acyclovir (ZOVIRAX) 400 MG tablet TAKE 1 TABLET(400 MG) BY MOUTH EVERY 12 HOURS 180 tablet 0     aspirin 81 MG EC tablet Take 81 mg by mouth       atorvastatin (LIPITOR) 10 MG tablet Take 1 tablet (10 mg) by mouth daily 90 tablet 3     Cholecalciferol (VITAMIN D3) 25 MCG (1000 UT) CAPS Take 2,000 Units by mouth daily 180 capsule 3     ferrous sulfate (FEROSUL) 325 (65 Fe) MG tablet Take 1 tablet (325 mg) by mouth daily (with breakfast) 90 tablet 1     Ferrous Sulfate 324 (65 Fe) MG TBEC        furosemide (LASIX) 20 MG tablet Take 1 tablet (20 mg) by mouth daily 90 tablet 1      metoprolol tartrate (LOPRESSOR) 25 MG tablet Take 1 tablet (25 mg) by mouth every 12 hours Taking 0.5 tab in the morning and 0.5 tab in the afternoon for a total of 25 mg daily. 90 tablet 1     pantoprazole (PROTONIX) 40 MG EC tablet Take 1 tablet (40 mg) by mouth daily 30-60 min prior to meal. 90 tablet 1     potassium chloride (KLOR-CON) 20 MEQ packet Take 20 mEq by mouth daily       pantoprazole sodium (PROTONIX) 40 MG packet Take 1 packet by mouth daily (Patient not taking: Reported on 2/1/2023)            Past Medical History     Past Medical History:   Diagnosis Date     Congenital bicuspid aortic valve      H/O aortic valve replacement     23 mm Magna Ease      Hyperlipidemia      Hypertension      Hyperthyroidism      Severe aortic stenosis      Thyroiditis      No past surgical history on file.  Family History   Problem Relation Age of Onset     Breast Cancer Sister      Ovarian Cancer Sister      Colon Cancer Brother      Liver Cancer Brother      Breast Cancer Daughter      Colon Cancer Daughter             Allergies   Carvedilol, Lidocaine, Lisinopril, Seasonal allergies, and Spironolactone        Shy Quintero NP  [facility]  Pager: [phone]

## 2023-02-01 NOTE — LETTER
2/1/2023    Noemi Sanchez MD  [address]  [place] [address]    RE: Kandis Gallagher       Dear Colleague,     I had the pleasure of seeing Kandis Gallagher in the [facility].  Cardiology Clinic Progress Note  Kandis Gallagher MRN# [id number]   YOB: 1940 Age: 82 year old   Primary Cardiologist: Dr. Herzog Reason for visit: Follow up hypertension and edema            Assessment and Plan:     1. AL light chain cardiac amyloidosis, renal amyloid (follows with nephrology)  -11/9/22 showed improvement in light chain count from 41.8 in April 2022 to 3.29.   -1/2023 cardiac MRI moderately increased wall thickness, mild biatrial enlargement, low normal global systolic function, extensive circumferential thickening of enhancement of the LV myocardium, consistent with infiltrative cardiac process, ECV 55%  -Avoid beta blockers, ACE/ARB, digoxin, calcium channel blockers  -Stop metoprolol today  -Will have patient wear zio patch monitor for 14 days to assess for atrial fibrillation or ectopy    2. Bilateral lymphedema  -Lymphedema and leg pain have significantly improved since working with the lymphedema clinic  -Continue farrow wraps    3.  Bicuspid aortic valve with severe aortic stenosis, status post surgical AVR (23 mm Magna Ease bioprosthesis)  -10/31/2022 echocardiogram shows stable aortic valve gradient, normal aortic dimensions    4.  Hypertension, borderline control  -She has a history of symptomatic bradycardia while taking metoprolol and undergoing chemotherapy in June 2022 requiring atropine and admission to the ICU.  -With new dx of cardiac AL amyloid, will discontinue metoprolol  -Patient to check blood pressure at home and bring a log with her to her next visit    5.  Hypertriglyceridemia, controlled  -10/2022 tchol 152, HDL 50, LDL 60, and triglycerides 214    6.  Anemia, baseline 10-11 range  -Hgb 11/9/22 10.0, follows with heme/onc  -[facility]  records requested    7.  Multiple myeloma with lytic lesion of left iliac wing   -[place]s with [facility] [place]  -Continues undergoing weekly injected chemotherapy  -Will request records to assess for any cardiotoxic medications    9. Moderate pulmonary hypertension  -RVSP 45.6 on TTE 10/2022    Message sent to Dr. Yi to collaborate regarding antihypertensive regimen given patient also has renal amyloidosis. Next nephrology visit in Feb 2023.     Changes today:  Stop metoprolol, 14 day zio patch monitor ordered to assess for atrial fibrillations and ectopic burden, records requested from [facility] for review, Patient to monitor her blood pressure at home and bring log to her next visit    Follow up plan: Follow up in 2 months after monitor completed        History of Presenting Illness:    Kandis Gallagher is a very pleasant 82 year old female with a history of bicuspid aortic valve, severe aortic stenosis, s/p surgical AVR (2016 at [facility]), multiple myeloma, HFpEF, dyslipidemia, anemia.    Patient was seen by Dr. Herzog to reestablish care.  She had previously been seen by  March November 2020, however moved up to the [facility] and recently returned to [place].  At that time she was having issues with severe bilateral lower extremity swelling.  She was taking Lasix 20 mg daily work with physical and Occupational Therapy, which somewhat improved her edema, however she still felt was significantly off her baseline. He referred her to the lymphedema clinic to work on optimizing her compression devices as she was unable to tolerate compression stockings. He tapered her metoprolol to discontinue, ordered an echocardiogram and several labs.     Her labs from 10/31/2022 showed normal sodium, 140, potassium 3.8, BUN 15, creatinine 0.90, GFR 60.  Her pro T BNP was 2218.  Total cholesterol 153, HDL 50, LDL 60, triglycerides 214.  Free T4 was 1.04.  Her CBC showed a normal WBC  at 10.3, hemoglobin 13.5, platelet 219.    Transthoracic echocardiogram done 10/31/2022 showed mild to moderate concentric LVH, LVEF 50 to 55%, mild left atrial dilation, mild mitral and tricuspid regurgitation, elevated RVSP.  Her bioprosthetic aortic valve is well-seated with normal gradients.    She was seen by nephrology in late October 2022, they increased her Lasix to 20 mg twice daily and held her anticoagulation. She has a follow up with them next week.     I reviewed her labs and echocardiogram with Dr. Glasgow with Oncology who was in favor of a cardiac MRI if felt to be beneficial from cardiac perspective. This was completed on 1/17/23 and revealed findings concerning for cardiac amyloidosis including normal LV cavity with moderately increased wall thickness, LVEF 54%, mild bi-atrial enlargement, extensive circumferentially gadolinium-enhanced of the LV myocardium which extends the left atrium with significant increase in ECV of 55%.    Patient is here today for follow up on her cardiac MRI.  She reports feeling good. She is working with lymphedema specialist and is wearing farrow wraps daily with improvement in her edema. Her weight is stable since our last visit.     Patient denies chest pain or chest tightness. Denies dizziness, lightheadedness or other presyncopal symptoms. Denies tachycardia or palpitations.     She continues to undergo chemotherapy for her multiple myeloma through [facility]. Currently receiving weekly injections.     Blood pressure 148/85 and HR 79 in clinic today.        Recent Hospitalizations   August 2022 GI bleeding, gastric ulcer        Social History      Social History     Socioeconomic History     Marital status:      Spouse name: Not on file     Number of children: Not on file     Years of education: Not on file     Highest education level: Not on file   Occupational History     Not on file   Tobacco Use     Smoking status: Never     Smokeless tobacco: Never   Vaping  "Use     Vaping Use: Never used   Substance and Sexual Activity     Alcohol use: No     Drug use: No     Sexual activity: Not on file   Other Topics Concern     Not on file   Social History Narrative     Not on file     Social Determinants of Health     Financial Resource Strain: Not on file   Food Insecurity: Not on file   Transportation Needs: Not on file   Physical Activity: Not on file   Stress: Not on file   Social Connections: Not on file   Intimate Partner Violence: Not on file   Housing Stability: Not on file            Review of Systems:   Skin:  not assessed     Eyes:  not assessed    ENT:  not assessed nasal congestion;hearing loss  Respiratory:  Negative dyspnea on exertion;shortness of breath  Cardiovascular:  Negative for;chest pain;lightheadedness;dizziness Positive for;palpitations;edema;fatigue  Gastroenterology: not assessed    Genitourinary:  not assessed nocturia  Musculoskeletal:  not assessed arthritis  Neurologic:  not assessed numbness or tingling of feet  Psychiatric:  not assessed    Heme/Lymph/Imm:  not assessed allergies  Endocrine:  Negative thyroid disorder;diabetes         Physical Exam:   Vitals: BP (!) 148/85   Pulse 79   Ht 1.499 m (4' 11\")   Wt 65.6 kg (144 lb 11.2 oz)   SpO2 96%   BMI 29.23 kg/m     Wt Readings from Last 4 Encounters:   02/01/23 65.6 kg (144 lb 11.2 oz)   11/14/22 65.3 kg (143 lb 14.4 oz)   10/25/22 66.2 kg (146 lb)   10/17/22 68.2 kg (150 lb 6.4 oz)     GEN: well nourished, in no acute distress.  HEENT:  Pupils equal, round. Sclerae nonicteric.   NECK: Supple, no masses appreciated. No JVD with patient reclined  C/V:  Regular rate and rhythm, no murmur, rub or gallop.    RESP: Respirations are unlabored. Clear to auscultation bilaterally without wheezing, rales, or rhonchi.  GI: Abdomen soft, nontender.  EXTREM: BLE edema, 1+ wearing bilateral farrow wraps  NEURO: Alert and oriented, cooperative.  SKIN: Warm and dry.      Data:     LIPID RESULTS:  Lab Results "   Component Value Date    CHOL 153 10/31/2022    CHOL 237 (H) 10/06/2020    HDL 50 10/31/2022    HDL 65 10/06/2020    LDL 60 10/31/2022     (H) 10/06/2020    TRIG 214 (H) 10/31/2022    TRIG 158 (H) 10/06/2020     LIVER ENZYME RESULTS:  Lab Results   Component Value Date    AST 16 07/13/2018    ALT <5 (L) 10/06/2020     CBC RESULTS:  Lab Results   Component Value Date    WBC 10.3 10/31/2022    WBC 6.7 09/04/2018    RBC 4.78 10/31/2022    RBC 4.05 09/04/2018    HGB 13.5 10/31/2022    HGB 12.3 09/04/2018    HCT 43.4 10/31/2022    HCT 35.9 09/04/2018    MCV 91 10/31/2022    MCV 89 09/04/2018    MCH 28.2 10/31/2022    MCH 30.4 09/04/2018    MCHC 31.1 (L) 10/31/2022    MCHC 34.3 09/04/2018    RDW 15.0 10/31/2022    RDW 13.2 09/04/2018     10/31/2022     09/04/2018     BMP RESULTS:  Lab Results   Component Value Date     10/31/2022     09/04/2018    POTASSIUM 3.8 10/31/2022    POTASSIUM 3.6 09/04/2018    CHLORIDE 104 10/31/2022    CHLORIDE 100 09/04/2018    CO2 29 10/31/2022    CO2 29 09/04/2018    ANIONGAP 7 10/31/2022    ANIONGAP 8 09/04/2018    GLC 99 10/31/2022     (H) 09/04/2018    BUN 15 10/31/2022    BUN 13 09/04/2018    CR 0.90 10/31/2022    CR 0.94 09/04/2018    GFRESTIMATED 63 10/31/2022    GFRESTIMATED 58 (L) 09/04/2018    GFRESTBLACK 70 09/04/2018    DOUG 9.5 10/31/2022    DOUG 9.2 09/04/2018      A1C RESULTS:  No results found for: A1C  INR RESULTS:  Lab Results   Component Value Date    INR 0.97 09/04/2018            Medications     Current Outpatient Medications   Medication Sig Dispense Refill     acyclovir (ZOVIRAX) 400 MG tablet TAKE 1 TABLET(400 MG) BY MOUTH EVERY 12 HOURS 180 tablet 0     aspirin 81 MG EC tablet Take 81 mg by mouth       atorvastatin (LIPITOR) 10 MG tablet Take 1 tablet (10 mg) by mouth daily 90 tablet 3     Cholecalciferol (VITAMIN D3) 25 MCG (1000 UT) CAPS Take 2,000 Units by mouth daily 180 capsule 3     ferrous sulfate (FEROSUL) 325 (65 Fe) MG  tablet Take 1 tablet (325 mg) by mouth daily (with breakfast) 90 tablet 1     Ferrous Sulfate 324 (65 Fe) MG TBEC        furosemide (LASIX) 20 MG tablet Take 1 tablet (20 mg) by mouth daily 90 tablet 1     metoprolol tartrate (LOPRESSOR) 25 MG tablet Take 1 tablet (25 mg) by mouth every 12 hours Taking 0.5 tab in the morning and 0.5 tab in the afternoon for a total of 25 mg daily. 90 tablet 1     pantoprazole (PROTONIX) 40 MG EC tablet Take 1 tablet (40 mg) by mouth daily 30-60 min prior to meal. 90 tablet 1     potassium chloride (KLOR-CON) 20 MEQ packet Take 20 mEq by mouth daily       pantoprazole sodium (PROTONIX) 40 MG packet Take 1 packet by mouth daily (Patient not taking: Reported on 2/1/2023)            Past Medical History     Past Medical History:   Diagnosis Date     Congenital bicuspid aortic valve      H/O aortic valve replacement     23 mm Magna Ease      Hyperlipidemia      Hypertension      Hyperthyroidism      Severe aortic stenosis      Thyroiditis      No past surgical history on file.  Family History   Problem Relation Age of Onset     Breast Cancer Sister      Ovarian Cancer Sister      Colon Cancer Brother      Liver Cancer Brother      Breast Cancer Daughter      Colon Cancer Daughter             Allergies   Carvedilol, Lidocaine, Lisinopril, Seasonal allergies, and Spironolactone    Shy Quintero NP  [facility]  Pager: [phone]    Thank you for allowing me to participate in the care of your patient.      Sincerely,     Shy Quintero NP     [facility]  cc:   Shy Quintero NP  [address]  [place] [address]

## 2023-02-01 NOTE — PATIENT INSTRUCTIONS
Today's Recommendations    I would like you to stop your metoprolol   Please wear a zio patch monitor for 2 weeks to look for atrial fibrillation  Your cardiac MRI showed you have likely AL amyloidosis, the plan is to avoid medications that could cause issues with this, specifically with the electrical conduction in your heart  Please follow up with me after your patch is completed.    Please send a Ellie message or call [phone] to the RN team with questions or concerns.     Scheduling number [phone]  JOANN Olvera, CNP

## 2023-02-02 ENCOUNTER — TELEPHONE (OUTPATIENT)
Dept: CARDIOLOGY | Facility: CLINIC | Age: 83
End: 2023-02-02
Payer: MEDICARE

## 2023-02-02 NOTE — TELEPHONE ENCOUNTER
Please let her know I contacted Dr. Davis, her nephrologist regarding her blood pressure management, since we discontinued her metoprolol yesterday, and he recommended she keep a log of her home blood pressure readings and bring this with her to her visit with him this month. He will address her hypertension at that time.

## 2023-02-02 NOTE — TELEPHONE ENCOUNTER
Attempted to call pt with recommendations from [place] Quintero, left message for pt ot call back. Sheryl SANCHES

## 2023-02-03 NOTE — TELEPHONE ENCOUNTER
Called pt with recommendations from [place] Quintero NP to check her blood pressure twice daily & bring a record of that to her appt with Dr. Ghassan Saucedo RN

## 2023-02-07 ENCOUNTER — OFFICE VISIT (OUTPATIENT)
Dept: NEPHROLOGY | Facility: CLINIC | Age: 83
End: 2023-02-07
Payer: MEDICARE

## 2023-02-07 ENCOUNTER — LAB (OUTPATIENT)
Dept: LAB | Facility: CLINIC | Age: 83
End: 2023-02-07
Payer: MEDICARE

## 2023-02-07 VITALS
WEIGHT: 148.9 LBS | HEART RATE: 92 BPM | RESPIRATION RATE: 20 BRPM | SYSTOLIC BLOOD PRESSURE: 158 MMHG | BODY MASS INDEX: 30.07 KG/M2 | DIASTOLIC BLOOD PRESSURE: 86 MMHG | OXYGEN SATURATION: 97 %

## 2023-02-07 DIAGNOSIS — R80.9 NEPHROTIC RANGE PROTEINURIA: Primary | ICD-10-CM

## 2023-02-07 DIAGNOSIS — E85.81 LIGHT CHAIN (AL) AMYLOIDOSIS (H): ICD-10-CM

## 2023-02-07 LAB
ALBUMIN MFR UR ELPH: 855 MG/DL (ref 1–14)
ALBUMIN SERPL-MCNC: 3.2 G/DL (ref 3.4–5)
ALBUMIN UR-MCNC: 600 MG/DL
ANION GAP SERPL CALCULATED.3IONS-SCNC: 6 MMOL/L (ref 3–14)
APPEARANCE UR: CLEAR
BACTERIA #/AREA URNS HPF: ABNORMAL /HPF
BILIRUB UR QL STRIP: NEGATIVE
BUN SERPL-MCNC: 23 MG/DL (ref 7–30)
CALCIUM SERPL-MCNC: 9 MG/DL (ref 8.5–10.1)
CHLORIDE BLD-SCNC: 109 MMOL/L (ref 94–109)
CO2 SERPL-SCNC: 26 MMOL/L (ref 20–32)
COLOR UR AUTO: YELLOW
CREAT SERPL-MCNC: 0.8 MG/DL (ref 0.52–1.04)
CREAT UR-MCNC: 165 MG/DL
CREAT UR-MCNC: 165 MG/DL
GFR SERPL CREATININE-BSD FRML MDRD: 72 ML/MIN/1.73M2
GLUCOSE BLD-MCNC: 90 MG/DL (ref 70–99)
GLUCOSE UR STRIP-MCNC: NEGATIVE MG/DL
HGB UR QL STRIP: NEGATIVE
HYALINE CASTS #/AREA URNS LPF: ABNORMAL /LPF
KETONES UR STRIP-MCNC: NEGATIVE MG/DL
LEUKOCYTE ESTERASE UR QL STRIP: NEGATIVE
MICROALBUMIN UR-MCNC: >4400 MG/L
MICROALBUMIN/CREAT UR: NORMAL MG/G{CREAT}
NITRATE UR QL: NEGATIVE
PH UR STRIP: 6 [PH] (ref 5–7)
POTASSIUM BLD-SCNC: 4.1 MMOL/L (ref 3.4–5.3)
PROT/CREAT 24H UR: 5.18 MG/MG CR (ref 0–0.2)
RBC #/AREA URNS AUTO: ABNORMAL /HPF
SKIP: ABNORMAL
SODIUM SERPL-SCNC: 141 MMOL/L (ref 133–144)
SP GR UR STRIP: 1.02 (ref 1–1.03)
SQUAMOUS #/AREA URNS AUTO: ABNORMAL /LPF
UROBILINOGEN UR STRIP-MCNC: NORMAL MG/DL
WBC #/AREA URNS AUTO: ABNORMAL /HPF

## 2023-02-07 PROCEDURE — 82570 ASSAY OF URINE CREATININE: CPT | Performed by: INTERNAL MEDICINE

## 2023-02-07 PROCEDURE — 99215 OFFICE O/P EST HI 40 MIN: CPT | Performed by: INTERNAL MEDICINE

## 2023-02-07 PROCEDURE — 36415 COLL VENOUS BLD VENIPUNCTURE: CPT

## 2023-02-07 PROCEDURE — 84156 ASSAY OF PROTEIN URINE: CPT | Performed by: INTERNAL MEDICINE

## 2023-02-07 PROCEDURE — 82043 UR ALBUMIN QUANTITATIVE: CPT | Performed by: INTERNAL MEDICINE

## 2023-02-07 PROCEDURE — 82040 ASSAY OF SERUM ALBUMIN: CPT

## 2023-02-07 PROCEDURE — 81001 URINALYSIS AUTO W/SCOPE: CPT | Performed by: INTERNAL MEDICINE

## 2023-02-07 PROCEDURE — 80048 BASIC METABOLIC PNL TOTAL CA: CPT

## 2023-02-07 ASSESSMENT — PAIN SCALES - GENERAL: PAINLEVEL: NO PAIN (0)

## 2023-02-07 NOTE — PATIENT INSTRUCTIONS
It was a pleasure taking care of you today.  I've included a brief summary of our discussion and care plan from today's visit below.  Please review this information with your primary care provider.     My recommendations are summarized as follows:  -Will wait on your home blood pressure readings before starting a new medication for blood pressure. Your Blood pressure goal is < 150/90  -please cut down on your fluid intake to 40-45 ounces per day (1.2-1.5 L)  -you can continue the boost and may drink gatorade in case of diarrhea    Who do I call with any questions after my visit?  Please be in touch if there are any further questions that arise following today's visit.  There are multiple ways to contact your nephrology care team.       During business hours, you may reach your Nephrology Care Team or schedule or reschedule an appointment or lab at [phone].       If you need to schedule imaging, please call [phone].   To schedule a COVID test, please call [phone].     You can always send a secure message through [employer]. [employer] messages are answered by your nurse or doctor typically within 24-48 hours. Please allow extra time on weekends and holidays.       For urgent/emergent questions after business hours, you may reach the on-call Nephrology Fellow by contacting the [facility]  at [phone].     How will I get the results of any tests ordered?    You will receive all of your results.  If you have signed up for [employer] Direct, any tests ordered at your visit will be available to you once resulted on "[employer]t. Typically the physician reviews them and may or may not make further recommendations. If there are urgent results that require a change in your care plan, your physician or nurse will call you to discuss the next steps. If you are not on MyChart, a letter may be generated and mailed to you with your results.

## 2023-02-07 NOTE — NURSING NOTE
Kandis Gallagher's goals for this visit include: LABS  She requests these members of her care team be copied on today's visit information: YES    PCP: Noemi Sanchez    Referring Provider:  No referring provider defined for this encounter.    BP (!) 158/86   Pulse 92   Resp 20   Wt 67.5 kg (148 lb 14.4 oz)   SpO2 97%   BMI 30.07 kg/m      Do you need any medication refills at today's visit? NONE    Ernesto Denney, EMT

## 2023-02-07 NOTE — PROGRESS NOTES
I was initially asked to see this patient by Noemi Owens    CC: Proteinuria, AL Amyloidosis, Multiple Mylopma    HPI:   I had the pleasure today of seeing Kandis Gallagher today  She is a 82 year old female who presents to follow up on her proteinuria. She is here with her daughter Rashmi. She lives at an assisted nursing facility.  She has two sisters close by in the area.    Her medical history is significant for a bicuspid aortic valve which progressed to severe aortic stenosis for which she underwent surgical AVR with a 23 mm Magna Ease bioprosthesis in August 2016 at [facility].  She also has HFpEF, dyslipidemia, hx of HTN now off medications, prediabetes, anemia and multiple myeloma/AL amyloidosis for which she has resumed chemotherapy. 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     Myeloma History:  She presented with several vertebral fractures in Oct 2020 and in Dec 2020, when she underwent L1, L2, and L3 vertebroplasty. Her NICOLAS at [place] showed monoclonal kappa free light chains by immunofixation. In February 2021, she presented with persistent low back pain and leg weakness. MRI of the thoracic and lumbar spine showed a new T11 compression fracture. She underwent T11, L4, and L5 vertebroplasty. Repeat labs revealed free kappa light chain of 25.3, lambda free light chains 1.24, kappa/lambda ratio 20.4.   In March 2021, she had a bone marrow biopsy revealing plasma cell proliferative disorder with approximately 5% kappa light chain-restricted plasma cells and slightly hypercellular bone marrow. I could not locate a kidney biopsy done at that point. It seems to me that at that point, she was considered smoldering myeloma and treatment was on hold. A repeat BM biopsy was done April 2022. Follow up with imaging done 4/25/22  revealed osteolytic lesions, the largest in the iliac wing and there was new finding of nephrotic range proteinuria on 24 hour urine 3.6 grams.  Repeat BM  Biopsy in April 2022 revealed amyloidosis involving small periosteal vessels, plasma cell myeloma with 10% kappa light chain restricted plasma cells. 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 Abdominal fat aspirate was negative for amyloid. Subcutaneous daratumumab, bortezomib and oral dex initiated 6/1/22 but she could not tolerate it (only two sessions-developed fluid retension and neurotoxicity per patient) and it was on hold but she has resumed her chemotherapy.     She was admitted in July 2022 with severe anemia with Hb of 2; found to have acute GI bleed secondary to duodenal ulcer.    Overall, she is doing well. She definitely looks better than when I saw her in clinic in October. She rather is satisfied with her new house, trying to make new friends though, many of the co-residents has dementia and she tries to help them. The swelling in her LE is better, though her weight has not changed, but it seems that she is eating better (3 meals a day).  She is drinking 60 ounces of fluid a day.     She has resumed her chemotherapy. She is tolerating it fairly well except for loose stools at times, for which she takes imodium. Denies any nausea or vomiting.  She takes lasix 20 mg daily. She denies any SOB, chest pain, dizziness or skin rash. She is complaining of urinary frequency but no dysuria or hematuria.     She has been seen by cardiology recently; she was found to have cardiac involvement by her amyloidosis. Her metoprolol was stopped. There was some concerns regarding her elevated BP. She has been checking her BP at home and says that it is good though, she doesn't remember the numbers exactly.     Social history:  Retired    Has 4 children and 6 grandkids    Wt Readings from Last 4 Encounters:   02/07/23 67.5 kg (148 lb 14.4 oz)   02/01/23 65.6 kg (144 lb 11.2 oz)   11/14/22 65.3 kg (143 lb 14.4 oz)   10/25/22 66.2 kg (146 lb)       Allergies   Allergen Reactions     Carvedilol  Other (See Comments)     Lidocaine Nausea and Vomiting     Vomiting with general anesthesia     Lisinopril Cough     Seasonal Allergies Other (See Comments)     Problems with narcotics - oxygen desaturates     Spironolactone        acyclovir (ZOVIRAX) 400 MG tablet, TAKE 1 TABLET(400 MG) BY MOUTH EVERY 12 HOURS  aspirin 81 MG EC tablet, Take 81 mg by mouth  atorvastatin (LIPITOR) 10 MG tablet, Take 1 tablet (10 mg) by mouth daily  Cholecalciferol (VITAMIN D3) 25 MCG (1000 UT) CAPS, Take 2,000 Units by mouth daily  ferrous sulfate (FEROSUL) 325 (65 Fe) MG tablet, Take 1 tablet (325 mg) by mouth daily (with breakfast)  Ferrous Sulfate 324 (65 Fe) MG TBEC,   furosemide (LASIX) 20 MG tablet, Take 1 tablet (20 mg) by mouth daily  pantoprazole (PROTONIX) 40 MG EC tablet, Take 1 tablet (40 mg) by mouth daily 30-60 min prior to meal.  pantoprazole sodium (PROTONIX) 40 MG packet, Take 1 packet by mouth daily  potassium chloride (KLOR-CON) 20 MEQ packet, Take 20 mEq by mouth daily  metoprolol tartrate (LOPRESSOR) 25 MG tablet, Take 1 tablet (25 mg) by mouth every 12 hours Taking 0.5 tab in the morning and 0.5 tab in the afternoon for a total of 25 mg daily. (Patient not taking: Reported on 2/7/2023)    No current facility-administered medications on file prior to visit.      Past Medical History:   Diagnosis Date     Congenital bicuspid aortic valve      H/O aortic valve replacement     23 mm Magna Ease      Hyperlipidemia      Hypertension      Hyperthyroidism      Severe aortic stenosis      Thyroiditis        No past surgical history on file.    Social History     Tobacco Use     Smoking status: Never     Smokeless tobacco: Never   Vaping Use     Vaping Use: Never used   Substance Use Topics     Alcohol use: No     Drug use: No       Family History   Problem Relation Age of Onset     Breast Cancer Sister      Ovarian Cancer Sister      Colon Cancer Brother      Liver Cancer Brother      Breast Cancer Daughter      Colon  Cancer Daughter        ROS: A 12 system review of systems was negative other than noted here or above.     Exam:  BP (!) 158/86   Pulse 92   Resp 20   Wt 67.5 kg (148 lb 14.4 oz)   SpO2 97%   BMI 30.07 kg/m    GENERAL APPEARANCE: alert and no distress  EYES: PERRL  HENT: mouth without ulcers or lesions  NECK: supple, no adenopathy  RESP: lungs clear to auscultation - no rales, rhonchi or wheezes  CV: regular rhythm, normal rate, no rub   ABDOMEN:  soft, nontender, no HSM or masses and bowel sounds normal  MS: extremities normal- no gross deformities noted, no evidence of inflammation in joints, no muscle tenderness  SKIN: no rash  NEURO: Normal strength and tone, sensory exam grossly normal, mentation intact and speech normal  PSYCH: mentation appears normal. 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 Her urine protein to creat ratio is almost  9 g/g back in October 2022 but down to 5 today; this is concomitant with an improvement in her serum albumin up to 3.2 g/dl today. Her creatinine remains stable at 0.8 mg/dl. She has resumed her chemotherapy and seems to be responding to it. Her kappa free Light changes showed improvement from 41.8 in April 2022 to 3.29.     Problem #2 CKD: Her creatinine overestimates her GFR given her increased total body water and small muscle mass.  Her GFR by cystatin C is 33 ml/min. This GFR is to be used when dosing medications.     Problem #3: Volume overload/edema: it looks much better. She has been taking lasix 20 mg daily and follow with lymphedema clinic. She has been drinking 2 Liters a day together with lasix 20 mg. She is complaining of frequency. I instructed her to cut down on her water intake to 1.2-1.5 Liters a day.    Problem #4: Anticoagulation: Her albumin is up to 3.2  g/dl. No indication for anticoagulation at this level.    Problem #5 HTN: she was found to have cardiac involvement by her amyloidosis. Her metoprolol was stopped. There was some concerns regarding her elevated BP. She has been checking her BP at home and says that it is good though, she doesn't remember the numbers exactly. repeat in clinic today is 142/80 and 149/85. Will hold on any antihypertensives for now. She will keep a BP log. Her goal is < 150/90    Problem # 6 left adnexal mass: An incidental finding on her recent CT, 5.6 cm in diameter.  This correlates with CT findings from 2/15/2021. This is probably benign. Radiology recommend one year follow-up pelvic ultrasound to ensure stability.    The total time of this encounter amounted to 60 minutes on the day of the encounter 2/7/2023. This time included face to face time spent with the patient, preparatory work and chart review, ordering tests, and performing post visit documentation.    *This dictation was prepared in part using Dragon recognition software.  As a result errors may occur. When identified these transcription errors have been corrected.  While every attempt is made to correct errors during dictation, errors may still exist.     Susan Yi MD   [facility]   Department of Medicine   Division of Renal Disease and Hypertension

## 2023-02-07 NOTE — TELEPHONE ENCOUNTER
----- Message from Shy Quintero NP sent at 2/1/2023  1:52 PM CST -----  Can we call and request records from [facility] from the last 3 months for this patient? She sees Dr. Schuler for oncology. Thanks in advance.

## 2023-02-13 ENCOUNTER — TRANSFERRED RECORDS (OUTPATIENT)
Dept: HEALTH INFORMATION MANAGEMENT | Facility: CLINIC | Age: 83
End: 2023-02-13

## 2023-02-16 ENCOUNTER — TELEPHONE (OUTPATIENT)
Dept: CARDIOLOGY | Facility: CLINIC | Age: 83
End: 2023-02-16
Payer: MEDICARE

## 2023-02-16 NOTE — TELEPHONE ENCOUNTER
[facility] Call Center    Phone Message    May a detailed message be left on voicemail: yes     Reason for Call: Other: Pt's daughter called in stating would like to speak with some one in regards to mom having heart issues, please reach out to [phone]     Action Taken: Other: Cardiology    Travel Screening: Not Applicable     Thank you!  [facility]

## 2023-02-16 NOTE — TELEPHONE ENCOUNTER
"Per My Chart message today, Pt 's daughter is asking, \"Hi Shy - I tried to call you via the clinic. I thought I would reach out this way as well. Ever since my mother went off the metoprolol a few weeks ago, she has been having racing heart incidents. One happened while she was at an appointment on Tuesday at [facility]. Her pulse was 158, so they ended up taking her to the ER inside the hospital. It calmed down, but she had two more episodes in the middle of that same night. I know she is getting her monitor this coming Monday, but should anything be done before then?  Thanks much, Rashmi.\"       Per ED note dated 2/14/23, \"Kandis Gallagher is a 82 y.o. female who presents to the emergency department for palpitations and tachycardia. The patient is currently undergoing treatment for amyloidosis that does involve her heart. She is followed closely by cardiology. She has a history of palpitations and was recently taken off of metoprolol due to her amyloidosis. Today, she was getting a routine pentamadine nebulizer when it was noted that her heart rate was greater than 150. She does admit that she felt her racing heart but did not feel any other associated symptoms from that. She says that is a very common thing for her. She is scheduled to get a Zio patch placed by her cardiology clinic within the next couple of days due to the fact that they just took her off of her metoprolol. Upon arrival to the emergency department, she was not tachycardic. The rest of her vital signs and physical exam were unremarkable. Her EKG showed a normal sinus rhythm. I told her that I do not know if she had some sort of dysrhythmia while she was up in the pulmonology lab but there is no current evidence of dysrhythmia on her current EKG. She did not want any further cardiac work-up. She just wanted to go home since this is a chronic problem for her and she is closely followed by her cardiology team. I did have our case " "managers contact the pulmonary lab and they rescheduled her pentamadine nebulizer for about an hour from now. One of our nurses took her up to the pulmonology lab in a wheelchair. She was told to follow-up with her Zio patch placement and with cardiology for any ongoing symptoms.\"     Pt scheduled for Ziopatch 2/20/23. Will message Brooklyn Quintero NP to review. Sheryl SANCHES   "

## 2023-02-17 ENCOUNTER — TELEPHONE (OUTPATIENT)
Dept: NEPHROLOGY | Facility: CLINIC | Age: 83
End: 2023-02-17
Payer: MEDICARE

## 2023-02-17 NOTE — TELEPHONE ENCOUNTER
Contacted patient to inform her that her urine protein level had improved per Dr. Yi. Patient was pleased to hear results.  Patient denies worsening of swelling. Has known lymphedema. We discussed follow up plan.    Kandis is reporting sore throat and cough. She had been tested for covid and it sounds like was reported to be negative. She denies any shortness of breath though cough is frequent. Advised patient to have a low threshold to see urgent care if symptoms are not improving. She verbalized understanding.     Tania Bernal, RN, BSN  Nephrology Care Coordinator  [facility]

## 2023-02-17 NOTE — TELEPHONE ENCOUNTER
[facility] Call Center    Phone Message    May a detailed message be left on voicemail: yes     Reason for Call: Other: Please call daughter back to discuss her moms heart issues. She thinks she needs to be seen for this on going heart racing episodes.      Action Taken: Message routed to:  Other: Cardiology    Travel Screening: Not Applicable       Thank you!  [facility]

## 2023-02-17 NOTE — TELEPHONE ENCOUNTER
Called pt's daughter with conditions from Dr. Herzog and Brooklyn Quintero NP, daughter states patient is being taken to [facility] with her sister as we speak.  Daughter advised that I will review records from [facility] next week and will follow-up accordingly.  Will let Brooklyn Quintero NP know

## 2023-02-17 NOTE — TELEPHONE ENCOUNTER
I reviewed this with Dr. Herzog and he can see her in clinic in follow-up.  If she would like to be seen prior to the results of her Zio patch monitor being available, which will likely be a toward the end of the month or early March he is happy to see her, however he may not have all the information needed to help make a decision regarding medication changes or treatments.

## 2023-02-27 ENCOUNTER — TRANSFERRED RECORDS (OUTPATIENT)
Dept: HEALTH INFORMATION MANAGEMENT | Facility: CLINIC | Age: 83
End: 2023-02-27
Payer: MEDICARE

## 2023-02-28 ENCOUNTER — TELEPHONE (OUTPATIENT)
Dept: CARDIOLOGY | Facility: CLINIC | Age: 83
End: 2023-02-28
Payer: MEDICARE

## 2023-02-28 NOTE — TELEPHONE ENCOUNTER
2/28/23 LVM -orphan - pt to cxl 5/1/23 w/Knapper due to template change and r/s- time on hold 5/10/23 HUBBARD 2:15pm Knapper please schedule manually-SL

## 2023-03-08 ENCOUNTER — OFFICE VISIT (OUTPATIENT)
Dept: CARDIOLOGY | Facility: CLINIC | Age: 83
End: 2023-03-08
Payer: MEDICARE

## 2023-03-08 ENCOUNTER — HOSPITAL ENCOUNTER (OUTPATIENT)
Dept: CARDIOLOGY | Facility: CLINIC | Age: 83
Discharge: HOME | End: 2023-03-08
Attending: NURSE PRACTITIONER | Admitting: NURSE PRACTITIONER
Payer: MEDICARE

## 2023-03-08 VITALS
WEIGHT: 132.2 LBS | SYSTOLIC BLOOD PRESSURE: 142 MMHG | BODY MASS INDEX: 26.65 KG/M2 | HEART RATE: 88 BPM | HEIGHT: 59 IN | DIASTOLIC BLOOD PRESSURE: 70 MMHG | OXYGEN SATURATION: 97 %

## 2023-03-08 DIAGNOSIS — Z95.2 S/P AVR (AORTIC VALVE REPLACEMENT): ICD-10-CM

## 2023-03-08 DIAGNOSIS — I50.42 CHRONIC COMBINED SYSTOLIC AND DIASTOLIC HEART FAILURE (H): ICD-10-CM

## 2023-03-08 DIAGNOSIS — E85.81 LIGHT CHAIN (AL) AMYLOIDOSIS (H): ICD-10-CM

## 2023-03-08 DIAGNOSIS — E85.4 CARDIAC AMYLOIDOSIS (H): Primary | ICD-10-CM

## 2023-03-08 DIAGNOSIS — R00.2 PALPITATIONS: ICD-10-CM

## 2023-03-08 DIAGNOSIS — I89.0 LYMPHEDEMA: ICD-10-CM

## 2023-03-08 DIAGNOSIS — E78.2 MIXED HYPERLIPIDEMIA: ICD-10-CM

## 2023-03-08 DIAGNOSIS — Q23.81 BICUSPID AORTIC VALVE: ICD-10-CM

## 2023-03-08 DIAGNOSIS — C90.01 MULTIPLE MYELOMA IN REMISSION (H): ICD-10-CM

## 2023-03-08 DIAGNOSIS — I43 CARDIAC AMYLOIDOSIS (H): Primary | ICD-10-CM

## 2023-03-08 PROCEDURE — 99215 OFFICE O/P EST HI 40 MIN: CPT | Mod: 25 | Performed by: INTERNAL MEDICINE

## 2023-03-08 PROCEDURE — 93248 EXT ECG>7D<15D REV&INTERPJ: CPT | Performed by: INTERNAL MEDICINE

## 2023-03-08 PROCEDURE — 93246 EXT ECG>7D<15D RECORDING: CPT

## 2023-03-08 RX ORDER — DEXAMETHASONE 4 MG/1
TABLET ORAL
COMMUNITY
Start: 2023-01-31 | End: 2024-08-26

## 2023-03-08 NOTE — PROGRESS NOTES
CARDIOLOGY CLINIC FOLLOW-UP NOTE      REASON FOR VISIT:   Follow-up cardiac amyloid, bicuspid aortic valve s/p AVR    PRIMARY CARE PHYSICIAN:  Noemi Sanchez        History of Present Illness   Kandis Gallagher is an extremely pleasant 82 year old female, here for routine follow-up.  She had previously seen Dr. Graham until November 2020, and then had moved out to the [facility] and received care there, but has since moved back and reestablish with us in October 2022.  Her medical history is significant for a bicuspid aortic valve which progressed to severe aortic stenosis for which she underwent surgical AVR with a 23 mm Magna Ease bioprosthesis in August 2016 at [facility].  Her aortic root diameters have been within normal limits in the past.  She also has amyloidosis with cardiac and renal involvement, lymphedema, longstanding palpitations, dyslipidemia, anemia, and has been undergoing chemotherapy for multiple myeloma.    She was unfortunately recently hospitalized for nearly 2 weeks at [facility] from 2/17/2023 through 2/27/2023 due to hypoxic respiratory failure from RSV.  I reviewed records from this hospitalization.  She did have some mild volume overload during her stay and was briefly given IV Lasix, but only for a couple of days.  Today she tells me that she feels relatively well.  Of note, we had done a cardiac MRI for evaluation of cardiac amyloidosis in January 2023 and this did show evidence of significant infiltration consistent with cardiac amyloidosis.  As a result, we weaned her off of her metoprolol.  Her blood pressure has been relatively well controlled despite this.  She has had a slight increase in her frequency of palpitations, however.  In fact, on 2/14/2023 she was scheduled to have a bronchoscopic procedure that was canceled due to elevated heart rate (unclear what the rhythm was at this time).  She was then hospitalized 3 days later for RSV, so this  may have played a role.    During today's visit, I reviewed multiple outside records, including her most recent outside BMP, CBC, and chest x-ray.  Her most recent echocardiogram was from our system on 10/31/2022, showing mild to moderate LVH with elevated LV filling pressures, mild mitral and tricuspid regurgitation, and moderate pulmonary hypertension by echo, with normal bioprosthetic aortic valve gradients and no AI.  Her cardiac MRI was done on 1/17/2023 and showed a low normal LVEF of 54% with extensive circumferential late gadolinium enhancement extending to the left atrium, as well as ECV significantly increased to 55%, consistent with infiltrative cardiomyopathy such as cardiac amyloidosis.      Assessment & Plan     1. 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 Chronic HFpEF, roughly euvolemic, NYHA II-III  3. Bilateral lymphedema, significantly improved with treatments from lymphedema clinic  4. Bicuspid aortic valve c/b severe aortic stenosis, s/p SAVR  a. 23 mm Magna Ease bioprosthesis in August 2016 at [facility]  b. Normal gradients on 10/2023 TTE  5. Frequent palpitations, with reported history of symptomatic bradycardia on beta-blockers  a. Zio patch currently in place  6. Hypertension  7. Dyslipidemia  8. Anemia  9. Multiple myeloma        It was a pleasure to meet with Kandis and her daughter again in clinic today.  I am sorry to hear about her extensive RSV hospitalization, but thankfully does appear that she is feeling better.  We talked in detail today about the significance of her infiltrative cardiomyopathy finding on CMR, which is most suggestive of cardiac amyloidosis given her other medical conditions.  We also talked about appropriate treatment for this, including the importance of avoiding potential aggravating medications.  They understand this.  At the same time, she has been dealing with palpitations and elevated heart rate,  which has been ongoing for many years but may have worsened a bit since we weaned her off her beta-blocker (though heart rate today is okay).  Her Zio patch was just placed today, so we will follow-up on these results, and particularly look for any evidence of atrial fibrillation, which would necessitate anticoagulation given the particularly high thromboembolism risk in patients with AF and cardiac amyloidosis.  Depending on the balance of tachycardic and bradycardic episodes, we can better assess whether or not to consider a beta-blocker in her, though I generally would favor avoiding it.  For now, we will continue her cardiac medications unchanged, and plan to see her back in roughly 6 months, with a repeat echocardiogram beforehand to reassess her bioprosthetic aortic valve.      -Follow-up Zio patch results  -Continue lymphedema management through lymphedema clinic, appreciate assistance  -Continue Lasix 20 mg and potassium 20 M EQ daily  -Continue to hold off on beta-blocker for now pending Zio patch results as above  -Avoid ACE/ARB/ARNI, digoxin, and nondihydropyridine calcium channel blockers      Follow-up: 6 months, with TTE beforehand, or sooner as needed          On the date of the patient's visit, I spent a total of 52 minutes reviewing the patient's chart; interviewing, examining, and counseling the patient; coordinating with other providers as necessary, entering orders, and documenting in the medical chart.      Charlie Herzog MD  Interventional Cardiology  March 8, 2023        Medications   Current Outpatient Medications   Medication     acyclovir (ZOVIRAX) 400 MG tablet     aspirin 81 MG EC tablet     atorvastatin (LIPITOR) 10 MG tablet     CALCIUM PO     Cholecalciferol (VITAMIN D3) 25 MCG (1000 UT) CAPS     diphenhydrAMINE HCl (BENADRYL PO)     ferrous sulfate (FEROSUL) 325 (65 Fe) MG tablet     furosemide (LASIX) 20 MG tablet     pantoprazole sodium (PROTONIX) 40 MG packet     potassium  chloride (KLOR-CON) 20 MEQ packet     Probiotic Product (PROBIOTIC PO)     dexamethasone (DECADRON) 4 MG tablet     No current facility-administered medications for this visit.     Allergies   Allergies   Allergen Reactions     Carvedilol Other (See Comments)     Lidocaine Nausea and Vomiting     Vomiting with general anesthesia     Lisinopril Cough     Seasonal Allergies Other (See Comments)     Problems with narcotics - oxygen desaturates     Spironolactone          Physical Exam       BP: (!) 142/70 Pulse: 88     SpO2: 97 %      Vital Signs with Ranges  Pulse:  [88] 88  BP: (142)/(70) 142/70  SpO2:  [97 %] 97 %  132 lbs 3.2 oz    Constitutional: Well-appearing, no acute distress  Respiratory: Normal respiratory effort, CTAB  Cardiovascular: RRR, 1/6 systolic murmur at the RUSB.  JVP appears about 8 cm H2O.  There is 1+ bilateral LE edema.  Normal carotid upstrokes, no carotid bruits.

## 2023-03-08 NOTE — LETTER
3/8/2023    Noemi Sanchez MD  [address]  [place] [address]    RE: Kandis Gallagher       Dear Colleague,     I had the pleasure of seeing Kandis Gallagher in the [facility].  CARDIOLOGY CLINIC FOLLOW-UP NOTE      REASON FOR VISIT:   Follow-up cardiac amyloid, bicuspid aortic valve s/p AVR    PRIMARY CARE PHYSICIAN:  Noemi Sanchez        History of Present Illness   Kandis Gallagher is an extremely pleasant 82 year old female, here for routine follow-up.  She had previously seen Dr. Graham until November 2020, and then had moved out to the [facility] and received care there, but has since moved back and reestablish with us in October 2022.  Her medical history is significant for a bicuspid aortic valve which progressed to severe aortic stenosis for which she underwent surgical AVR with a 23 mm Magna Ease bioprosthesis in August 2016 at [facility].  Her aortic root diameters have been within normal limits in the past.  She also has amyloidosis with cardiac and renal involvement, lymphedema, longstanding palpitations, dyslipidemia, anemia, and has been undergoing chemotherapy for multiple myeloma.    She was unfortunately recently hospitalized for nearly 2 weeks at [facility] from 2/17/2023 through 2/27/2023 due to hypoxic respiratory failure from RSV.  I reviewed records from this hospitalization.  She did have some mild volume overload during her stay and was briefly given IV Lasix, but only for a couple of days.  Today she tells me that she feels relatively well.  Of note, we had done a cardiac MRI for evaluation of cardiac amyloidosis in January 2023 and this did show evidence of significant infiltration consistent with cardiac amyloidosis.  As a result, we weaned her off of her metoprolol.  Her blood pressure has been relatively well controlled despite this.  She has had a slight increase in her frequency of palpitations, however.  In  fact, on 2/14/2023 she was scheduled to have a bronchoscopic procedure that was canceled due to elevated heart rate (unclear what the rhythm was at this time).  She was then hospitalized 3 days later for RSV, so this may have played a role.    During today's visit, I reviewed multiple outside records, including her most recent outside BMP, CBC, and chest x-ray.  Her most recent echocardiogram was from our system on 10/31/2022, showing mild to moderate LVH with elevated LV filling pressures, mild mitral and tricuspid regurgitation, and moderate pulmonary hypertension by echo, with normal bioprosthetic aortic valve gradients and no AI.  Her cardiac MRI was done on 1/17/2023 and showed a low normal LVEF of 54% with extensive circumferential late gadolinium enhancement extending to the left atrium, as well as ECV significantly increased to 55%, consistent with infiltrative cardiomyopathy such as cardiac amyloidosis.      Assessment & Plan     1. 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 Chronic HFpEF, roughly euvolemic, NYHA II-III  3. Bilateral lymphedema, significantly improved with treatments from lymphedema clinic  4. Bicuspid aortic valve c/b severe aortic stenosis, s/p SAVR  a. 23 mm Magna Ease bioprosthesis in August 2016 at [facility]  b. Normal gradients on 10/2023 TTE  5. Frequent palpitations, with reported history of symptomatic bradycardia on beta-blockers  a. Zio patch currently in place  6. Hypertension  7. Dyslipidemia  8. Anemia  9. Multiple myeloma        It was a pleasure to meet with Kandis and her daughter again in clinic today.  I am sorry to hear about her extensive RSV hospitalization, but thankfully does appear that she is feeling better.  We talked in detail today about the significance of her infiltrative cardiomyopathy finding on CMR, which is most suggestive of cardiac amyloidosis given her other medical conditions.  We also talked  about appropriate treatment for this, including the importance of avoiding potential aggravating medications.  They understand this.  At the same time, she has been dealing with palpitations and elevated heart rate, which has been ongoing for many years but may have worsened a bit since we weaned her off her beta-blocker (though heart rate today is okay).  Her Zio patch was just placed today, so we will follow-up on these results, and particularly look for any evidence of atrial fibrillation, which would necessitate anticoagulation given the particularly high thromboembolism risk in patients with AF and cardiac amyloidosis.  Depending on the balance of tachycardic and bradycardic episodes, we can better assess whether or not to consider a beta-blocker in her, though I generally would favor avoiding it.  For now, we will continue her cardiac medications unchanged, and plan to see her back in roughly 6 months, with a repeat echocardiogram beforehand to reassess her bioprosthetic aortic valve.      -Follow-up Zio patch results  -Continue lymphedema management through lymphedema clinic, appreciate assistance  -Continue Lasix 20 mg and potassium 20 M EQ daily  -Continue to hold off on beta-blocker for now pending Zio patch results as above  -Avoid ACE/ARB/ARNI, digoxin, and nondihydropyridine calcium channel blockers      Follow-up: 6 months, with TTE beforehand, or sooner as needed          On the date of the patient's visit, I spent a total of 52 minutes reviewing the patient's chart; interviewing, examining, and counseling the patient; coordinating with other providers as necessary, entering orders, and documenting in the medical chart.      Charlie Herzog MD  Interventional Cardiology  March 8, 2023        Medications   Current Outpatient Medications   Medication     acyclovir (ZOVIRAX) 400 MG tablet     aspirin 81 MG EC tablet     atorvastatin (LIPITOR) 10 MG tablet     CALCIUM PO     Cholecalciferol (VITAMIN  D3) 25 MCG (1000 UT) CAPS     diphenhydrAMINE HCl (BENADRYL PO)     ferrous sulfate (FEROSUL) 325 (65 Fe) MG tablet     furosemide (LASIX) 20 MG tablet     pantoprazole sodium (PROTONIX) 40 MG packet     potassium chloride (KLOR-CON) 20 MEQ packet     Probiotic Product (PROBIOTIC PO)     dexamethasone (DECADRON) 4 MG tablet     No current facility-administered medications for this visit.     Allergies   Allergies   Allergen Reactions     Carvedilol Other (See Comments)     Lidocaine Nausea and Vomiting     Vomiting with general anesthesia     Lisinopril Cough     Seasonal Allergies Other (See Comments)     Problems with narcotics - oxygen desaturates     Spironolactone      Physical Exam       BP: (!) 142/70 Pulse: 88     SpO2: 97 %      Vital Signs with Ranges  Pulse:  [88] 88  BP: (142)/(70) 142/70  SpO2:  [97 %] 97 %  132 lbs 3.2 oz    Constitutional: Well-appearing, no acute distress  Respiratory: Normal respiratory effort, CTAB  Cardiovascular: RRR, 1/6 systolic murmur at the RUSB.  JVP appears about 8 cm H2O.  There is 1+ bilateral LE edema.  Normal carotid upstrokes, no carotid bruits.          Thank you for allowing me to participate in the care of your patient.    Sincerely,     Charlie Herzog MD     [facility]

## 2023-03-20 ENCOUNTER — TRANSFERRED RECORDS (OUTPATIENT)
Dept: HEALTH INFORMATION MANAGEMENT | Facility: CLINIC | Age: 83
End: 2023-03-20

## 2023-03-30 ENCOUNTER — TELEPHONE (OUTPATIENT)
Dept: CARDIOLOGY | Facility: CLINIC | Age: 83
End: 2023-03-30
Payer: MEDICARE

## 2023-03-30 NOTE — TELEPHONE ENCOUNTER
Reviewed Ziopatch report showing       Per office note dated 3/08/23, Dr. Herzog recommended:   -Follow-up Zio patch results  -Continue lymphedema management through lymphedema clinic, appreciate assistance  -Continue Lasix 20 mg and potassium 20 M EQ daily  -Continue to hold off on beta-blocker for now pending Zio patch results as above  -Avoid ACE/ARB/ARNI, digoxin, and nondihydropyridine calcium channel blockers    Will message Dr. Herzog to review as Brooklyn Quintero NP is out of the office this week. Sheryl SANCHES

## 2023-03-31 NOTE — TELEPHONE ENCOUNTER
Reviewed with Dr. Herzog and he recommends pt see EP.   Informed pt's daughter regarding results and seeing EP.   Awaiting a time when pt can be scheduled with EP in the next few weeks.

## 2023-04-01 DIAGNOSIS — E87.6 HYPOKALEMIA: Primary | ICD-10-CM

## 2023-04-03 ENCOUNTER — TRANSFERRED RECORDS (OUTPATIENT)
Dept: HEALTH INFORMATION MANAGEMENT | Facility: CLINIC | Age: 83
End: 2023-04-03
Payer: MEDICARE

## 2023-04-04 ENCOUNTER — TELEPHONE (OUTPATIENT)
Dept: CARDIOLOGY | Facility: CLINIC | Age: 83
End: 2023-04-04
Payer: MEDICARE

## 2023-04-04 DIAGNOSIS — I47.10 PAROXYSMAL SUPRAVENTRICULAR TACHYCARDIA (H): Primary | ICD-10-CM

## 2023-04-04 RX ORDER — POTASSIUM CHLORIDE 1500 MG/1
20 TABLET, EXTENDED RELEASE ORAL DAILY
COMMUNITY
End: 2024-08-26 | Stop reason: ALTCHOICE

## 2023-04-04 RX ORDER — POTASSIUM CHLORIDE 1.5 G/1
POWDER, FOR SOLUTION ORAL
Qty: 90 PACKET | Refills: 1 | Status: SHIPPED | OUTPATIENT
Start: 2023-04-04 | End: 2023-10-02

## 2023-04-04 NOTE — TELEPHONE ENCOUNTER
Health Call Center    Phone Message    May a detailed message be left on voicemail: yes     Reason for Call: Other: Patient's daughter Rashmi called requesting to speak with someone on the careteam to go over results from Ziopatch monitor and recommendations. Please call back to further discuss.      Action Taken: Other: Cardiology    Travel Screening: Not Applicable     Thank you!  [facility]

## 2023-04-04 NOTE — TELEPHONE ENCOUNTER
Spoke with pt's daughter Rashmi about Dr. Herzog agreeing with EP that she should be seen by them.     Scheduled pt to see Dr. Hoffman on 4/17/23.

## 2023-04-05 ENCOUNTER — TELEPHONE (OUTPATIENT)
Dept: FAMILY MEDICINE | Facility: CLINIC | Age: 83
End: 2023-04-05

## 2023-04-05 NOTE — TELEPHONE ENCOUNTER
Forms/Letter Request    Type of form/letter: [facility]    Have you been seen for this request: N/A    Do we have the form/letter: Yes: Will place order/request on providers desk for review/signature.      When is form/letter needed by: asap    How would you like the form/letter returned: Fax : [phone]

## 2023-04-17 ENCOUNTER — TRANSFERRED RECORDS (OUTPATIENT)
Dept: HEALTH INFORMATION MANAGEMENT | Facility: CLINIC | Age: 83
End: 2023-04-17

## 2023-04-17 ENCOUNTER — OFFICE VISIT (OUTPATIENT)
Dept: CARDIOLOGY | Facility: CLINIC | Age: 83
End: 2023-04-17
Payer: MEDICARE

## 2023-04-17 VITALS
HEART RATE: 98 BPM | SYSTOLIC BLOOD PRESSURE: 138 MMHG | WEIGHT: 145 LBS | DIASTOLIC BLOOD PRESSURE: 70 MMHG | OXYGEN SATURATION: 96 % | BODY MASS INDEX: 29.29 KG/M2

## 2023-04-17 DIAGNOSIS — I50.32 CHRONIC HEART FAILURE WITH PRESERVED EJECTION FRACTION (HFPEF) (H): ICD-10-CM

## 2023-04-17 DIAGNOSIS — I47.10 PAROXYSMAL SUPRAVENTRICULAR TACHYCARDIA (H): ICD-10-CM

## 2023-04-17 PROCEDURE — 99205 OFFICE O/P NEW HI 60 MIN: CPT | Performed by: INTERNAL MEDICINE

## 2023-04-17 RX ORDER — FUROSEMIDE 20 MG
TABLET ORAL
Qty: 90 TABLET | Refills: 1 | Status: SHIPPED | OUTPATIENT
Start: 2023-04-17 | End: 2023-10-19

## 2023-04-17 NOTE — PROGRESS NOTES
Electrophysiology/ Clinic Note         H&P and Plan:     REASON FOR VISIT: Electrophysiology evaluation.      HISTORY OF PRESENT ILLNESS: Patient is a pleasant 82-year-old lady with a history of hypertension, hyperlipidemia, bicuspid aortic valve (surgical AVR in 2016), lymphedema, multiple myelosis and amyloidosis with cardiac involvement, who is here for evaluation of paroxysmal SVT.     Patient has a long history of paroxysmal SVT.  She actually required going to the ER couple times.  Most recent visit needed was in 2/14/2023.  At that time, she presents with sustained tachycardia.  However, tachycardia self terminated while she was monitoring an EKG.    She was recent evaluated in cardiology clinic by Dr. Herzog.  Due to concerns of ongoing cardiac amyloidosis, beta-blockers were discontinued.  A Zio patch monitor was obtained which revealed frequent episodes of SVT.  Therefore she was referred here for evaluation.    Today, she informs she is doing well.  She seems to be symptomatic with episodes of SVT, however she cannot precise how often she has them.  She complains of palpitation but denies any other symptoms of chest pain, lightheadedness, near-syncope or syncope.    PREVIOUS STUDIES (personally reviewed):  -Zio patch (3/8/2023): 165 runs of PSVT (up to 49 minutes) and 29 runs of Non sustained VT (up to 17 beats)  -Cardiac MRI (1/17/2023): EF of 54%.  Mild LVH.  Extensive circumferential late gadolinium enhancement of the LV, suggestive of cardiac amyloidosis.    ASSESSMENT AND PLAN:   1.  Paroxysmal SVT.  I had an extensive discussion with patient regarding management of SVT.  Options are doing nothing, start AV magy agents or pursue cath ablation procedure.  We discussed that there is some concerns about use of beta-blockers and pulmonary doses.  However, she seems to tolerate medication well in the past and I would be okay to resume the medication.    We also discussed cath ablation procedure.  I  explained the procedure in details patient is thin there is 1-2% risk of cases or procedure.    After discussion, she would like to think about her options.  We will follow-up with her in a couple days and finalize plan.      Aaron Hoffman MD    Physical Exam:  Vitals: /70   Pulse 98   Wt 65.8 kg (145 lb)   SpO2 96%   BMI 29.29 kg/m      Constitutional:  AAO x3.  Pt is in NAD.  HEAD: normocephalic.  SKIN: Skin normal color, texture and turgor with no lesions or eruptions.  Eyes: PERRL, EOMI.  ENT:  Supple, normal JVP. No lymphadenopathy or thyroid enlargement.  Chest:  CTAB.  Cardiac:  RRR, normal  S1 and S2.  No murmurs rubs or gallop.   Abdomen:  Normal BS.  Soft, non-tender and non-distended.  No rebound or guarding.    Extremities:  Pedious pulses palpable B/L.  No LE edema noticed.   Neurological: Strength and sensation grossly symmetric and intact throughout.       CURRENT MEDICATIONS:  Current Outpatient Medications   Medication Sig Dispense Refill     acyclovir (ZOVIRAX) 400 MG tablet TAKE 1 TABLET(400 MG) BY MOUTH EVERY 12 HOURS 180 tablet 0     aspirin 81 MG EC tablet Take 81 mg by mouth       atorvastatin (LIPITOR) 10 MG tablet Take 1 tablet (10 mg) by mouth daily 90 tablet 3     CALCIUM PO Take by mouth daily       Cholecalciferol (VITAMIN D3) 25 MCG (1000 UT) CAPS Take 2,000 Units by mouth daily 180 capsule 3     dexamethasone (DECADRON) 4 MG tablet TAKE 3 TABLETS BY MOUTH 1 TIME WEEKLY       diphenhydrAMINE HCl (BENADRYL PO) Take by mouth every 7 days       ferrous sulfate (FEROSUL) 325 (65 Fe) MG tablet Take 1 tablet (325 mg) by mouth daily (with breakfast) 90 tablet 1     furosemide (LASIX) 20 MG tablet TAKE 1 TABLET(20 MG) BY MOUTH DAILY 90 tablet 1     KLOR-CON 20 MEQ packet MIX 1 PACKET IN LIQUID AND DRINK BY MOUTH ONCE A DAY 90 packet 1     pantoprazole sodium (PROTONIX) 40 MG packet Take 1 packet by mouth daily       potassium chloride ER (KLOR-CON M) 20 MEQ CR tablet Take 20 mEq by  mouth daily Do not crush       Probiotic Product (PROBIOTIC PO) Take by mouth daily         ALLERGIES     Allergies   Allergen Reactions     Carvedilol Other (See Comments)     Lidocaine Nausea and Vomiting     Vomiting with general anesthesia     Lisinopril Cough     Seasonal Allergies Other (See Comments)     Problems with narcotics - oxygen desaturates     Spironolactone        PAST MEDICAL HISTORY:  Past Medical History:   Diagnosis Date     Congenital bicuspid aortic valve      H/O aortic valve replacement     23 mm Magna Ease      Hyperlipidemia      Hypertension      Hyperthyroidism      Severe aortic stenosis      Thyroiditis        PAST SURGICAL HISTORY:  No past surgical history on file.    FAMILY HISTORY:  The patient's family history was reviewed and is non-contributory for heart disease.    SOCIAL HISTORY:  Social History     Socioeconomic History     Marital status:    Tobacco Use     Smoking status: Never     Smokeless tobacco: Never   Vaping Use     Vaping status: Never Used   Substance and Sexual Activity     Alcohol use: No     Drug use: No       Review of Systems:  Skin:        Eyes:       ENT:       Respiratory:       Cardiovascular:       Gastroenterology:      Genitourinary:       Musculoskeletal:       Neurologic:       Psychiatric:       Heme/Lymph/Imm:       Endocrine:           Recent Lab Results:  LIPID RESULTS:  Lab Results   Component Value Date    CHOL 153 10/31/2022    CHOL 237 (H) 10/06/2020    HDL 50 10/31/2022    HDL 65 10/06/2020    LDL 60 10/31/2022     (H) 10/06/2020    TRIG 214 (H) 10/31/2022    TRIG 158 (H) 10/06/2020       LIVER ENZYME RESULTS:  Lab Results   Component Value Date    AST 16 07/13/2018    ALT <5 (L) 10/06/2020       CBC RESULTS:  Lab Results   Component Value Date    WBC 10.3 10/31/2022    WBC 6.7 09/04/2018    RBC 4.78 10/31/2022    RBC 4.05 09/04/2018    HGB 13.5 10/31/2022    HGB 12.3 09/04/2018    HCT 43.4 10/31/2022    HCT 35.9 09/04/2018     MCV 91 10/31/2022    MCV 89 09/04/2018    MCH 28.2 10/31/2022    MCH 30.4 09/04/2018    MCHC 31.1 (L) 10/31/2022    MCHC 34.3 09/04/2018    RDW 15.0 10/31/2022    RDW 13.2 09/04/2018     10/31/2022     09/04/2018       BMP RESULTS:  Lab Results   Component Value Date     02/07/2023     09/04/2018    POTASSIUM 4.1 02/07/2023    POTASSIUM 3.6 09/04/2018    CHLORIDE 109 02/07/2023    CHLORIDE 102 01/30/2023    CHLORIDE 100 09/04/2018    CO2 26 02/07/2023    CO2 29 09/04/2018    ANIONGAP 6 02/07/2023    ANIONGAP 8 09/04/2018    GLC 90 02/07/2023     (H) 09/04/2018    BUN 23 02/07/2023    BUN 13 09/04/2018    CR 0.80 02/07/2023    CR 0.94 09/04/2018    GFRESTIMATED 72 02/07/2023    GFRESTIMATED 58 (L) 09/04/2018    GFRESTBLACK 70 09/04/2018    DOUG 9.0 02/07/2023    DOUG 9.2 09/04/2018        A1C RESULTS:  No results found for: A1C    INR RESULTS:  Lab Results   Component Value Date    INR 0.97 09/04/2018         ECHOCARDIOGRAM  No results found for this or any previous visit (from the past 8760 hour(s)).      No orders of the defined types were placed in this encounter.    No orders of the defined types were placed in this encounter.    There are no discontinued medications.      Encounter Diagnosis   Name Primary?     Paroxysmal supraventricular tachycardia (H)          CC  Charlie Hrezog MD  [address] [id number]

## 2023-04-17 NOTE — LETTER
4/17/2023    Noemi Sanchez MD  [address]  [place] [address]    RE: Kandis Gallagher       Dear Colleague,     I had the pleasure of seeing Kandis AI Gallagher in the [facility].  Electrophysiology/ Clinic Note         H&P and Plan:     REASON FOR VISIT: Electrophysiology evaluation.      HISTORY OF PRESENT ILLNESS: Patient is a pleasant 82-year-old lady with a history of hypertension, hyperlipidemia, bicuspid aortic valve (surgical AVR in 2016), lymphedema, multiple myelosis and amyloidosis with cardiac involvement, who is here for evaluation of paroxysmal SVT.     Patient has a long history of paroxysmal SVT.  She actually required going to the ER couple times.  Most recent visit needed was in 2/14/2023.  At that time, she presents with sustained tachycardia.  However, tachycardia self terminated while she was monitoring an EKG.    She was recent evaluated in cardiology clinic by Dr. Herzog.  Due to concerns of ongoing cardiac amyloidosis, beta-blockers were discontinued.  A Zio patch monitor was obtained which revealed frequent episodes of SVT.  Therefore she was referred here for evaluation.    Today, she informs she is doing well.  She seems to be symptomatic with episodes of SVT, however she cannot precise how often she has them.  She complains of palpitation but denies any other symptoms of chest pain, lightheadedness, near-syncope or syncope.    PREVIOUS STUDIES (personally reviewed):  -Zio patch (3/8/2023): 165 runs of PSVT (up to 49 minutes) and 29 runs of Non sustained VT (up to 17 beats)  -Cardiac MRI (1/17/2023): EF of 54%.  Mild LVH.  Extensive circumferential late gadolinium enhancement of the LV, suggestive of cardiac amyloidosis.    ASSESSMENT AND PLAN:   1.  Paroxysmal SVT.  I had an extensive discussion with patient regarding management of SVT.  Options are doing nothing, start AV magy agents or pursue cath ablation procedure.  We discussed that there is  some concerns about use of beta-blockers and pulmonary doses.  However, she seems to tolerate medication well in the past and I would be okay to resume the medication.    We also discussed cath ablation procedure.  I explained the procedure in details patient is thin there is 1-2% risk of cases or procedure.    After discussion, she would like to think about her options.  We will follow-up with her in a couple days and finalize plan.      Aaron Hoffman MD    Physical Exam:  Vitals: /70   Pulse 98   Wt 65.8 kg (145 lb)   SpO2 96%   BMI 29.29 kg/m      Constitutional:  AAO x3.  Pt is in NAD.  HEAD: normocephalic.  SKIN: Skin normal color, texture and turgor with no lesions or eruptions.  Eyes: PERRL, EOMI.  ENT:  Supple, normal JVP. No lymphadenopathy or thyroid enlargement.  Chest:  CTAB.  Cardiac:  RRR, normal  S1 and S2.  No murmurs rubs or gallop.   Abdomen:  Normal BS.  Soft, non-tender and non-distended.  No rebound or guarding.    Extremities:  Pedious pulses palpable B/L.  No LE edema noticed.   Neurological: Strength and sensation grossly symmetric and intact throughout.       CURRENT MEDICATIONS:  Current Outpatient Medications   Medication Sig Dispense Refill    acyclovir (ZOVIRAX) 400 MG tablet TAKE 1 TABLET(400 MG) BY MOUTH EVERY 12 HOURS 180 tablet 0    aspirin 81 MG EC tablet Take 81 mg by mouth      atorvastatin (LIPITOR) 10 MG tablet Take 1 tablet (10 mg) by mouth daily 90 tablet 3    CALCIUM PO Take by mouth daily      Cholecalciferol (VITAMIN D3) 25 MCG (1000 UT) CAPS Take 2,000 Units by mouth daily 180 capsule 3    dexamethasone (DECADRON) 4 MG tablet TAKE 3 TABLETS BY MOUTH 1 TIME WEEKLY      diphenhydrAMINE HCl (BENADRYL PO) Take by mouth every 7 days      ferrous sulfate (FEROSUL) 325 (65 Fe) MG tablet Take 1 tablet (325 mg) by mouth daily (with breakfast) 90 tablet 1    furosemide (LASIX) 20 MG tablet TAKE 1 TABLET(20 MG) BY MOUTH DAILY 90 tablet 1    KLOR-CON 20 MEQ packet MIX 1  PACKET IN LIQUID AND DRINK BY MOUTH ONCE A DAY 90 packet 1    pantoprazole sodium (PROTONIX) 40 MG packet Take 1 packet by mouth daily      potassium chloride ER (KLOR-CON M) 20 MEQ CR tablet Take 20 mEq by mouth daily Do not crush      Probiotic Product (PROBIOTIC PO) Take by mouth daily         ALLERGIES     Allergies   Allergen Reactions    Carvedilol Other (See Comments)    Lidocaine Nausea and Vomiting     Vomiting with general anesthesia    Lisinopril Cough    Seasonal Allergies Other (See Comments)     Problems with narcotics - oxygen desaturates    Spironolactone        PAST MEDICAL HISTORY:  Past Medical History:   Diagnosis Date    Congenital bicuspid aortic valve     H/O aortic valve replacement     23 mm Magna Ease     Hyperlipidemia     Hypertension     Hyperthyroidism     Severe aortic stenosis     Thyroiditis        PAST SURGICAL HISTORY:  No past surgical history on file.    FAMILY HISTORY:  The patient's family history was reviewed and is non-contributory for heart disease.    SOCIAL HISTORY:  Social History     Socioeconomic History    Marital status:    Tobacco Use    Smoking status: Never    Smokeless tobacco: Never   Vaping Use    Vaping status: Never Used   Substance and Sexual Activity    Alcohol use: No    Drug use: No       Review of Systems:  Skin:        Eyes:       ENT:       Respiratory:       Cardiovascular:       Gastroenterology:      Genitourinary:       Musculoskeletal:       Neurologic:       Psychiatric:       Heme/Lymph/Imm:       Endocrine:           Recent Lab Results:  LIPID RESULTS:  Lab Results   Component Value Date    CHOL 153 10/31/2022    CHOL 237 (H) 10/06/2020    HDL 50 10/31/2022    HDL 65 10/06/2020    LDL 60 10/31/2022     (H) 10/06/2020    TRIG 214 (H) 10/31/2022    TRIG 158 (H) 10/06/2020       LIVER ENZYME RESULTS:  Lab Results   Component Value Date    AST 16 07/13/2018    ALT <5 (L) 10/06/2020       CBC RESULTS:  Lab Results   Component Value Date     WBC 10.3 10/31/2022    WBC 6.7 09/04/2018    RBC 4.78 10/31/2022    RBC 4.05 09/04/2018    HGB 13.5 10/31/2022    HGB 12.3 09/04/2018    HCT 43.4 10/31/2022    HCT 35.9 09/04/2018    MCV 91 10/31/2022    MCV 89 09/04/2018    MCH 28.2 10/31/2022    MCH 30.4 09/04/2018    MCHC 31.1 (L) 10/31/2022    MCHC 34.3 09/04/2018    RDW 15.0 10/31/2022    RDW 13.2 09/04/2018     10/31/2022     09/04/2018       BMP RESULTS:  Lab Results   Component Value Date     02/07/2023     09/04/2018    POTASSIUM 4.1 02/07/2023    POTASSIUM 3.6 09/04/2018    CHLORIDE 109 02/07/2023    CHLORIDE 102 01/30/2023    CHLORIDE 100 09/04/2018    CO2 26 02/07/2023    CO2 29 09/04/2018    ANIONGAP 6 02/07/2023    ANIONGAP 8 09/04/2018    GLC 90 02/07/2023     (H) 09/04/2018    BUN 23 02/07/2023    BUN 13 09/04/2018    CR 0.80 02/07/2023    CR 0.94 09/04/2018    GFRESTIMATED 72 02/07/2023    GFRESTIMATED 58 (L) 09/04/2018    GFRESTBLACK 70 09/04/2018    DOUG 9.0 02/07/2023    DOUG 9.2 09/04/2018        A1C RESULTS:  No results found for: A1C    INR RESULTS:  Lab Results   Component Value Date    INR 0.97 09/04/2018         ECHOCARDIOGRAM  No results found for this or any previous visit (from the past 8760 hour(s)).      No orders of the defined types were placed in this encounter.    No orders of the defined types were placed in this encounter.    There are no discontinued medications.      Encounter Diagnosis   Name Primary?    Paroxysmal supraventricular tachycardia (H)          CC  Charlie Herzog MD  [address] [id number]      Thank you for allowing me to participate in the care of your patient.      Sincerely,     Aaron Hoffman MD     [facility]

## 2023-04-25 ENCOUNTER — TELEPHONE (OUTPATIENT)
Dept: CARDIOLOGY | Facility: CLINIC | Age: 83
End: 2023-04-25
Payer: MEDICARE

## 2023-04-25 DIAGNOSIS — I47.10 SVT (SUPRAVENTRICULAR TACHYCARDIA) (H): Primary | ICD-10-CM

## 2023-04-25 NOTE — TELEPHONE ENCOUNTER
4/25/23 Msg recd from Aaron Vega MD [facility] Heart Ep Nurse  I saw patient in clinic to discuss management of SVT.  I offered her either resuming beta-blockers or pursue cath ablation procedure.  Patient would like to think about the options.     Please follow-up with patient in the next couple days over the phone to finalize plan.     Thank you.       Attempted to call pt but reached . JANNETTE and requested callback. Willard 330 pm

## 2023-04-26 ENCOUNTER — TRANSFERRED RECORDS (OUTPATIENT)
Dept: HEALTH INFORMATION MANAGEMENT | Facility: CLINIC | Age: 83
End: 2023-04-26
Payer: MEDICARE

## 2023-05-01 ENCOUNTER — TRANSFERRED RECORDS (OUTPATIENT)
Dept: HEALTH INFORMATION MANAGEMENT | Facility: CLINIC | Age: 83
End: 2023-05-01

## 2023-05-04 NOTE — TELEPHONE ENCOUNTER
Pt has upcoming appt with Dr. Herzog on 5/10/23, this was scheduled back on 3/1/23 and then pt saw Dr. Herzog on 3/8/23.    At that time the zio patch results were not back yet and then when the final report was reviewed, Dr. Herzog recommended pt see EP.     Pt then saw Dr. Hoffman on 4/17/23 and it was recommended to either resume beta blocker or pursue ablation procedure.     Informed pt's daughter (Rashmi) about this and how the 5/10/23 office visit may not be necessary because of previous visit.     Rashmi reports pt is wanting to discuss the EP recommendations with Dr. Herzog and they would like to keep the appt.     Will update EP team and Dr. Herzog.

## 2023-05-10 ENCOUNTER — OFFICE VISIT (OUTPATIENT)
Dept: CARDIOLOGY | Facility: CLINIC | Age: 83
End: 2023-05-10
Payer: MEDICARE

## 2023-05-10 VITALS
BODY MASS INDEX: 27.76 KG/M2 | HEART RATE: 79 BPM | SYSTOLIC BLOOD PRESSURE: 150 MMHG | HEIGHT: 59 IN | WEIGHT: 137.7 LBS | DIASTOLIC BLOOD PRESSURE: 78 MMHG

## 2023-05-10 DIAGNOSIS — I50.42 CHRONIC COMBINED SYSTOLIC AND DIASTOLIC HEART FAILURE (H): ICD-10-CM

## 2023-05-10 DIAGNOSIS — E85.81 LIGHT CHAIN (AL) AMYLOIDOSIS (H): ICD-10-CM

## 2023-05-10 DIAGNOSIS — I43 CARDIAC AMYLOIDOSIS (H): ICD-10-CM

## 2023-05-10 DIAGNOSIS — E78.2 MIXED HYPERLIPIDEMIA: ICD-10-CM

## 2023-05-10 DIAGNOSIS — I47.10 PAROXYSMAL SUPRAVENTRICULAR TACHYCARDIA (H): Primary | ICD-10-CM

## 2023-05-10 DIAGNOSIS — Z95.2 S/P AVR (AORTIC VALVE REPLACEMENT): ICD-10-CM

## 2023-05-10 DIAGNOSIS — E85.4 CARDIAC AMYLOIDOSIS (H): ICD-10-CM

## 2023-05-10 DIAGNOSIS — I89.0 LYMPHEDEMA: ICD-10-CM

## 2023-05-10 DIAGNOSIS — Q23.81 BICUSPID AORTIC VALVE: ICD-10-CM

## 2023-05-10 PROCEDURE — 99215 OFFICE O/P EST HI 40 MIN: CPT | Performed by: INTERNAL MEDICINE

## 2023-05-10 NOTE — LETTER
5/10/2023    Noemi Sanchez MD  [address]  [place] [address]    RE: Kandis Gallagher       Dear Colleague,     I had the pleasure of seeing Kandis Gallagher in the [facility].  CARDIOLOGY CLINIC FOLLOW-UP NOTE      REASON FOR VISIT:   Follow-up cardiac amyloid, bicuspid aortic valve s/p AVR    PRIMARY CARE PHYSICIAN:  Noemi Sanchez        History of Present Illness   Kandis Gallagher is an extremely pleasant 82 year old female, here for routine follow-up.  She had previously seen Dr. London until November 2020, and then had moved out to the [facility] and received care there, but has since moved back and reestablish with us in October 2022.  Her medical history is significant for a bicuspid aortic valve which progressed to severe aortic stenosis for which she underwent surgical AVR with a 23 mm Magna Ease bioprosthesis in August 2016 at [facility].  Her aortic root diameters have been within normal limits in the past.  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    When I first met her in October 2022, I had recommended a cardiac MRI to investigate for infiltrative disease.  She had this done in January 2023, and this did show evidence of significant infiltration consistent with cardiac amyloidosis.  As a result, we weaned her off of her metoprolol.  In conjunction with this, she has had an increase in her frequency of palpitations.  For work-up of this, I had her undergo a 14-day Zio patch in March 2023, which showed a total of 165 runs of SVT (up to 50 minutes in length), and 29 runs of NSVT (up to 17 beats in length).  Given these significant symptomatic SVT runs and our general preference for avoidance of beta-blockers given her likely cardiac amyloidosis, I referred her to EP to discuss  potential ablation.  She saw Dr. Hoffman in clinic on 4/17/2023 and he did offer her ablation, though she wanted to think about this further before committing.    Her most recent echocardiogram was from our system on 10/31/2022, showing mild to moderate LVH with elevated LV filling pressures, mild mitral and tricuspid regurgitation, and moderate pulmonary hypertension by echo, with normal bioprosthetic aortic valve gradients and no AI.  Her cardiac MRI was done on 1/17/2023 and showed a low normal LVEF of 54% with extensive circumferential late gadolinium enhancement extending to the left atrium, as well as ECV significantly increased to 55%, consistent with infiltrative cardiomyopathy such as cardiac amyloidosis.      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  I appreciate the consultation from Dr Hoffman in regards to her SVT.  We discussed the options he had presented her again in detail today, and she ultimately feels that she would like to proceed with attempted ablation.  I think that this is reasonable as well, particularly given the quality of life impact of her symptoms and our preference to avoid beta-blockers if possible.  I will message the EP team in have them set this up if still appropriate from Dr. Hoffman's  standpoint.  Otherwise, we will continue her current cardiac medications unchanged, though I did ask her to please send us a 1 week home blood pressure log, and we may need to consider adding additional antihypertensive medication based on these results, though of course we will need to take into account her cardiac amyloidosis in doing so.      -We will refer for ablation per patient preference; appreciate EP assistance  -Continue lymphedema management through lymphedema clinic, appreciate assistance  -Continue Lasix 20 mg and potassium 20 M EQ daily  -Home BP log for 1 week, will add antihypertensives if needed  -Avoid beta-blockers, ACE/ARB/ARNI, digoxin, and nondihydropyridine calcium channel blockers      Follow-up: Post ablation, otherwise with me or RAUL in 6 months      On the date of the patient's visit, I spent a total of 41 minutes reviewing the patient's chart; interviewing, examining, and counseling the patient; coordinating with other providers as necessary, entering orders, and documenting in the medical chart.        Charlie Herzog MD  Interventional Cardiology  March 8, 2023        Medications   Current Outpatient Medications   Medication    acyclovir (ZOVIRAX) 400 MG tablet    aspirin 81 MG EC tablet    atorvastatin (LIPITOR) 10 MG tablet    Biotin w/ Vitamins C & E (HAIR SKIN & NAILS GUMMIES PO)    Calcium-Phosphorus-Vitamin D (CALCIUM/VITAMIN D3/ADULT GUMMY PO)    Cholecalciferol (VITAMIN D3) 25 MCG (1000 UT) CAPS    dexamethasone (DECADRON) 4 MG tablet    diphenhydrAMINE HCl (BENADRYL PO)    ferrous sulfate (FEROSUL) 325 (65 Fe) MG tablet    furosemide (LASIX) 20 MG tablet    KLOR-CON 20 MEQ packet    pantoprazole sodium (PROTONIX) 40 MG packet    potassium chloride ER (KLOR-CON M) 20 MEQ CR tablet    Probiotic Product (PROBIOTIC GUMMIES PO)     No current facility-administered medications for this visit.     Allergies   Allergies   Allergen Reactions    Carvedilol Other (See Comments)     Lidocaine Nausea and Vomiting     Vomiting with general anesthesia    Lisinopril Cough    Seasonal Allergies Other (See Comments)     Problems with narcotics - oxygen desaturates    Spironolactone          Physical Exam       BP: (!) 150/78 Pulse: 79            Vital Signs with Ranges  Pulse:  [79] 79  BP: (150)/(78) 150/78  137 lbs 11.2 oz    Constitutional: Well-appearing, no acute distress  Respiratory: Normal respiratory effort, CTAB  Cardiovascular: RRR, 1/6 systolic murmur at the RUSB.  JVP appears about 8 cm H2O.  There is 1+ bilateral LE edema.  Normal carotid upstrokes, no carotid bruits.      Thank you for allowing me to participate in the care of your patient.      Sincerely,     Charlie Herzog MD     [facility]  cc:   No referring provider defined for this encounter.

## 2023-05-10 NOTE — TELEPHONE ENCOUNTER
5/10/23 Msg recd from Dr Herzog  MsVee Gallagher had seen Dr. Hoffman last month at which time he had offered her SVT ablation.  She wanted to take more time to think about this, and after we spoke today she would like to proceed with ablation.  Would you mind contacting her to help set this up (she needs to arrange transportation, etc.).      Orders placed, staff message sent to scheduling ( Jennie) to arrange   KHerroRN 435 pm

## 2023-05-10 NOTE — PROGRESS NOTES
CARDIOLOGY CLINIC FOLLOW-UP NOTE      REASON FOR VISIT:   Follow-up cardiac amyloid, bicuspid aortic valve s/p AVR    PRIMARY CARE PHYSICIAN:  Noemi Sanchez        History of Present Illness   Kandis Gallagher is an extremely pleasant 82 year old female, here for routine follow-up.  She had previously seen Dr. London until November 2020, and then had moved out to the [facility] and received care there, but has since moved back and reestablish with us in October 2022.  Her medical history is significant for a bicuspid aortic valve which progressed to severe aortic stenosis for which she underwent surgical AVR with a 23 mm Magna Ease bioprosthesis in August 2016 at [facility].  Her aortic root diameters have been within normal limits in the past.  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    When I first met her in October 2022, I had recommended a cardiac MRI to investigate for infiltrative disease.  She had this done in January 2023, and this did show evidence of significant infiltration consistent with cardiac amyloidosis.  As a result, we weaned her off of her metoprolol.  In conjunction with this, she has had an increase in her frequency of palpitations.  For work-up of this, I had her undergo a 14-day Zio patch in March 2023, which showed a total of 165 runs of SVT (up to 50 minutes in length), and 29 runs of NSVT (up to 17 beats in length).  Given these significant symptomatic SVT runs and our general preference for avoidance of beta-blockers given her likely cardiac amyloidosis, I referred her to EP to discuss potential ablation.  She saw Dr. Hoffman in clinic on 4/17/2023 and he did offer her ablation, though she wanted to think about this further before committing.    Her most recent echocardiogram was from our system on 10/31/2022,  showing mild to moderate LVH with elevated LV filling pressures, mild mitral and tricuspid regurgitation, and moderate pulmonary hypertension by echo, with normal bioprosthetic aortic valve gradients and no AI.  Her cardiac MRI was done on 1/17/2023 and showed a low normal LVEF of 54% with extensive circumferential late gadolinium enhancement extending to the left atrium, as well as ECV significantly increased to 55%, consistent with infiltrative cardiomyopathy such as cardiac amyloidosis.      Assessment & Plan     1. Frequent, symptomatic SVT (up to 50 minutes on 3/2023 Zio patch)   a. History of symptomatic bradycardia with beta-blockers, avoiding beta-blockers also due to amyloid  2. 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 Chronic HFpEF, roughly euvolemic, NYHA II-III  4. Bilateral lymphedema, significantly improved with treatments from lymphedema clinic  5. Bicuspid aortic valve c/b severe aortic stenosis, s/p SAVR  a. 23 mm Magna Ease bioprosthesis in August 2016 at [facility]  b. Normal gradients on 10/2022 TTE  6. Hypertension, poorly controlled today in clinic  7. Dyslipidemia  8. Anemia  9. Multiple myeloma        It was a pleasure to meet with Kandis and her daughter again in clinic today.  I appreciate the consultation from Dr Hoffman in regards to her SVT.  We discussed the options he had presented her again in detail today, and she ultimately feels that she would like to proceed with attempted ablation.  I think that this is reasonable as well, particularly given the quality of life impact of her symptoms and our preference to avoid beta-blockers if possible.  I will message the EP team in have them set this up if still appropriate from Dr. Hoffman's standpoint.  Otherwise, we will continue her current cardiac medications unchanged, though I did ask her to please send us a 1 week home blood pressure log, and we may need to consider adding  additional antihypertensive medication based on these results, though of course we will need to take into account her cardiac amyloidosis in doing so.      -We will refer for ablation per patient preference; appreciate EP assistance  -Continue lymphedema management through lymphedema clinic, appreciate assistance  -Continue Lasix 20 mg and potassium 20 M EQ daily  -Home BP log for 1 week, will add antihypertensives if needed  -Avoid beta-blockers, ACE/ARB/ARNI, digoxin, and nondihydropyridine calcium channel blockers      Follow-up: Post ablation, otherwise with me or RAUL in 6 months      On the date of the patient's visit, I spent a total of 41 minutes reviewing the patient's chart; interviewing, examining, and counseling the patient; coordinating with other providers as necessary, entering orders, and documenting in the medical chart.        Charlie Herzog MD  Interventional Cardiology  March 8, 2023        Medications   Current Outpatient Medications   Medication     acyclovir (ZOVIRAX) 400 MG tablet     aspirin 81 MG EC tablet     atorvastatin (LIPITOR) 10 MG tablet     Biotin w/ Vitamins C & E (HAIR SKIN & NAILS GUMMIES PO)     Calcium-Phosphorus-Vitamin D (CALCIUM/VITAMIN D3/ADULT GUMMY PO)     Cholecalciferol (VITAMIN D3) 25 MCG (1000 UT) CAPS     dexamethasone (DECADRON) 4 MG tablet     diphenhydrAMINE HCl (BENADRYL PO)     ferrous sulfate (FEROSUL) 325 (65 Fe) MG tablet     furosemide (LASIX) 20 MG tablet     KLOR-CON 20 MEQ packet     pantoprazole sodium (PROTONIX) 40 MG packet     potassium chloride ER (KLOR-CON M) 20 MEQ CR tablet     Probiotic Product (PROBIOTIC GUMMIES PO)     No current facility-administered medications for this visit.     Allergies   Allergies   Allergen Reactions     Carvedilol Other (See Comments)     Lidocaine Nausea and Vomiting     Vomiting with general anesthesia     Lisinopril Cough     Seasonal Allergies Other (See Comments)     Problems with narcotics - oxygen  desaturates     Spironolactone          Physical Exam       BP: (!) 150/78 Pulse: 79            Vital Signs with Ranges  Pulse:  [79] 79  BP: (150)/(78) 150/78  137 lbs 11.2 oz    Constitutional: Well-appearing, no acute distress  Respiratory: Normal respiratory effort, CTAB  Cardiovascular: RRR, 1/6 systolic murmur at the RUSB.  JVP appears about 8 cm H2O.  There is 1+ bilateral LE edema.  Normal carotid upstrokes, no carotid bruits.

## 2023-05-12 DIAGNOSIS — C90.00 MULTIPLE MYELOMA, REMISSION STATUS UNSPECIFIED (H): ICD-10-CM

## 2023-05-12 RX ORDER — ACYCLOVIR 400 MG/1
TABLET ORAL
Qty: 180 TABLET | Refills: 0 | Status: SHIPPED | OUTPATIENT
Start: 2023-05-12 | End: 2023-08-09

## 2023-05-15 ENCOUNTER — TRANSFERRED RECORDS (OUTPATIENT)
Dept: HEALTH INFORMATION MANAGEMENT | Facility: CLINIC | Age: 83
End: 2023-05-15
Payer: MEDICARE

## 2023-05-17 ENCOUNTER — TELEPHONE (OUTPATIENT)
Dept: CARDIOLOGY | Facility: CLINIC | Age: 83
End: 2023-05-17
Payer: MEDICARE

## 2023-05-17 NOTE — TELEPHONE ENCOUNTER
5/17/23 Recd call from daughter Rashmi asking about a cost estimate or if SVT Ablation will be covered by her insurance. Asked daughter to call [employer] office ( phone # provided) for answers to those questions  She voiced understanding and agreement with plan.   Willard 415 pm

## 2023-06-06 ENCOUNTER — TELEPHONE (OUTPATIENT)
Dept: MEDSURG UNIT | Facility: CLINIC | Age: 83
End: 2023-06-06
Payer: MEDICARE

## 2023-06-06 ENCOUNTER — TELEPHONE (OUTPATIENT)
Dept: CARDIOLOGY | Facility: CLINIC | Age: 83
End: 2023-06-06
Payer: MEDICARE

## 2023-06-06 DIAGNOSIS — I47.10 PAROXYSMAL SUPRAVENTRICULAR TACHYCARDIA (H): Primary | ICD-10-CM

## 2023-06-06 RX ORDER — SODIUM CHLORIDE, SODIUM LACTATE, POTASSIUM CHLORIDE, CALCIUM CHLORIDE 600; 310; 30; 20 MG/100ML; MG/100ML; MG/100ML; MG/100ML
INJECTION, SOLUTION INTRAVENOUS CONTINUOUS
Status: CANCELLED | OUTPATIENT
Start: 2023-06-06

## 2023-06-06 RX ORDER — LIDOCAINE 40 MG/G
CREAM TOPICAL
Status: CANCELLED | OUTPATIENT
Start: 2023-06-06

## 2023-06-06 NOTE — TELEPHONE ENCOUNTER
Chart reviewed for upcoming appt.  Nursing orders in epic for procedure.  Pt is aware of arrival time of 0630, however she is communicating with clinic about the need to possibly rescheduling.

## 2023-06-06 NOTE — TELEPHONE ENCOUNTER
Spoke to pt who has not been feeling well since her Chemo on Monday. Has had diarrhea and been taking Imodium.  Did attempt to flip her ablation to the afternoon to give her more time to feel better, but that will not work. Pt will plan on coming as scheduled at 0630, but will call care suites if not feeling well in the AM. Pt daughter and son will be with pt, but unsure who will be coming in he AM. Pt aware to have nothing to eat after midnight and clears up to 2 hours before arrival. Pt will hold her Lasix, K, ferrous sulfate and all her vitamins. Pt is aware of where to check in at and where she will discharge from. Pt will get discharge instructions that will let her know what she can and can not do post ablation. No other questions at this time. Marita

## 2023-06-07 ENCOUNTER — HOSPITAL ENCOUNTER (OUTPATIENT)
Facility: CLINIC | Age: 83
Discharge: HOME | End: 2023-06-07
Admitting: INTERNAL MEDICINE
Payer: MEDICARE

## 2023-06-07 VITALS
RESPIRATION RATE: 16 BRPM | HEIGHT: 59 IN | HEART RATE: 74 BPM | DIASTOLIC BLOOD PRESSURE: 74 MMHG | SYSTOLIC BLOOD PRESSURE: 132 MMHG | OXYGEN SATURATION: 95 % | BODY MASS INDEX: 28.22 KG/M2 | WEIGHT: 140 LBS | TEMPERATURE: 98.6 F

## 2023-06-07 DIAGNOSIS — I47.10 PAROXYSMAL SUPRAVENTRICULAR TACHYCARDIA (H): ICD-10-CM

## 2023-06-07 DIAGNOSIS — I47.10 SVT (SUPRAVENTRICULAR TACHYCARDIA) (H): ICD-10-CM

## 2023-06-07 LAB
ANION GAP SERPL CALCULATED.3IONS-SCNC: 11 MMOL/L (ref 7–15)
BUN SERPL-MCNC: 25.8 MG/DL (ref 8–23)
CALCIUM SERPL-MCNC: 9 MG/DL (ref 8.8–10.2)
CHLORIDE SERPL-SCNC: 103 MMOL/L (ref 98–107)
CREAT SERPL-MCNC: 1.06 MG/DL (ref 0.51–0.95)
DEPRECATED HCO3 PLAS-SCNC: 24 MMOL/L (ref 22–29)
ERYTHROCYTE [DISTWIDTH] IN BLOOD BY AUTOMATED COUNT: 15.3 % (ref 10–15)
GFR SERPL CREATININE-BSD FRML MDRD: 52 ML/MIN/1.73M2
GLUCOSE SERPL-MCNC: 97 MG/DL (ref 70–99)
HCT VFR BLD AUTO: 40.3 % (ref 35–47)
HGB BLD-MCNC: 12.7 G/DL (ref 11.7–15.7)
MCH RBC QN AUTO: 28.5 PG (ref 26.5–33)
MCHC RBC AUTO-ENTMCNC: 31.5 G/DL (ref 31.5–36.5)
MCV RBC AUTO: 91 FL (ref 78–100)
PLATELET # BLD AUTO: 92 10E3/UL (ref 150–450)
POTASSIUM SERPL-SCNC: 4 MMOL/L (ref 3.4–5.3)
RBC # BLD AUTO: 4.45 10E6/UL (ref 3.8–5.2)
SODIUM SERPL-SCNC: 138 MMOL/L (ref 136–145)
WBC # BLD AUTO: 10.8 10E3/UL (ref 4–11)

## 2023-06-07 PROCEDURE — 93653 COMPRE EP EVAL TX SVT: CPT | Performed by: INTERNAL MEDICINE

## 2023-06-07 PROCEDURE — C1730 CATH, EP, 19 OR FEW ELECT: HCPCS | Performed by: INTERNAL MEDICINE

## 2023-06-07 PROCEDURE — 93005 ELECTROCARDIOGRAM TRACING: CPT

## 2023-06-07 PROCEDURE — 93010 ELECTROCARDIOGRAM REPORT: CPT | Mod: XU | Performed by: INTERNAL MEDICINE

## 2023-06-07 PROCEDURE — 999N000071 HC STATISTIC HEART CATH LAB OR EP LAB

## 2023-06-07 PROCEDURE — C1732 CATH, EP, DIAG/ABL, 3D/VECT: HCPCS | Performed by: INTERNAL MEDICINE

## 2023-06-07 PROCEDURE — 258N000003 HC RX IP 258 OP 636: Performed by: INTERNAL MEDICINE

## 2023-06-07 PROCEDURE — 99153 MOD SED SAME PHYS/QHP EA: CPT | Performed by: INTERNAL MEDICINE

## 2023-06-07 PROCEDURE — 999N000054 HC STATISTIC EKG NON-CHARGEABLE

## 2023-06-07 PROCEDURE — 36591 DRAW BLOOD OFF VENOUS DEVICE: CPT

## 2023-06-07 PROCEDURE — 250N000009 HC RX 250: Performed by: INTERNAL MEDICINE

## 2023-06-07 PROCEDURE — 250N000011 HC RX IP 250 OP 636: Performed by: INTERNAL MEDICINE

## 2023-06-07 PROCEDURE — C1887 CATHETER, GUIDING: HCPCS | Performed by: INTERNAL MEDICINE

## 2023-06-07 PROCEDURE — 272N000001 HC OR GENERAL SUPPLY STERILE: Performed by: INTERNAL MEDICINE

## 2023-06-07 PROCEDURE — 36415 COLL VENOUS BLD VENIPUNCTURE: CPT | Performed by: INTERNAL MEDICINE

## 2023-06-07 PROCEDURE — 85014 HEMATOCRIT: CPT | Performed by: INTERNAL MEDICINE

## 2023-06-07 PROCEDURE — 80048 BASIC METABOLIC PNL TOTAL CA: CPT | Performed by: INTERNAL MEDICINE

## 2023-06-07 PROCEDURE — 999N000184 HC STATISTIC TELEMETRY

## 2023-06-07 PROCEDURE — 99152 MOD SED SAME PHYS/QHP 5/>YRS: CPT | Performed by: INTERNAL MEDICINE

## 2023-06-07 RX ORDER — HEPARIN SODIUM 200 [USP'U]/100ML
100-500 INJECTION, SOLUTION INTRAVENOUS CONTINUOUS PRN
Status: DISCONTINUED | OUTPATIENT
Start: 2023-06-07 | End: 2023-06-07 | Stop reason: HOSPADM

## 2023-06-07 RX ORDER — DOBUTAMINE HYDROCHLORIDE 200 MG/100ML
5-40 INJECTION INTRAVENOUS CONTINUOUS PRN
Status: DISCONTINUED | OUTPATIENT
Start: 2023-06-07 | End: 2023-06-07 | Stop reason: HOSPADM

## 2023-06-07 RX ORDER — MIDAZOLAM HCL IN 0.9 % NACL/PF 1 MG/ML
.5-6 PLASTIC BAG, INJECTION (ML) INTRAVENOUS CONTINUOUS PRN
Status: DISCONTINUED | OUTPATIENT
Start: 2023-06-07 | End: 2023-06-07 | Stop reason: HOSPADM

## 2023-06-07 RX ORDER — FENTANYL CITRATE 50 UG/ML
INJECTION, SOLUTION INTRAMUSCULAR; INTRAVENOUS
Status: DISCONTINUED | OUTPATIENT
Start: 2023-06-07 | End: 2023-06-07 | Stop reason: HOSPADM

## 2023-06-07 RX ORDER — LIDOCAINE 40 MG/G
CREAM TOPICAL
Status: DISCONTINUED | OUTPATIENT
Start: 2023-06-07 | End: 2023-06-07 | Stop reason: HOSPADM

## 2023-06-07 RX ORDER — HEPARIN SODIUM 200 [USP'U]/100ML
100-600 INJECTION, SOLUTION INTRAVENOUS CONTINUOUS PRN
Status: DISCONTINUED | OUTPATIENT
Start: 2023-06-07 | End: 2023-06-07 | Stop reason: HOSPADM

## 2023-06-07 RX ORDER — NALOXONE HYDROCHLORIDE 0.4 MG/ML
0.4 INJECTION, SOLUTION INTRAMUSCULAR; INTRAVENOUS; SUBCUTANEOUS
Status: DISCONTINUED | OUTPATIENT
Start: 2023-06-07 | End: 2023-06-07 | Stop reason: HOSPADM

## 2023-06-07 RX ORDER — SODIUM CHLORIDE, SODIUM LACTATE, POTASSIUM CHLORIDE, CALCIUM CHLORIDE 600; 310; 30; 20 MG/100ML; MG/100ML; MG/100ML; MG/100ML
INJECTION, SOLUTION INTRAVENOUS CONTINUOUS
Status: DISCONTINUED | OUTPATIENT
Start: 2023-06-07 | End: 2023-06-07 | Stop reason: HOSPADM

## 2023-06-07 RX ORDER — NALOXONE HYDROCHLORIDE 0.4 MG/ML
0.2 INJECTION, SOLUTION INTRAMUSCULAR; INTRAVENOUS; SUBCUTANEOUS
Status: DISCONTINUED | OUTPATIENT
Start: 2023-06-07 | End: 2023-06-07 | Stop reason: HOSPADM

## 2023-06-07 RX ORDER — CEFAZOLIN SODIUM 1 G/3ML
1 INJECTION, POWDER, FOR SOLUTION INTRAMUSCULAR; INTRAVENOUS
Status: DISCONTINUED | OUTPATIENT
Start: 2023-06-07 | End: 2023-06-07 | Stop reason: HOSPADM

## 2023-06-07 RX ORDER — OXYCODONE AND ACETAMINOPHEN 5; 325 MG/1; MG/1
1 TABLET ORAL EVERY 4 HOURS PRN
Status: DISCONTINUED | OUTPATIENT
Start: 2023-06-07 | End: 2023-06-07 | Stop reason: HOSPADM

## 2023-06-07 RX ADMIN — SODIUM CHLORIDE, POTASSIUM CHLORIDE, SODIUM LACTATE AND CALCIUM CHLORIDE: 600; 310; 30; 20 INJECTION, SOLUTION INTRAVENOUS at 07:04

## 2023-06-07 ASSESSMENT — ACTIVITIES OF DAILY LIVING (ADL)
ADLS_ACUITY_SCORE: 35

## 2023-06-07 NOTE — PROGRESS NOTES
Care Suites Post Procedure Note (SVT Ablation)    Patient Information  Name: Kandis Gallagher  Age: 82 year old    Post Procedure  Time patient returned to Care Suites: 9:45 am  Concerns/abnormal assessment: None  Plan/Other: Bedrest for four hours.  Review discharge instructions with patient.  Discharge to home when patient meets criteria.

## 2023-06-07 NOTE — DISCHARGE INSTRUCTIONS
SVT Ablation Discharge Instructions      After you go home:    Have an adult stay with you until tomorrow.  You may resume your normal diet.       For 24 hours - due to the sedation you received:  Relax and take it easy.  Do NOT make any important or legal decisions.  Do NOT drive or operate machines at home or at work.  Do NOT drink alcohol.    Care of Puncture Sites:    For the first 24 hrs - check the puncture site every 1-2 hours while awake.  For 2 days, when you cough, sneeze, laugh or move your bowels, hold your hand over the puncture site and press firmly.  Remove the dressing(s) after 24 hours. If there is minor oozing, apply a bandaid and remove it after 12 hours.  It is normal to have a small bruise, pea sized lump or soreness at the site.  You may shower tomorrow. Do NOT take a bath, or use a hot tub or pool for at least 3 days. Do NOT scrub the site. Do not use lotion or powder near the puncture site.      Activity - For 3 days:    No stooping or squatting  Do NOT do any heavy activity such as exercise, lifting, or straining.   Do NOT do housework, yard work or any activity that make you sweat  Do NOT lift more than 10 pounds      Bleeding:     If you start bleeding from the site in your groin/chest, lie down flat and press firmly on the site for 10 minutes.   Once bleeding stops, lay flat for 2 hours.  Call [facility] as soon as you can.       Call 911 right away if you have heavy bleeding or bleeding that does not stop.      Medicines:    Take your medications, including blood thinners, unless your provider tells you not to.  If you have stopped any medicines, check with your provider about when to restart them.  If you have pain or shortness of breath, you may take Advil (ibuprofen) or Tylenol (acetaminophen).      Follow Up Appointments:    Vesna Dalton on 7/10/23 at 3:30 with an EKG  You will receive a phone call the following day/Monday from an RN - Laura Lema or Bridgette.    Call  the clinic if:    You have increased pain or a large or growing hard lump around the site.  The site is red, swollen, hot or tender.  Blood or fluid is draining from the site.  You have chills or a fever greater than 101 F (38 C).  Your leg feels numb, cool or changes color.  Increased pain in the chest and/or groin.  Increased shortness of breath  Chest pain not relieved by Tylenol or Advil  New pain in the back or belly that you cannot control with Tylenol.  Recurrent irregular or fast heart rate lasting over 2 hours.  Any questions or concerns.    Heart rhythms:  You may have some irregular heartbeats. These feel very strong. They may make you feel that the fast heart rhythm is going to start again.  Give it time. The irregular beats should occur less often.       [facility]:    [phone] ( 8am-5pm M-F)  Laura Lema    [phone] option 2 (7 days a week)  on call Cardiologist

## 2023-06-07 NOTE — Clinical Note
Hemodynamic equipment used: 5 lead ECG, TopDown ConservationK With 3 Leads, Machine BP Cuff and pulse oximeter probe.

## 2023-06-07 NOTE — PROGRESS NOTES
Care Suites Discharge Nursing Note    Patient Information  Name: Kandis Gallagher  Age: 82 year old    Discharge Education:  Discharge instructions reviewed: Yes  Additional education/resources provided: Per Cardiology  Patient/patient representative verbalizes understanding: Yes  Patient discharging on new medications: No  Medication education completed: Yes    Discharge Plans:   Discharge location: home  Discharge ride contacted: Yes  Approximate discharge time: 1500    Discharge Criteria:  Discharge criteria met and vital signs stable: Yes.  VSS,site CDI, AxO  Patient Belongs:  Patient belongings returned to patient: Yes    Braxton Ludwig RN

## 2023-06-07 NOTE — PROGRESS NOTES
Care Suites Admission Nursing Note    Patient Information  Name: Kandis Gallagher  Age: 82 year old  Reason for admission: SVT Ablation  Care Suites arrival time: 0630    Visitor Information  Name: Jeremy     Patient Admission/Assessment   Pre-procedure assessment complete: Yes  If abnormal assessment/labs, provider notified: N/A  NPO: Yes  Medications held per instructions/orders: Yes  Consent: deferred  If applicable, pregnancy test status: deferred  Patient oriented to room: Yes  Education/questions answered: Yes  Plan/other: Prep for procedure    Discharge Planning  Discharge name/phone number: Jeremy [phone]  Overnight post sedation caregiver: Jeremy  Discharge location: home    Braxton Ludwig RN

## 2023-06-08 ENCOUNTER — TELEPHONE (OUTPATIENT)
Dept: CARDIOLOGY | Facility: CLINIC | Age: 83
End: 2023-06-08
Payer: MEDICARE

## 2023-06-08 NOTE — TELEPHONE ENCOUNTER
Spoke to patient in follow up to SVT ablation 6/7.  Denies CP, SOB or lightheadedness.  Feels tired today.  Discussed that is not uncommon and that it takes a couple of days to get back to baseline.  Dressing intact on right groin.  Instructed patient to remove and monitor site.  Voiding without difficulty.  Diarrhea is better.  Drinking and eating adequately.  Reviewed discharge instructions and follow up appt with patient.  Patient provided verbal understanding.  MYRON Cali

## 2023-06-09 LAB
ATRIAL RATE - MUSE: 82 BPM
ATRIAL RATE - MUSE: 85 BPM
DIASTOLIC BLOOD PRESSURE - MUSE: NORMAL MMHG
DIASTOLIC BLOOD PRESSURE - MUSE: NORMAL MMHG
INTERPRETATION ECG - MUSE: NORMAL
INTERPRETATION ECG - MUSE: NORMAL
P AXIS - MUSE: 75 DEGREES
P AXIS - MUSE: 87 DEGREES
PR INTERVAL - MUSE: 184 MS
PR INTERVAL - MUSE: 210 MS
QRS DURATION - MUSE: 88 MS
QRS DURATION - MUSE: 96 MS
QT - MUSE: 398 MS
QT - MUSE: 420 MS
QTC - MUSE: 464 MS
QTC - MUSE: 499 MS
R AXIS - MUSE: -17 DEGREES
R AXIS - MUSE: -35 DEGREES
SYSTOLIC BLOOD PRESSURE - MUSE: NORMAL MMHG
SYSTOLIC BLOOD PRESSURE - MUSE: NORMAL MMHG
T AXIS - MUSE: 71 DEGREES
T AXIS - MUSE: 73 DEGREES
VENTRICULAR RATE- MUSE: 82 BPM
VENTRICULAR RATE- MUSE: 85 BPM

## 2023-06-13 ENCOUNTER — LAB (OUTPATIENT)
Dept: LAB | Facility: CLINIC | Age: 83
End: 2023-06-13
Payer: MEDICARE

## 2023-06-13 ENCOUNTER — OFFICE VISIT (OUTPATIENT)
Dept: NEPHROLOGY | Facility: CLINIC | Age: 83
End: 2023-06-13
Payer: MEDICARE

## 2023-06-13 VITALS
WEIGHT: 139.9 LBS | HEART RATE: 89 BPM | SYSTOLIC BLOOD PRESSURE: 146 MMHG | BODY MASS INDEX: 28.26 KG/M2 | RESPIRATION RATE: 16 BRPM | OXYGEN SATURATION: 96 % | DIASTOLIC BLOOD PRESSURE: 81 MMHG

## 2023-06-13 DIAGNOSIS — R80.1 PERSISTENT PROTEINURIA: Primary | ICD-10-CM

## 2023-06-13 DIAGNOSIS — I50.42 CHRONIC COMBINED SYSTOLIC AND DIASTOLIC HEART FAILURE (H): Primary | ICD-10-CM

## 2023-06-13 DIAGNOSIS — I10 BENIGN ESSENTIAL HYPERTENSION: ICD-10-CM

## 2023-06-13 DIAGNOSIS — E85.81 LIGHT CHAIN (AL) AMYLOIDOSIS (H): ICD-10-CM

## 2023-06-13 DIAGNOSIS — N18.31 CHRONIC KIDNEY DISEASE, STAGE 3A (H): ICD-10-CM

## 2023-06-13 DIAGNOSIS — I50.42 CHRONIC COMBINED SYSTOLIC AND DIASTOLIC HEART FAILURE (H): ICD-10-CM

## 2023-06-13 DIAGNOSIS — R80.1 PERSISTENT PROTEINURIA: ICD-10-CM

## 2023-06-13 LAB
ALBUMIN SERPL BCG-MCNC: 4 G/DL (ref 3.5–5.2)
ANION GAP SERPL CALCULATED.3IONS-SCNC: 12 MMOL/L (ref 7–15)
BUN SERPL-MCNC: 16.4 MG/DL (ref 8–23)
CALCIUM SERPL-MCNC: 9.2 MG/DL (ref 8.8–10.2)
CHLORIDE SERPL-SCNC: 103 MMOL/L (ref 98–107)
CREAT SERPL-MCNC: 0.98 MG/DL (ref 0.51–0.95)
DEPRECATED HCO3 PLAS-SCNC: 23 MMOL/L (ref 22–29)
GFR SERPL CREATININE-BSD FRML MDRD: 57 ML/MIN/1.73M2
GLUCOSE SERPL-MCNC: 81 MG/DL (ref 70–99)
POTASSIUM SERPL-SCNC: 4.4 MMOL/L (ref 3.4–5.3)
SODIUM SERPL-SCNC: 138 MMOL/L (ref 136–145)

## 2023-06-13 PROCEDURE — 84156 ASSAY OF PROTEIN URINE: CPT | Performed by: INTERNAL MEDICINE

## 2023-06-13 PROCEDURE — 99215 OFFICE O/P EST HI 40 MIN: CPT | Performed by: INTERNAL MEDICINE

## 2023-06-13 PROCEDURE — 80048 BASIC METABOLIC PNL TOTAL CA: CPT

## 2023-06-13 PROCEDURE — 82040 ASSAY OF SERUM ALBUMIN: CPT

## 2023-06-13 PROCEDURE — 99417 PROLNG OP E/M EACH 15 MIN: CPT | Performed by: INTERNAL MEDICINE

## 2023-06-13 PROCEDURE — 36415 COLL VENOUS BLD VENIPUNCTURE: CPT

## 2023-06-13 ASSESSMENT — PAIN SCALES - GENERAL: PAINLEVEL: NO PAIN (0)

## 2023-06-13 NOTE — PROGRESS NOTES
CC: Proteinuria, AL Amyloidosis, Multiple Mylopma    HPI:   I had the pleasure today of seeing Kandis Gallagher today. She is a 82 year old female who presents to follow up on her proteinuria. She is here with her daughter Rashmi. She lives at an assisted nursing facility.  She has two sisters close by in the area.    Her medical history is significant for a bicuspid aortic valve which progressed to severe aortic stenosis for which she underwent surgical AVR with a 23 mm Magna Ease bioprosthesis in August 2016 at [facility].  She also has HFpEF, dyslipidemia, hx of HTN now off medications, prediabetes, anemia and multiple myeloma/AL amyloidosis for which she has resumed chemotherapy. 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     Myeloma History:  She presented with several vertebral fractures in Oct 2020 and in Dec 2020, when she underwent L1, L2, and L3 vertebroplasty. Her NICOLAS at [place] showed monoclonal kappa free light chains by immunofixation. In February 2021, she presented with persistent low back pain and leg weakness. MRI of the thoracic and lumbar spine showed a new T11 compression fracture. She underwent T11, L4, and L5 vertebroplasty. Repeat labs revealed free kappa light chain of 25.3, lambda free light chains 1.24, kappa/lambda ratio 20.4.   In March 2021, she had a bone marrow biopsy revealing plasma cell proliferative disorder with approximately 5% kappa light chain-restricted plasma cells and slightly hypercellular bone marrow. I could not locate a kidney biopsy done at that point. It seems to me that at that point, she was considered smoldering myeloma and treatment was on hold. A repeat BM biopsy was done April 2022. Follow up with imaging done 4/25/22  revealed osteolytic lesions, the largest in the iliac wing and there was new finding of nephrotic range proteinuria on 24 hour urine 3.6 grams.  Repeat BM Biopsy in April 2022 revealed amyloidosis involving small periosteal  vessels, plasma cell myeloma with 10% kappa light chain restricted plasma cells. 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 Abdominal fat aspirate was negative for amyloid. Subcutaneous daratumumab, bortezomib and oral dex initiated 6/1/22 but she could not tolerate it (only two sessions-developed fluid retension and neurotoxicity per patient) and it was on hold but she has resumed her chemotherapy.     Overall, she is doing ok. She had ablation last week and her HR is regular today. She is a bit tired after her chemotherapy yesterday. She is tolerating it fairly well except for loose stools at times, for which she takes imodium.  She is satisfied with her new home at the assisted facility, trying to make new friends though, many of the co-residents has dementia The swelling in her LE is better, though her weight has not changed, but it seems that she is eating better (3 meals a day). She is drinking 2 glasses of water a day.    She denies any nausea or vomiting.  She takes lasix 20 mg daily. She denies any SOB, chest pain, dizziness or skin rash. She is complaining of urinary frequency but no dysuria or hematuria.     Her BP is slightly elevated. Given her cardiac amyloidosis, her BB, WAYNE and CCB were stopped. She was restarted on spironolactone by cardiology but she has not started that yet.     Social history:  Retired    Has 4 children and 6 grandkids    Wt Readings from Last 4 Encounters:   06/13/23 63.5 kg (139 lb 14.4 oz)   06/07/23 63.5 kg (140 lb)   05/10/23 62.5 kg (137 lb 11.2 oz)   04/17/23 65.8 kg (145 lb)       Allergies   Allergen Reactions     Carvedilol Other (See Comments)     Lidocaine Nausea and Vomiting     Vomiting with general anesthesia     Lisinopril Cough     Seasonal Allergies Other (See Comments)     Problems with narcotics - oxygen desaturates     Spironolactone        acyclovir (ZOVIRAX) 400 MG tablet, TAKE 1 TABLET(400 MG) BY MOUTH EVERY 12 HOURS  aspirin 81 MG  EC tablet, Take 81 mg by mouth  atorvastatin (LIPITOR) 10 MG tablet, Take 1 tablet (10 mg) by mouth daily  Biotin w/ Vitamins C & E (HAIR SKIN & NAILS GUMMIES PO),   Calcium-Phosphorus-Vitamin D (CALCIUM/VITAMIN D3/ADULT GUMMY PO),   Cholecalciferol (VITAMIN D3) 25 MCG (1000 UT) CAPS, Take 2,000 Units by mouth daily  ferrous sulfate (FEROSUL) 325 (65 Fe) MG tablet, Take 1 tablet (325 mg) by mouth daily (with breakfast)  furosemide (LASIX) 20 MG tablet, TAKE 1 TABLET(20 MG) BY MOUTH DAILY  KLOR-CON 20 MEQ packet, MIX 1 PACKET IN LIQUID AND DRINK BY MOUTH ONCE A DAY  pantoprazole sodium (PROTONIX) 40 MG packet, Take 1 packet by mouth daily  potassium chloride ER (KLOR-CON M) 20 MEQ CR tablet, Take 20 mEq by mouth daily Do not crush  Probiotic Product (PROBIOTIC GUMMIES PO),   dexamethasone (DECADRON) 4 MG tablet, TAKE 3 TABLETS BY MOUTH 1 TIME WEEKLY (Patient not taking: Reported on 6/13/2023)  diphenhydrAMINE HCl (BENADRYL PO), Take by mouth every 7 days (Patient not taking: Reported on 6/13/2023)  spironolactone (ALDACTONE) 25 MG tablet, Take 0.5 tablets (12.5 mg) by mouth daily (Patient not taking: Reported on 6/13/2023)    No current facility-administered medications on file prior to visit.      Past Medical History:   Diagnosis Date     Congenital bicuspid aortic valve      H/O aortic valve replacement     23 mm Magna Ease      Hyperlipidemia      Hypertension      Hyperthyroidism      Severe aortic stenosis      Thyroiditis        Past Surgical History:   Procedure Laterality Date     EP ABLATION SVT N/A 6/7/2023    Procedure: Ablation Supraventricular Tachycardia;  Surgeon: Aaron Hoffman MD;  Location:  [facility]       Social History     Tobacco Use     Smoking status: Never     Smokeless tobacco: Never   Vaping Use     Vaping status: Never Used   Substance Use Topics     Alcohol use: No     Drug use: No       Family History   Problem Relation Age of Onset     Breast Cancer Sister       Ovarian Cancer Sister      Colon Cancer Brother      Liver Cancer Brother      Breast Cancer Daughter      Colon Cancer Daughter        ROS: A 12 system review of systems was negative other than noted here or above.     Exam:  BP (!) 146/81 (BP Location: Left arm, Patient Position: Sitting, Cuff Size: Adult Regular)   Pulse 89   Resp 16   Wt 63.5 kg (139 lb 14.4 oz)   SpO2 96%   BMI 28.26 kg/m       BP Readings from Last 6 Encounters:   06/13/23 (!) 146/81   06/07/23 132/74   05/10/23 (!) 150/78   04/17/23 138/70   03/08/23 (!) 142/70   02/07/23 (!) 158/86     GENERAL APPEARANCE: alert and no distress  EYES: PERRL  HENT: mouth without ulcers or lesions  NECK: supple, no adenopathy  RESP: lungs clear to auscultation - no rales, rhonchi or wheezes  CV: regular rhythm, normal rate, no rub   ABDOMEN:  soft, nontender, no HSM or masses and bowel sounds normal  MS: extremities normal- no gross deformities noted, no evidence of inflammation in joints, no muscle tenderness  SKIN: no rash  NEURO: Normal strength and tone, sensory exam grossly normal, mentation intact and speech normal  PSYCH: mentation appears normal. 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 Her urine protein to creat ratio is almost  9 g/g back in October 2022 but trending down. UPCR from today is pending; this is concomitant with an improvement in her serum albumin up to 4 g/dl today. Her creatinine remains stable at 0.98 mg/dl. She has resumed her chemotherapy and seems to be responding to it. Her kappa free Light changes showed improvement from 41.8 in April 2022 to 3.29.     Problem #2 CKD: Her creatinine overestimates her GFR given her increased total body water and small muscle mass.  Her GFR by cystatin C is 33 ml/min. This GFR is to be used when  dosing medications.     Problem #3: Volume overload/edema: it looks much better. She has been taking lasix 20 mg daily and follow with lymphedema clinic. She has cut down on her fluid intake.    Problem #4: Anticoagulation: Her albumin is up to 4g/dl. No indication for anticoagulation at this level.    Problem #5 HTN: she was found to have cardiac involvement by her amyloidosis.  She has been taken off her antihypertensive medications. With cardiac amyloidosis, it is advised to avoid CCB's, beta-blockers and ACE inhibitors.  She was started on spironolactone by her cardiologist but she has not started it yet.  Given her friability and her need for a wheelchair, I am okay with a blood pressure target around 150/90.    Problem # 6 left adnexal mass: An incidental finding on her recent CT, 5.6 cm in diameter.  This correlates with CT findings from 2/15/2021. This is probably benign. Radiology recommend one year follow-up pelvic ultrasound to ensure stability.    The total time of this encounter amounted to 71 minutes on the day of the encounter 6/13/2023. This time included face to face time spent with the patient, preparatory work and chart review, ordering tests, and performing post visit documentation.    *This dictation was prepared in part using Dragon recognition software.  As a result errors may occur. When identified these transcription errors have been corrected.  While every attempt is made to correct errors during dictation, errors may still exist.     Susan Yi MD   [facility]   Department of Medicine   Division of Renal Disease and Hypertension

## 2023-06-13 NOTE — NURSING NOTE
Kandis Gallagher's goals for this visit include: NONE  She requests these members of her care team be copied on today's visit information: YES    PCP: Noemi Sanchez    Referring Provider:  No referring provider defined for this encounter.    BP (!) 146/81 (BP Location: Left arm, Patient Position: Sitting, Cuff Size: Adult Regular)   Pulse 89   Resp 16   Wt 63.5 kg (139 lb 14.4 oz)   SpO2 96%   BMI 28.26 kg/m      Do you need any medication refills at today's visit? NONE    Ernesto Denney, EMT

## 2023-06-13 NOTE — PATIENT INSTRUCTIONS
It was a pleasure taking care of you today.  I've included a brief summary of our discussion and care plan from today's visit below.  Please review this information with your primary care provider.     My recommendations are summarized as follows:  -Kidney numbers are stable  -Please do urine test today    Who do I call with any questions after my visit?  Please be in touch if there are any further questions that arise following today's visit.  There are multiple ways to contact your nephrology care team.       During business hours, you may reach your Nephrology Care Team or schedule or reschedule an appointment or lab at [phone].       If you need to schedule imaging, please call [phone].   To schedule a COVID test, please call [phone].     You can always send a secure message through [employer]. [employer] messages are answered by your nurse or doctor typically within 24-48 hours. Please allow extra time on weekends and holidays.       For urgent/emergent questions after business hours, you may reach the on-call Nephrology Fellow by contacting the [facility]  at [phone].     How will I get the results of any tests ordered?    You will receive all of your results.  If you have signed up for [employer], any tests ordered at your visit will be available to you once resulted on [employer]. Typically the physician reviews them and may or may not make further recommendations. If there are urgent results that require a change in your care plan, your physician or nurse will call you to discuss the next steps. If you are not on [employer], a letter may be generated and mailed to you with your results.

## 2023-06-14 LAB
ALBUMIN MFR UR ELPH: 1205 MG/DL
CREAT UR-MCNC: 253 MG/DL
PROT/CREAT 24H UR: 4.76 MG/MG CR (ref 0–0.2)

## 2023-06-19 ENCOUNTER — IMMUNIZATION (OUTPATIENT)
Dept: FAMILY MEDICINE | Facility: CLINIC | Age: 83
End: 2023-06-19
Payer: MEDICARE

## 2023-06-19 DIAGNOSIS — Z23 ENCOUNTER FOR IMMUNIZATION: Primary | ICD-10-CM

## 2023-06-19 PROCEDURE — 99207 PR NO CHARGE NURSE ONLY: CPT

## 2023-06-19 PROCEDURE — 0134A COVID-19 BIVALENT 18+ (MODERNA): CPT

## 2023-06-19 PROCEDURE — 91313 COVID-19 BIVALENT 18+ (MODERNA): CPT

## 2023-06-19 NOTE — PROGRESS NOTES
Prior to immunization administration, verified patients identity using patient s name and date of birth. Please see Immunization Activity for additional information.     Screening Questionnaire for Adult Immunization    Are you sick today?   No   Do you have allergies to medications, food, a vaccine component or latex?   No   Have you ever had a serious reaction after receiving a vaccination?   No   Do you have a long-term health problem with heart, lung, kidney, or metabolic disease (e.g., diabetes), asthma, a blood disorder, no spleen, complement component deficiency, a cochlear implant, or a spinal fluid leak?  Are you on long-term aspirin therapy?   No   Do you have cancer, leukemia, HIV/AIDS, or any other immune system problem?   No   Do you have a parent, brother, or sister with an immune system problem?   No   In the past 3 months, have you taken medications that affect  your immune system, such as prednisone, other steroids, or anticancer drugs; drugs for the treatment of rheumatoid arthritis, Crohn s disease, or psoriasis; or have you had radiation treatments?   No   Have you had a seizure, or a brain or other nervous system problem?   No   During the past year, have you received a transfusion of blood or blood    products, or been given immune (gamma) globulin or antiviral drug?   No   For women: Are you pregnant or is there a chance you could become       pregnant during the next month?   No   Have you received any vaccinations in the past 4 weeks?   No     Immunization questionnaire answers were all negative.    I have reviewed the following standing orders:   This patient is due and qualifies for the Covid-19 vaccine.     Click here for COVID-19 Standing Order    I have reviewed the vaccines inclusion and exclusion criteria; No concerns regarding eligibility.     Patient instructed to remain in clinic for 15 minutes afterwards, and to report any adverse reactions.     Screening performed by Trice  OPHELIA Omalley on 6/19/2023 at 10:27 AM.

## 2023-06-23 ENCOUNTER — TRANSFERRED RECORDS (OUTPATIENT)
Dept: HEALTH INFORMATION MANAGEMENT | Facility: CLINIC | Age: 83
End: 2023-06-23
Payer: MEDICARE

## 2023-06-26 ENCOUNTER — LAB (OUTPATIENT)
Dept: LAB | Facility: CLINIC | Age: 83
End: 2023-06-26
Payer: MEDICARE

## 2023-06-26 ENCOUNTER — TRANSFERRED RECORDS (OUTPATIENT)
Dept: HEALTH INFORMATION MANAGEMENT | Facility: CLINIC | Age: 83
End: 2023-06-26

## 2023-06-26 DIAGNOSIS — I10 BENIGN ESSENTIAL HYPERTENSION: ICD-10-CM

## 2023-06-26 DIAGNOSIS — I50.42 CHRONIC COMBINED SYSTOLIC AND DIASTOLIC HEART FAILURE (H): ICD-10-CM

## 2023-06-26 LAB
ANION GAP SERPL CALCULATED.3IONS-SCNC: 12 MMOL/L (ref 7–15)
BUN SERPL-MCNC: 17.6 MG/DL (ref 8–23)
CALCIUM SERPL-MCNC: 9.8 MG/DL (ref 8.8–10.2)
CHLORIDE SERPL-SCNC: 104 MMOL/L (ref 98–107)
CREAT SERPL-MCNC: 0.92 MG/DL (ref 0.51–0.95)
DEPRECATED HCO3 PLAS-SCNC: 22 MMOL/L (ref 22–29)
GFR SERPL CREATININE-BSD FRML MDRD: 61 ML/MIN/1.73M2
GLUCOSE SERPL-MCNC: 143 MG/DL (ref 70–99)
POTASSIUM SERPL-SCNC: 4.6 MMOL/L (ref 3.4–5.3)
SODIUM SERPL-SCNC: 138 MMOL/L (ref 136–145)

## 2023-06-26 PROCEDURE — 36415 COLL VENOUS BLD VENIPUNCTURE: CPT

## 2023-06-26 PROCEDURE — 80048 BASIC METABOLIC PNL TOTAL CA: CPT

## 2023-06-27 ENCOUNTER — TELEPHONE (OUTPATIENT)
Dept: CARDIOLOGY | Facility: CLINIC | Age: 83
End: 2023-06-27
Payer: MEDICARE

## 2023-06-27 NOTE — TELEPHONE ENCOUNTER
Pt has OV with Vesna Dalton on 7/10/23.  Called pt's daughter, Rashmi, and no answer.  Left detailed message with Dr Herzog's review and recommendations below.  Requested a call back to Team 1 RN phone number if pt is experiencing new symptoms as a result of starting spironolactone.      Charlie Herzog MD 6/27/2023  8:28 AM  Good news.  Follow-up chemistry panel looks normal after starting the spironolactone.  Hopefully she is tolerating this well.  She can review her blood pressures at her upcoming appointment next month and potentially titrate the dose up further if needed.

## 2023-06-28 ENCOUNTER — TELEPHONE (OUTPATIENT)
Dept: FAMILY MEDICINE | Facility: CLINIC | Age: 83
End: 2023-06-28
Payer: MEDICARE

## 2023-06-28 NOTE — TELEPHONE ENCOUNTER
Forms/Letter Request    [facility]    Have you been seen for this request: N/A    Do we have the form/letter: Yes: placed in providers forms basket for review    When is form/letter needed by: ASAP    How would you like the form/letter returned: Fax [phone]

## 2023-06-30 RX ORDER — POTASSIUM CHLORIDE 1.5 G/1
POWDER, FOR SOLUTION ORAL
OUTPATIENT
Start: 2023-06-30

## 2023-07-10 ENCOUNTER — OFFICE VISIT (OUTPATIENT)
Dept: CARDIOLOGY | Facility: CLINIC | Age: 83
End: 2023-07-10
Payer: MEDICARE

## 2023-07-10 VITALS
DIASTOLIC BLOOD PRESSURE: 60 MMHG | BODY MASS INDEX: 25.8 KG/M2 | HEART RATE: 84 BPM | HEIGHT: 59 IN | SYSTOLIC BLOOD PRESSURE: 124 MMHG | WEIGHT: 128 LBS | OXYGEN SATURATION: 93 %

## 2023-07-10 DIAGNOSIS — I10 PRIMARY HYPERTENSION: ICD-10-CM

## 2023-07-10 DIAGNOSIS — I47.10 PAROXYSMAL SUPRAVENTRICULAR TACHYCARDIA (H): Primary | ICD-10-CM

## 2023-07-10 PROCEDURE — 99214 OFFICE O/P EST MOD 30 MIN: CPT | Performed by: NURSE PRACTITIONER

## 2023-07-10 PROCEDURE — 93000 ELECTROCARDIOGRAM COMPLETE: CPT | Performed by: NURSE PRACTITIONER

## 2023-07-10 RX ORDER — ONDANSETRON 8 MG/1
TABLET, FILM COATED ORAL EVERY 8 HOURS PRN
COMMUNITY
End: 2024-08-26

## 2023-07-10 NOTE — PROGRESS NOTES
Electrophysiology Clinic Progress Note  Kandis Gallagher MRN# [id number]   YOB: 1940 Age: 82 year old     Primary cardiologist: Dr. Herzog (general) and Dr. Hoffman (EP)    Reason for visit: 1 month follow-up post catheter ablation for SVT    History of presenting illness:    Kandis Gallagher is a pleasant 82 year old patient with past medical history significant for:    1. Paroxysmal SVT  2. Hypertension  3. Hyperlipidemia  4. Bicuspid aortic valve status post AVR in 2016 at [facility]  5. Lymphedema  6. Multiple myeloma   7. Amyloidosis with cardiac involvement    The patient is a longstanding history of PSVT requiring ED visits.  Her most recent ED visit was 2/14/2023 when she presented with sustained tachycardia that was self terminated while on the cardiac monitor.  The patient was evaluated by Dr. Herzog on 3/8/2023 and due to concerns for ongoing cardiac amyloidosis beta-blockers were discontinued.  The Zio patch was obtained that showed frequent episodes of SVT and therefore she was referred to Dr. Hoffman on 4/17/2023.    They discussed pharmacological therapy versus catheter ablation.  The patient elected to think about the options but ultimately elected to proceed with the ablation.  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    Today the patient presents with her daughter for a 1 month follow-up post ablation.  From an EP standpoint she is doing overall well.  Her groin site has moderate bruising without any discomfort or tenderness on palpitation.  She has not had any recurrent episodes of palpitations or tachycardia.    Dr. Herzog had previously recommended that she start spironolactone for hypertension management.  Unfortunately, she developed severe diarrhea with the spironolactone and has had approximately 10 to 14 pound weight loss.  She is also noting increased fatigue with  the weight loss and frequent stools.  EKG today notes sinus rhythm    Diagnotic studies:    Zio patch (3/8/2023): 165 runs of PSVT (up to 49 minutes) and 29 runs of Non sustained VT (up to 17 beats)    Cardiac MRI (1/17/2023): EF of 54%.  Mild LVH.  Extensive circumferential late gadolinium enhancement of the LV, suggestive of cardiac amyloidosis.    Echocardiogram (10/2022): LVEF of 50 to 55% with mild LAE and dilatation, mild MR, mild TR, RVSP consistent with moderate PH.  Normal bioprosthetic valve gradient            Assessment and Plan:     ASSESSMENT:    1. Paroxysmal SVT    Status post catheter ablation for typical AVNRT on 6/7/2023 with Dr. Hoffman    EKG today sinus rhythm    2. Bicuspid aortic valve with severe aortic valve stenosis    Status post SAVR in 2016 at [facility] with a 23 mm Magna Ease bioprosthesis    No significant AR on recent cardiac MRI 1/2023 and normal gradient on echo from 10/2022    3. Hypertension    Controlled    Spironolactone 12.5 mg daily, Lasix 20 mg daily    Concern for spironolactone causing loose stools    4. Infiltrative cardiomyopathy    Noted on cardiac MRI from 1/2023 and most likely cardiac amyloidosis    5. Chronic HFpEF    Compensated    PLAN:     1. Discontinue spironolactone  2. Continue good hydration with water and Gatorade  3. BMP in 1 week  4. Monitor home blood pressure 2-4 times a week and will reassess need for an additional agent  5. Follow-up with RAUL who works with Dr. Herzog in approximately 6 months or sooner if needed       Orders this Visit:  Orders Placed This Encounter   Procedures     EKG 12-lead complete w/read - Clinics (performed today)     No orders of the defined types were placed in this encounter.    There are no discontinued medications.    Today's clinic visit entailed:  Review of the result(s) of each unique test - EKG, echo, CMRI, BMP  Assessment requiring an independent historian(s) - family - Daughter  Ordering of each  unique test  Prescription drug management  35 minutes spent by me on the date of the encounter doing chart review, history and exam, documentation and further activities per the note  Provider  Link to [facility] Help Grid     The level of medical decision making during this visit was of moderate complexity.           Review of Systems:     Review of Systems:  Skin:        Eyes:       ENT:       Respiratory:       Cardiovascular:       Gastroenterology:      Genitourinary:       Musculoskeletal:       Neurologic:       Psychiatric:       Heme/Lymph/Imm:       Endocrine:                 Physical Exam:     Vitals: There were no vitals taken for this visit.  Constitutional: Frail and thin  Eyes: Pupils equal, round. Sclerae anicteric.   HEENT: Normocephalic, atraumatic.   Neck: Supple.  Respiratory: Breathing non-labored. Lungs clear to auscultation bilaterally. No crackles, wheezes, rhonchi, or rales.  Cardiovascular: Regular rate and rhythm, normal S1 and S2. No murmur, rub, or gallop.  Skin: Warm, dry. No rashes, cyanosis, or xanthelasma.  Extremities: No edema.  Neurologic: No gross motor deficits. Alert, awake, and oriented to person, place and time.  Psychiatric: Affect appropriate.        CURRENT MEDICATIONS:  Current Outpatient Medications   Medication Sig Dispense Refill     acyclovir (ZOVIRAX) 400 MG tablet TAKE 1 TABLET(400 MG) BY MOUTH EVERY 12 HOURS 180 tablet 0     aspirin 81 MG EC tablet Take 81 mg by mouth       atorvastatin (LIPITOR) 10 MG tablet Take 1 tablet (10 mg) by mouth daily 90 tablet 3     Biotin w/ Vitamins C & E (HAIR SKIN & NAILS GUMMIES PO)        Calcium-Phosphorus-Vitamin D (CALCIUM/VITAMIN D3/ADULT GUMMY PO)        Cholecalciferol (VITAMIN D3) 25 MCG (1000 UT) CAPS Take 2,000 Units by mouth daily 180 capsule 3     dexamethasone (DECADRON) 4 MG tablet TAKE 3 TABLETS BY MOUTH 1 TIME WEEKLY (Patient not taking: Reported on 6/13/2023)       diphenhydrAMINE HCl (BENADRYL PO) Take by mouth every 7  days (Patient not taking: Reported on 6/13/2023)       FEROSUL 325 (65 Fe) MG tablet TAKE 1 TABLET(325 MG) BY MOUTH DAILY WITH BREAKFAST 90 tablet 1     furosemide (LASIX) 20 MG tablet TAKE 1 TABLET(20 MG) BY MOUTH DAILY 90 tablet 1     KLOR-CON 20 MEQ packet MIX 1 PACKET IN LIQUID AND DRINK BY MOUTH ONCE A DAY 90 packet 1     pantoprazole sodium (PROTONIX) 40 MG packet Take 1 packet by mouth daily       potassium chloride ER (KLOR-CON M) 20 MEQ CR tablet Take 20 mEq by mouth daily Do not crush (Patient not taking: Reported on 6/13/2023)       Probiotic Product (PROBIOTIC GUMMIES PO)        spironolactone (ALDACTONE) 25 MG tablet Take 0.5 tablets (12.5 mg) by mouth daily (Patient not taking: Reported on 6/13/2023) 45 tablet 3       ALLERGIES  Allergies   Allergen Reactions     Carvedilol Other (See Comments)     Lidocaine Nausea and Vomiting     Vomiting with general anesthesia     Lisinopril Cough     Seasonal Allergies Other (See Comments)     Problems with narcotics - oxygen desaturates     Spironolactone          PAST MEDICAL HISTORY:  Past Medical History:   Diagnosis Date     Congenital bicuspid aortic valve      H/O aortic valve replacement     23 mm Magna Ease      Hyperlipidemia      Hypertension      Hyperthyroidism      Severe aortic stenosis      Thyroiditis        PAST SURGICAL HISTORY:  Past Surgical History:   Procedure Laterality Date     EP ABLATION SVT N/A 6/7/2023    Procedure: Ablation Supraventricular Tachycardia;  Surgeon: Aaron Hoffman MD;  Location:  HEART CARDI[facility] CATH LAB       FAMILY HISTORY:  Family History   Problem Relation Age of Onset     Breast Cancer Sister      Ovarian Cancer Sister      Colon Cancer Brother      Liver Cancer Brother      Breast Cancer Daughter      Colon Cancer Daughter        SOCIAL HISTORY:  Social History     Socioeconomic History     Marital status:    Tobacco Use     Smoking status: Never     Smokeless tobacco: Never   Vaping Use     Vaping  Use: Never used   Substance and Sexual Activity     Alcohol use: No     Drug use: No

## 2023-07-10 NOTE — PATIENT INSTRUCTIONS
Today's Recommendations    Stop spironolactone due to loose stools  Continue good hydration  Labs in 1 week  Monitor BP 2-4 times a week  Continue all other medications without changes.  Please follow up with Dr. Herzog in 6 months.    Please send a [employer] message or call [phone] to the RN team with questions or concerns.     Scheduling number [phone]    JOANN Veloz, CNP

## 2023-07-12 ENCOUNTER — TELEPHONE (OUTPATIENT)
Dept: FAMILY MEDICINE | Facility: CLINIC | Age: 83
End: 2023-07-12
Payer: MEDICARE

## 2023-07-12 NOTE — TELEPHONE ENCOUNTER
Forms/Letter Request    Type of form/letter: [facility]    Have you been seen for this request: N/A    Do we have the form/letter: Yes: Will place form on providers desk for review/signature    When is form/letter needed by: asap    How would you like the form/letter returned: Fax : [phone]

## 2023-07-19 ENCOUNTER — NURSE TRIAGE (OUTPATIENT)
Dept: FAMILY MEDICINE | Facility: CLINIC | Age: 83
End: 2023-07-19
Payer: MEDICARE

## 2023-07-19 NOTE — TELEPHONE ENCOUNTER
I would agree that she should at least notify her oncologist regarding the diarrhea to see if they have any other recommendations  .  Continued use of Imodium is recommended  .  Metamucil use may be of benefit but can cause gassy symptoms so watch for that.  1 gram daily can be used - over the counter.    Follow up in person if unable to keep up with oral intake in order to prevent dehydration, signs of dehydration, or any sign of blood in the stool.    LINDA

## 2023-07-19 NOTE — TELEPHONE ENCOUNTER
RN called patient and also spoke with patient's daughter Rasmhi. RN relayed provider message below, and she verbalized good understanding. They will reach out to oncology regarding diarrhea. No further questions or concerns.    MYRON Varner  [facility] Primary Care Triage

## 2023-07-19 NOTE — TELEPHONE ENCOUNTER
Patient states she has multiple myeloma, has weekly chemotherapy treatments on Mondays.  Has had episodes of diarrhea since starting chemotherapy treatment almost a year ago. Currently having large quantities of mostly liquid stool. Two episodes of diarrhea in the last 24 hours. No blood or black/tarry colored stool.     Reports drinking 3- 8oz bottles of liquid the last 24 hours but patient uncertain if this is the correct amount. She urinated more than a few hours ago but less than 12 hours ago. Was not able to give specific time frame. No dry mouth. Reports drinking electrolyte drink after having episode of diarrhea. Reports sometimes feeling slightly lightheaded after large episode of diarrhea, which she then lays down and starts to feel better.     Pt states she takes imodium. She is wondering if she should take something else, such as metamucil to bulk up stool.    Writer advised pt diarrhea can be common side effect of chemotherapy. Might be best for patient to call oncologist for additional recommendations, but can also send to primary care provider (protocol states see primary care provider within 24 hours.) Advised patient to watch for signs of dehydration- dry mouth, going 12 hours without urinating. Patient verbalized understanding.    Daughter typically manages patient's care and appts, pt states if needs appointment, should coordinate with daughter Rashmi for this.     Misa Brice RN    [facility]- Primary Care      Reason for Disposition    Weak immune system (e.g., HIV positive, cancer chemo, splenectomy, organ transplant, chronic steroids)    Additional Information    Negative: Shock suspected (e.g., cold/pale/clammy skin, too weak to stand, low BP, rapid pulse)    Negative: Difficult to awaken or acting confused (e.g., disoriented, slurred speech)    Negative: Sounds like a life-threatening emergency to the triager    Negative: Vomiting also present and worse than  "the diarrhea    Negative: Fecal impaction suspected (with leakage of watery stool around impaction)    Negative: [1] SEVERE abdominal pain (e.g., excruciating) AND [2] present > 1 hour    Negative: [1] SEVERE abdominal pain AND [2] age > 60 years    Negative: [1] Blood in the stool AND [2] moderate or large amount of blood    Negative: Black or tarry bowel movements (Exception: chronic-unchanged black-grey bowel movements AND is taking iron pills or Pepto-bismol)    Negative: [1] Neutropenia known or suspected (e.g., recent cancer chemotherapy) AND [2] new-onset of diarrhea    Negative: [1] Drinking very little AND [2] dehydration suspected (e.g., no urine > 12 hours, very dry mouth, very lightheaded)    Negative: Patient sounds very sick or weak to the triager    Negative: [1] SEVERE diarrhea (e.g., 7 or more times / day more than normal) AND [2] receiving chemotherapy or radiation therapy (within past 4 weeks)    Negative: [1] SEVERE diarrhea (e.g., 7 or more times / day more than normal) AND [2] bone marrow or stem cell transplant in past 100 days    Negative: [1] SEVERE diarrhea AND [2] age > 60 years    Negative: [1] Constant abdominal pain AND [2] present > 2 hours    Negative: [1] SEVERE diarrhea (e.g., 7 or more times / day more than normal) AND [2] present > 24 hours (1 day)    Negative: [1] MODERATE diarrhea (e.g., 4-6 times / day more than normal) AND [2] present > 48 hours (2 days)    Negative: Abdominal pain  (Exception: Pain clears with each passage of diarrhea stool)    Negative: Fever > 101 F (38.3 C)    Negative: [1] Blood in the stool AND [2] small amount of blood (Exception: only on toilet paper. Reason: diarrhea can cause rectal irritation with blood on wiping)    Negative: [1] Recent antibiotic therapy (i.e., within last 2 months) AND [2] > 3 days since antibiotic was stopped    Answer Assessment - Initial Assessment Questions  1. DIARRHEA SEVERITY: \"How bad is the diarrhea?\" \"How many extra " "stools have you had in the past 24 hours than normal?\"     - MILD (Scale 1-3; CTCAE Grade 1): Few loose or mushy BMs; increase of 1-3 stools over normal daily number of stools; mild increase in ostomy output.    - MODERATE (Scale 4-7; CTCAE Grade 2): Increase of 4-6 stools daily over normal; moderate increase in ostomy output.    - SEVERE (Scale 8-10; or worst possible; CTCAE Grade 3): Increase of 7 or more stools daily over normal; moderate increase in ostomy output; incontinence.      Twice in the last day, large amount at one time, becomes watery.   2. ONSET: \"When did the diarrhea begin?\"       Has chemo every Monday, sometimes has diarrhea before and sometimes after. Onset was almost a year ago.   3. BM CONSISTENCY: \"How loose or watery is the diarrhea?\" \"Is there any blood in the stool?\"      Mostly liquid once it gets going. Gets a little gassy beforehand.  4. BM ODOR: \"Does the stool smell worse or different than usual?\"     No  5. VOMITING: \"Are you also vomiting?\" If Yes, ask: \"How many times in the past 24 hours?\"       No  6. ABDOMINAL PAIN: \"Are you having any abdominal pain?\" If Yes, ask: \"What does it feel like?\" (e.g., crampy, dull, intermittent, constant)     None  7. ABDOMINAL PAIN SEVERITY: If present, ask: \"How bad is the pain?\"  (e.g., Scale 1-10; mild, moderate, or severe)     - MILD (1-3): doesn't interfere with normal activities, abdomen soft and not tender to touch      - MODERATE (4-7): interferes with normal activities or awakens from sleep, abdomen tender to touch      - SEVERE (8-10): excruciating pain, doubled over, unable to do any normal activities        No  8. ORAL INTAKE: If vomiting, \"Have you been able to drink liquids?\" \"How much fluids have you had in the past 24 hours?\"      3, 8oz drinks but patient is unsure about this. Drinking boost, water  9. HYDRATION: \"Any signs of dehydration?\" (e.g., dry mouth [not just dry lips], too weak to stand, dizziness, new weight loss) \"When " "did you last urinate?\"      It has been a few hours since urinated. Sometimes feels lightheaded after having big episode of diarrhea.  10. EXPOSURE: \"Have you traveled to a foreign country recently?\" \"Have you been exposed to anyone with diarrhea?\" \"Could you have eaten any food that was spoiled?\"        None  11. CANCER: \"What type of cancer do you have?\"        Multiple myeloma  12. CANCER - TREATMENT: \"What cancer treatments have you received?\" \"When did you last take or receive them?\" (e.g., recent surgery, radiation, immunotherapy, or chemotherapy). If triager has access to the patient's medical record, triager should review treatments and administration dates.        Chemotherapy  13. CANCER - NEUROPENIA RISK: \"Have you received chemotherapy recently? If Yes, ask: \"When was it and what was your last CBC and ANC (absolute neutrophil count)?\" \"Were you told that your white cell count was low?\" If triager has access to the patient's medical record, triager should review most recent labs. An ANC less than 1,000 - 1,500 means that the neutrophils are low and the immune system is weak.        WBC in normal range per labs  14. C DIFF RSK: \"Have you ever had c-difficile (C-Diff) diarrhea?\"         None  15. DIARRHEA MEDICINES: \"Are you taking any medicines right now to make the diarrhea better?\" If Yes, ask: \"What drugs are you taking?\" (e.g., Immodium, Lomotil)        Immodium  16. OTHER SYMPTOMS: \"Do you have any other symptoms?\" (e.g., fever, blood in stool)        None    Protocols used: CANCER - DIARRHEA-A-AH      "

## 2023-07-24 ENCOUNTER — LAB (OUTPATIENT)
Dept: LAB | Facility: CLINIC | Age: 83
End: 2023-07-24
Payer: MEDICARE

## 2023-07-24 ENCOUNTER — TRANSFERRED RECORDS (OUTPATIENT)
Dept: HEALTH INFORMATION MANAGEMENT | Facility: CLINIC | Age: 83
End: 2023-07-24

## 2023-07-24 DIAGNOSIS — I10 PRIMARY HYPERTENSION: ICD-10-CM

## 2023-07-24 LAB
ANION GAP SERPL CALCULATED.3IONS-SCNC: 15 MMOL/L (ref 7–15)
BUN SERPL-MCNC: 18.4 MG/DL (ref 8–23)
CALCIUM SERPL-MCNC: 9.3 MG/DL (ref 8.8–10.2)
CHLORIDE SERPL-SCNC: 108 MMOL/L (ref 98–107)
CREAT SERPL-MCNC: 0.87 MG/DL (ref 0.51–0.95)
DEPRECATED HCO3 PLAS-SCNC: 22 MMOL/L (ref 22–29)
GFR SERPL CREATININE-BSD FRML MDRD: 65 ML/MIN/1.73M2
GLUCOSE SERPL-MCNC: 158 MG/DL (ref 70–99)
POTASSIUM SERPL-SCNC: 4.1 MMOL/L (ref 3.4–5.3)
SODIUM SERPL-SCNC: 145 MMOL/L (ref 136–145)

## 2023-07-24 PROCEDURE — 80048 BASIC METABOLIC PNL TOTAL CA: CPT

## 2023-07-24 PROCEDURE — 36415 COLL VENOUS BLD VENIPUNCTURE: CPT

## 2023-08-09 DIAGNOSIS — C90.00 MULTIPLE MYELOMA, REMISSION STATUS UNSPECIFIED (H): ICD-10-CM

## 2023-08-09 RX ORDER — ACYCLOVIR 400 MG/1
TABLET ORAL
Qty: 180 TABLET | Refills: 0 | Status: SHIPPED | OUTPATIENT
Start: 2023-08-09 | End: 2023-11-09

## 2023-08-21 ENCOUNTER — TRANSFERRED RECORDS (OUTPATIENT)
Dept: HEALTH INFORMATION MANAGEMENT | Facility: CLINIC | Age: 83
End: 2023-08-21
Payer: MEDICARE

## 2023-09-05 ENCOUNTER — TRANSFERRED RECORDS (OUTPATIENT)
Dept: HEALTH INFORMATION MANAGEMENT | Facility: CLINIC | Age: 83
End: 2023-09-05
Payer: MEDICARE

## 2023-09-20 DIAGNOSIS — E78.5 HYPERLIPIDEMIA LDL GOAL <100: ICD-10-CM

## 2023-09-20 RX ORDER — ATORVASTATIN CALCIUM 10 MG/1
10 TABLET, FILM COATED ORAL DAILY
Qty: 90 TABLET | Refills: 3 | Status: SHIPPED | OUTPATIENT
Start: 2023-09-20 | End: 2024-08-28

## 2023-10-02 DIAGNOSIS — E87.6 HYPOKALEMIA: ICD-10-CM

## 2023-10-02 RX ORDER — POTASSIUM CHLORIDE 1.5 G/1
POWDER, FOR SOLUTION ORAL
Qty: 90 EACH | Refills: 0 | Status: SHIPPED | OUTPATIENT
Start: 2023-10-02 | End: 2024-01-02

## 2023-10-03 ENCOUNTER — MYC REFILL (OUTPATIENT)
Dept: FAMILY MEDICINE | Facility: CLINIC | Age: 83
End: 2023-10-03
Payer: MEDICARE

## 2023-10-03 DIAGNOSIS — E87.6 HYPOKALEMIA: ICD-10-CM

## 2023-10-03 RX ORDER — POTASSIUM CHLORIDE 1.5 G/1.58G
POWDER, FOR SOLUTION ORAL
Qty: 90 EACH | Refills: 0 | OUTPATIENT
Start: 2023-10-03

## 2023-10-18 ENCOUNTER — MYC MEDICAL ADVICE (OUTPATIENT)
Dept: FAMILY MEDICINE | Facility: CLINIC | Age: 83
End: 2023-10-18
Payer: MEDICARE

## 2023-10-18 DIAGNOSIS — N39.0 URINARY TRACT INFECTION WITHOUT HEMATURIA, SITE UNSPECIFIED: Primary | ICD-10-CM

## 2023-10-19 ENCOUNTER — DOCUMENTATION ONLY (OUTPATIENT)
Dept: FAMILY MEDICINE | Facility: CLINIC | Age: 83
End: 2023-10-19
Payer: MEDICARE

## 2023-10-19 RX ORDER — CEFDINIR 250 MG/5ML
300 POWDER, FOR SUSPENSION ORAL 2 TIMES DAILY
Qty: 48 ML | Refills: 0 | Status: SHIPPED | OUTPATIENT
Start: 2023-10-19 | End: 2023-10-23

## 2023-10-19 NOTE — PROGRESS NOTES
Patient have Lab appointment on 10/24/23 and there is no orders. Please order test if needed.  Thanks  Kaylen Vaughn MLT([facility]

## 2023-10-23 ENCOUNTER — TRANSFERRED RECORDS (OUTPATIENT)
Dept: HEALTH INFORMATION MANAGEMENT | Facility: CLINIC | Age: 83
End: 2023-10-23
Payer: MEDICARE

## 2023-10-23 ENCOUNTER — DOCUMENTATION ONLY (OUTPATIENT)
Dept: FAMILY MEDICINE | Facility: CLINIC | Age: 83
End: 2023-10-23
Payer: MEDICARE

## 2023-10-23 NOTE — PROGRESS NOTES
Patient have Lab appointment on 10/24/23 and there is no orders. Please order test if needed.  Thanks  Kay[facility] Vaughn [facility]

## 2023-10-24 ENCOUNTER — LAB (OUTPATIENT)
Dept: LAB | Facility: CLINIC | Age: 83
End: 2023-10-24
Payer: MEDICARE

## 2023-10-24 DIAGNOSIS — N39.0 URINARY TRACT INFECTION WITHOUT HEMATURIA, SITE UNSPECIFIED: ICD-10-CM

## 2023-10-24 PROCEDURE — 87086 URINE CULTURE/COLONY COUNT: CPT

## 2023-10-25 LAB — BACTERIA UR CULT: NORMAL

## 2023-11-09 DIAGNOSIS — C90.00 MULTIPLE MYELOMA, REMISSION STATUS UNSPECIFIED (H): ICD-10-CM

## 2023-11-09 RX ORDER — ACYCLOVIR 400 MG/1
TABLET ORAL
Qty: 180 TABLET | Refills: 0 | Status: SHIPPED | OUTPATIENT
Start: 2023-11-09 | End: 2024-04-26

## 2023-11-20 ENCOUNTER — MEDICAL CORRESPONDENCE (OUTPATIENT)
Dept: HEALTH INFORMATION MANAGEMENT | Facility: CLINIC | Age: 83
End: 2023-11-20
Payer: MEDICARE

## 2023-11-20 ENCOUNTER — TELEPHONE (OUTPATIENT)
Dept: FAMILY MEDICINE | Facility: CLINIC | Age: 83
End: 2023-11-20
Payer: MEDICARE

## 2023-11-20 NOTE — TELEPHONE ENCOUNTER
Forms/Letter Request    Type of form/letter:  Raj Lowry OT & PT    Have you been seen for this request: N/A    Do we have the form/letter: Yes: Will place on providers desk     When is form/letter needed by: asap    How would you like the form/letter returned: Fax : [phone]

## 2023-12-06 DIAGNOSIS — N18.31 CHRONIC KIDNEY DISEASE, STAGE 3A (H): Primary | ICD-10-CM

## 2023-12-12 ENCOUNTER — TRANSFERRED RECORDS (OUTPATIENT)
Dept: HEALTH INFORMATION MANAGEMENT | Facility: CLINIC | Age: 83
End: 2023-12-12
Payer: MEDICARE

## 2023-12-30 DIAGNOSIS — E87.6 HYPOKALEMIA: ICD-10-CM

## 2024-01-02 RX ORDER — POTASSIUM CHLORIDE 1.5 G/1
POWDER, FOR SOLUTION ORAL
Qty: 90 EACH | Refills: 0 | Status: SHIPPED | OUTPATIENT
Start: 2024-01-02 | End: 2024-01-04

## 2024-01-04 RX ORDER — POTASSIUM CHLORIDE 1.5 G/1.58G
POWDER, FOR SOLUTION ORAL
Qty: 30 EACH | Refills: 0 | Status: SHIPPED | OUTPATIENT
Start: 2024-01-04 | End: 2024-03-12

## 2024-01-06 ENCOUNTER — HEALTH MAINTENANCE LETTER (OUTPATIENT)
Age: 84
End: 2024-01-06

## 2024-01-10 ENCOUNTER — MEDICAL CORRESPONDENCE (OUTPATIENT)
Dept: HEALTH INFORMATION MANAGEMENT | Facility: CLINIC | Age: 84
End: 2024-01-10
Payer: MEDICARE

## 2024-01-29 ENCOUNTER — MYC MEDICAL ADVICE (OUTPATIENT)
Dept: FAMILY MEDICINE | Facility: CLINIC | Age: 84
End: 2024-01-29
Payer: MEDICARE

## 2024-01-29 ENCOUNTER — TRANSFERRED RECORDS (OUTPATIENT)
Dept: HEALTH INFORMATION MANAGEMENT | Facility: CLINIC | Age: 84
End: 2024-01-29
Payer: MEDICARE

## 2024-01-30 ENCOUNTER — TELEPHONE (OUTPATIENT)
Dept: FAMILY MEDICINE | Facility: CLINIC | Age: 84
End: 2024-01-30

## 2024-01-30 ENCOUNTER — OFFICE VISIT (OUTPATIENT)
Dept: FAMILY MEDICINE | Facility: CLINIC | Age: 84
End: 2024-01-30
Payer: MEDICARE

## 2024-01-30 VITALS
HEART RATE: 93 BPM | TEMPERATURE: 98.2 F | SYSTOLIC BLOOD PRESSURE: 134 MMHG | BODY MASS INDEX: 23.23 KG/M2 | DIASTOLIC BLOOD PRESSURE: 68 MMHG | OXYGEN SATURATION: 96 % | WEIGHT: 115 LBS

## 2024-01-30 DIAGNOSIS — F02.80 ALZHEIMER'S DEMENTIA WITHOUT BEHAVIORAL DISTURBANCE, PSYCHOTIC DISTURBANCE, MOOD DISTURBANCE, OR ANXIETY, UNSPECIFIED DEMENTIA SEVERITY, UNSPECIFIED TIMING OF DEMENTIA ONSET (H): ICD-10-CM

## 2024-01-30 DIAGNOSIS — K26.4 GASTROINTESTINAL HEMORRHAGE ASSOCIATED WITH DUODENAL ULCER: ICD-10-CM

## 2024-01-30 DIAGNOSIS — Z09 HOSPITAL DISCHARGE FOLLOW-UP: Primary | ICD-10-CM

## 2024-01-30 DIAGNOSIS — C90.00 MULTIPLE MYELOMA NOT HAVING ACHIEVED REMISSION (H): ICD-10-CM

## 2024-01-30 DIAGNOSIS — E61.1 IRON DEFICIENCY: ICD-10-CM

## 2024-01-30 DIAGNOSIS — D69.6 THROMBOCYTOPENIA (H): ICD-10-CM

## 2024-01-30 DIAGNOSIS — E78.2 MIXED HYPERLIPIDEMIA: ICD-10-CM

## 2024-01-30 DIAGNOSIS — G30.9 ALZHEIMER'S DEMENTIA WITHOUT BEHAVIORAL DISTURBANCE, PSYCHOTIC DISTURBANCE, MOOD DISTURBANCE, OR ANXIETY, UNSPECIFIED DEMENTIA SEVERITY, UNSPECIFIED TIMING OF DEMENTIA ONSET (H): ICD-10-CM

## 2024-01-30 DIAGNOSIS — I50.42 CHRONIC COMBINED SYSTOLIC AND DIASTOLIC HEART FAILURE (H): ICD-10-CM

## 2024-01-30 DIAGNOSIS — N18.31 CHRONIC KIDNEY DISEASE, STAGE 3A (H): ICD-10-CM

## 2024-01-30 DIAGNOSIS — E85.81 LIGHT CHAIN (AL) AMYLOIDOSIS (H): ICD-10-CM

## 2024-01-30 DIAGNOSIS — R39.9 URINARY TRACT INFECTION SYMPTOMS: ICD-10-CM

## 2024-01-30 DIAGNOSIS — E44.0 MODERATE PROTEIN MALNUTRITION (H): ICD-10-CM

## 2024-01-30 DIAGNOSIS — Z87.440 PERSONAL HISTORY OF URINARY TRACT INFECTION: ICD-10-CM

## 2024-01-30 DIAGNOSIS — Z86.79 HX OF SUPRAVENTRICULAR TACHYCARDIA: ICD-10-CM

## 2024-01-30 LAB
ALBUMIN UR-MCNC: 100 MG/DL
APPEARANCE UR: CLEAR
BACTERIA #/AREA URNS HPF: ABNORMAL /HPF
BILIRUB UR QL STRIP: NEGATIVE
COLOR UR AUTO: YELLOW
ERYTHROCYTE [DISTWIDTH] IN BLOOD BY AUTOMATED COUNT: 15.5 % (ref 10–15)
GLUCOSE UR STRIP-MCNC: NEGATIVE MG/DL
HCT VFR BLD AUTO: 33.8 % (ref 35–47)
HGB BLD-MCNC: 12.3 G/DL (ref 11.7–15.7)
HGB UR QL STRIP: NEGATIVE
HYALINE CASTS #/AREA URNS LPF: ABNORMAL /LPF
KETONES UR STRIP-MCNC: NEGATIVE MG/DL
LEUKOCYTE ESTERASE UR QL STRIP: NEGATIVE
MCH RBC QN AUTO: 30.4 PG (ref 26.5–33)
MCHC RBC AUTO-ENTMCNC: 36.4 G/DL (ref 31.5–36.5)
MCV RBC AUTO: 84 FL (ref 78–100)
MUCOUS THREADS #/AREA URNS LPF: PRESENT /LPF
NITRATE UR QL: NEGATIVE
PH UR STRIP: 5.5 [PH] (ref 5–7)
PLATELET # BLD AUTO: 128 10E3/UL (ref 150–450)
RBC # BLD AUTO: 4.05 10E6/UL (ref 3.8–5.2)
RBC #/AREA URNS AUTO: ABNORMAL /HPF
SP GR UR STRIP: 1.02 (ref 1–1.03)
SQUAMOUS #/AREA URNS AUTO: ABNORMAL /LPF
UROBILINOGEN UR STRIP-ACNC: 0.2 E.U./DL
WBC # BLD AUTO: 10 10E3/UL (ref 4–11)
WBC #/AREA URNS AUTO: ABNORMAL /HPF

## 2024-01-30 PROCEDURE — 84156 ASSAY OF PROTEIN URINE: CPT | Performed by: NURSE PRACTITIONER

## 2024-01-30 PROCEDURE — 81001 URINALYSIS AUTO W/SCOPE: CPT | Performed by: NURSE PRACTITIONER

## 2024-01-30 PROCEDURE — 80069 RENAL FUNCTION PANEL: CPT | Performed by: NURSE PRACTITIONER

## 2024-01-30 PROCEDURE — 85027 COMPLETE CBC AUTOMATED: CPT | Performed by: NURSE PRACTITIONER

## 2024-01-30 PROCEDURE — 99214 OFFICE O/P EST MOD 30 MIN: CPT | Performed by: NURSE PRACTITIONER

## 2024-01-30 PROCEDURE — 87086 URINE CULTURE/COLONY COUNT: CPT | Performed by: NURSE PRACTITIONER

## 2024-01-30 PROCEDURE — 36415 COLL VENOUS BLD VENIPUNCTURE: CPT | Performed by: NURSE PRACTITIONER

## 2024-01-30 PROCEDURE — 83970 ASSAY OF PARATHORMONE: CPT | Performed by: NURSE PRACTITIONER

## 2024-01-30 RX ORDER — FERROUS SULFATE 220 (44)/5
300 ELIXIR ORAL DAILY
Qty: 408 ML | Refills: 4 | Status: SHIPPED | OUTPATIENT
Start: 2024-01-30 | End: 2024-08-26 | Stop reason: ALTCHOICE

## 2024-01-30 RX ORDER — FERROUS SULFATE 300 MG/5ML
300 LIQUID (ML) ORAL DAILY
Qty: 150 ML | Refills: 1 | Status: SHIPPED | OUTPATIENT
Start: 2024-01-30 | End: 2024-01-30 | Stop reason: DRUGHIGH

## 2024-01-30 ASSESSMENT — PAIN SCALES - GENERAL: PAINLEVEL: NO PAIN (0)

## 2024-01-30 NOTE — TELEPHONE ENCOUNTER
Message: Ferrous Sulfate 300mg(60FE)/5ML is not available. Please verify if provider is ok with switching to 220mg/5ml -->adjusted to 6.8ml PO every day. Thank you.

## 2024-01-30 NOTE — PATIENT INSTRUCTIONS
PLAN:   1.   Symptomatic therapy suggested: increase fluids and call prn if symptoms persist or worsen.    2.  Orders Placed This Encounter   Medications    ferrous sulfate 300 (60 Fe) MG/5ML solution     Sig: Take 5 mLs (300 mg) by mouth daily     Dispense:  150 mL     Refill:  1     Order instructions to provider for this medication are to order as mg of Ferrous Sulfate. Each 5 mL contains 300 mg Ferrous Sulfate = 60 mg elemental Iron.     Orders Placed This Encounter   Procedures    UA with Microscopic reflex to Culture    UA Microscopic with Reflex to Culture    CBC with platelets     3.  Keep appointment with nephrology as planned    Patient needs to follow up in if no improvement,or sooner if worsening of symptoms or other symptoms develop.

## 2024-01-30 NOTE — PROGRESS NOTES
Charles Marquis is a 83 year old, presenting for the following health issues:  Hospital F/U (Right femure [id number] [facility]  discharge from TCU 1-16-24/Follow up UTI request to check urine again)      1/30/2024     2:02 PM   Additional Questions   Roomed by Clarisa ABDUL   Accompanied by daughter  Rashmi     [facility]     Hospital Follow-up Visit:    Hospital/Nursing Home/IP Rehab Facility:  [facility]   Date of Admission: 11/17/2023-11/21/2023  Then TCU discharge on 1/16/2024 from [facility] and now is in [facility] at assisted living facility   Date of Discharge:   Reason(s) for Admission: Right hip fracture after fall coming back to bedroom from the bathroom     Was your hospitalization related to COVID-19? No   Problems taking medications regularly:  None  Medication changes since discharge: None  Problems adhering to non-medication therapy:  None    Summary of hospitalization:  CareEverywhere information obtained and reviewed  Diagnostic Tests/Treatments reviewed.  Follow up needed: specialist follow up   Other Healthcare Providers Involved in Patient s Care:  Is getting PT and OT now. Had follow up with orthopedics already          Specialist appointment - nephrologist  and Physical Therapy  Update since discharge: fluctuating course.     Plan of care communicated with patient and family and daughter      Labs reviewed in EPIC  BP Readings from Last 3 Encounters:   01/30/24 134/68   07/10/23 124/60   06/13/23 (!) 146/81    Wt Readings from Last 3 Encounters:   01/30/24 52.2 kg (115 lb)   07/10/23 58.1 kg (128 lb)   06/13/23 63.5 kg (139 lb 14.4 oz)                  Patient Active Problem List   Diagnosis    Patient [facility] by [facility]    Elevated troponin    Thoracic back pain    Subclinical hyperthyroidism    Severe aortic stenosis    S/P AVR (aortic valve replacement)    Retention of urine    Recurrent urinary tract infection    Presence of  prosthetic heart valve    Prediabetes    Postoperative retention of urine    Post-menopausal osteoporosis    Pathological fracture of vertebra due to osteoporosis (H)    Other specified conditions associated with female genital organs and menstrual cycle    Osteoporosis with current pathological fracture    Monoclonal gammopathy of unknown significance (MGUS)    Mixed hyperlipidemia    Light chain (AL) amyloidosis (H)    Leukocytosis    Intracranial meningioma (H)    Hypotension due to hypovolemia    Hyponatremia    Hypercalcemia    History of falling    Heart palpitations    Encephalopathy    Dementia (H)    Disorder of pancreas    Diverticulitis    Dysphagia    Adnexal mass    Acute kidney injury (DINA) with acute tubular necrosis (ATN) (H24)    Anemia, macrocytic    Osteoarthritis of hip    Benign neoplasm of meninges (H)    Bicuspid aortic valve    Bilateral leg edema    Weakness    Delirium    Chronic kidney disease, stage 3a (H)     Past Surgical History:   Procedure Laterality Date    EP ABLATION SVT N/A 6/7/2023    Procedure: Ablation Supraventricular Tachycardia;  Surgeon: Aaron Hoffman MD;  Location:  [facility]       Social History     Tobacco Use    Smoking status: Never    Smokeless tobacco: Never   Substance Use Topics    Alcohol use: No     Family History   Problem Relation Age of Onset    Breast Cancer Sister     Ovarian Cancer Sister     Colon Cancer Brother     Liver Cancer Brother     Breast Cancer Daughter     Colon Cancer Daughter          Current Outpatient Medications   Medication Sig Dispense Refill    aspirin 81 MG EC tablet Take 81 mg by mouth      atorvastatin (LIPITOR) 10 MG tablet TAKE 1 TABLET(10 MG) BY MOUTH DAILY 90 tablet 3    Biotin w/ Vitamins C & E (HAIR SKIN & NAILS GUMMIES PO)       Calcium-Phosphorus-Vitamin D (CALCIUM/VITAMIN D3/ADULT GUMMY PO)       Cholecalciferol (VITAMIN D3) 25 MCG (1000 UT) CAPS Take 2,000 Units by mouth daily 180 capsule 3     dexamethasone (DECADRON) 4 MG tablet       diphenhydrAMINE HCl (BENADRYL PO) Take by mouth every 30 days      furosemide (LASIX) 20 MG tablet TAKE 1 TABLET(20 MG) BY MOUTH DAILY 90 tablet 1    pantoprazole sodium (PROTONIX) 40 MG packet Take 1 packet by mouth daily      potassium chloride (KLOR-CON) 20 MEQ packet MIX 1 PACKET IN LIQUID AND TAKE BY MOUTH ONCE EVERY DAY AS DIRECTED.  +++ appointment needed for additional refills +++ 30 each 0    potassium chloride ER (KLOR-CON M) 20 MEQ CR tablet Take 20 mEq by mouth daily Do not crush      Probiotic Product (PROBIOTIC GUMMIES PO)       acyclovir (ZOVIRAX) 400 MG tablet TAKE 1 TABLET(400 MG) BY MOUTH EVERY 12 HOURS (Patient not taking: Reported on 1/30/2024) 180 tablet 0    ferrous sulfate 220 (44 Fe) MG/5ML SOLN Take 6.82 mLs (300 mg) by mouth daily 408 mL 4    ondansetron (ZOFRAN) 8 MG tablet Take by mouth every 8 hours as needed for nausea (Patient not taking: Reported on 1/30/2024)       Allergies   Allergen Reactions    Carvedilol Other (See Comments)    Lidocaine Nausea and Vomiting     Vomiting with general anesthesia    Lisinopril Cough    Seasonal Allergies Other (See Comments)     Problems with narcotics - oxygen desaturates    Spironolactone        Review of Systems  Constitutional, neuro, ENT, endocrine, pulmonary, cardiac, gastrointestinal, genitourinary, musculoskeletal, integument and psychiatric systems are negative, except as otherwise noted.      Objective    /68 (BP Location: Right arm, Patient Position: Sitting, Cuff Size: Adult Small)   Pulse 93   Temp 98.2  F (36.8  C) (Oral)   Wt 52.2 kg (115 lb)   SpO2 96%   BMI 23.23 kg/m    Body mass index is 23.23 kg/m .  Physical Exam   GENERAL: Patient is well nourished, well developed,frail, in no apparent distress, non-toxic, in no respiratory distress and acyanotic, alert, and cooperative  EYES: Eyes grossly normal to inspection and conjunctivae and sclerae normal  HENT:ear canals and TM's  normal and nose and mouth without ulcers or lesions   NECK:normal and supple  CARDIAC:regular rates and rhythm, no murmur, click or rub, and no irregular beats  without LE edema bilaterally  RESP: normal respiratory rate and rhythm, lungs clear to auscultation  unlabored respirations, no intercostal retractions or accessory muscle use  ABD:soft, nontender  SKIN: negatives: temperature normal, mobility and turgor normal  MS: extremities normal- no gross deformities noted and gait normal  NEURO: speech normal and memory decreased   PSYCH: alertness: alert, affect and mood normal, good hygiene, insight and judgement impaired         Results for orders placed or performed in visit on 01/30/24   UA with Microscopic reflex to Culture     Status: Abnormal    Specimen: Urine, Clean Catch   Result Value Ref Range    Color Urine Yellow Colorless, Straw, Light Yellow, Yellow    Appearance Urine Clear Clear    Glucose Urine Negative Negative mg/dL    Bilirubin Urine Negative Negative    Ketones Urine Negative Negative mg/dL    Specific Gravity Urine 1.020 1.003 - 1.035    Blood Urine Negative Negative    pH Urine 5.5 5.0 - 7.0    Protein Albumin Urine 100 (A) Negative mg/dL    Urobilinogen Urine 0.2 0.2, 1.0 E.U./dL    Nitrite Urine Negative Negative    Leukocyte Esterase Urine Negative Negative   UA Microscopic with Reflex to Culture     Status: Abnormal   Result Value Ref Range    Bacteria Urine Few (A) None Seen /HPF    RBC Urine 0-2 0-2 /HPF /HPF    WBC Urine 0-5 0-5 /HPF /HPF    Squamous Epithelials Urine Few (A) None Seen /LPF    Mucus Urine Present (A) None Seen /LPF    Hyaline Casts Urine 5-10 (A) None Seen /LPF    Narrative    Urine Culture not indicated   Renal panel     Status: Abnormal   Result Value Ref Range    Sodium 141 135 - 145 mmol/L    Potassium 3.9 3.4 - 5.3 mmol/L    Chloride 103 98 - 107 mmol/L    Carbon Dioxide (CO2) 29 22 - 29 mmol/L    Anion Gap 9 7 - 15 mmol/L    Glucose 119 (H) 70 - 99 mg/dL    Urea  Nitrogen 28.7 (H) 8.0 - 23.0 mg/dL    Creatinine 0.88 0.51 - 0.95 mg/dL    GFR Estimate 64 >60 mL/min/1.73m2    Calcium 10.1 8.8 - 10.2 mg/dL    Albumin 4.4 3.5 - 5.2 g/dL    Phosphorus 2.5 2.5 - 4.5 mg/dL   Protein  random urine     Status: Abnormal   Result Value Ref Range    Total Protein Urine mg/dL 115.0   mg/dL    Total Protein Urine mg/mg Creat 1.68 (H) 0.00 - 0.20 mg/mg Cr    Creatinine Urine mg/dL 68.5 mg/dL   Parathyroid Hormone Intact     Status: Normal   Result Value Ref Range    Parathyroid Hormone Intact 45 15 - 65 pg/mL    Narrative    This result was obtained with the Roche Elecsys PTH STAT assay.   This reference range differs from PTH assays used in other [facility] laboratories.   CBC with platelets     Status: Abnormal   Result Value Ref Range    WBC Count 10.0 4.0 - 11.0 10e3/uL    RBC Count 4.05 3.80 - 5.20 10e6/uL    Hemoglobin 12.3 11.7 - 15.7 g/dL    Hematocrit 33.8 (L) 35.0 - 47.0 %    MCV 84 78 - 100 fL    MCH 30.4 26.5 - 33.0 pg    MCHC 36.4 31.5 - 36.5 g/dL    RDW 15.5 (H) 10.0 - 15.0 %    Platelet Count 128 (L) 150 - 450 10e3/uL   Urine Culture     Status: None    Specimen: Urine, Clean Catch   Result Value Ref Range    Culture 10,000-50,000 CFU/mL Mixture of Urogenital Coral      Assessment & Plan     (Z09) Hospital discharge follow-up  (primary encounter diagnosis)    (R39.9) Urinary tract infection symptoms  Comment: will recheck urine to rule out UTI   Plan: UA with Microscopic reflex to Culture, UA         Microscopic with Reflex to Culture, Urine         Culture    (E78.2) Mixed hyperlipidemia  Comment: Continue current medications as prescribed.       (N18.31) Chronic kidney disease, stage 3a (H)  Comment:   Plan: CBC with platelets,     (Z87.440) Personal history of urinary tract infection  Comment:  Plan: UA with Microscopic reflex to Culture, UA         Microscopic with Reflex to Culture    (E61.1) Iron deficiency  Comment:  Plan: DISCONTINUED: ferrous sulfate 300 (60  "Fe)         MG/5ML solution    (K26.4) Gastrointestinal hemorrhage associated with duodenal ulcer  Comment:   Plan: DISCONTINUED: ferrous sulfate 300 (60 Fe)         MG/5ML solution    (C90.00) Multiple myeloma not having achieved remission (H)  Comment:FOLLOW UP WITH SPECIALIST :Oncology    (E44.0) Moderate protein malnutrition (H24)  Comment: stable at present       (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)  Comment: FOLLOW UP WITH SPECIALIST :Cardiology      (D69.6) Thrombocytopenia (H24)  Comment: stable     (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)  Comment: FOLLOW UP WITH SPECIALIST :Oncology      (G30.9,  F02.80) Alzheimer's dementia without behavioral disturbance, psychotic disturbance, mood disturbance, or anxiety, unspecified dementia severity, unspecified timing of dementia onset (H)  Comment: is in assisted living now     (Z86.79) Hx of supraventricular tachycardia  Comment: stable at present        Review of external notes as documented elsewhere in note  Ordering of each unique test   Time spent by me doing chart review, history and exam, documentation and further activities per the note    MED REC REQUIRED  Post Medication Reconciliation Status: discharge medications reconciled and changed, per note/orders  BMI  Estimated body mass index is 23.23 kg/m  as calculated from the following:    Height as of 7/10/23: 1.499 m (4' 11\").    Weight as of this encounter: 52.2 kg (115 lb).       See Patient Instructions  Patient Instructions   PLAN:   1.   Symptomatic therapy suggested: increase fluids and call prn if symptoms persist or worsen.    2.  Orders Placed This Encounter   Medications    ferrous sulfate 300 (60 Fe) MG/5ML solution     Sig: Take 5 mLs (300 mg) by mouth daily     Dispense:  150 mL     Refill:  1     Order instructions to provider for this medication are to order as mg of Ferrous Sulfate. Each 5 mL contains 300 mg Ferrous Sulfate = 60 mg elemental Iron.     Orders " Placed This Encounter   Procedures    UA with Microscopic reflex to Culture    UA Microscopic with Reflex to Culture    CBC with platelets     3.  Keep appointment with nephrology as planned    Patient needs to follow up in if no improvement,or sooner if worsening of symptoms or other symptoms develop.                Signed Electronically by: JOANN Mendes CNP

## 2024-01-31 LAB
ALBUMIN MFR UR ELPH: 115 MG/DL
ALBUMIN SERPL BCG-MCNC: 4.4 G/DL (ref 3.5–5.2)
ANION GAP SERPL CALCULATED.3IONS-SCNC: 9 MMOL/L (ref 7–15)
BACTERIA UR CULT: NORMAL
BUN SERPL-MCNC: 28.7 MG/DL (ref 8–23)
CALCIUM SERPL-MCNC: 10.1 MG/DL (ref 8.8–10.2)
CHLORIDE SERPL-SCNC: 103 MMOL/L (ref 98–107)
CREAT SERPL-MCNC: 0.88 MG/DL (ref 0.51–0.95)
CREAT UR-MCNC: 68.5 MG/DL
DEPRECATED HCO3 PLAS-SCNC: 29 MMOL/L (ref 22–29)
EGFRCR SERPLBLD CKD-EPI 2021: 64 ML/MIN/1.73M2
GLUCOSE SERPL-MCNC: 119 MG/DL (ref 70–99)
PHOSPHATE SERPL-MCNC: 2.5 MG/DL (ref 2.5–4.5)
POTASSIUM SERPL-SCNC: 3.9 MMOL/L (ref 3.4–5.3)
PROT/CREAT 24H UR: 1.68 MG/MG CR (ref 0–0.2)
PTH-INTACT SERPL-MCNC: 45 PG/ML (ref 15–65)
SODIUM SERPL-SCNC: 141 MMOL/L (ref 135–145)

## 2024-01-31 NOTE — RESULT ENCOUNTER NOTE
Chas Gallagher,    Attached are your test results.  -Urine culture growing normal genital bacteria. No urinary tract infection present .  There is no need for antibiotics at this point.  If new, worsening, or persistent symptoms occur then you should call or return for a recheck.       Please contact us if you have any questions.    Amber Brownlee, CNP

## 2024-02-02 ENCOUNTER — DOCUMENTATION ONLY (OUTPATIENT)
Dept: NEPHROLOGY | Facility: CLINIC | Age: 84
End: 2024-02-02
Payer: MEDICARE

## 2024-02-02 DIAGNOSIS — N18.31 CHRONIC KIDNEY DISEASE, STAGE 3A (H): ICD-10-CM

## 2024-02-02 DIAGNOSIS — N17.0 ACUTE KIDNEY INJURY (AKI) WITH ACUTE TUBULAR NECROSIS (ATN) (H): Primary | ICD-10-CM

## 2024-02-02 NOTE — PROGRESS NOTES
Kandis CLOUD Elliott has an upcoming lab appointment:    Future Appointments   Date Time Provider Department Center   2/6/2024  3:00 [facility]   2/6/2024  4:00 PM Susan Yi MD [facility]   4/24/2024 10:45 AM Charlie Herzog MD [facility]   5/6/2024  1:00 PM Noemi Sanchez MD [facility]     Patient is scheduled for the following lab. There is no order available. Please review and place either future orders or HMPO (Review of Health Maintenance Protocol Orders), as appropriate.  Thank you,    Jazlyn Cazares

## 2024-02-05 PROBLEM — C90.00 MULTIPLE MYELOMA NOT HAVING ACHIEVED REMISSION (H): Status: ACTIVE | Noted: 2024-02-05

## 2024-02-05 PROBLEM — I50.42 CHRONIC COMBINED SYSTOLIC AND DIASTOLIC HEART FAILURE (H): Status: ACTIVE | Noted: 2024-02-05

## 2024-02-05 PROBLEM — I50.33 ACUTE ON CHRONIC DIASTOLIC HEART FAILURE (H): Status: ACTIVE | Noted: 2024-02-05

## 2024-02-05 PROBLEM — Z86.79 HX OF SUPRAVENTRICULAR TACHYCARDIA: Status: ACTIVE | Noted: 2024-02-05

## 2024-02-05 PROBLEM — E44.0 MODERATE PROTEIN MALNUTRITION (H): Status: ACTIVE | Noted: 2024-02-05

## 2024-02-05 PROBLEM — I50.33 ACUTE ON CHRONIC DIASTOLIC HEART FAILURE (H): Status: RESOLVED | Noted: 2024-02-05 | Resolved: 2024-02-05

## 2024-02-05 PROBLEM — D69.6 THROMBOCYTOPENIA (H): Status: ACTIVE | Noted: 2024-02-05

## 2024-02-06 ENCOUNTER — OFFICE VISIT (OUTPATIENT)
Dept: NEPHROLOGY | Facility: CLINIC | Age: 84
End: 2024-02-06
Payer: MEDICARE

## 2024-02-06 ENCOUNTER — LAB (OUTPATIENT)
Dept: LAB | Facility: CLINIC | Age: 84
End: 2024-02-06
Payer: MEDICARE

## 2024-02-06 VITALS
HEART RATE: 82 BPM | DIASTOLIC BLOOD PRESSURE: 81 MMHG | SYSTOLIC BLOOD PRESSURE: 164 MMHG | WEIGHT: 115 LBS | BODY MASS INDEX: 23.23 KG/M2 | OXYGEN SATURATION: 99 %

## 2024-02-06 DIAGNOSIS — N08 RENAL AMYLOIDOSIS (H): Primary | ICD-10-CM

## 2024-02-06 DIAGNOSIS — N18.31 CHRONIC KIDNEY DISEASE, STAGE 3A (H): ICD-10-CM

## 2024-02-06 DIAGNOSIS — N17.0 ACUTE KIDNEY INJURY (AKI) WITH ACUTE TUBULAR NECROSIS (ATN) (H): ICD-10-CM

## 2024-02-06 DIAGNOSIS — E85.4 RENAL AMYLOIDOSIS (H): Primary | ICD-10-CM

## 2024-02-06 LAB
ALBUMIN MFR UR ELPH: 72.2 MG/DL
ALBUMIN SERPL BCG-MCNC: 4.5 G/DL (ref 3.5–5.2)
ALBUMIN UR-MCNC: 50 MG/DL
ANION GAP SERPL CALCULATED.3IONS-SCNC: 10 MMOL/L (ref 7–15)
APPEARANCE UR: CLEAR
BILIRUB UR QL STRIP: NEGATIVE
BUN SERPL-MCNC: 21.9 MG/DL (ref 8–23)
CALCIUM SERPL-MCNC: 10.2 MG/DL (ref 8.8–10.2)
CHLORIDE SERPL-SCNC: 102 MMOL/L (ref 98–107)
COLOR UR AUTO: ABNORMAL
CREAT SERPL-MCNC: 0.85 MG/DL (ref 0.51–0.95)
CREAT UR-MCNC: 57.1 MG/DL
DEPRECATED HCO3 PLAS-SCNC: 31 MMOL/L (ref 22–29)
EGFRCR SERPLBLD CKD-EPI 2021: 67 ML/MIN/1.73M2
ERYTHROCYTE [DISTWIDTH] IN BLOOD BY AUTOMATED COUNT: 15.7 % (ref 10–15)
GLUCOSE SERPL-MCNC: 94 MG/DL (ref 70–99)
GLUCOSE UR STRIP-MCNC: NEGATIVE MG/DL
HCT VFR BLD AUTO: 39.9 % (ref 35–47)
HGB BLD-MCNC: 12.8 G/DL (ref 11.7–15.7)
HGB UR QL STRIP: NEGATIVE
KETONES UR STRIP-MCNC: NEGATIVE MG/DL
LEUKOCYTE ESTERASE UR QL STRIP: NEGATIVE
MCH RBC QN AUTO: 29.6 PG (ref 26.5–33)
MCHC RBC AUTO-ENTMCNC: 32.1 G/DL (ref 31.5–36.5)
MCV RBC AUTO: 92 FL (ref 78–100)
NITRATE UR QL: NEGATIVE
PH UR STRIP: 6 [PH] (ref 5–7)
PHOSPHATE SERPL-MCNC: 3.6 MG/DL (ref 2.5–4.5)
PLATELET # BLD AUTO: 133 10E3/UL (ref 150–450)
POTASSIUM SERPL-SCNC: 3.9 MMOL/L (ref 3.4–5.3)
PROT/CREAT 24H UR: 1.26 MG/MG CR (ref 0–0.2)
RBC # BLD AUTO: 4.32 10E6/UL (ref 3.8–5.2)
RBC #/AREA URNS AUTO: ABNORMAL /HPF
SKIP: ABNORMAL
SODIUM SERPL-SCNC: 143 MMOL/L (ref 135–145)
SP GR UR STRIP: 1.01 (ref 1–1.03)
SQUAMOUS #/AREA URNS AUTO: ABNORMAL /LPF
UROBILINOGEN UR STRIP-MCNC: NORMAL MG/DL
WBC # BLD AUTO: 7.6 10E3/UL (ref 4–11)
WBC #/AREA URNS AUTO: ABNORMAL /HPF

## 2024-02-06 PROCEDURE — 85027 COMPLETE CBC AUTOMATED: CPT

## 2024-02-06 PROCEDURE — 84156 ASSAY OF PROTEIN URINE: CPT

## 2024-02-06 PROCEDURE — 80069 RENAL FUNCTION PANEL: CPT

## 2024-02-06 PROCEDURE — 82570 ASSAY OF URINE CREATININE: CPT

## 2024-02-06 PROCEDURE — 82043 UR ALBUMIN QUANTITATIVE: CPT

## 2024-02-06 PROCEDURE — 83970 ASSAY OF PARATHORMONE: CPT

## 2024-02-06 PROCEDURE — 81001 URINALYSIS AUTO W/SCOPE: CPT

## 2024-02-06 PROCEDURE — 36415 COLL VENOUS BLD VENIPUNCTURE: CPT

## 2024-02-06 PROCEDURE — 99214 OFFICE O/P EST MOD 30 MIN: CPT | Performed by: INTERNAL MEDICINE

## 2024-02-06 RX ORDER — ACETAMINOPHEN 325 MG/1
650 TABLET ORAL
COMMUNITY
Start: 2023-02-27

## 2024-02-06 NOTE — PROGRESS NOTES
CC: Proteinuria, AL Amyloidosis, Multiple Mylopma    HPI:   I had the pleasure today of seeing Kandis Gallagher today. She is a 83 year old female who presents to follow up on her proteinuria. She is here with her daughter Rashmi. She lives at an assisted nursing facility.  She has two sisters close by in the area.    Her medical history is significant for a bicuspid aortic valve which progressed to severe aortic stenosis for which she underwent surgical AVR with a 23 mm Magna Ease bioprosthesis in August 2016 at [facility].  She also has HFpEF, dyslipidemia, hx of HTN now off medications, prediabetes, anemia and multiple myeloma/AL amyloidosis for which she has resumed chemotherapy. 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     Myeloma History:  She presented with several vertebral fractures in Oct 2020 and in Dec 2020, when she underwent L1, L2, and L3 vertebroplasty. Her NICOLAS at [facility] showed monoclonal kappa free light chains by immunofixation. In February 2021, she presented with persistent low back pain and leg weakness. MRI of the thoracic and lumbar spine showed a new T11 compression fracture. She underwent T11, L4, and L5 vertebroplasty. Repeat labs revealed free kappa light chain of 25.3, lambda free light chains 1.24, kappa/lambda ratio 20.4.   In March 2021, she had a bone marrow biopsy revealing plasma cell proliferative disorder with approximately 5% kappa light chain-restricted plasma cells and slightly hypercellular bone marrow. I could not locate a kidney biopsy done at that point. It seems to me that at that point, she was considered smoldering myeloma and treatment was on hold. A repeat BM biopsy was done April 2022. Follow up with imaging done 4/25/22  revealed osteolytic lesions, the largest in the iliac wing and there was new finding of nephrotic range proteinuria on 24 hour urine 3.6 grams.  Repeat BM Biopsy in April 2022 revealed amyloidosis involving small periosteal  vessels, plasma cell myeloma with 10% kappa light chain restricted plasma cells. 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 Abdominal fat aspirate was negative for amyloid. Subcutaneous daratumumab, bortezomib and oral dex initiated 6/1/22 but she could not tolerate it (only two sessions-developed fluid retension and neurotoxicity per patient) and it was on hold but she has resumed her chemotherapy.       She had a right hip fracture in November. No surgery done. She was in rehabd for nine weeks. Now, she can walk using walker. She is now getting Daratumumab every month. Energy is good. Overall, she is doing ok. She is working with PT. She is recovering well after her surgery. The swelling in her LE is better. She has lost some weight after sustaining the hip fracture.    She denies any nausea or vomiting.  She takes lasix 40 mg daily. She denies any SOB, chest pain, dizziness or skin rash. She is complaining of urinary frequency but no dysuria or hematuria.     Her BP is slightly elevated. Given her cardiac amyloidosis, her BB, WAYNE and CCB were stopped. She is not on spironolactone either.    Social history:  Retired    Has 4 children (daughter is the only one in [place]) and 6 grandkids    Wt Readings from Last 4 Encounters:   02/06/24 52.2 kg (115 lb)   01/30/24 52.2 kg (115 lb)   07/10/23 58.1 kg (128 lb)   06/13/23 63.5 kg (139 lb 14.4 oz)       Allergies   Allergen Reactions    Carvedilol Other (See Comments)    Lidocaine Nausea and Vomiting     Vomiting with general anesthesia    Lisinopril Cough    Seasonal Allergies Other (See Comments)     Problems with narcotics - oxygen desaturates    Spironolactone        acetaminophen (TYLENOL) 325 MG tablet, Take 650 mg by mouth  acyclovir (ZOVIRAX) 400 MG tablet, TAKE 1 TABLET(400 MG) BY MOUTH EVERY 12 HOURS  aspirin 81 MG EC tablet, Take 81 mg by mouth  atorvastatin (LIPITOR) 10 MG tablet, TAKE 1 TABLET(10 MG) BY MOUTH DAILY  Biotin w/  Vitamins C & E (HAIR SKIN & NAILS GUMMIES PO),   Calcium-Phosphorus-Vitamin D (CALCIUM/VITAMIN D3/ADULT GUMMY PO),   Cholecalciferol (VITAMIN D3) 25 MCG (1000 UT) CAPS, Take 2,000 Units by mouth daily  dexamethasone (DECADRON) 4 MG tablet,   diphenhydrAMINE HCl (BENADRYL PO), Take by mouth every 30 days  ferrous sulfate 220 (44 Fe) MG/5ML SOLN, Take 6.82 mLs (300 mg) by mouth daily  furosemide (LASIX) 20 MG tablet, TAKE 1 TABLET(20 MG) BY MOUTH DAILY  pantoprazole sodium (PROTONIX) 40 MG packet, Take 1 packet by mouth daily  potassium chloride (KLOR-CON) 20 MEQ packet, MIX 1 PACKET IN LIQUID AND TAKE BY MOUTH ONCE EVERY DAY AS DIRECTED.  +++ appointment needed for additional refills +++  potassium chloride ER (KLOR-CON M) 20 MEQ CR tablet, Take 20 mEq by mouth daily Do not crush  Probiotic Product (PROBIOTIC GUMMIES PO),   ondansetron (ZOFRAN) 8 MG tablet, Take by mouth every 8 hours as needed for nausea (Patient not taking: Reported on 1/30/2024)    No current facility-administered medications on file prior to visit.      Past Medical History:   Diagnosis Date    Congenital bicuspid aortic valve     H/O aortic valve replacement     23 mm Magna Ease     Hyperlipidemia     Hypertension     Hyperthyroidism     Severe aortic stenosis     Thyroiditis        Past Surgical History:   Procedure Laterality Date    EP ABLATION SVT N/A 6/7/2023    Procedure: Ablation Supraventricular Tachycardia;  Surgeon: Aaron Hoffman MD;  Location:  [facility]       Social History     Tobacco Use    Smoking status: Never    Smokeless tobacco: Never   Vaping Use    Vaping Use: Never used   Substance Use Topics    Alcohol use: No    Drug use: No       Family History   Problem Relation Age of Onset    Breast Cancer Sister     Ovarian Cancer Sister     Colon Cancer Brother     Liver Cancer Brother     Breast Cancer Daughter     Colon Cancer Daughter        ROS: A 12 system review of systems was negative other than noted  here or above.     Exam:  BP (!) 164/81 (BP Location: Left arm, Patient Position: Chair, Cuff Size: Adult Regular)   Pulse 82   Wt 52.2 kg (115 lb)   SpO2 99%   BMI 23.23 kg/m       BP Readings from Last 6 Encounters:   02/06/24 (!) 164/81   01/30/24 134/68   07/10/23 124/60   06/13/23 (!) 146/81   06/07/23 132/74   05/10/23 (!) 150/78     Wt Readings from Last 4 Encounters:   02/06/24 52.2 kg (115 lb)   01/30/24 52.2 kg (115 lb)   07/10/23 58.1 kg (128 lb)   06/13/23 63.5 kg (139 lb 14.4 oz)     GENERAL APPEARANCE: alert and no distress  EYES: PERRL  HENT: mouth without ulcers or lesions  NECK: supple, no adenopathy  RESP: lungs clear to auscultation - no rales, rhonchi or wheezes  CV: regular rhythm, normal rate, no rub   ABDOMEN:  soft, nontender, no HSM or masses and bowel sounds normal  MS: extremities normal- no gross deformities noted, no evidence of inflammation in joints, no muscle tenderness  SKIN: no rash  NEURO: Normal strength and tone, sensory exam grossly normal, mentation intact and speech normal  PSYCH: mentation appears normal. 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 Her urine protein to creat ratio is almost  9 g/g back in October 2022 but trending down nicely. UPCR from today is 1.26 g/g; this is concomitant with an improvement in her serum albumin up to 4.5 g/dl today. Her creatinine remains stable at 0.85 mg/dl. She is on maintenance daratumumab. Her kappa free Light changes showed improvement from 41.8 in April 2022 to 3.29.     Problem #2 CKD: Her creatinine overestimates her GFR given her increased total body water and small muscle mass.  Her GFR by cystatin C is 33 ml/min. This GFR is to be used when dosing medications.     Problem #3: Volume overload/edema: it looks much better. She has  been taking lasix 40 mg daily and follow with lymphedema clinic. She has cut down on her fluid intake.    Problem #4: Anticoagulation: Her albumin is up to 4.5g/dl. No indication for anticoagulation at this level.    Problem #5 HTN: she was found to have cardiac involvement by her amyloidosis.  She has been taken off her antihypertensive medications. With cardiac amyloidosis, it is advised to avoid CCB's, beta-blockers and ACE inhibitors.   Given her friability and her need for a wheelchair, I am okay with a blood pressure target around 150/90.    Problem # 6 left adnexal mass: An incidental finding on her recent CT, 5.6 cm in diameter.  This correlates with CT findings from 2/15/2021. This is probably benign.     The total time of this encounter amounted to 30 minutes on the day of the encounter 2/6/2024. This time included face to face time spent with the patient, preparatory work and chart review, ordering tests, and performing post visit documentation.    *This dictation was prepared in part using Dragon recognition software.  As a result errors may occur. When identified these transcription errors have been corrected.  While every attempt is made to correct errors during dictation, errors may still exist.     Susan Yi MD   [facility]   Department of Medicine   Division of Renal Disease and Hypertension

## 2024-02-06 NOTE — NURSING NOTE
Kandis Gallagher's goals for this visit include:   Chief Complaint   Patient presents with    RECHECK     6 month follow up CKD        She requests these members of her care team be copied on today's visit information: yes     PCP: Noemi Sanchez    Referring Provider:  No referring provider defined for this encounter.    BP (!) 164/81 (BP Location: Left arm, Patient Position: Chair, Cuff Size: Adult Regular)   Pulse 82   Wt 52.2 kg (115 lb)   SpO2 99%   BMI 23.23 kg/m      Do you need any medication refills at today's visit? No       LYUDMILA Arellano   Neph/Pulm [facility]

## 2024-02-06 NOTE — PATIENT INSTRUCTIONS
It was a pleasure taking care of you today.  I've included a brief summary of our discussion and care plan from today's visit below.  Please review this information with your primary care provider.     My recommendations are summarized as follows:  -Kidney numbers are stable    Who do I call with any questions after my visit?  Please be in touch if there are any further questions that arise following today's visit.  There are multiple ways to contact your nephrology care team.       During business hours, you may reach your Nephrology Care Team or schedule or reschedule an appointment or lab at [phone].       If you need to schedule imaging, please call [phone].   To schedule a COVID test, please call [phone].     You can always send a secure message through Testif. Testif messages are answered by your nurse or doctor typically within 24-48 hours. Please allow extra time on weekends and holidays.       For urgent/emergent questions after business hours, you may reach the on-call Nephrology Fellow by contacting the [facility]  at [phone].     How will I get the results of any tests ordered?    You will receive all of your results.  If you have signed up for [employer]if, any tests ordered at your visit will be available to you once resulted on [employer]. Typically the physician reviews them and may or may not make further recommendations. If there are urgent results that require a change in your care plan, your physician or nurse will call you to discuss the next steps. If you are not on [employer], a letter may be generated and mailed to you with your results.

## 2024-02-07 ENCOUNTER — TELEPHONE (OUTPATIENT)
Dept: NEPHROLOGY | Facility: CLINIC | Age: 84
End: 2024-02-07
Payer: MEDICARE

## 2024-02-07 LAB
CREAT UR-MCNC: 54.6 MG/DL
MICROALBUMIN UR-MCNC: 507 MG/L
MICROALBUMIN/CREAT UR: 928.57 MG/G CR (ref 0–25)
PTH-INTACT SERPL-MCNC: 51 PG/ML (ref 15–65)

## 2024-02-07 NOTE — TELEPHONE ENCOUNTER
[facility] Call Center    Phone Message    May a detailed message be left on voicemail: yes     Reason for Call: Order(s): Other:   Reason for requested: Labs  Date needed: 10/8/24   Provider name: Dr. Yi    Action Taken: Message routed to:  Adult Clinics: Nephrology p [id number]    Travel Screening: Not Applicable

## 2024-02-07 NOTE — TELEPHONE ENCOUNTER
Future lab orders will be placed closer to scheduled Nephrology appointment.    Maegan Heaton LPN

## 2024-02-07 NOTE — CONFIDENTIAL NOTE
Left Voicemail (1st Attempt) for the patient to call back and schedule the following:    Appointment type: return nephrology  Provider:   Return date: 10/06/2024  Specialty phone number: [phone]   Additional appointment(s) needed: schedule lab prior to return visit   Additonal Notes: follow up in 8 months around 10/06/2024.     [facility]   Gastroenterology, Infectious Diseases, Nephrology, Pulmonology and Rheumatology Specialties  [facility]

## 2024-02-26 ENCOUNTER — TRANSFERRED RECORDS (OUTPATIENT)
Dept: HEALTH INFORMATION MANAGEMENT | Facility: CLINIC | Age: 84
End: 2024-02-26
Payer: MEDICARE

## 2024-03-12 DIAGNOSIS — E87.6 HYPOKALEMIA: ICD-10-CM

## 2024-03-12 RX ORDER — POTASSIUM CHLORIDE 1.5 G/1.58G
POWDER, FOR SOLUTION ORAL
Qty: 90 EACH | Refills: 1 | Status: SHIPPED | OUTPATIENT
Start: 2024-03-12 | End: 2024-08-28

## 2024-03-25 ENCOUNTER — TRANSFERRED RECORDS (OUTPATIENT)
Dept: HEALTH INFORMATION MANAGEMENT | Facility: CLINIC | Age: 84
End: 2024-03-25
Payer: MEDICARE

## 2024-04-01 ENCOUNTER — MYC MEDICAL ADVICE (OUTPATIENT)
Dept: FAMILY MEDICINE | Facility: CLINIC | Age: 84
End: 2024-04-01
Payer: MEDICARE

## 2024-04-01 DIAGNOSIS — R41.0 CONFUSION: Primary | ICD-10-CM

## 2024-04-01 NOTE — TELEPHONE ENCOUNTER
Triage protocol used: Urinary Tract Infection on Antibiotic Follow-up Call - Female-A-[place]     Lani Holden, RAGHUN, RN   [facility]

## 2024-04-08 ENCOUNTER — LAB (OUTPATIENT)
Dept: LAB | Facility: CLINIC | Age: 84
End: 2024-04-08
Payer: MEDICARE

## 2024-04-08 DIAGNOSIS — R41.0 CONFUSION: ICD-10-CM

## 2024-04-08 DIAGNOSIS — R41.0 CONFUSION: Primary | ICD-10-CM

## 2024-04-08 LAB
ALBUMIN UR-MCNC: 30 MG/DL
APPEARANCE UR: CLEAR
BACTERIA #/AREA URNS HPF: ABNORMAL /HPF
BILIRUB UR QL STRIP: NEGATIVE
COLOR UR AUTO: YELLOW
GLUCOSE UR STRIP-MCNC: NEGATIVE MG/DL
HGB UR QL STRIP: NEGATIVE
KETONES UR STRIP-MCNC: NEGATIVE MG/DL
LEUKOCYTE ESTERASE UR QL STRIP: ABNORMAL
NITRATE UR QL: NEGATIVE
PH UR STRIP: 7 [PH] (ref 5–7)
RBC #/AREA URNS AUTO: ABNORMAL /HPF
SP GR UR STRIP: 1.01 (ref 1–1.03)
SQUAMOUS #/AREA URNS AUTO: ABNORMAL /LPF
UROBILINOGEN UR STRIP-ACNC: 2 E.U./DL
WBC #/AREA URNS AUTO: ABNORMAL /HPF
WBC CLUMPS #/AREA URNS HPF: PRESENT /HPF

## 2024-04-08 PROCEDURE — 81001 URINALYSIS AUTO W/SCOPE: CPT

## 2024-04-08 PROCEDURE — 87086 URINE CULTURE/COLONY COUNT: CPT | Performed by: FAMILY MEDICINE

## 2024-04-10 LAB — BACTERIA UR CULT: NORMAL

## 2024-04-22 ENCOUNTER — TRANSFERRED RECORDS (OUTPATIENT)
Dept: HEALTH INFORMATION MANAGEMENT | Facility: CLINIC | Age: 84
End: 2024-04-22
Payer: MEDICARE

## 2024-04-23 ENCOUNTER — TRANSFERRED RECORDS (OUTPATIENT)
Dept: HEALTH INFORMATION MANAGEMENT | Facility: CLINIC | Age: 84
End: 2024-04-23

## 2024-04-24 ENCOUNTER — OFFICE VISIT (OUTPATIENT)
Dept: CARDIOLOGY | Facility: CLINIC | Age: 84
End: 2024-04-24
Payer: MEDICARE

## 2024-04-24 VITALS
OXYGEN SATURATION: 98 % | HEART RATE: 80 BPM | DIASTOLIC BLOOD PRESSURE: 75 MMHG | SYSTOLIC BLOOD PRESSURE: 133 MMHG | WEIGHT: 115.4 LBS | BODY MASS INDEX: 23.31 KG/M2

## 2024-04-24 DIAGNOSIS — I10 PRIMARY HYPERTENSION: ICD-10-CM

## 2024-04-24 DIAGNOSIS — Z95.2 S/P AVR (AORTIC VALVE REPLACEMENT): ICD-10-CM

## 2024-04-24 DIAGNOSIS — I43 CARDIAC AMYLOIDOSIS (H): ICD-10-CM

## 2024-04-24 DIAGNOSIS — I47.10 PAROXYSMAL SUPRAVENTRICULAR TACHYCARDIA (H): ICD-10-CM

## 2024-04-24 DIAGNOSIS — Q23.81 BICUSPID AORTIC VALVE: ICD-10-CM

## 2024-04-24 DIAGNOSIS — E85.81 LIGHT CHAIN (AL) AMYLOIDOSIS (H): ICD-10-CM

## 2024-04-24 DIAGNOSIS — I89.0 LYMPHEDEMA: ICD-10-CM

## 2024-04-24 DIAGNOSIS — E85.4 CARDIAC AMYLOIDOSIS (H): ICD-10-CM

## 2024-04-24 DIAGNOSIS — I50.22 CHRONIC SYSTOLIC HEART FAILURE (H): Primary | ICD-10-CM

## 2024-04-24 DIAGNOSIS — E78.2 MIXED HYPERLIPIDEMIA: ICD-10-CM

## 2024-04-24 PROCEDURE — 99215 OFFICE O/P EST HI 40 MIN: CPT | Performed by: INTERNAL MEDICINE

## 2024-04-24 PROCEDURE — G2211 COMPLEX E/M VISIT ADD ON: HCPCS | Performed by: INTERNAL MEDICINE

## 2024-04-24 NOTE — PROGRESS NOTES
CARDIOLOGY CLINIC FOLLOW-UP NOTE      REASON FOR VISIT:   Follow-up cardiac amyloid, bicuspid aortic valve s/p AVR, SVT s/p ablation    PRIMARY CARE PHYSICIAN:  Noemi Sanchez        History of Present Illness   Kandis Gallagher is an extremely pleasant 83 year old female, here for routine follow-up.  She had previously seen Dr. London until November 2020, and then had moved out to the [facility] and received care there, but has since moved back and reestablished with us in October 2022.  Her medical history is significant for a bicuspid aortic valve which progressed to severe aortic stenosis for which she underwent surgical AVR with a 23 mm Magna Ease bioprosthesis in August 2016 at [facility].  Her aortic root diameters have been within normal limits in the past.  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    When I first met her in October 2022, I had recommended a cardiac MRI to investigate for infiltrative disease.  She had this done in January 2023, and this did show evidence of significant infiltration consistent with cardiac amyloidosis.  As a result, we weaned her off of her metoprolol.  In conjunction with this, she had an increase in her frequency of palpitations.  For work-up of this, I had her undergo a 14-day Zio patch in March 2023, which showed a total of 165 runs of SVT (up to 50 minutes in length), and 29 runs of NSVT (up to 17 beats in length).  Given these significant symptomatic SVT runs and our general preference for avoidance of beta-blockers given her likely cardiac amyloidosis, I referred her to EP to discuss potential ablation.  She eventually underwent ablation of what was found to be typical AVNRT in 6/2023, and tells me that her palpitations have nearly  resolved since then.    Since her last visit, she unfortunately had a fall and a hip fracture in November 2023.  Also in November 2023 she was hospitalized in the [facility] system for possible decompensated heart failure.  She was diuresed and her typical Lasix 20 mg daily was increased to 40 mg daily.  Since that time, she reports overall feeling well.  Her weight is down 15-20 pounds compared to last year.  She does have some mild lightheadedness but nothing severe.  Her lower extremity swelling is minimal.  She does not consistently wear compression stockings.  Her biggest complaint is that her sleep is poor because she is getting up too much at night due to diuretics.    Her most recent echocardiogram was in Care Everywhere from 11/13/2023, showing normal LV size with LVEF of 35%, mildly reduced RV systolic function, moderate mitral regurgitation, and normal bioprosthetic AVR function.  Her cardiac MRI was done on 1/17/2023 and showed a low normal LVEF of 54% with extensive circumferential late gadolinium enhancement extending to the left atrium, as well as ECV significantly increased to 55%, consistent with infiltrative cardiomyopathy such as cardiac amyloidosis.      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, most likely cardiac amyloidosis  Chronic HFmrEF (LVEF 35% on outside 6/2023 TTE), roughly euvolemic, NYHA II  Bilateral lymphedema, significantly improved with treatments from lymphedema clinic  Bicuspid aortic valve c/b severe aortic stenosis, s/p SAVR  23 mm Magna Ease bioprosthesis in August 2016 at [facility]  Normal gradients on 6/2023 TTE  Prior symptomatic SVT (up to 50 minutes on 3/2023 Zio patch) s/p successful ablation of AVNRT in 6/2023  History of symptomatic bradycardia with beta-blockers, avoiding beta-blockers also due to amyloid  Hypertension, currently well-controlled  Dyslipidemia, reasonably well-controlled  Thrombocytopenia  Multiple  myeloma        It was a pleasure to meet with Kandis and her daughter again in clinic today.  I am very glad to hear that she has had near resolution of her palpitations with SVT ablation.  We will continue to hold off on beta-blocker for now as a result, both due to her history of symptomatic bradycardia and her cardiac amyloidosis.  She did have a bit of a drop in her LVEF on her outside echo, though I cannot see these images so I am unable to definitively confirm this.  Thankfully, though, her bioprosthetic valve appears to continue working well.  At this time, she appears intravascularly euvolemic, and the quality of life impact of her diuretic regimen is problematic.  Accordingly, I recommended that she cut down on her Lasix from 40 mg daily to 20 mg daily and her potassium from 2 packets daily to 1 packet daily.  She is weighed daily at her assisted living facility, so I instructed her to double this (both the Lasix and potassium) if her weight goes up by 3 pounds or more.      -Continue lymphedema management through lymphedema clinic, appreciate assistance  -Decrease Lasix to 20 mg daily and potassium 20 M EQ daily, with as needed extra doses if needed for weight gain as above  -Avoid beta-blockers, ACE/ARB/ARNI, digoxin, and nondihydropyridine calcium channel blockers given cardiac amyloid  -Continue atorvastatin 10 mg daily  -Continue aspirin 81 mg daily      Follow-up: 6 months with RAUL, with TTE before hand, or sooner as needed        On the date of the patient's visit, I spent a total of 46 minutes reviewing the patient's chart; interviewing, examining, and counseling the patient; coordinating with other providers as necessary, entering orders, and documenting in the medical chart.        Charlie Herzog MD  Interventional Cardiology  April 24, 2024        The longitudinal plan of care for the diagnosis(es)/condition(s) as documented were addressed during this visit. Due to the added complexity in  care, I will continue to support Kandis in the subsequent management and with ongoing continuity of care.        Medications   Current Outpatient Medications   Medication Sig Dispense Refill    acetaminophen (TYLENOL) 325 MG tablet Take 650 mg by mouth      acyclovir (ZOVIRAX) 400 MG tablet TAKE 1 TABLET(400 MG) BY MOUTH EVERY 12 HOURS 180 tablet 0    aspirin 81 MG EC tablet Take 81 mg by mouth      atorvastatin (LIPITOR) 10 MG tablet TAKE 1 TABLET(10 MG) BY MOUTH DAILY 90 tablet 3    Biotin w/ Vitamins C & E (HAIR SKIN & NAILS GUMMIES PO)       Calcium-Phosphorus-Vitamin D (CALCIUM/VITAMIN D3/ADULT GUMMY PO)       Cholecalciferol (VITAMIN D3) 25 MCG (1000 UT) CAPS Take 2,000 Units by mouth daily 180 capsule 3    ferrous sulfate 220 (44 Fe) MG/5ML SOLN Take 6.82 mLs (300 mg) by mouth daily 408 mL 4    furosemide (LASIX) 20 MG tablet TAKE 1 TABLET(20 MG) BY MOUTH DAILY (Patient taking differently: 40 mg) 90 tablet 1    pantoprazole sodium (PROTONIX) 40 MG packet Take 1 packet by mouth daily      potassium chloride (KLOR-CON) 20 MEQ packet MIX 1 PACKET IN LIQUID AND TAKE BY MOUTH ONCE EVERY DAY AS DIRECTED. 90 each 1    Probiotic Product (PROBIOTIC GUMMIES PO)       dexamethasone (DECADRON) 4 MG tablet  (Patient not taking: Reported on 4/24/2024)      diphenhydrAMINE HCl (BENADRYL PO) Take by mouth every 30 days (Patient not taking: Reported on 4/24/2024)      ondansetron (ZOFRAN) 8 MG tablet Take by mouth every 8 hours as needed for nausea (Patient not taking: Reported on 1/30/2024)      potassium chloride ER (KLOR-CON M) 20 MEQ CR tablet Take 20 mEq by mouth daily Do not crush (Patient not taking: Reported on 4/24/2024)       No current facility-administered medications for this visit.     Allergies   Allergies   Allergen Reactions    Carvedilol Other (See Comments)    Lidocaine Nausea and Vomiting     Vomiting with general anesthesia    Lisinopril Cough    Seasonal Allergies Other (See Comments)     Problems  with narcotics - oxygen desaturates    Spironolactone          Physical Exam       BP: 133/75 Pulse: 80     SpO2: 98 %      Vital Signs with Ranges  Pulse:  [80] 80  BP: (133-147)/(75-81) 133/75  SpO2:  [98 %] 98 %  115 lbs 6.4 oz    Constitutional: Well-appearing, no acute distress  Respiratory: Normal respiratory effort, CTAB  Cardiovascular: RRR, 1/6 systolic murmur at the RUSB.  JVP appears about 8 cm H2O.  There is trace bilateral LE edema.  Normal carotid upstrokes, no carotid bruits.

## 2024-04-24 NOTE — LETTER
4/24/2024    Noemi Sanchez MD  [address]  [place] [address]    RE: Kandis Gallagher       Dear Colleague,     I had the pleasure of seeing Kandis Gallagher in the [facility].  CARDIOLOGY CLINIC FOLLOW-UP NOTE      REASON FOR VISIT:   Follow-up cardiac amyloid, bicuspid aortic valve s/p AVR, SVT s/p ablation    PRIMARY CARE PHYSICIAN:  Noemi Sanchez        History of Present Illness  Kandis Gallagher is an extremely pleasant 83 year old female, here for routine follow-up.  She had previously seen Dr. London until November 2020, and then had moved out to the [facility] and received care there, but has since moved back and reestablished with us in October 2022.  Her medical history is significant for a bicuspid aortic valve which progressed to severe aortic stenosis for which she underwent surgical AVR with a 23 mm Magna Ease bioprosthesis in August 2016 at [facility].  Her aortic root diameters have been within normal limits in the past.  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    When I first met her in October 2022, I had recommended a cardiac MRI to investigate for infiltrative disease.  She had this done in January 2023, and this did show evidence of significant infiltration consistent with cardiac amyloidosis.  As a result, we weaned her off of her metoprolol.  In conjunction with this, she had an increase in her frequency of palpitations.  For work-up of this, I had her undergo a 14-day Zio patch in March 2023, which showed a total of 165 runs of SVT (up to 50 minutes in length), and 29 runs of NSVT (up to 17 beats in length).  Given these significant symptomatic SVT runs and our general preference for avoidance of beta-blockers  given her likely cardiac amyloidosis, I referred her to EP to discuss potential ablation.  She eventually underwent ablation of what was found to be typical AVNRT in 6/2023, and tells me that her palpitations have nearly resolved since then.    Since her last visit, she unfortunately had a fall and a hip fracture in November 2023.  Also in November 2023 she was hospitalized in the [facility] system for possible decompensated heart failure.  She was diuresed and her typical Lasix 20 mg daily was increased to 40 mg daily.  Since that time, she reports overall feeling well.  Her weight is down 15-20 pounds compared to last year.  She does have some mild lightheadedness but nothing severe.  Her lower extremity swelling is minimal.  She does not consistently wear compression stockings.  Her biggest complaint is that her sleep is poor because she is getting up too much at night due to diuretics.    Her most recent echocardiogram was in [facility] from 11/13/2023, showing normal LV size with LVEF of 35%, mildly reduced RV systolic function, moderate mitral regurgitation, and normal bioprosthetic AVR function.  Her cardiac MRI was done on 1/17/2023 and showed a low normal LVEF of 54% with extensive circumferential late gadolinium enhancement extending to the left atrium, as well as ECV significantly increased to 55%, consistent with infiltrative cardiomyopathy such as cardiac amyloidosis.      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, most likely cardiac amyloidosis  Chronic HFmrEF (LVEF 35% on outside 6/2023 TTE), roughly euvolemic, NYHA II  Bilateral lymphedema, significantly improved with treatments from lymphedema clinic  Bicuspid aortic valve c/b severe aortic stenosis, s/p SAVR  23 mm Magna Ease bioprosthesis in August 2016 at [facility]  Normal gradients on 6/2023 TTE  Prior symptomatic SVT (up to 50 minutes on 3/2023 Zio patch) s/p successful ablation of AVNRT in  6/2023  History of symptomatic bradycardia with beta-blockers, avoiding beta-blockers also due to amyloid  Hypertension, currently well-controlled  Dyslipidemia, reasonably well-controlled  Thrombocytopenia  Multiple myeloma        It was a pleasure to meet with Kandis and her daughter again in clinic today.  I am very glad to hear that she has had near resolution of her palpitations with SVT ablation.  We will continue to hold off on beta-blocker for now as a result, both due to her history of symptomatic bradycardia and her cardiac amyloidosis.  She did have a bit of a drop in her LVEF on her outside echo, though I cannot see these images so I am unable to definitively confirm this.  Thankfully, though, her bioprosthetic valve appears to continue working well.  At this time, she appears intravascularly euvolemic, and the quality of life impact of her diuretic regimen is problematic.  Accordingly, I recommended that she cut down on her Lasix from 40 mg daily to 20 mg daily and her potassium from 2 packets daily to 1 packet daily.  She is weighed daily at her assisted living facility, so I instructed her to double this (both the Lasix and potassium) if her weight goes up by 3 pounds or more.      -Continue lymphedema management through lymphedema clinic, appreciate assistance  -Decrease Lasix to 20 mg daily and potassium 20 M EQ daily, with as needed extra doses if needed for weight gain as above  -Avoid beta-blockers, ACE/ARB/ARNI, digoxin, and nondihydropyridine calcium channel blockers given cardiac amyloid  -Continue atorvastatin 10 mg daily  -Continue aspirin 81 mg daily      Follow-up: 6 months with RAUL, with TTE before hand, or sooner as needed        On the date of the patient's visit, I spent a total of 46 minutes reviewing the patient's chart; interviewing, examining, and counseling the patient; coordinating with other providers as necessary, entering orders, and documenting in the medical  chart.        Charlie Herzog MD  Interventional Cardiology  April 24, 2024        The longitudinal plan of care for the diagnosis(es)/condition(s) as documented were addressed during this visit. Due to the added complexity in care, I will continue to support Kandis in the subsequent management and with ongoing continuity of care.        Medications  Current Outpatient Medications   Medication Sig Dispense Refill    acetaminophen (TYLENOL) 325 MG tablet Take 650 mg by mouth      acyclovir (ZOVIRAX) 400 MG tablet TAKE 1 TABLET(400 MG) BY MOUTH EVERY 12 HOURS 180 tablet 0    aspirin 81 MG EC tablet Take 81 mg by mouth      atorvastatin (LIPITOR) 10 MG tablet TAKE 1 TABLET(10 MG) BY MOUTH DAILY 90 tablet 3    Biotin w/ Vitamins C & E (HAIR SKIN & NAILS GUMMIES PO)       Calcium-Phosphorus-Vitamin D (CALCIUM/VITAMIN D3/ADULT GUMMY PO)       Cholecalciferol (VITAMIN D3) 25 MCG (1000 UT) CAPS Take 2,000 Units by mouth daily 180 capsule 3    ferrous sulfate 220 (44 Fe) MG/5ML SOLN Take 6.82 mLs (300 mg) by mouth daily 408 mL 4    furosemide (LASIX) 20 MG tablet TAKE 1 TABLET(20 MG) BY MOUTH DAILY (Patient taking differently: 40 mg) 90 tablet 1    pantoprazole sodium (PROTONIX) 40 MG packet Take 1 packet by mouth daily      potassium chloride (KLOR-CON) 20 MEQ packet MIX 1 PACKET IN LIQUID AND TAKE BY MOUTH ONCE EVERY DAY AS DIRECTED. 90 each 1    Probiotic Product (PROBIOTIC GUMMIES PO)       dexamethasone (DECADRON) 4 MG tablet  (Patient not taking: Reported on 4/24/2024)      diphenhydrAMINE HCl (BENADRYL PO) Take by mouth every 30 days (Patient not taking: Reported on 4/24/2024)      ondansetron (ZOFRAN) 8 MG tablet Take by mouth every 8 hours as needed for nausea (Patient not taking: Reported on 1/30/2024)      potassium chloride ER (KLOR-CON M) 20 MEQ CR tablet Take 20 mEq by mouth daily Do not crush (Patient not taking: Reported on 4/24/2024)       No current facility-administered medications for this visit.      Allergies  Allergies   Allergen Reactions    Carvedilol Other (See Comments)    Lidocaine Nausea and Vomiting     Vomiting with general anesthesia    Lisinopril Cough    Seasonal Allergies Other (See Comments)     Problems with narcotics - oxygen desaturates    Spironolactone          Physical Exam      BP: 133/75 Pulse: 80     SpO2: 98 %      Vital Signs with Ranges  Pulse:  [80] 80  BP: (133-147)/(75-81) 133/75  SpO2:  [98 %] 98 %  115 lbs 6.4 oz    Constitutional: Well-appearing, no acute distress  Respiratory: Normal respiratory effort, CTAB  Cardiovascular: RRR, 1/6 systolic murmur at the RUSB.  JVP appears about 8 cm H2O.  There is trace bilateral LE edema.  Normal carotid upstrokes, no carotid bruits.      Thank you for allowing me to participate in the care of your patient.      Sincerely,     Charlie Herzog MD     [facility]  cc:   No referring provider defined for this encounter.

## 2024-04-25 DIAGNOSIS — C90.00 MULTIPLE MYELOMA, REMISSION STATUS UNSPECIFIED (H): ICD-10-CM

## 2024-04-26 RX ORDER — ACYCLOVIR 400 MG/1
TABLET ORAL
Qty: 180 TABLET | Refills: 0 | Status: SHIPPED | OUTPATIENT
Start: 2024-04-26

## 2024-05-07 ENCOUNTER — MYC MEDICAL ADVICE (OUTPATIENT)
Dept: FAMILY MEDICINE | Facility: CLINIC | Age: 84
End: 2024-05-07
Payer: MEDICARE

## 2024-05-07 DIAGNOSIS — R41.0 CONFUSION: Primary | ICD-10-CM

## 2024-05-07 NOTE — TELEPHONE ENCOUNTER
See patient daughter message: I do have an order available for urine evaluation but is probably best done through whoever is following her there at the rehab facility.  Please attempt to contact [facility] in [place] regarding this.    LINDA

## 2024-05-07 NOTE — TELEPHONE ENCOUNTER
MYRON Em from [facility] in [place] calling back with a patient update. Patient was confused last night and RN spoke to son earlier and will monitor for the next 24 hours as this morning she was sharp and knew she's been to [facility] before, and knows where she is right now. RN wanted to send update to provider.    Routing to provider as IZABELA Flores RN  [facility]

## 2024-05-07 NOTE — TELEPHONE ENCOUNTER
RN contacted [facility] at [phone] and was transferred to rehab, then nursing team. Spoke with MYRON Huynh with rehab, but she states patient's morning nurse is currently on break. She is going to let them know to call clinic back at [phone] to talk with a nurse.     If patient's rehab nurse calls clinic, please route to any available nurse to follow up on Dr. Sanchez's message below. See [employer] messages as well for context. Thank you.    Yusra Cui, RN, BSN, PHN  [facility]  Nurse Triage, Family Practice

## 2024-06-28 ENCOUNTER — TELEPHONE (OUTPATIENT)
Dept: FAMILY MEDICINE | Facility: CLINIC | Age: 84
End: 2024-06-28
Payer: MEDICARE

## 2024-06-28 NOTE — TELEPHONE ENCOUNTER
Forms/Letter Request    Type of form/letter: PT re-certification plan of care     Do we have the form/letter: YES- Placed on provider's desk    Who is the form from? [facility]    Where did/will the form come from? form was faxed in    How would you like the form/letter returned: Fax : [phone]

## 2024-07-12 ENCOUNTER — MEDICAL CORRESPONDENCE (OUTPATIENT)
Dept: HEALTH INFORMATION MANAGEMENT | Facility: CLINIC | Age: 84
End: 2024-07-12
Payer: MEDICARE

## 2024-07-19 ENCOUNTER — TRANSFERRED RECORDS (OUTPATIENT)
Dept: HEALTH INFORMATION MANAGEMENT | Facility: CLINIC | Age: 84
End: 2024-07-19

## 2024-08-08 ENCOUNTER — TRANSFERRED RECORDS (OUTPATIENT)
Dept: HEALTH INFORMATION MANAGEMENT | Facility: CLINIC | Age: 84
End: 2024-08-08
Payer: MEDICARE

## 2024-08-08 ENCOUNTER — TELEPHONE (OUTPATIENT)
Dept: FAMILY MEDICINE | Facility: CLINIC | Age: 84
End: 2024-08-08
Payer: MEDICARE

## 2024-08-08 DIAGNOSIS — R10.2 PELVIC PAIN IN FEMALE: Primary | ICD-10-CM

## 2024-08-08 NOTE — TELEPHONE ENCOUNTER
Called and spoke with Rocío and relayed provider's message below as written. Rocío requested to continue with x-ray orders. States she spoke to patient's daughter today and was informed daughter is unable to take patient to any visits today.     Writer reinforced provider's message and advise to have in person evaluation if x-ray negative and pain persist. Roíco verbalized understanding.    : please fax orders to  [phone].    MYRON Israel  [facility]

## 2024-08-08 NOTE — TELEPHONE ENCOUNTER
"Rocío, director of nursing at [facility] is calling to report patient fell 2 days ago and \"landed on her bottom.\"    Rocío reports at time of fall, no injuries were noted, but today patient c/o pain and discomfort. Rocío requesting X-ray of pelvis and if approved to please fax to [phone].    FYI: Report PPX can come to patient to provide X-ray.     Routing to provider to review and advise.     MYRON Israel  [facility] Triage  [place]      "

## 2024-08-08 NOTE — TELEPHONE ENCOUNTER
If significant pain, evaluation in [facility] recommended if they will accept.   If mild pain we can do the xray but if negative and pain persists, needs the in person evaluation.  Order is available - fax if not going to [facility].     PSK

## 2024-08-09 ENCOUNTER — TELEPHONE (OUTPATIENT)
Dept: FAMILY MEDICINE | Facility: CLINIC | Age: 84
End: 2024-08-09

## 2024-08-09 NOTE — TELEPHONE ENCOUNTER
FYI - Status Update    Who is Calling: [facility]     Update: Pt is felling discomfort so she has been taking Tylenol and Biofreeze. They will be faxing over her xray results today.     Does caller want a call/response back: No

## 2024-08-15 ENCOUNTER — TELEPHONE (OUTPATIENT)
Dept: FAMILY MEDICINE | Facility: CLINIC | Age: 84
End: 2024-08-15
Payer: MEDICARE

## 2024-08-15 DIAGNOSIS — R41.0 CONFUSION: Primary | ICD-10-CM

## 2024-08-15 NOTE — TELEPHONE ENCOUNTER
Home Care is calling regarding an established patient with [facility].       Requesting orders from: Noemi Sanchez  Provider is following patient: Yes  Is this a 60-day recertification request?  No    Orders Requested  UA UC order request:     Ondina from [facility] calling. She states that patient is complaining of increased confusion, staff thinks she may have UTI, no other symptoms noted from patient but were hoping to get UAUC to process.         Information was gathered and will be sent to provider for review.  RN will contact Home Care with information after provider review.  Confirmed ok to leave a detailed message with call back.  Contact information confirmed and updated as needed.        Jc Flores RN  [facility]

## 2024-08-15 NOTE — TELEPHONE ENCOUNTER
Writer called residence and read provider message below as written. She verbalized understanding and had no other questions or concerns.    Fax number to send orders is [phone].    Routing to team coordinators to review and fax urine order to [facility].    Jc Flores RN  [facility]

## 2024-08-21 ENCOUNTER — TELEPHONE (OUTPATIENT)
Dept: FAMILY MEDICINE | Facility: CLINIC | Age: 84
End: 2024-08-21
Payer: MEDICARE

## 2024-08-21 NOTE — TELEPHONE ENCOUNTER
Writer called Assisted Living to relay provider message below as written. She verbalized understanding and states she needs a fax of all of the orders provided.    Routing to team coordinators to review and advise on faxing orders to assisted living at Fax # [phone].     Jc Flores RN  [facility]

## 2024-08-21 NOTE — TELEPHONE ENCOUNTER
Order from me in computer for UA/ UC dated 815 - ok to use this order for repeat U/A.  Fax to alf.    Ok to keep the calcium/vitamin D and probiotic in her room.    Ferrous sulfate dosing - 300 mg daily - that is 6.8 ml per day.    For back pain - I would recommend over the counter lidocaine patch 4% (script may not be covered) to use for 12 hours on and 12 hours off per day.  Less side effects.  Allergy noted to lidocaine with general anesthesia - vomiting - not likely to cause this side effects when given topically.        PSK    PSK

## 2024-08-21 NOTE — TELEPHONE ENCOUNTER
"Liliya from patient's [facility] calling.    Has some requests/concerns she would like to relay to Dr. Sanchez regarding the patient.    1.Requests repeat UA/UC  Just came back yesterday from hospital- \"Kandis is still very confused and not acting like herself,\" per caller. Was on bactrim for 5 days (started ), finishes course of therapy today.     2. Would like order to keep Calcium-Phosphorus-Vitamin D ( Calcium-Cholecalciferol (D3) 600 mg-10 mcg (400 unit) Chew and [facility] [employer] 10 billion cell -200 mg Chew  in her room    3. Needs updated order for liquid ferrous sulfate sent over  Discharge paperwork from hospital just has the concentration of the medication listed, but no SIG. Upon reviewing discharge paperwork, it shows the followin. Would like orders to try something else for her lower back pain d/t fall on  (T2 fx shown on xray)  Still having a lot of pain- tried tramadol at [facility], but was too sedating. Family does not want her to have. Per nursing note from hospital stay:    Currently getting scheduled tylenol 650mg 4x/day, but pt reports is not adequate.       [facility] Fax # [phone].     Pt has follow up scheduled with PCP on 24.    Rosy Crowell, RN, BSN  -[facility]             "

## 2024-08-22 DIAGNOSIS — R10.2 PELVIC PAIN IN FEMALE: ICD-10-CM

## 2024-08-22 DIAGNOSIS — E61.1 IRON DEFICIENCY: Primary | ICD-10-CM

## 2024-08-22 DIAGNOSIS — K59.00 CONSTIPATION: ICD-10-CM

## 2024-08-22 RX ORDER — FERROUS SULFATE 325(65) MG
325 TABLET ORAL
Qty: 90 TABLET | Refills: 0 | Status: SHIPPED | OUTPATIENT
Start: 2024-08-22

## 2024-08-22 RX ORDER — POLYETHYLENE GLYCOL 3350 17 G/17G
17 POWDER, FOR SOLUTION ORAL DAILY PRN
COMMUNITY
Start: 2024-08-22 | End: 2024-08-22

## 2024-08-22 RX ORDER — FERROUS SULFATE 325(65) MG
325 TABLET ORAL
COMMUNITY
Start: 2024-08-22 | End: 2024-08-22

## 2024-08-22 RX ORDER — POLYETHYLENE GLYCOL 3350 17 G/17G
17 POWDER, FOR SOLUTION ORAL DAILY PRN
Qty: 510 G | Refills: 1 | Status: SHIPPED | OUTPATIENT
Start: 2024-08-22

## 2024-08-22 NOTE — LETTER
Kandis Gallagher  [address]  [place] [facility] [address]        To Whom It May Concern:      Kandis Gall[birth year], 1940.        Ferrous Sulfate  325 mg daily.    Ok to use Tylenol packets  500mg  every 6 hours as needed for pain.  Miralax 17 gram daily as needed for constipation.        Noemi Sanchez MD

## 2024-08-22 NOTE — TELEPHONE ENCOUNTER
Fax letter  Do they need scripts or do they have these at residence.  All over the counter.    PSK

## 2024-08-22 NOTE — TELEPHONE ENCOUNTER
Liliya from patient's assisted living facility calling re: order requests.    Pt recently came back to her [facility] from [facility] and they need a few orders adjusted.    Ferrous Sulfate- need changed to pill form, as they cannot administer the liquid form.  Pt prefers powdered Tylenol packets to the tablets. It only comes in a 500mg concentration.  Would like an order to give her something PRN for constipation.    Please fax a signed copy of the orders to [facility] at [phone].    Routing to provider to review and advise.Medications pended (Miralax as stool softener).    Rosy Crowell, RN, BSN  [facility]

## 2024-08-26 ENCOUNTER — OFFICE VISIT (OUTPATIENT)
Dept: FAMILY MEDICINE | Facility: CLINIC | Age: 84
End: 2024-08-26
Payer: MEDICARE

## 2024-08-26 VITALS
BODY MASS INDEX: 23.8 KG/M2 | HEIGHT: 58 IN | TEMPERATURE: 98.1 F | WEIGHT: 113.38 LBS | OXYGEN SATURATION: 97 % | HEART RATE: 78 BPM | SYSTOLIC BLOOD PRESSURE: 139 MMHG | DIASTOLIC BLOOD PRESSURE: 75 MMHG | RESPIRATION RATE: 16 BRPM

## 2024-08-26 DIAGNOSIS — R63.4 WEIGHT LOSS: ICD-10-CM

## 2024-08-26 DIAGNOSIS — E78.5 HYPERLIPIDEMIA LDL GOAL <100: ICD-10-CM

## 2024-08-26 DIAGNOSIS — I47.10 PAROXYSMAL SUPRAVENTRICULAR TACHYCARDIA (H): ICD-10-CM

## 2024-08-26 DIAGNOSIS — G30.9 ALZHEIMER'S DEMENTIA WITHOUT BEHAVIORAL DISTURBANCE, PSYCHOTIC DISTURBANCE, MOOD DISTURBANCE, OR ANXIETY, UNSPECIFIED DEMENTIA SEVERITY, UNSPECIFIED TIMING OF DEMENTIA ONSET (H): ICD-10-CM

## 2024-08-26 DIAGNOSIS — C90.00 MULTIPLE MYELOMA, REMISSION STATUS UNSPECIFIED (H): ICD-10-CM

## 2024-08-26 DIAGNOSIS — E87.6 HYPOKALEMIA: ICD-10-CM

## 2024-08-26 DIAGNOSIS — N18.31 CHRONIC KIDNEY DISEASE, STAGE 3A (H): ICD-10-CM

## 2024-08-26 DIAGNOSIS — K26.4 GASTROINTESTINAL HEMORRHAGE ASSOCIATED WITH DUODENAL ULCER: ICD-10-CM

## 2024-08-26 DIAGNOSIS — C44.310 BCC (BASAL CELL CARCINOMA), FACE: ICD-10-CM

## 2024-08-26 DIAGNOSIS — I50.32 CHRONIC HEART FAILURE WITH PRESERVED EJECTION FRACTION (HFPEF) (H): ICD-10-CM

## 2024-08-26 DIAGNOSIS — Z00.00 ENCOUNTER FOR MEDICARE ANNUAL WELLNESS EXAM: Primary | ICD-10-CM

## 2024-08-26 DIAGNOSIS — F02.80 ALZHEIMER'S DEMENTIA WITHOUT BEHAVIORAL DISTURBANCE, PSYCHOTIC DISTURBANCE, MOOD DISTURBANCE, OR ANXIETY, UNSPECIFIED DEMENTIA SEVERITY, UNSPECIFIED TIMING OF DEMENTIA ONSET (H): ICD-10-CM

## 2024-08-26 DIAGNOSIS — R41.0 CONFUSION: ICD-10-CM

## 2024-08-26 LAB
ALBUMIN UR-MCNC: 100 MG/DL
APPEARANCE UR: CLEAR
BILIRUB UR QL STRIP: NEGATIVE
COLOR UR AUTO: YELLOW
GLUCOSE UR STRIP-MCNC: NEGATIVE MG/DL
HGB UR QL STRIP: NEGATIVE
KETONES UR STRIP-MCNC: ABNORMAL MG/DL
LEUKOCYTE ESTERASE UR QL STRIP: ABNORMAL
MUCOUS THREADS #/AREA URNS LPF: PRESENT /LPF
NITRATE UR QL: NEGATIVE
PH UR STRIP: 5.5 [PH] (ref 5–7)
RBC #/AREA URNS AUTO: ABNORMAL /HPF
SP GR UR STRIP: 1.02 (ref 1–1.03)
SQUAMOUS #/AREA URNS AUTO: ABNORMAL /LPF
UROBILINOGEN UR STRIP-ACNC: 0.2 E.U./DL
WBC #/AREA URNS AUTO: ABNORMAL /HPF
YEAST #/AREA URNS HPF: ABNORMAL /HPF

## 2024-08-26 PROCEDURE — 84134 ASSAY OF PREALBUMIN: CPT | Performed by: FAMILY MEDICINE

## 2024-08-26 PROCEDURE — 36415 COLL VENOUS BLD VENIPUNCTURE: CPT | Performed by: FAMILY MEDICINE

## 2024-08-26 PROCEDURE — G0439 PPPS, SUBSEQ VISIT: HCPCS | Performed by: FAMILY MEDICINE

## 2024-08-26 PROCEDURE — 84443 ASSAY THYROID STIM HORMONE: CPT | Performed by: FAMILY MEDICINE

## 2024-08-26 PROCEDURE — 80061 LIPID PANEL: CPT | Performed by: FAMILY MEDICINE

## 2024-08-26 PROCEDURE — 82248 BILIRUBIN DIRECT: CPT | Performed by: FAMILY MEDICINE

## 2024-08-26 PROCEDURE — 87086 URINE CULTURE/COLONY COUNT: CPT | Performed by: FAMILY MEDICINE

## 2024-08-26 PROCEDURE — 99214 OFFICE O/P EST MOD 30 MIN: CPT | Mod: 25 | Performed by: FAMILY MEDICINE

## 2024-08-26 PROCEDURE — 80053 COMPREHEN METABOLIC PANEL: CPT | Performed by: FAMILY MEDICINE

## 2024-08-26 PROCEDURE — 81001 URINALYSIS AUTO W/SCOPE: CPT | Performed by: FAMILY MEDICINE

## 2024-08-26 RX ORDER — FUROSEMIDE 20 MG
20 TABLET ORAL DAILY
Qty: 90 TABLET | Refills: 1 | Status: SHIPPED | OUTPATIENT
Start: 2024-08-26

## 2024-08-26 RX ORDER — PANTOPRAZOLE SODIUM 40 MG/1
40 TABLET, DELAYED RELEASE ORAL DAILY
Qty: 90 TABLET | Refills: 1 | Status: SHIPPED | OUTPATIENT
Start: 2024-08-26

## 2024-08-26 ASSESSMENT — SOCIAL DETERMINANTS OF HEALTH (SDOH): HOW OFTEN DO YOU GET TOGETHER WITH FRIENDS OR RELATIVES?: TWICE A WEEK

## 2024-08-26 ASSESSMENT — PAIN SCALES - GENERAL
PAINLEVEL: NO PAIN (0)
PAINLEVEL: NO PAIN (0)

## 2024-08-26 NOTE — PATIENT INSTRUCTIONS
Labs today.  I will send results over [employer].     I will update refills when results return.      I will look into the starting to the acyclovir.        Patient Education   Preventive Care Advice   This is general advice given by our system to help you stay healthy. However, your care team may have specific advice just for you. Please talk to your care team about your preventive care needs.  Nutrition  Eat 5 or more servings of fruits and vegetables each day.  Try wheat bread, brown rice and whole grain pasta (instead of white bread, rice, and pasta).  Get enough calcium and vitamin D. Check the label on foods and aim for 100% of the RDA (recommended daily allowance).  Lifestyle  Exercise at least 150 minutes each week  (30 minutes a day, 5 days a week).  Do muscle strengthening activities 2 days a week. These help control your weight and prevent disease.  No smoking.  Wear sunscreen to prevent skin cancer.  Have a dental exam and cleaning every 6 months.  Yearly exams  See your health care team every year to talk about:  Any changes in your health.  Any medicines your care team has prescribed.  Preventive care, family planning, and ways to prevent chronic diseases.  Shots (vaccines)   HPV shots (up to age 26), if you've never had them before.  Hepatitis B shots (up to age 59), if you've never had them before.  COVID-19 shot: Get this shot when it's due.  Flu shot: Get a flu shot every year.  Tetanus shot: Get a tetanus shot every 10 years.  Pneumococcal, hepatitis A, and RSV shots: Ask your care team if you need these based on your risk.  Shingles shot (for age 50 and up)  General health tests  Diabetes screening:  Starting at age 35, Get screened for diabetes at least every 3 years.  If you are younger than age 35, ask your care team if you should be screened for diabetes.  Cholesterol test: At age 39, start having a cholesterol test every 5 years, or more often if advised.  Bone density scan (DEXA): At age 50,  ask your care team if you should have this scan for osteoporosis (brittle bones).  Hepatitis C: Get tested at least once in your life.  STIs (sexually transmitted infections)  Before age 24: Ask your care team if you should be screened for STIs.  After age 24: Get screened for STIs if you're at risk. You are at risk for STIs (including HIV) if:  You are sexually active with more than one person.  You don't use condoms every time.  You or a partner was diagnosed with a sexually transmitted infection.  If you are at risk for HIV, ask about PrEP medicine to prevent HIV.  Get tested for HIV at least once in your life, whether you are at risk for HIV or not.  Cancer screening tests  Cervical cancer screening: If you have a cervix, begin getting regular cervical cancer screening tests starting at age 21.  Breast cancer scan (mammogram): If you've ever had breasts, begin having regular mammograms starting at age 40. This is a scan to check for breast cancer.  Colon cancer screening: It is important to start screening for colon cancer at age 45.  Have a colonoscopy test every 10 years (or more often if you're at risk) Or, ask your provider about stool tests like a FIT test every year or Cologuard test every 3 years.  To learn more about your testing options, visit:   .  For help making a decision, visit:   https://bit.ly/cg28743.  Prostate cancer screening test: If you have a prostate, ask your care team if a prostate cancer screening test (PSA) at age 55 is right for you.  Lung cancer screening: If you are a current or former smoker ages 50 to 80, ask your care team if ongoing lung cancer screenings are right for you.  For informational purposes only. Not to replace the advice of your health care provider. Copyright   2023 [employer]. All rights reserved. Clinically reviewed by the [facility]. [employer] 702821 - REV 01/24.  Learning About Activities of Daily Living  What are  activities of daily living?     Activities of daily living (ADLs) are the basic self-care tasks you do every day. These include eating, bathing, dressing, and moving around.  As you age, and if you have health problems, you may find that it's harder to do some of these tasks. If so, your doctor can suggest ideas that may help.  To measure what kind of help you may need, your doctor will ask how well you are able to do ADLs. Let your doctor know if there are any tasks that you are having trouble doing. This is an important first step to getting help. And when you have the help you need, you can stay as independent as possible.  How will a doctor assess your ADLs?  Asking about ADLs is part of a routine health checkup your doctor will likely do as you age. Your health check might be done in a doctor's office, in your home, or at a hospital. The goal is to find out if you are having any problems that could make it hard to care for yourself or that make it unsafe for you to be on your own.  To measure your ADLs, your doctor will ask how hard it is for you to do routine tasks. Your doctor may also want to know if you have changed the way you do a task because of a health problem. Your doctor may watch how you:  Walk back and forth.  Keep your balance while you stand or walk.  Move from sitting to standing or from a bed to a chair.  Button or unbutton a shirt or sweater.  Remove and put on your shoes.  It's common to feel a little worried or anxious if you find you can't do all the things you used to be able to do. Talking with your doctor about ADLs is a way to make sure you're as safe as possible and able to care for yourself as well as you can. You may want to bring a caregiver, friend, or family member to your checkup. They can help you talk to your doctor.  Follow-up care is a key part of your treatment and safety. Be sure to make and go to all appointments, and call your doctor if you are having problems. It's also  a good idea to know your test results and keep a list of the medicines you take.  Current as of: October 24, 2023  Content Version: 14.1    3101-6131 [employer].   Care instructions adapted under license by your healthcare professional. If you have questions about a medical condition or this instruction, always ask your healthcare professional. [employer] disclaims any warranty or liability for your use of this information.    Preventing Falls: Care Instructions  Injuries and health problems such as trouble walking or poor eyesight can increase your risk of falling. So can some medicines. But there are things you can do to help prevent falls. You can exercise to get stronger. You can also arrange your home to make it safer.    Talk to your doctor about the medicines you take. Ask if any of them increase the risk of falls and whether they can be changed or stopped.   Try to exercise regularly. It can help improve your strength and balance. This can help lower your risk of falling.     Practice fall safety and prevention.    Wear low-heeled shoes that fit well and give your feet good support. Talk to your doctor if you have foot problems that make this hard.  Carry a cellphone or wear a medical alert device that you can use to call for help.  Use stepladders instead of chairs to reach high objects. Don't climb if you're at risk for falls. Ask for help, if needed.  Wear the correct eyeglasses, if you need them.    Make your home safer.    Remove rugs, cords, clutter, and furniture from walkways.  Keep your house well lit. Use night-lights in hallways and bathrooms.  Install and use sturdy handrails on stairways.  Wear nonskid footwear, even inside. Don't walk barefoot or in socks without shoes.    Be safe outside.    Use handrails, curb cuts, and ramps whenever possible.  Keep your hands free by using a shoulder bag or backpack.  Try to walk in well-lit areas. Watch out for uneven ground,  "changes in pavement, and debris.  Be careful in the winter. Walk on the grass or gravel when sidewalks are slippery. Use de-icer on steps and walkways. Add non-slip devices to shoes.    Put grab bars and nonskid mats in your shower or tub and near the toilet. Try to use a shower chair or bath bench when bathing.   Get into a tub or shower by putting in your weaker leg first. Get out with your strong side first. Have a phone or medical alert device in the bathroom with you.   Where can you learn more?  Go to https://www.[employer]/patiented  Enter G117 in the search box to learn more about \"Preventing Falls: Care Instructions.\"  Current as of: July 17, 2023               Content Version: 14.0    [id number] [employer].   Care instructions adapted under license by your healthcare professional. If you have questions about a medical condition or this instruction, always ask your healthcare professional. [employer] disclaims any warranty or liability for your use of this information.      Learning About Sleeping Well  What does sleeping well mean?     Sleeping well means getting enough sleep to feel good and stay healthy. How much sleep is enough varies among people.  The number of hours you sleep and how you feel when you wake up are both important. If you do not feel refreshed, you probably need more sleep. Another sign of not getting enough sleep is feeling tired during the day.  Experts recommend that adults get at least 7 or more hours of sleep per day. Children and older adults need more sleep.  Why is getting enough sleep important?  Getting enough quality sleep is a basic part of good health. When your sleep suffers, your physical health, mood, and your thoughts can suffer too. You may find yourself feeling more grumpy or stressed. Not getting enough sleep also can lead to serious problems, including injury, accidents, anxiety, and depression.  What might cause poor sleeping?  Many " "things can cause sleep problems, including:  Changes to your sleep schedule.  Stress. Stress can be caused by fear about a single event, such as giving a speech. Or you may have ongoing stress, such as worry about work or school.  Depression, anxiety, and other mental or emotional conditions.  Changes in your sleep habits or surroundings. This includes changes that happen where you sleep, such as noise, light, or sleeping in a different bed. It also includes changes in your sleep pattern, such as having jet lag or working a late shift.  Health problems, such as pain, breathing problems, and restless legs syndrome.  Lack of regular exercise.  Using alcohol, nicotine, or caffeine before bed.  How can you help yourself?  Here are some tips that may help you sleep more soundly and wake up feeling more refreshed.  Your sleeping area   Use your bedroom only for sleeping and sex. A bit of light reading may help you fall asleep. But if it doesn't, do your reading elsewhere in the house. Try not to use your TV, computer, smartphone, or tablet while you are in bed.  Be sure your bed is big enough to stretch out comfortably, especially if you have a sleep partner.  Keep your bedroom quiet, dark, and cool. Use curtains, blinds, or a sleep mask to block out light. To block out noise, use earplugs, soothing music, or a \"white noise\" machine.  Your evening and bedtime routine   Create a relaxing bedtime routine. You might want to take a warm shower or bath, or listen to soothing music.  Go to bed at the same time every night. And get up at the same time every morning, even if you feel tired.  What to avoid   Limit caffeine (coffee, tea, caffeinated sodas) during the day, and don't have any for at least 6 hours before bedtime.  Avoid drinking alcohol before bedtime. Alcohol can cause you to wake up more often during the night.  Try not to smoke or use tobacco, especially in the evening. Nicotine can keep you awake.  Limit naps " "during the day, especially close to bedtime.  Avoid lying in bed awake for too long. If you can't fall asleep or if you wake up in the middle of the night and can't get back to sleep within about 20 minutes, get out of bed and go to another room until you feel sleepy.  Avoid taking medicine right before bed that may keep you awake or make you feel hyper or energized. Your doctor can tell you if your medicine may do this and if you can take it earlier in the day.  If you can't sleep   Imagine yourself in a peaceful, pleasant scene. Focus on the details and feelings of being in a place that is relaxing.  Get up and do a quiet or boring activity until you feel sleepy.  Avoid drinking any liquids before going to bed to help prevent waking up often to use the bathroom.  Where can you learn more?  Go to https://www.[employer].net/patiented  Enter J942 in the search box to learn more about \"Learning About Sleeping Well.\"  Current as of: July 10, 2023  Content Version: 14.1    2777-2366 [employer].   Care instructions adapted under license by your healthcare professional. If you have questions about a medical condition or this instruction, always ask your healthcare professional. [employer] disclaims any warranty or liability for your use of this information.    Bladder Training: Care Instructions  Your Care Instructions     Bladder training is used to treat urge incontinence and stress incontinence. Urge incontinence means that the need to urinate comes on so fast that you can't get to a toilet in time. Stress incontinence means that you leak urine because of pressure on your bladder. For example, it may happen when you laugh, cough, or lift something heavy.  Bladder training can increase how long you can wait before you have to urinate. It can also help your bladder hold more urine. And it can give you better control over the urge to urinate.  It is important to remember that bladder training " takes a few weeks to a few months to make a difference. You may not see results right away, but don't give up.  Follow-up care is a key part of your treatment and safety. Be sure to make and go to all appointments, and call your doctor if you are having problems. It's also a good idea to know your test results and keep a list of the medicines you take.  How can you care for yourself at home?  Work with your doctor to come up with a bladder training program that is right for you. You may use one or more of the following methods.  Delayed urination  In the beginning, try to keep from urinating for 5 minutes after you first feel the need to go.  While you wait, take deep, slow breaths to relax. Kegel exercises can also help you delay the need to go to the bathroom.  After some practice, when you can easily wait 5 minutes to urinate, try to wait 10 minutes before you urinate.  Slowly increase the waiting period until you are able to control when you have to urinate.  Scheduled urination  Empty your bladder when you first wake up in the morning.  Schedule times throughout the day when you will urinate.  Start by going to the bathroom every hour, even if you don't need to go.  Slowly increase the time between trips to the bathroom.  When you have found a schedule that works well for you, keep doing it.  If you wake up during the night and have to urinate, do it. Apply your schedule to waking hours only.  Kegel exercises  These tighten and strengthen pelvic muscles, which can help you control the flow of urine. (If doing these exercises causes pain, stop doing them and talk with your doctor.) To do Kegel exercises:  Squeeze your muscles as if you were trying not to pass gas. Or squeeze your muscles as if you were stopping the flow of urine. Your belly, legs, and buttocks shouldn't move.  Hold the squeeze for 3 seconds, then relax for 5 to 10 seconds.  Start with 3 seconds, then add 1 second each week until you are able to  "squeeze for 10 seconds.  Repeat the exercise 10 times a session. Do 3 to 8 sessions a day.  When should you call for help?  Watch closely for changes in your health, and be sure to contact your doctor if:    Your incontinence is getting worse.     You do not get better as expected.   Where can you learn more?  Go to https://www.[employer].net/patiented  Enter V684 in the search box to learn more about \"Bladder Training: Care Instructions.\"  Current as of: November 15, 2023               Content Version: 14.0    [id number] [email or web address].   Care instructions adapted under license by your healthcare professional. If you have questions about a medical condition or this instruction, always ask your healthcare professional. [employer] disclaims any warranty or liability for your use of this information.         "

## 2024-08-26 NOTE — PROGRESS NOTES
Preventive Care Visit  [facility]  Noemi Sanchez MD, Family Medicine  Aug 26, 2024      Assessment & Plan     Encounter for Medicare annual wellness exam  Screening and preventive care discussed    Gastrointestinal hemorrhage associated with duodenal ulcer  Ongoing use of Protonix for GI protection due to history of GI bleed.  No recurrent symptoms described.  - pantoprazole (PROTONIX) 40 MG EC tablet; Take 1 tablet (40 mg) by mouth daily. 30-60 min prior to meal.    Chronic heart failure with preserved ejection fraction (HFpEF) (H)  Ongoing use of furosemide.  Euvolemic on evaluation today.  - furosemide (LASIX) 20 MG tablet; Take 1 tablet (20 mg) by mouth daily.    Chronic kidney disease, stage 3a (H)  Reassess kidney function with labs today  - Basic metabolic panel  (Ca, Cl, CO2, Creat, Gluc, K, Na, BUN); Future  - Basic metabolic panel  (Ca, Cl, CO2, Creat, Gluc, K, Na, BUN)    Confusion  In the past confusion has been a sign of a urinary tract infection.  Urinalysis completed indicates potential for infection with cultures pending.  She just recently finished Bactrim so will await culture results before prescribing alternative antibiotic.  - UA Macroscopic with reflex to Microscopic and Culture - Lab Collect; Future  - UA Macroscopic with reflex to Microscopic and Culture - Lab Collect  - UA Microscopic with Reflex to Culture  - Urine Culture    Weight loss  Nutritional supplement with boost or alternative.  Assessment of protein will be made with prealbumin level.  - Prealbumin; Future  - Prealbumin    Hyperlipidemia LDL goal <100  Will reassess cholesterol management.  Continuing atorvastatin at this time.  - Lipid panel reflex to direct LDL Non-fasting; Future  - Hepatic panel (Albumin, ALT, AST, Bili, Alk Phos, TP); Future  - Lipid panel reflex to direct LDL Non-fasting  - Hepatic panel (Albumin, ALT, AST, Bili, Alk Phos, TP)    BCC (basal cell carcinoma), face  Recent treatment  to basal cell carcinoma on the face/forehead.  Scabbed areas noted currently.  She does have follow-up with dermatology    Paroxysmal supraventricular tachycardia (H24)  SVT ablation June 2023.  No recent symptoms.    Alzheimer's dementia without behavioral disturbance, psychotic disturbance, mood disturbance, or anxiety, unspecified dementia severity, unspecified timing of dementia onset (H)  Resides in assisted living.  Significant family involvement is also present.  No concerns about safety.  Monitor nutritional status over time.    Multiple myeloma, remission status unspecified (H)  Recently discontinued chemotherapy due to heme response of normalized light chains with side effects and quality of life issues on the treatments.  Management of this through [facility]. Repeat labs due in October    Patient has been advised of split billing requirements and indicates understanding: Yes        Counseling  Appropriate preventive services were addressed with this patient via screening, questionnaire, or discussion as appropriate for fall prevention, nutrition, physical activity, Tobacco-use cessation, social engagement, weight loss and cognition.  Checklist reviewing preventive services available has been given to the patient.  Reviewed patient's diet, addressing concerns and/or questions.   The patient was instructed to see the dentist every 6 months.   Discussed possible causes of fatigue. Updated plan of care.  Patient reported difficulty with activities of daily living were addressed today.Information on urinary incontinence and treatment options given to patient.       Patient Instructions   Labs today.  I will send results over [employer].     I will update refills when results return.      I will look into the starting to the acyclovir.               Charles Marquis is a 83 year old, presenting for the following:  Medicare Visit     Recent hospitalization following a fall: pt does PT/OT 5-6 days a week. Has fears  of falling.  Has a walker with her most times but at times she will walk away from it.  The most recent fall required EMS to come and get her up.  Numerous bony injuries in the past including femur fracture Nov 2023. Pelvis fracture in multiple places along with head injury requiring stitches - in spring 2024    Recurrent UTI   Was seen at urgent care on 8/16 at the [facility].  Was placed on Bactrim at that time and continued on that medication at the time of the fall - completed last Wednesday AM.  Mild increase in confusion remains.      Boost once daily.  For nutritional support.  Weight is down over the last year but recently has been fairly stable.  Wt Readings from Last 5 Encounters:   08/26/24 51.4 kg (113 lb 6 oz)   04/24/24 52.3 kg (115 lb 6.4 oz)   02/06/24 52.2 kg (115 lb)   01/30/24 52.2 kg (115 lb)   07/10/23 58.1 kg (128 lb)     Multiple myeloma  Chemo recently discontinued due to quality of life issues with the treatments.        Health Care Directive  Patient has a Health Care Directive on file  Advance care planning document is on file and is current.    HPI      Hyperlipidemia Follow-Up    Are you regularly taking any medication or supplement to lower your cholesterol?   Yes- atorvastatin  Are you having muscle aches or other side effects that you think could be caused by your cholesterol lowering medication?  No    Heart Failure Follow-up   Are you experiencing any shortness of breath? No  Are you experiencing any swelling in your legs or feet?  No  Are you using more pillows than usual? No  Do you cough at night?  No  Do you check your weight daily?  No  Have you had a weight change recently?  No  Are you having any of the following side effects from your medications? (Select all that apply)  The patient does not report symptoms of dizziness, fatigue, cough, swelling, or slow heart beat.  Since your last visit, how many times have you gone to the cardiologist, urgent care, emergency  room, or hospital because of your heart failure?   None  Last Echo:   Echo result w/o MOPS: Interpretation Summary There is mild to moderate concentric left ventricular hypertrophy.The visual ejection fraction is 50-55%.Diastolic Doppler findings (E/E' ratio and/or other parameters) suggest leftventricular filling pressures are increased.The right ventricle is normal in structure, function and size.The left atrium is mildly dilated.There is mild (1+) mitral regurgitation.There is mild (1+) tricuspid regurgitation.Right ventricular systolic pressure is elevated, consistent with moderatepulmonary hypertension.There is a bioprosthetic aortic valve.      Chronic Kidney Disease Follow-up    Do you take any over the counter pain medicine?: No        8/26/2024   General Health   How would you rate your overall physical health? (!) FAIR   Feel stress (tense, anxious, or unable to sleep) To some extent      (!) STRESS CONCERN - conflict at residence      8/26/2024   Nutrition   Diet: Low salt            10/17/2022   Exercise   Frequency of exercise: 2-3 days/week            8/26/2024   Social Factors   Frequency of gathering with friends or relatives Twice a week   Worry food won't last until get money to buy more No   Food not last or not have enough money for food? No   Do you have housing? (Housing is defined as stable permanent housing and does not include staying ouside in a car, in a tent, in an abandoned building, in an overnight shelter, or couch-surfing.) Yes   Are you worried about losing your housing? No   Lack of transportation? No   Unable to get utilities (heat,electricity)? No            8/26/2024   Fall Risk   Fallen 2 or more times in the past year? Yes   Trouble with walking or balance? Yes   Gait Speed Test (Document in seconds) 5   Gait Speed Test Interpretation Less than or equal to 5.00 seconds - PASS       Pt encouraged to keep walker with her at at times and have her daughter help when necessary. Pt  currently has PT/OT coming to her house 5-6 times every week to maintain/improve mobility and decrease fall risk.         8/26/2024   Activities of Daily Living- Home Safety   Needs help with the following daily activites Transportation    Shopping    Preparing meals    Housework    Bathing    Laundry    Money management   Safety concerns in the home None of the above       Multiple values from one day are sorted in reverse-chronological order         8/26/2024   Dental   Dentist two times every year? (!) NO            8/26/2024   Hearing Screening   Hearing concerns? None of the above            8/26/2024   Driving Risk Screening   Patient/family members have concerns about driving (!) DECLINE            8/26/2024   General Alertness/Fatigue Screening   Have you been more tired than usual lately? (!) YES            8/26/2024   Urinary Incontinence Screening   Bothered by leaking urine in past 6 months Yes            8/26/2024   TB Screening   Were you born outside of the [place]? No            Today's PHQ-2 Score:       8/26/2024    10:03 AM   PHQ-2 ( 1999 [employer])   Q1: Little interest or pleasure in doing things 1   Q2: Feeling down, depressed or hopeless 1   PHQ-2 Score 2   Q1: Little interest or pleasure in doing things Several days   Q2: Feeling down, depressed or hopeless Several days   PHQ-2 Score 2           8/26/2024   Substance Use   Alcohol more than 3/day or more than 7/wk Not Applicable   Do you have a current opioid prescription? No   How severe/bad is pain from 1 to 10? 4/10   Do you use any other substances recreationally? No        Social History     Tobacco Use    Smoking status: Never    Smokeless tobacco: Never   Vaping Use    Vaping status: Never Used   Substance Use Topics    Alcohol use: No    Drug use: No          Mammogram Screening - After age 74- determine frequency with patient based on health status, life expectancy and patient goals      Fracture Risk Assessment Tool  Link to Frax  Calculator  Use the information below to complete the Frax calculator  : 1940  Sex: female  Weight (kg): 51.4 kg (actual weight)  Height (cm): 147.3 cm  Previous Fragility Fracture:  Yes  History of parent with fractured hip:  No  Current Smoking:  No  Patient has been on glucocorticoids for more than 3 months (5mg/day or more): No  Rheumatoid Arthritis on Problem List:  No  Secondary Osteoporosis on Problem List:  No  Consumes 3 or more units of alcohol per day: No  Femoral Neck BMD (g/cm2)            Reviewed and updated as needed this visit by Provider                    Past Medical History:   Diagnosis Date    Congenital bicuspid aortic valve     H/O aortic valve replacement     23 mm Magna Ease     Hyperlipidemia     Hypertension     Hyperthyroidism     Severe aortic stenosis     Thyroiditis      Past Surgical History:   Procedure Laterality Date    EP ABLATION SVT N/A 2023    Procedure: Ablation Supraventricular Tachycardia;  Surgeon: Aaron Hoffman MD;  Location: [facility] CARDIAC CATH LAB     BP Readings from Last 3 Encounters:   24 139/75   24 133/75   24 (!) 164/81    Wt Readings from Last 3 Encounters:   24 51.4 kg (113 lb 6 oz)   24 52.3 kg (115 lb 6.4 oz)   24 52.2 kg (115 lb)                  Current providers sharing in care for this patient include:  Patient Care Team:  Noemi Sanchez MD as PCP - General (Family Medicine)  Regis Ramirez, RN as Nurse Coordinator (Cardiology)  Denae London MD as MD (Cardiology)  Karen Kaplan, MYRON as Specialty Care Coordinator (Cardiology)  Shy Quintero NP as Nurse Practitioner (Cardiology)  Charlie Herzog MD as Assigned Heart and Vascular Provider  Susan Yi MD as Assigned Nephrology Provider  Amber Brownlee APRN CNP as Assigned PCP    The following health maintenance items are reviewed in Epic and correct as of today:  Health Maintenance   Topic Date Due    HF ACTION  "PLAN  Never done    ZOSTER IMMUNIZATION (1 of 2) 09/24/2009    DTAP/TDAP/TD IMMUNIZATION (1 - Tdap) 06/03/2016    MEDICARE ANNUAL WELLNESS VISIT  10/17/2023    ANNUAL REVIEW OF HM ORDERS  10/17/2023    LIPID  10/31/2023    COVID-19 Vaccine (8 - 2023-24 season) 11/28/2023    ALT  01/30/2024    BMP  08/06/2024    INFLUENZA VACCINE (1) 09/01/2024    MICROALBUMIN  02/06/2025    CBC  02/06/2025    HEMOGLOBIN  02/06/2025    FALL RISK ASSESSMENT  08/26/2025    ADVANCE CARE PLANNING  10/22/2027    DEXA  11/15/2036    TSH W/FREE T4 REFLEX  Completed    PHQ-2 (once per calendar year)  Completed    Pneumococcal Vaccine: 65+ Years  Completed    URINALYSIS  Completed    RSV VACCINE (Pregnancy & 60+)  Completed    HPV IMMUNIZATION  Aged Out    MENINGITIS IMMUNIZATION  Aged Out    RSV MONOCLONAL ANTIBODY  Aged Out         Review of Systems  Constitutional, HEENT, cardiovascular, pulmonary, GI, , musculoskeletal, neuro, skin, endocrine and psych systems are negative, except as otherwise noted.     Objective    Exam  /75 (BP Location: Right arm, Patient Position: Sitting, Cuff Size: Adult Large)   Pulse 78   Temp 98.1  F (36.7  C) (Oral)   Resp 16   Ht 1.473 m (4' 10\")   Wt 51.4 kg (113 lb 6 oz)   SpO2 97%   BMI 23.70 kg/m     Estimated body mass index is 23.7 kg/m  as calculated from the following:    Height as of this encounter: 1.473 m (4' 10\").    Weight as of this encounter: 51.4 kg (113 lb 6 oz).    Physical Exam  GENERAL: alert and no distress  EYES: Eyes grossly normal to inspection, PERRL and conjunctivae and sclerae normal  HENT: ear canals and TM's normal, nose and mouth without ulcers or lesions  NECK: no adenopathy, no asymmetry, masses, or scars  RESP: lungs clear to auscultation - no rales, rhonchi or wheezes  CV: regular rate and rhythm, normal S1 S2, no S3 or S4, no murmur, click or rub, no peripheral edema  MS: no gross musculoskeletal defects noted, no edema  NEURO: Normal strength and tone, " mentation intact and speech normal  PSYCH: mentation appears normal, affect normal/bright        8/26/2024   Mini Cog   Clock Draw Score 2 Normal   3 Item Recall 3 objects recalled   Mini Cog Total Score 5                 Signed Electronically by: Noemi Sanchez MD        This chart was documented by provider using a voice activated software called Dragon in addition to manual typing. There may be vocabulary errors or other grammatical errors due to this.

## 2024-08-27 PROBLEM — I47.10 PAROXYSMAL SUPRAVENTRICULAR TACHYCARDIA (H): Status: ACTIVE | Noted: 2024-08-27

## 2024-08-27 LAB
ALBUMIN SERPL BCG-MCNC: 4.3 G/DL (ref 3.5–5.2)
ALP SERPL-CCNC: 241 U/L (ref 40–150)
ALT SERPL W P-5'-P-CCNC: 17 U/L (ref 0–50)
ANION GAP SERPL CALCULATED.3IONS-SCNC: 8 MMOL/L (ref 7–15)
AST SERPL W P-5'-P-CCNC: 25 U/L (ref 0–45)
BILIRUB DIRECT SERPL-MCNC: <0.2 MG/DL (ref 0–0.3)
BILIRUB SERPL-MCNC: 0.5 MG/DL
BUN SERPL-MCNC: 27.2 MG/DL (ref 8–23)
CALCIUM SERPL-MCNC: 9.5 MG/DL (ref 8.8–10.4)
CHLORIDE SERPL-SCNC: 105 MMOL/L (ref 98–107)
CHOLEST SERPL-MCNC: 137 MG/DL
CREAT SERPL-MCNC: 0.86 MG/DL (ref 0.51–0.95)
EGFRCR SERPLBLD CKD-EPI 2021: 66 ML/MIN/1.73M2
FASTING STATUS PATIENT QL REPORTED: YES
FASTING STATUS PATIENT QL REPORTED: YES
GLUCOSE SERPL-MCNC: 101 MG/DL (ref 70–99)
HCO3 SERPL-SCNC: 26 MMOL/L (ref 22–29)
HDLC SERPL-MCNC: 61 MG/DL
LDLC SERPL CALC-MCNC: 57 MG/DL
NONHDLC SERPL-MCNC: 76 MG/DL
POTASSIUM SERPL-SCNC: 4.4 MMOL/L (ref 3.4–5.3)
PREALB SERPL-MCNC: 23.2 MG/DL (ref 20–40)
PROT SERPL-MCNC: 6.6 G/DL (ref 6.4–8.3)
SODIUM SERPL-SCNC: 139 MMOL/L (ref 135–145)
TRIGL SERPL-MCNC: 96 MG/DL

## 2024-08-27 NOTE — RESULT ENCOUNTER NOTE
Urine testing is suggestive of a residual or repeat urinary tract infection.  Because you were just treated with an antibiotic, I would recommend we await the urine culture results which should be available later today or tomorrow in order to determine the best treatment for this.  Please call or MyChart message me if you have any questions.      IAINK

## 2024-08-28 ENCOUNTER — MYC MEDICAL ADVICE (OUTPATIENT)
Dept: FAMILY MEDICINE | Facility: CLINIC | Age: 84
End: 2024-08-28
Payer: MEDICARE

## 2024-08-28 ENCOUNTER — TELEPHONE (OUTPATIENT)
Dept: FAMILY MEDICINE | Facility: CLINIC | Age: 84
End: 2024-08-28
Payer: MEDICARE

## 2024-08-28 DIAGNOSIS — R41.0 CONFUSION: Primary | ICD-10-CM

## 2024-08-28 LAB — BACTERIA UR CULT: NORMAL

## 2024-08-28 RX ORDER — ATORVASTATIN CALCIUM 10 MG/1
10 TABLET, FILM COATED ORAL DAILY
Qty: 90 TABLET | Refills: 3 | Status: SHIPPED | OUTPATIENT
Start: 2024-08-28

## 2024-08-28 RX ORDER — POTASSIUM CHLORIDE 1.5 G/1.58G
POWDER, FOR SOLUTION ORAL
Qty: 90 EACH | Refills: 1 | Status: SHIPPED | OUTPATIENT
Start: 2024-08-28

## 2024-08-28 NOTE — TELEPHONE ENCOUNTER
Called patient's daughterRashmi per PCP. No consent to communicate on file. Advised that she and her mom will need to fill one out the next time they come in to the office in order for us to take calls or give information out about her mom. Daughter verbalized understanding.    Relayed message below from PCP. Daughter verbalized understanding, but wanting to know what they should do next because her mom is still very confused. Would like to investigate this further.    Will route message to PCP for review and advisement.    Rosy Crowell RN, BSN  [facility]         ----- Message from Noemi Sanchez sent at 8/28/2024  2:30 PM CDT -----  Call patient daughter.      The final urine culture does not show an infection.  The mixture of urogenital rosario is a contamination from collection.  I would not recommend treatment now but would recommend a recheck of the urine in 1 week OR at any time before then if change in symptoms are noted.  Please call or MyChart message me if you have any questions.      LINDA

## 2024-08-28 NOTE — RESULT ENCOUNTER NOTE
Call patient daughter.      The final urine culture does not show an infection.  The mixture of urogenital rosario is a contamination from collection.  I would not recommend treatment now but would recommend a recheck of the urine in 1 week OR at any time before then if change in symptoms are noted.  Please call or MyChart message me if you have any questions.      IAINK

## 2024-08-28 NOTE — RESULT ENCOUNTER NOTE
You kidney function is normal. Blood sugar is normal considering you were not fully fasting for this appointment.  Liver testing is normal.    The elevated alkaline phosphatase level is likely related to the bones and potentially the multiple myeloma.  It is lower than testing in July.    Your protein level is normal indicating good nutrition.  Continue the boost supplements as previous.   Your cholesterol levels are under good control.  I will update your cholesterol medication.  Please call or [employer] message me if you have any questions.      LINDA

## 2024-08-29 LAB — TSH SERPL DL<=0.005 MIU/L-ACNC: 0.54 UIU/ML (ref 0.3–4.2)

## 2024-09-25 DIAGNOSIS — N17.0 ACUTE KIDNEY INJURY (AKI) WITH ACUTE TUBULAR NECROSIS (ATN) (H): Primary | ICD-10-CM

## 2024-09-25 DIAGNOSIS — N18.31 CHRONIC KIDNEY DISEASE, STAGE 3A (H): ICD-10-CM

## 2024-09-26 ENCOUNTER — MEDICAL CORRESPONDENCE (OUTPATIENT)
Dept: HEALTH INFORMATION MANAGEMENT | Facility: CLINIC | Age: 84
End: 2024-09-26
Payer: MEDICARE

## 2024-09-26 ENCOUNTER — TELEPHONE (OUTPATIENT)
Dept: FAMILY MEDICINE | Facility: CLINIC | Age: 84
End: 2024-09-26
Payer: MEDICARE

## 2024-09-26 ENCOUNTER — TRANSFERRED RECORDS (OUTPATIENT)
Dept: HEALTH INFORMATION MANAGEMENT | Facility: CLINIC | Age: 84
End: 2024-09-26
Payer: MEDICARE

## 2024-09-26 NOTE — TELEPHONE ENCOUNTER
Forms/Letter Request    [facility]       Do we have the form/letter: Yes: Placed in providers in box for review    Where did/will the form come from? form was faxed in    When is form/letter needed by: ASAP    How would you like the form/letter returned: Fax : [phone]

## 2024-09-26 NOTE — TELEPHONE ENCOUNTER
Forms/Letter Request    Type of form/letter: OTHER: [facility] Provider orders Cert 9/27/23-9/26/24       Do we have the form/letter: Yes: Placed in providers in box for review    Where did/will the form come from? form was faxed in    When is form/letter needed by: ASAP    How would you like the form/letter returned: Fax : [phone]

## 2024-10-01 ENCOUNTER — LAB (OUTPATIENT)
Dept: LAB | Facility: CLINIC | Age: 84
End: 2024-10-01
Payer: MEDICARE

## 2024-10-01 ENCOUNTER — OFFICE VISIT (OUTPATIENT)
Dept: NEPHROLOGY | Facility: CLINIC | Age: 84
End: 2024-10-01
Payer: MEDICARE

## 2024-10-01 VITALS — HEART RATE: 80 BPM | DIASTOLIC BLOOD PRESSURE: 82 MMHG | OXYGEN SATURATION: 98 % | SYSTOLIC BLOOD PRESSURE: 178 MMHG

## 2024-10-01 DIAGNOSIS — N18.31 STAGE 3A CHRONIC KIDNEY DISEASE (H): ICD-10-CM

## 2024-10-01 DIAGNOSIS — N18.31 STAGE 3A CHRONIC KIDNEY DISEASE (H): Primary | ICD-10-CM

## 2024-10-01 DIAGNOSIS — N17.0 ACUTE KIDNEY INJURY (AKI) WITH ACUTE TUBULAR NECROSIS (ATN) (H): ICD-10-CM

## 2024-10-01 DIAGNOSIS — N18.31 CHRONIC KIDNEY DISEASE, STAGE 3A (H): ICD-10-CM

## 2024-10-01 LAB
ALBUMIN MFR UR ELPH: 60.3 MG/DL
ALBUMIN SERPL BCG-MCNC: 4.3 G/DL (ref 3.5–5.2)
ALBUMIN UR-MCNC: 50 MG/DL
ANION GAP SERPL CALCULATED.3IONS-SCNC: 10 MMOL/L (ref 7–15)
APPEARANCE UR: ABNORMAL
BACTERIA #/AREA URNS HPF: ABNORMAL /HPF
BILIRUB UR QL STRIP: NEGATIVE
BUN SERPL-MCNC: 19.1 MG/DL (ref 8–23)
CALCIUM SERPL-MCNC: 10 MG/DL (ref 8.8–10.4)
CHLORIDE SERPL-SCNC: 105 MMOL/L (ref 98–107)
COLOR UR AUTO: ABNORMAL
CREAT SERPL-MCNC: 0.87 MG/DL (ref 0.51–0.95)
CREAT UR-MCNC: 54.4 MG/DL
CREAT UR-MCNC: 56.5 MG/DL
EGFRCR SERPLBLD CKD-EPI 2021: 65 ML/MIN/1.73M2
ERYTHROCYTE [DISTWIDTH] IN BLOOD BY AUTOMATED COUNT: 14.6 % (ref 10–15)
GLUCOSE SERPL-MCNC: 126 MG/DL (ref 70–99)
GLUCOSE UR STRIP-MCNC: NEGATIVE MG/DL
HCO3 SERPL-SCNC: 26 MMOL/L (ref 22–29)
HCT VFR BLD AUTO: 35 % (ref 35–47)
HGB BLD-MCNC: 11.2 G/DL (ref 11.7–15.7)
HGB UR QL STRIP: NEGATIVE
HYALINE CASTS #/AREA URNS LPF: ABNORMAL /LPF
KETONES UR STRIP-MCNC: NEGATIVE MG/DL
LEUKOCYTE ESTERASE UR QL STRIP: ABNORMAL
MCH RBC QN AUTO: 29.7 PG (ref 26.5–33)
MCHC RBC AUTO-ENTMCNC: 32 G/DL (ref 31.5–36.5)
MCV RBC AUTO: 93 FL (ref 78–100)
MICROALBUMIN UR-MCNC: 375 MG/L
MICROALBUMIN/CREAT UR: 689.34 MG/G CR (ref 0–25)
NITRATE UR QL: NEGATIVE
PH UR STRIP: 5.5 [PH] (ref 5–7)
PHOSPHATE SERPL-MCNC: 3.5 MG/DL (ref 2.5–4.5)
PLATELET # BLD AUTO: 136 10E3/UL (ref 150–450)
POTASSIUM SERPL-SCNC: 4 MMOL/L (ref 3.4–5.3)
PROT/CREAT 24H UR: 1.07 MG/MG CR (ref 0–0.2)
PTH-INTACT SERPL-MCNC: 56 PG/ML (ref 15–65)
RBC # BLD AUTO: 3.77 10E6/UL (ref 3.8–5.2)
RBC #/AREA URNS AUTO: ABNORMAL /HPF
SKIP: ABNORMAL
SODIUM SERPL-SCNC: 141 MMOL/L (ref 135–145)
SP GR UR STRIP: 1.01 (ref 1–1.03)
SQUAMOUS #/AREA URNS AUTO: ABNORMAL /LPF
UROBILINOGEN UR STRIP-MCNC: NORMAL MG/DL
WBC # BLD AUTO: 7.6 10E3/UL (ref 4–11)
WBC #/AREA URNS AUTO: ABNORMAL /HPF
WBC CLUMPS #/AREA URNS HPF: PRESENT /HPF

## 2024-10-01 PROCEDURE — 80069 RENAL FUNCTION PANEL: CPT

## 2024-10-01 PROCEDURE — 84156 ASSAY OF PROTEIN URINE: CPT

## 2024-10-01 PROCEDURE — G2211 COMPLEX E/M VISIT ADD ON: HCPCS | Performed by: INTERNAL MEDICINE

## 2024-10-01 PROCEDURE — 82570 ASSAY OF URINE CREATININE: CPT

## 2024-10-01 PROCEDURE — 99214 OFFICE O/P EST MOD 30 MIN: CPT | Performed by: INTERNAL MEDICINE

## 2024-10-01 PROCEDURE — 81001 URINALYSIS AUTO W/SCOPE: CPT

## 2024-10-01 PROCEDURE — 36415 COLL VENOUS BLD VENIPUNCTURE: CPT

## 2024-10-01 PROCEDURE — 82043 UR ALBUMIN QUANTITATIVE: CPT

## 2024-10-01 PROCEDURE — 83970 ASSAY OF PARATHORMONE: CPT

## 2024-10-01 PROCEDURE — 85027 COMPLETE CBC AUTOMATED: CPT

## 2024-10-01 NOTE — PATIENT INSTRUCTIONS
It was a pleasure taking care of you today.  I've included a brief summary of our discussion and care plan from today's visit below.  Please review this information with your primary care provider.     My recommendations are summarized as follows:  -Kidney numbers are stable     Who do I call with any questions after my visit?  Please be in touch if there are any further questions that arise following today's visit.  There are multiple ways to contact your nephrology care team.       During business hours, you may reach your Nephrology Care Team or schedule or reschedule an appointment or lab at [phone].       If you need to schedule imaging, please call [phone].   To schedule a COVID test, please call [phone].     You can always send a secure message through [employer]. [employer] messages are answered by your nurse or doctor typically within 24-48 hours. Please allow extra time on weekends and holidays.       For urgent/emergent questions after business hours, you may reach the on-call Nephrology Fellow by contacting the [facility]  at [phone].     How will I get the results of any tests ordered?    You will receive all of your results.  If you have signed up for [employer], any tests ordered at your visit will be available to you once resulted on [employer]. Typically the physician reviews them and may or may not make further recommendations. If there are urgent results that require a change in your care plan, your physician or nurse will call you to discuss the next steps. If you are not on [employer], a letter may be generated and mailed to you with your results.

## 2024-10-01 NOTE — NURSING NOTE
Kandis Gallagher's goals for this visit include:   Chief Complaint   Patient presents with    RECHECK     Follow up CKD        She requests these members of her care team be copied on today's visit information: no     PCP: Noemi Sanchez    Referring Provider:  Susan Yi MD  [address] [place] [id number]    BP (!) 178/82 (BP Location: Right arm, Patient Position: Chair, Cuff Size: Adult Regular)   Pulse 80   SpO2 98%     Do you need any medication refills at today's visit? No     LYUDMILA Arellano   Neph/Pulm Teams  [facility]

## 2024-10-01 NOTE — PROGRESS NOTES
CC: Proteinuria, AL Amyloidosis, Multiple Mylopma    HPI:   I had the pleasure today of seeing Kandis Gallagher today. She is a 83 year old female who presents to follow up on her proteinuria. She is here with her daughter Rashmi. She lives at an assisted nursing facility.  She has two sisters close by in the area.    Her medical history is significant for a bicuspid aortic valve which progressed to severe aortic stenosis for which she underwent surgical AVR with a 23 mm Magna Ease bioprosthesis in August 2016 at [facility].  She also has HFpEF, dyslipidemia, hx of HTN now off medications, prediabetes, anemia and multiple myeloma/AL amyloidosis for which she has resumed chemotherapy. 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     Myeloma History:  She presented with several vertebral fractures in Oct 2020 and in Dec 2020, when she underwent L1, L2, and L3 vertebroplasty. Her NICOLAS at [facility] showed monoclonal kappa free light chains by immunofixation. In February 2021, she presented with persistent low back pain and leg weakness. MRI of the thoracic and lumbar spine showed a new T11 compression fracture. She underwent T11, L4, and L5 vertebroplasty. Repeat labs revealed free kappa light chain of 25.3, lambda free light chains 1.24, kappa/lambda ratio 20.4.   In March 2021, she had a bone marrow biopsy revealing plasma cell proliferative disorder with approximately 5% kappa light chain-restricted plasma cells and slightly hypercellular bone marrow. I could not locate a kidney biopsy done at that point. It seems to me that at that point, she was considered smoldering myeloma and treatment was on hold. A repeat BM biopsy was done April 2022. Follow up with imaging done 4/25/22  revealed osteolytic lesions, the largest in the iliac wing and there was new finding of nephrotic range proteinuria on 24 hour urine 3.6 grams.  Repeat BM Biopsy in April 2022 revealed amyloidosis involving small periosteal  vessels, plasma cell myeloma with 10% kappa light chain restricted plasma cells. 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 Abdominal fat aspirate was negative for amyloid. Subcutaneous daratumumab, bortezomib and oral dex initiated 6/1/22 but she could not tolerate it (only two sessions-developed fluid retension and neurotoxicity per patient) and it was on hold but she has resumed her chemotherapy.     Since I last saw her in clinic, she has had several admissions for falls with a pelvic fracture and acute cystitis.  No interventions were done.  Her birthday was last week and she had a party at her facility set up by her daughter.  Sometimes she tries to look without her walker, which causes some imbalance.  She is strict can go 1 post drink each day.  She has been working with PT and just graduated.  The swelling in her legs has been stable.  She takes the furosemide 20 mg daily.     She denies any nausea or vomiting. She denies any SOB, chest pain, dizziness or skin rash. She is complaining of urinary frequency but no dysuria or hematuria.    Her BP is slightly elevated in clinic today however her blood pressure at home has been around 130-150 systolic over 60-70. Given her cardiac amyloidosis, her BB, WAYNE and CCB were stopped. She is not on spironolactone either    Social history:  Retired    Has 4 children (daughter is the only one in [place]) and 6 grandkids    Wt Readings from Last 4 Encounters:   08/26/24 51.4 kg (113 lb 6 oz)   04/24/24 52.3 kg (115 lb 6.4 oz)   02/06/24 52.2 kg (115 lb)   01/30/24 52.2 kg (115 lb)       Allergies   Allergen Reactions    Carvedilol Other (See Comments)    Lidocaine Nausea and Vomiting     Vomiting with general anesthesia    Lisinopril Cough    Seasonal Allergies Other (See Comments)     Problems with narcotics - oxygen desaturates    Spironolactone        Current Outpatient Medications   Medication Sig Dispense Refill    acetaminophen (TYLENOL)  325 MG tablet Take 650 mg by mouth      Acetaminophen 500 MG PACK Take 500 mg by mouth 4 times daily as needed (pain).      acyclovir (ZOVIRAX) 400 MG tablet TAKE 1 TABLET(400 MG) BY MOUTH EVERY 12 HOURS 180 tablet 0    aspirin 81 MG EC tablet Take 81 mg by mouth      atorvastatin (LIPITOR) 10 MG tablet Take 1 tablet (10 mg) by mouth daily. 90 tablet 3    Biotin w/ Vitamins C & E (HAIR SKIN & NAILS GUMMIES PO)       Calcium-Phosphorus-Vitamin D (CALCIUM/VITAMIN D3/ADULT GUMMY PO)       Cholecalciferol (VITAMIN D3) 25 MCG (1000 UT) CAPS Take 2,000 Units by mouth daily 180 capsule 3    diphenhydrAMINE HCl (BENADRYL PO) Take by mouth every 30 days      ferrous sulfate (FEROSUL) 325 (65 Fe) MG tablet Take 1 tablet (325 mg) by mouth daily (with breakfast). 90 tablet 0    furosemide (LASIX) 20 MG tablet Take 1 tablet (20 mg) by mouth daily. 90 tablet 1    pantoprazole (PROTONIX) 40 MG EC tablet Take 1 tablet (40 mg) by mouth daily. 30-60 min prior to meal. 90 tablet 1    pantoprazole sodium (PROTONIX) 40 MG packet Take 1 packet by mouth daily      polyethylene glycol (MIRALAX) 17 GM/Dose powder Take 17 g by mouth daily as needed for constipation. 510 g 1    potassium chloride (KLOR-CON) 20 MEQ packet MIX 1 PACKET IN LIQUID AND TAKE BY MOUTH ONCE EVERY DAY AS DIRECTED. 90 each 1    Probiotic Product (PROBIOTIC GUMMIES PO)        No current facility-administered medications for this visit.       Past Medical History:   Diagnosis Date    Congenital bicuspid aortic valve     H/O aortic valve replacement     23 mm Magna Ease     Hyperlipidemia     Hypertension     Hyperthyroidism     Severe aortic stenosis     Thyroiditis        Past Surgical History:   Procedure Laterality Date    EP ABLATION SVT N/A 6/7/2023    Procedure: Ablation Supraventricular Tachycardia;  Surgeon: Aaron Hoffman MD;  Location[facility] CARDIAC CATH LAB       Social History     Tobacco Use    Smoking status: Never    Smokeless tobacco: Never    Vaping Use    Vaping status: Never Used   Substance Use Topics    Alcohol use: No    Drug use: No       Family History   Problem Relation Age of Onset    Breast Cancer Sister     Ovarian Cancer Sister     Colon Cancer Brother     Liver Cancer Brother     Breast Cancer Daughter     Colon Cancer Daughter        ROS: A 12 system review of systems was negative other than noted here or above.     Exam:  BP (!) 178/82 (BP Location: Right arm, Patient Position: Chair, Cuff Size: Adult Regular)   Pulse 80   SpO2 98%      BP Readings from Last 6 Encounters:   10/01/24 (!) 178/82   08/26/24 139/75   04/24/24 133/75   02/06/24 (!) 164/81   01/30/24 134/68   07/10/23 124/60     Wt Readings from Last 4 Encounters:   08/26/24 51.4 kg (113 lb 6 oz)   04/24/24 52.3 kg (115 lb 6.4 oz)   02/06/24 52.2 kg (115 lb)   01/30/24 52.2 kg (115 lb)     GENERAL APPEARANCE: alert and no distress  EYES: PERRL  HENT: mouth without ulcers or lesions  NECK: supple, no adenopathy  RESP: lungs clear to auscultation - no rales, rhonchi or wheezes  CV: regular rhythm, normal rate, no rub   ABDOMEN:  soft, nontender, no HSM or masses and bowel sounds normal  MS: extremities normal- no gross deformities noted, no evidence of inflammation in joints, no muscle tenderness  SKIN: no rash  NEURO: Normal strength and tone, sensory exam grossly normal, mentation intact and speech normal  PSYCH: mentation appears normal. 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 Her urine protein to creat ratio is almost  9 g/g back in October 2022 but trending down nicely. UPCR from today is 1 g/g; this is concomitant with an improvement in her serum albumin up to 4.3 g/dl today. Her creatinine remains stable at 0.8 mg/dl. Her kappa light chain has normalized. She has been off  daratumumab.     Problem #2 CKD: Her creatinine overestimates her GFR given her increased total body water and small muscle mass.  Her GFR by cystatin C is 33 ml/min. This GFR is to be used when dosing medications.     Problem #3: Volume overload/edema: it looks much better. She has been taking lasix 20 mg daily and follow with lymphedema clinic. She has cut down on her fluid intake.    Problem #4: Anticoagulation: Her albumin is up to 4.3g/dl. No indication for anticoagulation at this level.    Problem #5 HTN: she was found to have cardiac involvement by her amyloidosis.  She has been taken off her antihypertensive medications. With cardiac amyloidosis, it is advised to avoid CCB's, beta-blockers and ACE inhibitors.   Given her friability and her need for a wheelchair, I am okay with a blood pressure target around 150/90 and it has been around that at home.    Problem # 6 left adnexal mass: An incidental finding on her recent CT, 5.6 cm in diameter.  This correlates with CT findings from 2/15/2021. This is probably benign.     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 Glasgow at the [facility].  She received treatment with daratumumab, bortezomib, and dexamethasone (cyclophosphamide omitted for tolerability), as part of the Andromeda regimen for AL amyloid. Her treatment has been complicated and interrupted for multiple hospitalizations for COVID, symptomatic bradycardia, GI bleed, type 2 MI associated debility, RSV hospitalization. Her last daratumumab was  in October 2023.  her kappa light chain has normalized.     The total time of this encounter amounted to 39 minutes on the day of the encounter 10/1/2024. This time included face to face time spent with the patient, preparatory work and chart review, ordering tests, and performing post visit documentation.    The longitudinal plan of care for this patient was addressed during this visit. Due to the added complexity in  care, I will continue to support the patient with subsequent management of this condition(s) and with the ongoing continuity of care of this condition(s).     *This dictation was prepared in part using Dragon recognition software.  As a result errors may occur. When identified these transcription errors have been corrected.  While every attempt is made to correct errors during dictation, errors may still exist.     Susan Yi MD   [facility]   Department of Medicine   Division of Renal Disease and Hypertension

## 2024-10-02 ENCOUNTER — TELEPHONE (OUTPATIENT)
Dept: NEPHROLOGY | Facility: CLINIC | Age: 84
End: 2024-10-02
Payer: MEDICARE

## 2024-10-02 NOTE — CONFIDENTIAL NOTE
Sent Apliiqt (1st Attempt) for the patient to call back and schedule the following:    Appointment type: return nephrology  Provider:   Return date:  04/01/2025  Specialty phone number: [phone]  Additional appointment(s) needed: schedule labs prior to return visit  Additonal Notes: follow up in 6 months, around 04/01/2025.       Unable to leave voicemail for patient to call back. Patient's voicemail inbox full. Sent [employer] message    Lucy Giron   Gastroenterology, Infectious Diseases, Nephrology, Pulmonology and Rheumatology Specialties  [facility]

## 2024-10-14 DIAGNOSIS — C90.00 MULTIPLE MYELOMA, REMISSION STATUS UNSPECIFIED (H): ICD-10-CM

## 2024-10-14 RX ORDER — ACYCLOVIR 400 MG/1
TABLET ORAL
Qty: 180 TABLET | Refills: 0 | Status: SHIPPED | OUTPATIENT
Start: 2024-10-14

## 2024-10-15 ENCOUNTER — MYC MEDICAL ADVICE (OUTPATIENT)
Dept: FAMILY MEDICINE | Facility: CLINIC | Age: 84
End: 2024-10-15
Payer: MEDICARE

## 2024-10-15 DIAGNOSIS — N39.0 RECURRENT UTI: Primary | ICD-10-CM

## 2024-10-16 ENCOUNTER — TELEPHONE (OUTPATIENT)
Dept: FAMILY MEDICINE | Facility: CLINIC | Age: 84
End: 2024-10-16
Payer: MEDICARE

## 2024-10-16 NOTE — TELEPHONE ENCOUNTER
LVM x1 for daughter to call back to schedule lab visit and either virtual Or in-person visit a couple days after labs with Dr Sanchez.

## 2024-10-16 NOTE — TELEPHONE ENCOUNTER
Please call patient re:  lab visit now/soon with appointment with me following.  Ok for virtual if they are ok with this.  If prefer in person, ok to use 40 min appointment on 10/31.    LINDA

## 2024-10-16 NOTE — TELEPHONE ENCOUNTER
There is another MyChart encounter in regards pt. We will call pt to schedule. Provider already stated if they can do virtual then we can do in person.

## 2024-10-16 NOTE — TELEPHONE ENCOUNTER
Reason for Call:  Appointment Request    Patient requesting this type of appt:  Follow-Up Uti    Requested provider: Noemi Sanchez    Reason patient unable to be scheduled: Not within requested timeframe    When does patient want to be seen/preferred time: 3-7 days OR ASAP    Comments: PLEASE CALL HER BACK TO HELP FIT THEM INTO THE SCHEDULE.    Could we send this information to you in [employer] or would you prefer to receive a phone call?:   Patient would prefer a phone call   Okay to leave a detailed message?: Yes at Home number on file [phone] (home)    Call taken on 10/16/2024 at 1:26 PM by Karen Ricardo

## 2024-10-21 ENCOUNTER — LAB (OUTPATIENT)
Dept: LAB | Facility: CLINIC | Age: 84
End: 2024-10-21
Payer: MEDICARE

## 2024-10-21 DIAGNOSIS — N39.0 RECURRENT UTI: ICD-10-CM

## 2024-10-21 LAB
ALBUMIN UR-MCNC: ABNORMAL MG/DL
APPEARANCE UR: CLEAR
BACTERIA #/AREA URNS HPF: ABNORMAL /HPF
BILIRUB UR QL STRIP: NEGATIVE
COLOR UR AUTO: YELLOW
GLUCOSE UR STRIP-MCNC: NEGATIVE MG/DL
HGB UR QL STRIP: NEGATIVE
HYALINE CASTS #/AREA URNS LPF: ABNORMAL /LPF
KETONES UR STRIP-MCNC: NEGATIVE MG/DL
LEUKOCYTE ESTERASE UR QL STRIP: NEGATIVE
MUCOUS THREADS #/AREA URNS LPF: PRESENT /LPF
NITRATE UR QL: NEGATIVE
PH UR STRIP: 5.5 [PH] (ref 5–7)
RBC #/AREA URNS AUTO: ABNORMAL /HPF
SP GR UR STRIP: 1.02 (ref 1–1.03)
SQUAMOUS #/AREA URNS AUTO: ABNORMAL /LPF
UROBILINOGEN UR STRIP-ACNC: 0.2 E.U./DL
WBC #/AREA URNS AUTO: ABNORMAL /HPF

## 2024-10-21 PROCEDURE — 81001 URINALYSIS AUTO W/SCOPE: CPT

## 2024-10-22 NOTE — RESULT ENCOUNTER NOTE
This urine testing does not indicate sign of infection.  It is much improved compared with previous.  Please call or MyChart message me if you have any questions.      IAINK

## 2024-10-24 ENCOUNTER — HOSPITAL ENCOUNTER (OUTPATIENT)
Dept: CARDIOLOGY | Facility: CLINIC | Age: 84
Discharge: HOME | End: 2024-10-24
Attending: INTERNAL MEDICINE | Admitting: INTERNAL MEDICINE
Payer: MEDICARE

## 2024-10-24 DIAGNOSIS — I89.0 LYMPHEDEMA: ICD-10-CM

## 2024-10-24 DIAGNOSIS — I47.10 PAROXYSMAL SUPRAVENTRICULAR TACHYCARDIA (H): ICD-10-CM

## 2024-10-24 DIAGNOSIS — Q23.81 BICUSPID AORTIC VALVE: ICD-10-CM

## 2024-10-24 DIAGNOSIS — Z95.2 S/P AVR (AORTIC VALVE REPLACEMENT): ICD-10-CM

## 2024-10-24 DIAGNOSIS — E78.2 MIXED HYPERLIPIDEMIA: ICD-10-CM

## 2024-10-24 DIAGNOSIS — I50.22 CHRONIC SYSTOLIC HEART FAILURE (H): ICD-10-CM

## 2024-10-24 DIAGNOSIS — I10 PRIMARY HYPERTENSION: ICD-10-CM

## 2024-10-24 LAB — LVEF ECHO: NORMAL

## 2024-10-24 PROCEDURE — 93306 TTE W/DOPPLER COMPLETE: CPT | Mod: 26 | Performed by: INTERNAL MEDICINE

## 2024-10-24 PROCEDURE — 93306 TTE W/DOPPLER COMPLETE: CPT

## 2024-10-30 ENCOUNTER — VIRTUAL VISIT (OUTPATIENT)
Dept: FAMILY MEDICINE | Facility: CLINIC | Age: 84
End: 2024-10-30
Payer: MEDICARE

## 2024-10-30 DIAGNOSIS — N39.0 RECURRENT UTI: Primary | ICD-10-CM

## 2024-10-30 PROCEDURE — 99214 OFFICE O/P EST MOD 30 MIN: CPT | Mod: 95 | Performed by: FAMILY MEDICINE

## 2024-10-30 PROCEDURE — G2211 COMPLEX E/M VISIT ADD ON: HCPCS | Mod: 95 | Performed by: FAMILY MEDICINE

## 2024-10-30 NOTE — PATIENT INSTRUCTIONS
"Your last urine testing did not show any evidence of infection.    To prevent infection in the future:  Avoid use of the Poise pads. Change Depends after any leaking.  Wipe front to back not back to front when cleansing.  Empty bladder frequently.    You can drink cranberry juice on a regular basis and attempt to prevent infections.    Referral to urology is given.  Someone will contact you to set up that appointment.    I do have a \"standing order\" for urinalysis to be done at the first sign of symptoms.  When you are ready to  the initial cup for collection of the urine, reach out via [employer] or phone call to the nurse and ask for the cup to be made available.  Following that initial collection, you can request to get a new cup when you drop off a full cup at the time of testing.  You can schedule a lab appointment for drop off of the urine once it is collected.    "

## 2024-10-30 NOTE — PROGRESS NOTES
"  If patient has telephone visit, have they been educated on video visit as preferred visit method and offered to change to video visit? NOT APPLICABLE        Instructions Relayed to Patient by Virtual Roomer:     Patient is active on [email or web address]:   Relayed following to patient: \"It looks like you are active on [email or web address], are you able to join the visit this way? If not, do you need us to send you a link now or would you like your provider to send a link via text or email when they are ready to initiate the visit?\"      Patient Confirmed they will join visit via: [employer]  Reminded patient to ensure they were logged on to virtual visit by arrival time listed.   Asked if patient has flexibility to initiate visit sooner than arrival time: patient is unable to initiate visit earlier than arrival time     If pediatric virtual visit, ensured pediatric patient along with parent/guardian will be present for video visit.     Patient offered the website [email or web address] and/or phone number to [email or web address] Help line: [phone]      Answers submitted by the patient for this visit:  General Questionnaire (Submitted on 10/29/2024)  Chief Complaint: Chronic problems general questions HPI Form  What is the reason for your visit today? : To discuss best course for preventing UTIs going forward  How many servings of fruits and vegetables do you eat daily?: 2-3  On average, how many sweetened beverages do you drink each day (Examples: soda, juice, sweet tea, etc.  Do NOT count diet or artificially sweetened beverages)?: 1  How many minutes a day do you exercise enough to make your heart beat faster?: 20 to 29  How many days a week do you exercise enough to make your heart beat faster?: 3 or less  How many days per week do you miss taking your medication?: 0  Questionnaire about: Chronic problems general questions HPI Form (Submitted on 10/29/2024)  Chief Complaint: Chronic problems general questions HPI Form  Kandis suarez " 84 year old who is being evaluated via a billable video visit.    How would you like to obtain your AVS? MyChart  If the video visit is dropped, the invitation should be resent by: Text to cell phone: [phone]  Will anyone else be joining your video visit? Yes: Dawna Caraballo. How would they like to receive their invitation? Text to cell phone: [phone]      Assessment & Plan     Recurrent UTI  Discussed methods of preventing recurrent UTIs as well as a plan for future evaluation of any symptoms that may be present.  Maintaining good hydration, frequent voiding, changing undergarments if incontinence has occurred, and hygiene care were all discussed.  Because the symptoms can come on fairly quickly for her, I have put a standing order for repeat urinalysis to be done if symptoms occur.  Daughter can  a cup and have it on hand to use if needed for recurrent symptoms.  Once she has dropped off 1 cup she can  a new 1 to have available for any subsequent symptoms.  In addition we did discuss the potential to see urology for any other recommendations.  I am a little reluctant to use a prophylactic antibiotic this due to potential side effects and resistance of bacteria developing.  We briefly discussed topical estrogen use in women although I am not sure she would be able to do this on her own.  Referral to urology for their recommendations as planned and referral was given.  - UA Macroscopic with reflex to Microscopic and Culture - Lab Collect; Standing  - Adult Urology  Referral; Future            Patient Instructions   Your last urine testing did not show any evidence of infection.    To prevent infection in the future:  Avoid use of the Poise pads. Change Depends after any leaking.  Wipe front to back not back to front when cleansing.  Empty bladder frequently.    You can drink cranberry juice on a regular basis and attempt to prevent infections.    Referral to urology is given.   "Someone will contact you to set up that appointment.    I do have a \"standing order\" for urinalysis to be done at the first sign of symptoms.  When you are ready to  the initial cup for collection of the urine, reach out via MyChart or phone call to the nurse and ask for the cup to be made available.  Following that initial collection, you can request to get a new cup when you drop off a full cup at the time of testing.  You can schedule a lab appointment for drop off of the urine once it is collected.      The longitudinal plan of care for the diagnosis(es)/condition(s) as documented were addressed during this visit. Due to the added complexity in care, I will continue to support Kandis in the subsequent management and with ongoing continuity of care.    Review of prior external note(s) from [facility] information from [facility] reviewed  Review of the result(s) of each unique test - serial urinalysis, cbc  Ordering of each unique test  39 minutes spent by me on the date of the encounter doing chart review, review of outside records, review of test results, interpretation of tests, patient visit, documentation, and discussion with family         Subjective   Kandis is a 84 year old, presenting for the following health issues:  Results (UTI)        10/30/2024    10:42 AM   Additional Questions   Roomed by Bam   Accompanied by self       Video Start Time:  11:26 AM    History of Present Illness       Reason for visit:  To discuss best course for preventing UTIs going forward    She eats 2-3 servings of fruits and vegetables daily.She consumes 1 sweetened beverage(s) daily.She exercises with enough effort to increase her heart rate 20 to 29 minutes per day.  She exercises with enough effort to increase her heart rate 3 or less days per week.   She is taking medications regularly.     No current symptoms.  First symptoms when present - confusion/paranoia.    Last infection was early October.  Treatment " at that time was sulfameth-trimeth susp with good toleration and benefit.      UTI every 2-4 months.    Prior to the most recent one there was severe infection requiring hospitalization and rehab stay subsequent to that to regain strength.    Over this year has had 5-6 episodes.  Changed to alternative probiotic recently.            Review of Systems  Constitutional, HEENT, cardiovascular, pulmonary, gi and gu systems are negative, except as otherwise noted.      Objective           Vitals:  No vitals were obtained today due to virtual visit.    Physical Exam   GENERAL: alert and no distress  EYES: Eyes grossly normal to inspection.  No discharge or erythema, or obvious scleral/conjunctival abnormalities.  RESP: No audible wheeze, cough, or visible cyanosis.    SKIN: Visible skin clear. No significant rash, abnormal pigmentation or lesions.  NEURO: Cranial nerves grossly intact.  Mentation and speech appropriate for age.  PSYCH: Appropriate affect, tone, and pace of words    Hospital Outpatient Visit on 10/24/2024   Component Date Value Ref Range Status    LVEF  10/24/2024 40-45%   Final         Video-Visit Details    Type of service:  Video Visit   Video End Time: 11:56 AM  Originating Location (pt. Location): Home    Distant Location (provider location):  Off-site  Platform used for Video Visit: [employer]  Signed Electronically by: Noemi Sanchez MD            This chart was documented by provider using a voice activated software called Dragon in addition to manual typing. There may be vocabulary errors or other grammatical errors due to this.

## 2024-11-21 ENCOUNTER — MYC MEDICAL ADVICE (OUTPATIENT)
Dept: FAMILY MEDICINE | Facility: CLINIC | Age: 84
End: 2024-11-21
Payer: MEDICARE

## 2024-11-25 ENCOUNTER — OFFICE VISIT (OUTPATIENT)
Dept: CARDIOLOGY | Facility: CLINIC | Age: 84
End: 2024-11-25
Attending: INTERNAL MEDICINE
Payer: MEDICARE

## 2024-11-25 VITALS
HEIGHT: 58 IN | SYSTOLIC BLOOD PRESSURE: 138 MMHG | DIASTOLIC BLOOD PRESSURE: 78 MMHG | BODY MASS INDEX: 25.19 KG/M2 | OXYGEN SATURATION: 98 % | HEART RATE: 86 BPM | WEIGHT: 120 LBS

## 2024-11-25 DIAGNOSIS — Q23.81 BICUSPID AORTIC VALVE: ICD-10-CM

## 2024-11-25 DIAGNOSIS — I50.22 CHRONIC SYSTOLIC HEART FAILURE (H): Primary | ICD-10-CM

## 2024-11-25 DIAGNOSIS — E61.1 IRON DEFICIENCY: ICD-10-CM

## 2024-11-25 DIAGNOSIS — E78.2 MIXED HYPERLIPIDEMIA: ICD-10-CM

## 2024-11-25 DIAGNOSIS — I10 PRIMARY HYPERTENSION: ICD-10-CM

## 2024-11-25 DIAGNOSIS — I47.10 PAROXYSMAL SUPRAVENTRICULAR TACHYCARDIA (H): ICD-10-CM

## 2024-11-25 DIAGNOSIS — Z95.2 S/P AVR (AORTIC VALVE REPLACEMENT): ICD-10-CM

## 2024-11-25 PROCEDURE — 99214 OFFICE O/P EST MOD 30 MIN: CPT | Performed by: NURSE PRACTITIONER

## 2024-11-25 RX ORDER — FERROUS SULFATE 325(65) MG
325 TABLET ORAL
Qty: 90 TABLET | Refills: 0 | Status: SHIPPED | OUTPATIENT
Start: 2024-11-25

## 2024-11-25 NOTE — PROGRESS NOTES
Cardiology Clinic Progress Note  Kandis Gallagher MRN# [id number]   YOB: 1940 Age: 84 year old   Primary Cardiologist: Dr. Herzog  Reason for visit: 6 month follow up             Assessment and Plan:       1.  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  Chronic HFrEF  - LVEF: 40 to 45% (10/2024), previously noted to be down to 35% (11/2023  - NYHA class II  -Etiology: unknown, possibly mixed ischemic and non-ischemic  - Fluid status: euvolemic; suspected dry weight: ~115-120  - Diuretic regimen: furosemide 20mg daily   - Ischemic evaluation: None  - Guideline directed medical therapy:  - Beta blocker: contraindicated 2/2 to #1    - ACEI/ARB/ARNI: contraindicated 2/2 to #1    - Aldactone antagonist:previously taking, discontinued due to concern for possible side effect  - SGLT2 inhibitor: Contraindication due to recurrent UTI    3.  Bilateral lymphedema, stable  -Significantly improved following working with lymphedema clinic which she has now completed    4.  Bicuspid aortic valve, s/p SAVR with 23 mm Magna Ease bioprosthesis in August 2016 at [facility]   -10/24 TTE with peak gradient 9, mean gradient of 16 mmHg across the aortic valve    5.  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  Hypertension, controlled, off anti-hypertensives     7.  Dyslipidemia, well-controlled  -8/2024 lipid panel with LDL 57    8.  Thrombocytopenia  -Platelets stable in the 120-130 range    9.  Multiple myeloma  -Follows with [facility]     10.  CAD  -2018 coronary angiogram showed normal left main, large LAD with no significant disease, small caliber distal LAD, no significant disease in circumflex, large  dominant RCA with 20% proximal disease    Kandis appears to be stable from a cardiovascular standpoint.  Of note her daughter said that she did have C. difficile during that time she thought her diarrhea was caused by spironolactone, so the future we could consider resuming that as part of her medical therapy if needed.  Currently she is dealing with a UTI and was recently ordered on Bactrim, however so we will hold off on adding additional medications right now that could affect her renal function or electrolytes.  Thankfully she had no signs or symptoms of decompensated heart failure today.  I will repeat the results of her echocardiogram with Dr. Herzog and see if he like this repeated sooner than 1 years time.  Otherwise we will continue her current medical regimen and plan to see her back in 6 months    Plan:  Continue aspirin and atorvastatin for medical management of CAD  Continue furosemide 20 mg daily with 20mg daily as needed for weight gain   Avoid beta-blockers, ACE/ARB/ARNI, digoxin, and nondihydropyridine calcium channel blockers given cardiac amyloid  Annual echocardiograms for surveillance of aortic valve    Follow up: Follow up with Dr. Herzog in 6 months         History of Presenting Illness:    Kandis Gallagher is a very pleasant 84 year old female with a history of nonobstructive coronary artery disease paroxysmal SVT, hypertension, hyperlipidemia, bicuspid aortic valve s/p AVR 2016, lymphedema, multiple myeloma, amyloidosis with cardiac involvement.    Patient underwent a bicuspid aortic surgical repair in 2016 at [facility].  Her aortic root diameters have been normal since that time.  She also has AL amyloidosis with cardiac and renal diagnosed by cardiac MRI.  She also underwent a successful AVNRT ablation in 2023 following Zio patch monitor that showed 165 runs of SVT up to 50 minutes in length and 29 runs of NSVT.    That time she has been hospitalized for  multiple falls and urinary tract infections.  She had an echocardiogram done during 1 of these hospitalizations in November 2023 which showed normal left ventricular size and ejection fraction of 35% mild reduced RV systolic function, moderate mitral regurgitation, normal bioprosthetic aortic valve function.  When she saw Dr. Herzog in April 2024 she is not having any concerning cardiac symptoms and her furosemide was slightly reduced to 20 mg daily.    We repeated an echocardiogram last month which showed a slight improvement in her ejection fraction to 40 to 45% with inferior wall akinesis and mild aortic valve stenosis.    Patient is here today for a 6 month follow up. She presents with her daughter today. She notes she is confused today as she is dealing with a UTI. Patient reports feeling good.  Denies any acute weight gains, shortness of breath, orthopnea, or PND.  Her lower extremity edema has been stable and she is no longer working with lymphedema therapy    Patient denies chest pain or chest tightness. Denies dizziness, lightheadedness or other presyncopal symptoms. Denies tachycardia or palpitations.     Most recent labs from 10/2024 show sodium 141, potassium 4.0, BUN 19.1, creatinine 0.7, GFR 65, hemoglobin 11.2, platelets 126.     Blood pressure 138/78 and HR 86 in clinic today.        Recent Hospitalizations   4/2020 for pelvic fracture from fall  6/2024 septic shock with UTI  8/2024 ER visits for 2 falls          Social History      Social History     Socioeconomic History    Marital status:      Spouse name: Not on file    Number of children: Not on file    Years of education: Not on file    Highest education level: Not on file   Occupational History    Not on file   Tobacco Use    Smoking status: Never    Smokeless tobacco: Never   Vaping Use    Vaping status: Never Used   Substance and Sexual Activity    Alcohol use: No    Drug use: No    Sexual activity: Not on file   Other Topics Concern     Not on file   Social History Narrative    Not on file     Social Drivers of Health     Financial Resource Strain: Low Risk  (8/26/2024)    Financial Resource Strain     Within the past 12 months, have you or your family members you live with been unable to get utilities (heat, electricity) when it was really needed?: No   Food Insecurity: Low Risk  (8/26/2024)    Food Insecurity     Within the past 12 months, did you worry that your food would run out before you got money to buy more?: No     Within the past 12 months, did the food you bought just not last and you didn t have money to get more?: No   Transportation Needs: Low Risk  (8/26/2024)    Transportation Needs     Within the past 12 months, has lack of transportation kept you from medical appointments, getting your medicines, non-medical meetings or appointments, work, or from getting things that you need?: No   Physical Activity: Inactive (10/25/2023)    [facility]    Exercise Vital Sign     Days of Exercise per Week: 0 days     Minutes of Exercise per Session: 0 min   Stress: Stress Concern Present (8/26/2024)    [employer] [place] [employer] - Occupational Stress Questionnaire     Feeling of Stress : To some extent   Social Connections: Unknown (8/26/2024)    Social Connection and Isolation Panel [NHANES]     Frequency of Communication with Friends and Family: Not on file     Frequency of Social Gatherings with Friends and Family: Twice a week     Attends Jain Services: Not on file     Active Member of Clubs or Organizations: Not on file     Attends Club or Organization Meetings: Not on file     Marital Status: Not on file   Recent Concern: Social Connections - Socially Isolated (8/18/2024)    [facility] [employer] [facility]    Social Connections     Do you often feel lonely or isolated from those around you?: 4   Interpersonal Safety: Not At Risk (4/29/2024)    Received from [place]  [facility]    Humiliation, Afraid, Rape, and Kick questionnaire     Fear of Current or Ex-Partner: No     Emotionally Abused: No     Physically Abused: No     Sexually Abused: No   Housing Stability: Low Risk  (8/26/2024)    Housing Stability     Do you have housing? : Yes     Are you worried about losing your housing?: No            Review of Systems:   Skin:  Positive for bruising   Eyes:  Negative    ENT:  Negative    Respiratory:  Positive for shortness of breath;dyspnea on exertion  Cardiovascular:    Positive for;edema;syncope or near-syncope  Gastroenterology: Negative    Genitourinary:  Positive for urinary tract infection;urinary frequency  Musculoskeletal:  Positive for    Neurologic:  Positive for    Psychiatric:  Positive for sleep disturbances  Heme/Lymph/Imm:  Negative    Endocrine:  Negative           Physical Exam:   Vitals: /78   Pulse 86   Ht 1.473 m (4' 10\")   Wt 54.4 kg (120 lb)   SpO2 98%   BMI 25.08 kg/m     Wt Readings from Last 4 Encounters:   11/25/24 54.4 kg (120 lb)   08/26/24 51.4 kg (113 lb 6 oz)   04/24/24 52.3 kg (115 lb 6.4 oz)   02/06/24 52.2 kg (115 lb)     GEN: well nourished, in no acute distress.  HEENT:  Pupils equal, round. Sclerae nonicteric.   NECK: Supple, no masses appreciated. NoJVD with patient supine.  C/V:  Regular rate and rhythm,  crisp valvular click  RESP: Respirations are unlabored. Clear to auscultation bilaterally without wheezing, rales, or rhonchi.  GI: Abdomen soft, nontender.  EXTREM: trace bilateral LE lymphedema .  NEURO: Alert and oriented, cooperative.  SKIN: Warm and dry.        Data:     LIPID RESULTS:  Lab Results   Component Value Date    CHOL 137 08/26/2024    CHOL 237 (H) 10/06/2020    HDL 61 08/26/2024    HDL 65 10/06/2020    LDL 57 08/26/2024     (H) 10/06/2020    TRIG 96 08/26/2024    TRIG 158 (H) 10/06/2020     LIVER ENZYME RESULTS:  Lab Results   Component Value Date    AST 25 08/26/2024    AST 16 07/13/2018    ALT 17 08/26/2024 " "   ALT <5 (L) 10/06/2020     CBC RESULTS:  Lab Results   Component Value Date    WBC 7.6 10/01/2024    WBC 6.7 09/04/2018    RBC 3.77 (L) 10/01/2024    RBC 4.05 09/04/2018    HGB 11.2 (L) 10/01/2024    HGB 12.3 09/04/2018    HCT 35.0 10/01/2024    HCT 35.9 09/04/2018    MCV 93 10/01/2024    MCV 89 09/04/2018    MCH 29.7 10/01/2024    MCH 30.4 09/04/2018    MCHC 32.0 10/01/2024    MCHC 34.3 09/04/2018    RDW 14.6 10/01/2024    RDW 13.2 09/04/2018     (L) 10/01/2024     09/04/2018     BMP RESULTS:  Lab Results   Component Value Date     10/01/2024     09/04/2018    POTASSIUM 4.0 10/01/2024    POTASSIUM 4.1 02/07/2023    POTASSIUM 3.6 09/04/2018    CHLORIDE 105 10/01/2024    CHLORIDE 109 02/07/2023    CHLORIDE 102 01/30/2023    CHLORIDE 100 09/04/2018    CO2 26 10/01/2024    CO2 26 02/07/2023    CO2 29 09/04/2018    ANIONGAP 10 10/01/2024    ANIONGAP 6 02/07/2023    ANIONGAP 8 09/04/2018     (H) 10/01/2024    GLC 90 02/07/2023     (H) 09/04/2018    BUN 19.1 10/01/2024    BUN 23 02/07/2023    BUN 13 09/04/2018    CR 0.87 10/01/2024    CR 0.94 09/04/2018    GFRESTIMATED 65 10/01/2024    GFRESTIMATED 58 (L) 09/04/2018    GFRESTBLACK 70 09/04/2018    DOUG 10.0 10/01/2024    DOUG 9.2 09/04/2018      A1C RESULTS:  No results found for: \"A1C\"  INR RESULTS:  Lab Results   Component Value Date    INR 0.97 09/04/2018            Medications     Current Outpatient Medications   Medication Sig Dispense Refill    acetaminophen (TYLENOL) 325 MG tablet Take 650 mg by mouth      Acetaminophen 500 MG PACK Take 500 mg by mouth 4 times daily as needed (pain).      acyclovir (ZOVIRAX) 400 MG tablet TAKE 1 TABLET(400 MG) BY MOUTH EVERY 12 HOURS 180 tablet 0    aspirin 81 MG EC tablet Take 81 mg by mouth      atorvastatin (LIPITOR) 10 MG tablet Take 1 tablet (10 mg) by mouth daily. 90 tablet 3    Biotin w/ Vitamins C & E (HAIR SKIN & NAILS GUMMIES PO)       Calcium-Phosphorus-Vitamin D (CALCIUM/VITAMIN " D3/ADULT GUMMY PO)       Cholecalciferol (VITAMIN D3) 25 MCG (1000 UT) CAPS Take 2,000 Units by mouth daily 180 capsule 3    diphenhydrAMINE HCl (BENADRYL PO) Take by mouth every 30 days      ferrous sulfate (FEROSUL) 325 (65 Fe) MG tablet Take 1 tablet (325 mg) by mouth daily (with breakfast). 90 tablet 0    furosemide (LASIX) 20 MG tablet Take 1 tablet (20 mg) by mouth daily. 90 tablet 1    pantoprazole (PROTONIX) 40 MG EC tablet Take 1 tablet (40 mg) by mouth daily. 30-60 min prior to meal. 90 tablet 1    polyethylene glycol (MIRALAX) 17 GM/Dose powder Take 17 g by mouth daily as needed for constipation. 510 g 1    potassium chloride (KLOR-CON) 20 MEQ packet MIX 1 PACKET IN LIQUID AND TAKE BY MOUTH ONCE EVERY DAY AS DIRECTED. 90 each 1    Probiotic Product (PROBIOTIC GUMMIES PO)             Past Medical History     Past Medical History:   Diagnosis Date    Congenital bicuspid aortic valve     H/O aortic valve replacement     23 mm Magna Ease     Hyperlipidemia     Hypertension     Hyperthyroidism     Severe aortic stenosis     Thyroiditis      Past Surgical History:   Procedure Laterality Date    EP ABLATION SVT N/A 6/7/2023    Procedure: Ablation Supraventricular Tachycardia;  Surgeon: Aaron Hoffman MD;  Location:  [facility]     Family History   Problem Relation Age of Onset    Breast Cancer Sister     Ovarian Cancer Sister     Colon Cancer Brother     Liver Cancer Brother     Breast Cancer Daughter     Colon Cancer Daughter             Allergies   Carvedilol, Lidocaine, Lisinopril, Seasonal allergies, and Spironolactone        Shy Quintero NP  [facility]

## 2024-11-25 NOTE — PATIENT INSTRUCTIONS
Today's Recommendations    We will continue your current amount of diuretic (furosemide)  Keep any eye on your weight and blood pressure  Your heart ultrasound showed the pumping function is slightly better than the study that was done last year   Continue all medications without changes.  Please follow up with Dr. Herzog in 6 months.    Please send a [employer] message or call [phone] to the RN team with questions or concerns.     Scheduling number [phone]  JOANN Olvera, CNP

## 2024-11-25 NOTE — TELEPHONE ENCOUNTER
Prescription approved per [facility] Refill Protocol.  Sofaí Zaman RN  [facility]

## 2024-11-25 NOTE — LETTER
11/25/2024    Noemi Sanchez MD  [address]  [place] [address]    RE: Kandis Gallagher       Dear Colleague,     I had the pleasure of seeing Kandis Gallagher in the [facility].    Cardiology Clinic Progress Note  Kandis Gallagher MRN# [id number]   YOB: 1940 Age: 84 year old   Primary Cardiologist: Dr. Herzog  Reason for visit: 6 month follow up             Assessment and Plan:       1.  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  Chronic HFrEF  - LVEF: 40 to 45% (10/2024), previously noted to be down to 35% (11/2023  - NYHA class II  -Etiology: unknown, possibly mixed ischemic and non-ischemic  - Fluid status: euvolemic; suspected dry weight: ~115-120  - Diuretic regimen: furosemide 20mg daily   - Ischemic evaluation: None  - Guideline directed medical therapy:  - Beta blocker: contraindicated 2/2 to #1    - ACEI/ARB/ARNI: contraindicated 2/2 to #1    - Aldactone antagonist:previously taking, discontinued due to concern for possible side effect  - SGLT2 inhibitor: Contraindication due to recurrent UTI    3.  Bilateral lymphedema, stable  -Significantly improved following working with lymphedema clinic which she has now completed    4.  Bicuspid aortic valve, s/p SAVR with 23 mm Magna Ease bioprosthesis in August 2016 at [facility]   -10/24 TTE with peak gradient 9, mean gradient of 16 mmHg across the aortic valve    5.  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  Hypertension, controlled, off anti-hypertensives     7.  Dyslipidemia, well-controlled  -8/2024 lipid panel with LDL 57    8.  Thrombocytopenia  -Platelets stable in the 120-130 range    9.   Multiple myeloma  -Follows with [facility]     10.  CAD  -2018 coronary angiogram showed normal left main, large LAD with no significant disease, small caliber distal LAD, no significant disease in circumflex, large dominant RCA with 20% proximal disease    Kandis appears to be stable from a cardiovascular standpoint.  Of note her daughter said that she did have C. difficile during that time she thought her diarrhea was caused by spironolactone, so the future we could consider resuming that as part of her medical therapy if needed.  Currently she is dealing with a UTI and was recently ordered on Bactrim, however so we will hold off on adding additional medications right now that could affect her renal function or electrolytes.  Thankfully she had no signs or symptoms of decompensated heart failure today.  I will repeat the results of her echocardiogram with Dr. Herzog and see if he like this repeated sooner than 1 years time.  Otherwise we will continue her current medical regimen and plan to see her back in 6 months    Plan:  Continue aspirin and atorvastatin for medical management of CAD  Continue furosemide 20 mg daily with 20mg daily as needed for weight gain   Avoid beta-blockers, ACE/ARB/ARNI, digoxin, and nondihydropyridine calcium channel blockers given cardiac amyloid  Annual echocardiograms for surveillance of aortic valve    Follow up: Follow up with Dr. Herzog in 6 months         History of Presenting Illness:    Kandis Gallagher is a very pleasant 84 year old female with a history of nonobstructive coronary artery disease paroxysmal SVT, hypertension, hyperlipidemia, bicuspid aortic valve s/p AVR 2016, lymphedema, multiple myeloma, amyloidosis with cardiac involvement.    Patient underwent a bicuspid aortic surgical repair in 2016 at [facility].  Her aortic root diameters have been normal since that time.  She also has AL amyloidosis with cardiac and renal diagnosed by  cardiac MRI.  She also underwent a successful AVNRT ablation in 2023 following Zio patch monitor that showed 165 runs of SVT up to 50 minutes in length and 29 runs of NSVT.    That time she has been hospitalized for multiple falls and urinary tract infections.  She had an echocardiogram done during 1 of these hospitalizations in November 2023 which showed normal left ventricular size and ejection fraction of 35% mild reduced RV systolic function, moderate mitral regurgitation, normal bioprosthetic aortic valve function.  When she saw Dr. Herzog in April 2024 she is not having any concerning cardiac symptoms and her furosemide was slightly reduced to 20 mg daily.    We repeated an echocardiogram last month which showed a slight improvement in her ejection fraction to 40 to 45% with inferior wall akinesis and mild aortic valve stenosis.    Patient is here today for a 6 month follow up. She presents with her daughter today. She notes she is confused today as she is dealing with a UTI. Patient reports feeling good.  Denies any acute weight gains, shortness of breath, orthopnea, or PND.  Her lower extremity edema has been stable and she is no longer working with lymphedema therapy    Patient denies chest pain or chest tightness. Denies dizziness, lightheadedness or other presyncopal symptoms. Denies tachycardia or palpitations.     Most recent labs from 10/2024 show sodium 141, potassium 4.0, BUN 19.1, creatinine 0.7, GFR 65, hemoglobin 11.2, platelets 126.     Blood pressure 138/78 and HR 86 in clinic today.        Recent Hospitalizations   4/2020 for pelvic fracture from fall  6/2024 septic shock with UTI  8/2024 ER visits for 2 falls          Social History      Social History     Socioeconomic History     Marital status:      Spouse name: Not on file     Number of children: Not on file     Years of education: Not on file     Highest education level: Not on file   Occupational History     Not on file   Tobacco  Use     Smoking status: Never     Smokeless tobacco: Never   Vaping Use     Vaping status: Never Used   Substance and Sexual Activity     Alcohol use: No     Drug use: No     Sexual activity: Not on file   Other Topics Concern     Not on file   Social History Narrative     Not on file     Social Drivers of Health     Financial Resource Strain: Low Risk  (8/26/2024)    Financial Resource Strain      Within the past 12 months, have you or your family members you live with been unable to get utilities (heat, electricity) when it was really needed?: No   Food Insecurity: Low Risk  (8/26/2024)    Food Insecurity      Within the past 12 months, did you worry that your food would run out before you got money to buy more?: No      Within the past 12 months, did the food you bought just not last and you didn t have money to get more?: No   Transportation Needs: Low Risk  (8/26/2024)    Transportation Needs      Within the past 12 months, has lack of transportation kept you from medical appointments, getting your medicines, non-medical meetings or appointments, work, or from getting things that you need?: No   Physical Activity: Inactive (10/25/2023)    [facility]    Exercise Vital Sign      Days of Exercise per Week: 0 days      Minutes of Exercise per Session: 0 min   Stress: Stress Concern Present (8/26/2024)    [employer] - Occupational Stress Questionnaire      Feeling of Stress : To some extent   Social Connections: Unknown (8/26/2024)    Social Connection and Isolation Panel [NHANES]      Frequency of Communication with Friends and Family: Not on file      Frequency of Social Gatherings with Friends and Family: Twice a week      Attends [employer] Services: Not on file      Active Member of Clubs or Organizations: Not on file      Attends Club or Organization Meetings: Not on file      Marital Status: Not on file   Recent Concern: Social Connections - Socially  "Isolated (8/18/2024)    Received from [facility]    Social Connections      Do you often feel lonely or isolated from those around you?: 4   Interpersonal Safety: Not At Risk (4/29/2024)    Received from [facility]    Humiliation, Afraid, Rape, and Kick questionnaire      Fear of Current or Ex-Partner: No      Emotionally Abused: No      Physically Abused: No      Sexually Abused: No   Housing Stability: Low Risk  (8/26/2024)    Housing Stability      Do you have housing? : Yes      Are you worried about losing your housing?: No            Review of Systems:   Skin:  Positive for bruising   Eyes:  Negative    ENT:  Negative    Respiratory:  Positive for shortness of breath;dyspnea on exertion  Cardiovascular:    Positive for;edema;syncope or near-syncope  Gastroenterology: Negative    Genitourinary:  Positive for urinary tract infection;urinary frequency  Musculoskeletal:  Positive for    Neurologic:  Positive for    Psychiatric:  Positive for sleep disturbances  Heme/Lymph/Imm:  Negative    Endocrine:  Negative           Physical Exam:   Vitals: /78   Pulse 86   Ht 1.473 m (4' 10\")   Wt 54.4 kg (120 lb)   SpO2 98%   BMI 25.08 kg/m     Wt Readings from Last 4 Encounters:   11/25/24 54.4 kg (120 lb)   08/26/24 51.4 kg (113 lb 6 oz)   04/24/24 52.3 kg (115 lb 6.4 oz)   02/06/24 52.2 kg (115 lb)     GEN: well nourished, in no acute distress.  HEENT:  Pupils equal, round. Sclerae nonicteric.   NECK: Supple, no masses appreciated. NoJVD with patient supine.  C/V:  Regular rate and rhythm,  crisp valvular click  RESP: Respirations are unlabored. Clear to auscultation bilaterally without wheezing, rales, or rhonchi.  GI: Abdomen soft, nontender.  EXTREM: trace bilateral LE lymphedema .  NEURO: Alert and oriented, cooperative.  SKIN: Warm and dry.        Data:     LIPID RESULTS:  Lab Results   Component Value Date    CHOL 137 08/26/2024    CHOL 237 (H) 10/06/2020    HDL 61 " "08/26/2024    HDL 65 10/06/2020    LDL 57 08/26/2024     (H) 10/06/2020    TRIG 96 08/26/2024    TRIG 158 (H) 10/06/2020     LIVER ENZYME RESULTS:  Lab Results   Component Value Date    AST 25 08/26/2024    AST 16 07/13/2018    ALT 17 08/26/2024    ALT <5 (L) 10/06/2020     CBC RESULTS:  Lab Results   Component Value Date    WBC 7.6 10/01/2024    WBC 6.7 09/04/2018    RBC 3.77 (L) 10/01/2024    RBC 4.05 09/04/2018    HGB 11.2 (L) 10/01/2024    HGB 12.3 09/04/2018    HCT 35.0 10/01/2024    HCT 35.9 09/04/2018    MCV 93 10/01/2024    MCV 89 09/04/2018    MCH 29.7 10/01/2024    MCH 30.4 09/04/2018    MCHC 32.0 10/01/2024    MCHC 34.3 09/04/2018    RDW 14.6 10/01/2024    RDW 13.2 09/04/2018     (L) 10/01/2024     09/04/2018     BMP RESULTS:  Lab Results   Component Value Date     10/01/2024     09/04/2018    POTASSIUM 4.0 10/01/2024    POTASSIUM 4.1 02/07/2023    POTASSIUM 3.6 09/04/2018    CHLORIDE 105 10/01/2024    CHLORIDE 109 02/07/2023    CHLORIDE 102 01/30/2023    CHLORIDE 100 09/04/2018    CO2 26 10/01/2024    CO2 26 02/07/2023    CO2 29 09/04/2018    ANIONGAP 10 10/01/2024    ANIONGAP 6 02/07/2023    ANIONGAP 8 09/04/2018     (H) 10/01/2024    GLC 90 02/07/2023     (H) 09/04/2018    BUN 19.1 10/01/2024    BUN 23 02/07/2023    BUN 13 09/04/2018    CR 0.87 10/01/2024    CR 0.94 09/04/2018    GFRESTIMATED 65 10/01/2024    GFRESTIMATED 58 (L) 09/04/2018    GFRESTBLACK 70 09/04/2018    DOUG 10.0 10/01/2024    DOUG 9.2 09/04/2018      A1C RESULTS:  No results found for: \"A1C\"  INR RESULTS:  Lab Results   Component Value Date    INR 0.97 09/04/2018            Medications     Current Outpatient Medications   Medication Sig Dispense Refill     acetaminophen (TYLENOL) 325 MG tablet Take 650 mg by mouth       Acetaminophen 500 MG PACK Take 500 mg by mouth 4 times daily as needed (pain).       acyclovir (ZOVIRAX) 400 MG tablet TAKE 1 TABLET(400 MG) BY MOUTH EVERY 12 HOURS 180 " tablet 0     aspirin 81 MG EC tablet Take 81 mg by mouth       atorvastatin (LIPITOR) 10 MG tablet Take 1 tablet (10 mg) by mouth daily. 90 tablet 3     Biotin w/ Vitamins C & E (HAIR SKIN & NAILS GUMMIES PO)        Calcium-Phosphorus-Vitamin D (CALCIUM/VITAMIN D3/ADULT GUMMY PO)        Cholecalciferol (VITAMIN D3) 25 MCG (1000 UT) CAPS Take 2,000 Units by mouth daily 180 capsule 3     diphenhydrAMINE HCl (BENADRYL PO) Take by mouth every 30 days       ferrous sulfate (FEROSUL) 325 (65 Fe) MG tablet Take 1 tablet (325 mg) by mouth daily (with breakfast). 90 tablet 0     furosemide (LASIX) 20 MG tablet Take 1 tablet (20 mg) by mouth daily. 90 tablet 1     pantoprazole (PROTONIX) 40 MG EC tablet Take 1 tablet (40 mg) by mouth daily. 30-60 min prior to meal. 90 tablet 1     polyethylene glycol (MIRALAX) 17 GM/Dose powder Take 17 g by mouth daily as needed for constipation. 510 g 1     potassium chloride (KLOR-CON) 20 MEQ packet MIX 1 PACKET IN LIQUID AND TAKE BY MOUTH ONCE EVERY DAY AS DIRECTED. 90 each 1     Probiotic Product (PROBIOTIC GUMMIES PO)             Past Medical History     Past Medical History:   Diagnosis Date     Congenital bicuspid aortic valve      H/O aortic valve replacement     23 mm Magna Ease      Hyperlipidemia      Hypertension      Hyperthyroidism      Severe aortic stenosis      Thyroiditis      Past Surgical History:   Procedure Laterality Date     EP ABLATION SVT N/A 6/7/2023    Procedure: Ablation Supraventricular Tachycardia;  Surgeon: Aaron Hoffman MD;  Location:  HEART CARDIAC CATH LAB     Family History   Problem Relation Age of Onset     Breast Cancer Sister      Ovarian Cancer Sister      Colon Cancer Brother      Liver Cancer Brother      Breast Cancer Daughter      Colon Cancer Daughter             Allergies   Carvedilol, Lidocaine, Lisinopril, Seasonal allergies, and Spironolactone        Shy Quintero NP  [facility]       Thank you for  allowing me to participate in the care of your patient.      Sincerely,     Shy Quintero NP     [facility]  cc:   Charlie Herzog MD  [address] [id number]

## 2024-11-29 ENCOUNTER — NURSE TRIAGE (OUTPATIENT)
Dept: FAMILY MEDICINE | Facility: CLINIC | Age: 84
End: 2024-11-29

## 2024-11-29 ENCOUNTER — HOSPITAL ENCOUNTER (INPATIENT)
Facility: CLINIC | Age: 84
DRG: 682 | End: 2024-11-29
Attending: INTERNAL MEDICINE | Admitting: INTERNAL MEDICINE
Payer: MEDICARE

## 2024-11-29 DIAGNOSIS — E53.8 VITAMIN B12 DEFICIENCY (NON ANEMIC): Primary | ICD-10-CM

## 2024-11-29 DIAGNOSIS — K59.00 CONSTIPATION, UNSPECIFIED CONSTIPATION TYPE: ICD-10-CM

## 2024-11-29 PROBLEM — N13.30 HYDRONEPHROSIS: Status: ACTIVE | Noted: 2024-11-29

## 2024-11-29 PROCEDURE — 120N000001 HC R&B MED SURG/OB

## 2024-11-29 PROCEDURE — 258N000003 HC RX IP 258 OP 636: Performed by: INTERNAL MEDICINE

## 2024-11-29 PROCEDURE — 99223 1ST HOSP IP/OBS HIGH 75: CPT | Mod: AI | Performed by: INTERNAL MEDICINE

## 2024-11-29 PROCEDURE — 250N000013 HC RX MED GY IP 250 OP 250 PS 637: Performed by: INTERNAL MEDICINE

## 2024-11-29 RX ORDER — SULFAMETHOXAZOLE AND TRIMETHOPRIM 800; 160 MG/1; MG/1
1 TABLET ORAL 2 TIMES DAILY
Status: ON HOLD | COMMUNITY
End: 2024-12-06

## 2024-11-29 RX ORDER — ACETAMINOPHEN 325 MG/1
650 TABLET ORAL EVERY 4 HOURS PRN
Status: DISCONTINUED | OUTPATIENT
Start: 2024-11-29 | End: 2024-12-06 | Stop reason: HOSPADM

## 2024-11-29 RX ORDER — POLYETHYLENE GLYCOL 3350 17 G/17G
17 POWDER, FOR SOLUTION ORAL 2 TIMES DAILY PRN
Status: DISCONTINUED | OUTPATIENT
Start: 2024-11-29 | End: 2024-12-06 | Stop reason: HOSPADM

## 2024-11-29 RX ORDER — HYDRALAZINE HYDROCHLORIDE 20 MG/ML
10 INJECTION INTRAMUSCULAR; INTRAVENOUS EVERY 4 HOURS PRN
Status: DISCONTINUED | OUTPATIENT
Start: 2024-11-29 | End: 2024-12-06 | Stop reason: HOSPADM

## 2024-11-29 RX ORDER — ONDANSETRON 4 MG/1
4 TABLET, ORALLY DISINTEGRATING ORAL EVERY 6 HOURS PRN
Status: DISCONTINUED | OUTPATIENT
Start: 2024-11-29 | End: 2024-12-06 | Stop reason: HOSPADM

## 2024-11-29 RX ORDER — ACYCLOVIR 400 MG/1
400 TABLET ORAL 2 TIMES DAILY
Status: DISCONTINUED | OUTPATIENT
Start: 2024-11-29 | End: 2024-12-06 | Stop reason: HOSPADM

## 2024-11-29 RX ORDER — ASPIRIN 81 MG/1
81 TABLET, CHEWABLE ORAL DAILY
COMMUNITY

## 2024-11-29 RX ORDER — ONDANSETRON 2 MG/ML
4 INJECTION INTRAMUSCULAR; INTRAVENOUS EVERY 6 HOURS PRN
Status: DISCONTINUED | OUTPATIENT
Start: 2024-11-29 | End: 2024-12-06 | Stop reason: HOSPADM

## 2024-11-29 RX ORDER — ACETAMINOPHEN 650 MG/1
650 SUPPOSITORY RECTAL EVERY 4 HOURS PRN
Status: DISCONTINUED | OUTPATIENT
Start: 2024-11-29 | End: 2024-12-06 | Stop reason: HOSPADM

## 2024-11-29 RX ORDER — LIDOCAINE 40 MG/G
CREAM TOPICAL
Status: DISCONTINUED | OUTPATIENT
Start: 2024-11-29 | End: 2024-12-06 | Stop reason: HOSPADM

## 2024-11-29 RX ORDER — AMOXICILLIN 250 MG
2 CAPSULE ORAL 2 TIMES DAILY PRN
Status: DISCONTINUED | OUTPATIENT
Start: 2024-11-29 | End: 2024-12-06 | Stop reason: HOSPADM

## 2024-11-29 RX ORDER — AMOXICILLIN 250 MG
1 CAPSULE ORAL 2 TIMES DAILY PRN
Status: DISCONTINUED | OUTPATIENT
Start: 2024-11-29 | End: 2024-12-06 | Stop reason: HOSPADM

## 2024-11-29 RX ORDER — PANTOPRAZOLE SODIUM 40 MG/1
40 TABLET, DELAYED RELEASE ORAL DAILY
Status: DISCONTINUED | OUTPATIENT
Start: 2024-11-30 | End: 2024-11-30

## 2024-11-29 RX ORDER — HYDRALAZINE HYDROCHLORIDE 10 MG/1
10 TABLET, FILM COATED ORAL EVERY 4 HOURS PRN
Status: DISCONTINUED | OUTPATIENT
Start: 2024-11-29 | End: 2024-12-06 | Stop reason: HOSPADM

## 2024-11-29 RX ORDER — ASPIRIN 81 MG/1
81 TABLET, CHEWABLE ORAL DAILY
Status: DISCONTINUED | OUTPATIENT
Start: 2024-11-30 | End: 2024-12-06 | Stop reason: HOSPADM

## 2024-11-29 RX ORDER — LACTOBACILLUS RHAMNOSUS GG 10B CELL
1 CAPSULE ORAL DAILY
COMMUNITY

## 2024-11-29 RX ORDER — VITAMIN B COMPLEX
25 TABLET ORAL DAILY
COMMUNITY

## 2024-11-29 RX ORDER — SODIUM CHLORIDE 9 MG/ML
INJECTION, SOLUTION INTRAVENOUS CONTINUOUS
Status: ACTIVE | OUTPATIENT
Start: 2024-11-29 | End: 2024-11-30

## 2024-11-29 RX ORDER — ATORVASTATIN CALCIUM 10 MG/1
10 TABLET, FILM COATED ORAL DAILY
Status: DISCONTINUED | OUTPATIENT
Start: 2024-11-29 | End: 2024-12-06 | Stop reason: HOSPADM

## 2024-11-29 RX ORDER — PROCHLORPERAZINE MALEATE 5 MG/1
5 TABLET ORAL EVERY 6 HOURS PRN
Status: DISCONTINUED | OUTPATIENT
Start: 2024-11-29 | End: 2024-12-06 | Stop reason: HOSPADM

## 2024-11-29 RX ADMIN — ACYCLOVIR 400 MG: 400 TABLET ORAL at 22:39

## 2024-11-29 RX ADMIN — SODIUM CHLORIDE: 900 INJECTION, SOLUTION INTRAVENOUS at 22:29

## 2024-11-29 RX ADMIN — ATORVASTATIN CALCIUM 10 MG: 10 TABLET, FILM COATED ORAL at 22:39

## 2024-11-29 ASSESSMENT — ACTIVITIES OF DAILY LIVING (ADL)
ADLS_ACUITY_SCORE: 68
ADLS_ACUITY_SCORE: 68

## 2024-11-29 NOTE — TELEPHONE ENCOUNTER
General Call      Reason for Call:  UTI    What are your questions or concerns:  was seen in urgent care and put on antibiotic and not getting better    Date of last appointment with provider: 10/30/2024, was seen in [place] at [place] 11/22    Could we send this information to you in [facility] or would you prefer to receive a phone call?:   Patient would prefer a phone call   Okay to leave a detailed message?: Yes at Other phone number:  [phone]

## 2024-11-29 NOTE — TELEPHONE ENCOUNTER
Called patient reached patient's daughter Rashmi (no CTC on file). Rashmi states she is not with patient currently but heading over to patient's home now to take her to ER due to UTI sx are not improving and having some diffculty walking.       Daughter states patient went to UC last Friday for UTI and was started on antibiotics. States on Monday patient was notified of urine culture results and antibiotics switched to Macrobid due to 3 different bacteria found.    Advise Rashmi, due to no CTC on file, writer unable to disclose what call is about and to have patient call clinic back and speak with staff or for patient to give verbal permission to speak with daughter. Advise based on information Rashmi provided, writer agreed with UC/ER for further evaluation. Rashmi verbalized understanding.     MYRON Israel  [facility]

## 2024-11-30 LAB
ALBUMIN SERPL BCG-MCNC: 3.6 G/DL (ref 3.5–5.2)
ALBUMIN UR-MCNC: 20 MG/DL
ALP SERPL-CCNC: 125 U/L (ref 40–150)
ALT SERPL W P-5'-P-CCNC: 20 U/L (ref 0–50)
AMMONIA PLAS-SCNC: 23 UMOL/L (ref 11–51)
AMORPH CRY #/AREA URNS HPF: ABNORMAL /HPF
ANION GAP SERPL CALCULATED.3IONS-SCNC: 11 MMOL/L (ref 7–15)
APPEARANCE UR: CLEAR
AST SERPL W P-5'-P-CCNC: 32 U/L (ref 0–45)
BASE EXCESS BLDV CALC-SCNC: 2.1 MMOL/L (ref -3–3)
BASOPHILS # BLD AUTO: 0.1 10E3/UL (ref 0–0.2)
BASOPHILS NFR BLD AUTO: 1 %
BILIRUB DIRECT SERPL-MCNC: 0.22 MG/DL (ref 0–0.3)
BILIRUB SERPL-MCNC: 0.6 MG/DL
BILIRUB UR QL STRIP: NEGATIVE
BUN SERPL-MCNC: 18.8 MG/DL (ref 8–23)
CALCIUM SERPL-MCNC: 9.4 MG/DL (ref 8.8–10.4)
CHLORIDE SERPL-SCNC: 104 MMOL/L (ref 98–107)
COLOR UR AUTO: ABNORMAL
CREAT SERPL-MCNC: 1.16 MG/DL (ref 0.51–0.95)
EGFRCR SERPLBLD CKD-EPI 2021: 46 ML/MIN/1.73M2
EOSINOPHIL # BLD AUTO: 0.1 10E3/UL (ref 0–0.7)
EOSINOPHIL NFR BLD AUTO: 2 %
ERYTHROCYTE [DISTWIDTH] IN BLOOD BY AUTOMATED COUNT: 13.9 % (ref 10–15)
FLUAV RNA SPEC QL NAA+PROBE: NEGATIVE
FLUBV RNA RESP QL NAA+PROBE: NEGATIVE
GLUCOSE SERPL-MCNC: 89 MG/DL (ref 70–99)
GLUCOSE UR STRIP-MCNC: NEGATIVE MG/DL
HCO3 BLDV-SCNC: 27 MMOL/L (ref 21–28)
HCO3 SERPL-SCNC: 25 MMOL/L (ref 22–29)
HCT VFR BLD AUTO: 35.3 % (ref 35–47)
HGB BLD-MCNC: 11.2 G/DL (ref 11.7–15.7)
HGB UR QL STRIP: NEGATIVE
IMM GRANULOCYTES # BLD: 0 10E3/UL
IMM GRANULOCYTES NFR BLD: 0 %
KETONES UR STRIP-MCNC: NEGATIVE MG/DL
LEUKOCYTE ESTERASE UR QL STRIP: NEGATIVE
LYMPHOCYTES # BLD AUTO: 2.1 10E3/UL (ref 0.8–5.3)
LYMPHOCYTES NFR BLD AUTO: 25 %
MCH RBC QN AUTO: 29.5 PG (ref 26.5–33)
MCHC RBC AUTO-ENTMCNC: 31.7 G/DL (ref 31.5–36.5)
MCV RBC AUTO: 93 FL (ref 78–100)
MONOCYTES # BLD AUTO: 0.5 10E3/UL (ref 0–1.3)
MONOCYTES NFR BLD AUTO: 6 %
NEUTROPHILS # BLD AUTO: 5.5 10E3/UL (ref 1.6–8.3)
NEUTROPHILS NFR BLD AUTO: 67 %
NITRATE UR QL: NEGATIVE
NRBC # BLD AUTO: 0 10E3/UL
NRBC BLD AUTO-RTO: 0 /100
O2/TOTAL GAS SETTING VFR VENT: 0 %
OXYHGB MFR BLDV: 81 % (ref 70–75)
PCO2 BLDV: 44 MM HG (ref 40–50)
PH BLDV: 7.4 [PH] (ref 7.32–7.43)
PH UR STRIP: 7 [PH] (ref 5–7)
PLATELET # BLD AUTO: 142 10E3/UL (ref 150–450)
PO2 BLDV: 48 MM HG (ref 25–47)
POTASSIUM SERPL-SCNC: 3.8 MMOL/L (ref 3.4–5.3)
PROT SERPL-MCNC: 5.8 G/DL (ref 6.4–8.3)
RBC # BLD AUTO: 3.8 10E6/UL (ref 3.8–5.2)
RBC URINE: <1 /HPF
RSV RNA SPEC NAA+PROBE: NEGATIVE
SAO2 % BLDV: 83 % (ref 70–75)
SARS-COV-2 RNA RESP QL NAA+PROBE: NEGATIVE
SODIUM SERPL-SCNC: 140 MMOL/L (ref 135–145)
SP GR UR STRIP: 1.03 (ref 1–1.03)
SQUAMOUS EPITHELIAL: 2 /HPF
T4 FREE SERPL-MCNC: 1.34 NG/DL (ref 0.9–1.7)
TOTAL PROTEIN SERUM FOR ELP: 5.4 G/DL (ref 6.4–8.3)
TSH SERPL DL<=0.005 MIU/L-ACNC: 0.26 UIU/ML (ref 0.3–4.2)
UROBILINOGEN UR STRIP-MCNC: NORMAL MG/DL
VIT B12 SERPL-MCNC: 266 PG/ML (ref 232–1245)
WBC # BLD AUTO: 8.3 10E3/UL (ref 4–11)
WBC URINE: 1 /HPF

## 2024-11-30 PROCEDURE — 82040 ASSAY OF SERUM ALBUMIN: CPT | Performed by: INTERNAL MEDICINE

## 2024-11-30 PROCEDURE — 87637 SARSCOV2&INF A&B&RSV AMP PRB: CPT | Performed by: INTERNAL MEDICINE

## 2024-11-30 PROCEDURE — 80048 BASIC METABOLIC PNL TOTAL CA: CPT | Performed by: INTERNAL MEDICINE

## 2024-11-30 PROCEDURE — 99233 SBSQ HOSP IP/OBS HIGH 50: CPT | Performed by: INTERNAL MEDICINE

## 2024-11-30 PROCEDURE — 82435 ASSAY OF BLOOD CHLORIDE: CPT | Performed by: INTERNAL MEDICINE

## 2024-11-30 PROCEDURE — 250N000013 HC RX MED GY IP 250 OP 250 PS 637: Performed by: INTERNAL MEDICINE

## 2024-11-30 PROCEDURE — 83521 IG LIGHT CHAINS FREE EACH: CPT | Performed by: INTERNAL MEDICINE

## 2024-11-30 PROCEDURE — 84165 PROTEIN E-PHORESIS SERUM: CPT | Mod: 26 | Performed by: STUDENT IN AN ORGANIZED HEALTH CARE EDUCATION/TRAINING PROGRAM

## 2024-11-30 PROCEDURE — 80053 COMPREHEN METABOLIC PANEL: CPT | Performed by: INTERNAL MEDICINE

## 2024-11-30 PROCEDURE — 84443 ASSAY THYROID STIM HORMONE: CPT | Performed by: INTERNAL MEDICINE

## 2024-11-30 PROCEDURE — 84166 PROTEIN E-PHORESIS/URINE/CSF: CPT | Mod: 26 | Performed by: STUDENT IN AN ORGANIZED HEALTH CARE EDUCATION/TRAINING PROGRAM

## 2024-11-30 PROCEDURE — 81001 URINALYSIS AUTO W/SCOPE: CPT | Performed by: INTERNAL MEDICINE

## 2024-11-30 PROCEDURE — 82248 BILIRUBIN DIRECT: CPT | Performed by: INTERNAL MEDICINE

## 2024-11-30 PROCEDURE — 250N000009 HC RX 250: Performed by: INTERNAL MEDICINE

## 2024-11-30 PROCEDURE — 36415 COLL VENOUS BLD VENIPUNCTURE: CPT | Performed by: INTERNAL MEDICINE

## 2024-11-30 PROCEDURE — 120N000001 HC R&B MED SURG/OB

## 2024-11-30 PROCEDURE — 82140 ASSAY OF AMMONIA: CPT | Performed by: INTERNAL MEDICINE

## 2024-11-30 PROCEDURE — 85025 COMPLETE CBC W/AUTO DIFF WBC: CPT | Performed by: INTERNAL MEDICINE

## 2024-11-30 PROCEDURE — 250N000011 HC RX IP 250 OP 636: Performed by: INTERNAL MEDICINE

## 2024-11-30 PROCEDURE — 84439 ASSAY OF FREE THYROXINE: CPT | Performed by: INTERNAL MEDICINE

## 2024-11-30 PROCEDURE — 82607 VITAMIN B-12: CPT | Performed by: INTERNAL MEDICINE

## 2024-11-30 PROCEDURE — 84155 ASSAY OF PROTEIN SERUM: CPT | Performed by: INTERNAL MEDICINE

## 2024-11-30 PROCEDURE — 82805 BLOOD GASES W/O2 SATURATION: CPT | Performed by: INTERNAL MEDICINE

## 2024-11-30 PROCEDURE — 84165 PROTEIN E-PHORESIS SERUM: CPT | Mod: TC | Performed by: STUDENT IN AN ORGANIZED HEALTH CARE EDUCATION/TRAINING PROGRAM

## 2024-11-30 PROCEDURE — 84166 PROTEIN E-PHORESIS/URINE/CSF: CPT | Performed by: STUDENT IN AN ORGANIZED HEALTH CARE EDUCATION/TRAINING PROGRAM

## 2024-11-30 RX ORDER — CEFTRIAXONE 1 G/1
1 INJECTION, POWDER, FOR SOLUTION INTRAMUSCULAR; INTRAVENOUS EVERY 24 HOURS
Status: COMPLETED | OUTPATIENT
Start: 2024-11-30 | End: 2024-12-02

## 2024-11-30 RX ADMIN — ATORVASTATIN CALCIUM 10 MG: 10 TABLET, FILM COATED ORAL at 20:28

## 2024-11-30 RX ADMIN — PANTOPRAZOLE SODIUM 40 MG: 40 INJECTION, POWDER, FOR SOLUTION INTRAVENOUS at 08:01

## 2024-11-30 RX ADMIN — ACYCLOVIR 400 MG: 400 TABLET ORAL at 20:28

## 2024-11-30 RX ADMIN — CEFTRIAXONE SODIUM 1 G: 1 INJECTION, POWDER, FOR SOLUTION INTRAMUSCULAR; INTRAVENOUS at 13:25

## 2024-11-30 ASSESSMENT — ACTIVITIES OF DAILY LIVING (ADL)
ADLS_ACUITY_SCORE: 71
ADLS_ACUITY_SCORE: 71
ADLS_ACUITY_SCORE: 68
ADLS_ACUITY_SCORE: 71
ADLS_ACUITY_SCORE: 76
ADLS_ACUITY_SCORE: 68
ADLS_ACUITY_SCORE: 68
ADLS_ACUITY_SCORE: 76
ADLS_ACUITY_SCORE: 76
ADLS_ACUITY_SCORE: 68
ADLS_ACUITY_SCORE: 71
ADLS_ACUITY_SCORE: 68
ADLS_ACUITY_SCORE: 71
ADLS_ACUITY_SCORE: 68
ADLS_ACUITY_SCORE: 71
ADLS_ACUITY_SCORE: 68
ADLS_ACUITY_SCORE: 68
ADLS_ACUITY_SCORE: 76
ADLS_ACUITY_SCORE: 71
ADLS_ACUITY_SCORE: 71
ADLS_ACUITY_SCORE: 76

## 2024-11-30 NOTE — PROGRESS NOTES
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 Transferred to [facility] 11/29/2024 for further evaluation and treatment.      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  Now developed acute on chronic renal failure.  CT scan of the abdominal pelvis done in outlying hospital, shows new moderate left hydronephrosis and dilatation of the left renal pelvis.  With normal decompression left ureter and symmetrical enhancement of both kidneys finding likely due to UPJ stenosis  Urology is consulted.  [place] [facility] consulted this morning and discussed the case.  She was given IV fluids overnight, noted creatinine improved to 1.16 at this time.  UA normal at this time.  Discontinue Bactrim agree with that.  Mostly because of acute renal failure chronic kidney disease most likely side effect of Bactrim.  I will keep her on IV fluids today.  No procedure planned by the urology at this time.  Patient can eat.         Acute encephalopathy likely metabolic  Possible underlying cognitive impairment/dementia  Patient most likely has some confusion for couple of weeks because of UTI initially and then use of Bactrim.  Bactrim can cause some CNS side effects, as well as renal failure.  UTI is  "now resolved, no other metabolic abnormality.  Renal failure is improved as well  Patient is awake and alert at this time.  Has baseline cognitive impairment.  Patient is oriented to his person and place, disoriented to time.  Has some lack of memory on examination.  I will have OT do the Mini-Mental testing.  On review of chart and [facility], and was evaluate by the neurology in the past, because of delirium,  They thought that she may have underlying dementia likely Alzheimer's  *Hx of dementia noted in chart, though daughters report patient is usually quite \"sharp\" and they deny any dx of cognitive impairment or dementia in the past.   *CT head done on [facility] showed no acute intracranial finding, stable generalized volume loss presumed chronic microvascular ischemic changes.  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    COVID/influenza/RSV negative.  TSH slightly low but free T4 normal.  Vitamin B12 is pending at this time.  - COVID PCR, Vitamin B12, TSH   OT consulted for Mini-Mental or slums testing      Generalized weakness  - PT consulted     The discussed with patient daughter Maegan, she does not think the patient has any memory issues, as she lives by herself and take care of herself.  She did have a history of delirium with UTIs and sepsis in the past.     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   Chronic HFrEF   Bicuspid aortic valve s/p SAVR  *Followed by [facility] cardiology, recently seen 11/25/24, note reviewed  *TTE 10/24/24 EF 40-45%  *Previous cardiac MRI suggestive of cardiac amyloidosis   - Hold prior to admission furosemide in favor of IVF above  - GDMT limited due to contraindications     Possible recent UTI  *Recently dx in urgent care 11/22, initially on cephalexin, changed to TMP-SMX 11/25 with UC growing enterobacter cloacae spp, though also note 50-100k mixed gram positive and urogenital rosario   *UA at OSH negative, the chest received a call that the cultures " "growing Proteus sensitivities pending from the [facility]  -Agree with stopping Bactrim at this time.  As the culture is growing Proteus, I will recheck the UA here and send the culture if needed  Till then I will start her on IV ceftriaxone 1 g daily.     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      Hypertension  Controlled off of anti-hypertensives  - Monitor      Coronary artery disease, non-obstructive  Hyperlipidemia  - Continue prior to admission atorvastatin  - Hold aspirin for possible urologic procedure      Multiple myeloma  Follows with [facility]. Currently off treatment. Lytic lesions noted on CT likely due to known MM.   - Continue prior to admission acyclovir   -Will check SPEP, UPEP, and free light chains.     GERD  - Continue prior to admission PPI     Adnexal cyst  *CT abdomen/pelvis incidentally noted enlarging cystic lesion in the left adnexa likely ovarian in origin measuring up to 8.7 cm in maximum diameter   *Pelvic US 3/2021 A 6.0 cm left adnexal cyst is simple in appearance.  - Discussed with daughters, recommend continued outpatient follow-up      Chronic compression fracture T12  - Acetaminophen PRN     Pancreatic cysts  CT abd/pelvis with two new cystic lesions in pancreatic body, 1.5 x 1.0 cm and 1.4 x 0.9 cm.   - Discussed with daughters  - CT or MRI/MRCP every 6 months for 1 year and then yearly for 2 years per radiology recommendations           Clinically Significant Risk Factors Present on Admission                 # Drug Induced Platelet Defect: home medication list includes an antiplatelet medication       # Dementia: noted on problem list       # Overweight: Estimated body mass index is 25.08 kg/m  as calculated from the following:    Height as of 11/25/24: 1.473 m (4' 10\").    Weight as of 11/25/24: 54.4 kg (120 lb).              Call patient daughter Maegan over the phone, and updated her " on the patient condition.  Answered all her questions.  Updated about the urology recommendations.  ED physician Corinne told her that she will need a stenting, she was surprised that is not done.  I did repeat the urology recommendations about no procedure or stenting at this time.  Updated her about the possibility of Proteus in the urine culture as we received the report from the [facility].  There is started on IV ceftriaxone.  If her condition does not improve will likely need neurology evaluation.    DVT Prophylaxis: Pneumatic Compression Devices  Code Status: No CPR- Do NOT Intubate        Medically Ready for Discharge: Anticipated in 2-4 Days        Elmer Hidalgo MD, MD  Text Page  (7am - 6pm)    Interval History   Patient seen and evaluated this morning, was sleeping comfortably, but did woke up easily, told me her name, she told me that she is in the hospital, does not remember the date exactly, did not remember the month but does not remember the ER, I have hard time telling me who is the president, or her current address.  She denies any fever chills chest pain abdominal pain dysuria materia constipation diarrhea.  She has hard time explaining why she is in the hospital either.    No other significant event overnight    -Data reviewed today: I reviewed all new labs and imaging results over the last 24 hours. I personally reviewed no images or EKG's today.    Physical Exam   Temp: 98.1  F (36.7  C) Temp src: Oral BP: 135/63 Pulse: 79   Resp: 18 SpO2: 96 % O2 Device: None (Room air)    There were no vitals filed for this visit.  Vital Signs with Ranges  Temp:  [97.6  F (36.4  C)-98.5  F (36.9  C)] 98.1  F (36.7  C)  Pulse:  [72-82] 79  Resp:  [16-20] 18  BP: (132-151)/(57-73) 135/63  SpO2:  [94 %-97 %] 96 %  I/O last 3 completed shifts:  In: -   Out: 275 [Urine:275]    Constitutional: awake, alert, cooperative, no apparent distress, and appears stated age  Eyes: Lids and lashes normal, pupils equal,  round and reactive to light, extra ocular muscles intact, sclera clear, conjunctiva normal  Respiratory: No increased work of breathing, good air exchange, clear to auscultation bilaterally, no crackles or wheezing  Cardiovascular: Normal apical impulse, regular rate and rhythm, normal S1 and S2, no S3 or S4, and no murmur noted  GI: No scars, normal bowel sounds, soft, non-distended, non-tender, no masses palpated, no hepatosplenomegally  Skin: no bruising or bleeding  Musculoskeletal: no lower extremity pitting edema present  Neurologic: No focal deficit    Medications   Current Facility-Administered Medications   Medication Dose Route Frequency Provider Last Rate Last Admin     Current Facility-Administered Medications   Medication Dose Route Frequency Provider Last Rate Last Admin    acyclovir (ZOVIRAX) tablet 400 mg  400 mg Oral BID Elmer Hidalgo MD   400 mg at 11/29/24 2239    [Held by provider] aspirin (ASA) chewable tablet 81 mg  81 mg Oral Daily Isauro Evans MD        atorvastatin (LIPITOR) tablet 10 mg  10 mg Oral Daily Isauro Evans MD   10 mg at 11/29/24 2239    pantoprazole (PROTONIX) IV push injection 40 mg  40 mg Intravenous QAM AC Elmer Hidalgo MD   40 mg at 11/30/24 0801    sodium chloride (PF) 0.9% PF flush 3 mL  3 mL Intracatheter Q8H Isauro Evans MD   3 mL at 11/29/24 2230       Data   Recent Labs   Lab 11/30/24  1045 11/30/24  0913   WBC 8.3  --    HGB 11.2*  --    MCV 93  --    *  --    NA  --  140   POTASSIUM  --  3.8   CHLORIDE  --  104   CO2  --  25   BUN  --  18.8   CR  --  1.16*   ANIONGAP  --  11   DOUG  --  9.4   GLC  --  89   ALBUMIN  --  3.6   PROTTOTAL  --  5.8*   BILITOTAL  --  0.6   ALKPHOS  --  125   ALT  --  20   AST  --  32       Recent Results (from the past 24 hours)   CT Head w/o Contrast    Narrative    For Patients: As a result of the 21st Century Cures Act, medical imaging exams and procedure reports are released immediately  into your electronic medical record. You may view this report before your referring provider. If you have questions, please contact your health care provider.    EXAM: CT HEAD BRAIN WO  LOCATION: [facility]  DATE: 11/29/2024    INDICATION: confusion  COMPARISON: Head CT 08/18/2024.  TECHNIQUE: Routine CT Head without IV contrast. Multiplanar reformats. Dose reduction techniques were used.    FINDINGS:  INTRACRANIAL CONTENTS: No intracranial hemorrhage, extraaxial collection, or mass effect.  No CT evidence of acute infarct. Chronic infarct in the right cerebellar hemisphere. Stable calcified extra-axial mass along the left parietal convexity, presumed to represent a meningioma. Moderate presumed chronic small vessel ischemic changes. Mild generalized volume loss. No hydrocephalus.     VISUALIZED ORBITS/SINUSES/MASTOIDS: Prior bilateral cataract surgery. Visualized portions of the orbits are otherwise unremarkable. Subtotal opacification of the left sphenoid sinus, similar to the prior exam. No middle ear or mastoid effusion.    BONES/SOFT TISSUES: No acute abnormality.    Impression    1.  No acute intracranial findings.  2.  Stable generalized cerebral volume loss and presumed chronic microvascular ischemic changes.  3.  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  4.  Stable calcified extra-axial mass along the left parietal convexity, presumed to represent a meningioma.   CT Abdomen Pelvis w Contrast    Narrative    For Patients: As a result of the 21st Century Cures Act, medical imaging exams and procedure reports are released immediately into your electronic medical record. You may view this report before your referring provider. If you have questions, please contact your health care provider.    EXAM: CT ABDOMEN PELVIS W  LOCATION: [facility]  DATE: 11/29/2024    INDICATION: Abdominal or pelvic pain. Abdominal trauma, blunt. Bilateral flank pain with active UTI not responding to outpatient  antibiotics. Left flank pain with ecchymosis from fall 3 days ago.  COMPARISON: CT pelvis dated 8/18/2024, CT lumbar spine dated 8/18/2024, CT abdomen and pelvis dated 12/21/2020.  TECHNIQUE: CT scan of the abdomen and pelvis was performed following injection of IV contrast. Multiplanar reformats were obtained. Dose reduction techniques were used.  CONTRAST: Omnipaque 350 63 mL    FINDINGS:   LOWER CHEST: A few strands of atelectasis in the lung bases.    HEPATOBILIARY: Hepatic steatosis. Cholecystectomy 2.4 cm cyst posterior lateral segment left hepatic lobe requires no further evaluation. No bile duct dilatation.    PANCREAS: New cystic lesion in the body of the pancreas measuring 1.5 x 1.0 cm with possible additional cystic lesion in the distal body measuring 1.4 x 0.9 cm also new.    SPLEEN: Normal.    ADRENAL GLANDS: Normal.    KIDNEYS/BLADDER: New moderate left hydronephrosis and dilatation of the left renal pelvis with normal decompressed left ureter. No renal or ureteral calculi. Symmetric enhancement both kidneys. Findings likely due to UPJ stenosis. Low-attenuation subcentimeter renal lesion(s). These are compatible with small benign cysts and no specific imaging evaluation or follow-up is recommended. Bladder unremarkable.    BOWEL: Colonic diverticulosis with no diverticulitis. Moderate stool in the colon. No bowel obstruction.    LYMPH NODES: Normal.    VASCULATURE: Aortoiliac calcification.    PELVIC ORGANS: Hysterectomy. Enlarging cystic lesion in the left adnexa likely ovarian in origin measuring 8.7 x 7.2 x 7.3 cm and previously measured 7.1 x 4.5 x 5.6 cm.    MUSCULOSKELETAL: Changes of vertebroplasty T11, L1-L5 vertebral bodies. Stable compression with anterior wedging superior endplate of T12 compared to most recent exam. The expansile lytic lesion involving the left iliac bone measuring 3.0 x 1.7 cm remains stable. Multiple additional subtle tiny lucent foci in the bony pelvis. These findings  are most consistent with known multiple myeloma. Multiple old pelvic fractures. No acute fractures. Right ERA.    Impression    1.  New moderate left hydronephrosis and dilatation of the left renal pelvis with normal decompressed left ureter and symmetric enhancement of both kidneys. Findings likely due to UPJ stenosis.  2.  Colonic diverticulosis with no diverticulitis. Moderate stool in the colon. No bowel obstruction.  3.  Hepatic steatosis.  4.  Cholecystectomy and hysterectomy.  5.  Enlarging cystic lesion in the left adnexa likely ovarian in origin measuring up to 8.7 cm in maximum diameter today and previously measured 7.1 cm on 12/21/2020. See below for management recommendations.  6.  Multiple old pelvic fractures. Stable compression with anterior wedging superior endplate of T12. Changes of vertebroplasty T11 and L1-L5 vertebral bodies.  7.  Lytic expansile lesion left iliac bone as well as numerous tiny foci of cortical lytic changes in the bony pelvis, stable, likely due to patient's known multiple myeloma.  8.  Two new cystic lesions in the pancreatic body measuring 1.5 x 1.0 cm and 1.4 x 0.9 cm. See below for management recommendations.    REFERENCE:  Management of Incidental Adnexal Findings on CT and MRI: A White Paper of the ACR Incidental Findings Committee. J Am Miguel Radiol 2020; 17(2):248-254.    Postmenopausal or equal to or >50 years if status unknown:    Equal to or <3 cm: No further imaging.  >3 cm on CT: Ultrasound.  Equal to or <5 cm on MRI: No follow-up if fully characterized.  >3 cm on MRI: Ultrasound in 6-12 months if not fully characterized.  >5 cm on MRI: Ultrasound in 6-12 months even if fully characterized.    REFERENCE:  Revisions of international consensus [place] guidelines for the management of IPMN of the pancreas. Pancreatology 2017;17(5):738-753.    Largest cyst between 10-20 mm: CT or MRI/MRCP every 6 months for 1 year and then yearly for 2 years and then every 2 years if no  change.    CT Lumbar Spine Reconstructed    Narrative    For Patients: As a result of the 21st Century Cures Act, medical imaging exams and procedure reports are released immediately into your electronic medical record. You may view this report before your referring provider. If you have questions, please contact your health care provider.    EXAM: CT 2D RECONSTRUCTION LUMBAR SPINE  LOCATION: [facility]  DATE: 11/29/2024    INDICATION: free text)->Left flank pain fall 3 days ago  COMPARISON: Lumbar spine CT 08/18/2024.  TECHNIQUE: Routine CT Lumbar Spine without IV contrast. Multiplanar reformats. Dose reduction techniques were used.     FINDINGS:  VERTEBRA: Stable moderate anterior compression deformity of the superior endplate of T12. Stable chronic compression deformities of the T11, L1, L2, L3, L4, and L5 vertebral bodies, status post vertebroplasty. Unchanged chronic fracture of the right sacral ala. No evidence of acute lumbar fracture. Unchanged grade 1 anterolisthesis of L4 on L5 and L5 on S1, presumed degenerative.     CANAL/FORAMINA: Multilevel lumbar spondylosis, similar to the prior exam. Moderate spinal canal stenosis at L4-L5. Moderate bilateral neural foraminal stenosis at L4-L5 and L5-S1.    PARASPINAL: Atherosclerotic calcification of the abdominal aorta and its branches.    Impression    1.  No evidence of acute lumbar fracture.  2.  Stable moderate chronic compression deformity of T12.  3.  Stable chronic compression deformities of T11 and L1-L5, status post vertebroplasty.  4.  Stable chronic fracture of the right sacral ala.  5.  Unchanged multilevel lumbar spondylosis.

## 2024-11-30 NOTE — PLAN OF CARE
"Summary: New Hydronephrosis with dilatation of L Renal Pelvis   DATE & TIME: 11/29/2024 9012-9024    Cognitive Concerns/ Orientation : Disoriented to time. Slow to respond. Forgetful. Calm and cooperative.    BEHAVIOR & AGGRESSION TOOL COLOR: Green   CIWA SCORE: NA    ABNL VS/O2: VSS with BP in the 150's. On RA. Cont Pox at daughter's request, reported to desat when in an \"ill state.\"  MOBILITY: Baseline walker. Recent fall. 1PA, GB, and walker  PAIN MANAGMENT: Some mild complaint of LLE discomfort. And baseline neuropathy in hands and feet.   DIET: 2g Na. NPO at midnight. Pills need to be crushed as pt reports some difficulties with swallowing medications.   BOWEL/BLADDER: Purewick in place. Pt incontinent at admission.   ABNL LAB/BG: Labs not uploaded from ED in [place]. Sepsis fired on admission, but admitting MD asked to cancel those.   DRAIN/DEVICES: PIV in LFA infusing NS at 75mL/hr. Purewick   TELEMETRY RHYTHM: NA   SKIN: Dry and flushed. Blanchable redness on buttocks.   TESTS/PROCEDURES: Am labs   D/C DAY/GOALS/PLACE: Pending Urology consult. PT, OT, CCRN, SW consulted.   OTHER IMPORTANT INFO: Dawna De Guzman updated on phone, lives in [place]. She will be primary contact at anytime if needed. Contact information in sticky note and on pt's board. Bed alarm on for safety.                         "

## 2024-11-30 NOTE — PHARMACY-ADMISSION MEDICATION HISTORY
Pharmacist Admission Medication History    Admission medication history is complete. The information provided in this note is only as accurate as the sources available at the time of the update.    Information Source(s): Patient, Hospital records, and [facility]where[facility] via in-person    Pertinent Information:   -reviewed fill history in [employer]  -reviewed pharmacist medication history note from 11/29/24 from [facility] - where patient's family was interviewed. Family not present on hospital transfer.    -writer spoke with patient briefly in attempt to confirm information/details on transfer, but patient confused at multiple points, reconciled list entirely from chart review.     Changes made to PTA medication list:  Added: bactrim   Deleted:   Acetaminophen 325 mg tablet, 500 mg packet - medication removed by [facility]   Benadryl PO monthly - not on [facility] medication list, no fill history  Miralax daily PRN - not on [facility] medication list, no fill history   Changed:   Aspirin EC tab -> chew tab  Added dose/directions to hair/skin/nails  Ca multivitamin -> Ca single ingredient tablet  Vitamin D 2000 -> 1000 units   Added dose/directions to culturelle     Allergies reviewed with patient and updates made in EHR: no    Medication History Completed By: Kelly Baron [facility] 11/29/2024 9:42 PM    PTA Med List   Medication Sig Note Last Dose/Taking    acyclovir (ZOVIRAX) 400 MG tablet TAKE 1 TABLET(400 MG) BY MOUTH EVERY 12 HOURS  11/29/2024 Morning    aspirin (ASA) 81 MG chewable tablet Take 81 mg by mouth daily.  11/29/2024 Morning    atorvastatin (LIPITOR) 10 MG tablet Take 1 tablet (10 mg) by mouth daily.  11/28/2024 Evening    Biotin w/ Vitamins C & E (HAIR SKIN & NAILS GUMMIES PO) Take 1 tablet by mouth daily.  11/29/2024 Morning    CALCIUM PO Take 1 tablet by mouth daily.  Past Week Morning    FEROSUL 325 (65 Fe) MG tablet TAKE ONE TABLET BY MOUTH DAILY WITH BREAKFAST  11/29/2024 Morning     furosemide (LASIX) 20 MG tablet Take 1 tablet (20 mg) by mouth daily.  11/29/2024 Morning    pantoprazole (PROTONIX) 40 MG EC tablet Take 1 tablet (40 mg) by mouth daily. 30-60 min prior to meal.  11/29/2024 Morning    potassium chloride (KLOR-CON) 20 MEQ packet MIX 1 PACKET IN LIQUID AND TAKE BY MOUTH ONCE EVERY DAY AS DIRECTED.  11/28/2024 Noon    Probiotic Product (CULTURELLE PROBIOTICS) CHEW Take 1 chew tab by mouth daily.  11/29/2024 Morning    sulfamethoxazole-trimethoprim (BACTRIM DS) 800-160 MG tablet Take 1 tablet by mouth 2 times daily. 11/29/2024: Filled 11/24/24 #7ds #14, first dose 11/25/24 AM 11/29/2024 Morning    Vitamin D3 (CHOLECALCIFEROL) 25 mcg (1000 units) tablet Take 25 mcg by mouth daily.  Past Week Morning

## 2024-11-30 NOTE — PROGRESS NOTES
Summary: New Hydronephrosis with dilatation of L Renal Pelvis     Orientation: A/O self    Vitals/Tele: VSS on RA    IV Access/drains: L PIV SL    Diet: 2g Na diet, NPO over night per MD for Urology consult.     Mobility: A x1 GB/W, up to BSC    GI/: Incontinent of B/B, purewick in place. Up to BSC, voided 275 ml, bladder scan-272 ml     Wound/Skin: BLE edema, baseline numbness in hands and feet, scattered bruising    Consults: Urology/PT/OT/SW    Discharge Plan: TBD      See Flow sheets for assessment

## 2024-11-30 NOTE — PROGRESS NOTES
UROLOGY    Discussed patient with Hospitalist and reviewed the CT images from [facility]. Their working diagnosis was a UTI causing mental status changes. The CT showed mild left hydro but the nephrogram phases were the same bilaterally indicating that there is no obstruction; at least not enough to cause her current symptoms. Also, there was no crossing vessel to cause hydro. Therefore, I would not recommend stent placement at this time.     Continue supportive care with antibiotics and consider ID consults if progress is not being made.     Regards,    Magdi Love MD  Call with any concerns.  [phone] cell

## 2024-11-30 NOTE — PLAN OF CARE
Goal Outcome Evaluation:      Plan of Care Reviewed With: patient    Overall Patient Progress: no changeOverall Patient Progress: no change     Reason for Admission: Acute kidney injury on chronic kidney disease, stage 3  Moderate left hydronephrosis with suspected UPJ stenosis    Cognitive/Mentation: A/Ox self confusion  Neuros/CMS: generalized weakness, numbness tingling LE's baseline  VS: stable.  /GI: incontinent. Last BM PTA.  Pain: Rflacc 2 during repositioning. Comfortable after     Drains/Lines: PIV patent intact and infused   Skin: redness blanchable on sacrum coccyx. Meplex placed.  Activity: Assist x two lift. Until PT eval.  Diet: reg with thin liquids. Pt acquired diet early afternoon ate 100% of lunch. Takes pills whole.     Therapies recs: pending  Discharge: pending    Aggression Stoplight Tool: green    End of shift summary: pt UA/UC obtained and abx started. Daughter updated.

## 2024-11-30 NOTE — H&P
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 Transferred to [facility] 11/29/2024 for further evaluation and treatment.     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   *Creatinine 1.48, BUN 25. Baseline creatinine around 0.9, per nephrology notes overestimates GFR and in stage 3 range based on Cystatin C.  *CT abdomen/pelvis with new moderate left hydronephrosis and dilatation of the left renal pelvis with normal decompressed left ureter and symmetric enhancement of both kidneys, findings likely cysts secondary to UPJ stenosis  *Note on TMP-SMX PTA which can artificially elevate creatinine, though with obstructive findings above likely a component of true DINA  *UA negative for infection   *Transferred for urology evaluation   - Urology consulted  - NPO until seen by urology  - IVF with LR  - BMP in AM    -Stop TMP-SMX    Metabolic encephalopathy   *Daughters report 2 weeks of increased confusion  *Hx of dementia noted in chart, though daughters report patient is usually quite \"sharp\" and they deny any dx of cognitive impairment or dementia in the past.   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   Chronic HFrEF   Bicuspid aortic valve s/p SAVR  *Followed by [facility] cardiology, recently seen 11/25/24, note " reviewed  *TTE 10/24/24 EF 40-45%  *Previous cardiac MRI suggestive of cardiac amyloidosis   - Hold prior to admission furosemide in favor of IVF above  - GDMT limited due to contraindications    Possible recent UTI  *Recently dx in urgent care 11/22, initially on cephalexin, changed to TMP-SMX 11/25 with UC growing enterobacter cloacae spp, though also note 50-100k mixed gram positive and urogenital rosario   *UA at OSH negative  - Stop TMP-SMX, no indication for ongoing antibiotics     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     Hypertension  Controlled off of anti-hypertensives  - Monitor     Coronary artery disease, non-obstructive  Hyperlipidemia  - Continue prior to admission atorvastatin  - Hold aspirin for possible urologic procedure     Multiple myeloma  Follows with [facility]. Currently off treatment. Lytic lesions noted on CT likely due to known MM.   - Continue prior to admission acyclovir     GERD  - Continue prior to admission PPI    Adnexal cyst  *CT abdomen/pelvis incidentally noted enlarging cystic lesion in the left adnexa likely ovarian in origin measuring up to 8.7 cm in maximum diameter   *Pelvic US 3/2021 A 6.0 cm left adnexal cyst is simple in appearance.  - Discussed with daughters, recommend continued outpatient follow-up     Chronic compression fracture T12  - Acetaminophen PRN    Pancreatic cysts  CT abd/pelvis with two new cystic lesions in pancreatic body, 1.5 x 1.0 cm and 1.4 x 0.9 cm.   - Discussed with daughters  - CT or MRI/MRCP every 6 months for 1 year and then yearly for 2 years per radiology recommendations        Diet: NPO per Anesthesia Guidelines for Procedure/Surgery Except for: Meds   DVT Prophylaxis: Pneumatic Compression Devices  Murphy Catheter: Not present  Code Status: No CPR- Do NOT Intubate - Discussed with patient and daughters     Disposition Plan   Admit to inpatient. Anticipate  "greater than or equal to 2 midnights prior to discharge.      Medically Ready for Discharge: Anticipated in 2-4 Days       Entered: Isauro Evans MD 11/29/2024, 9:27 PM     The patient's care was discussed with the patient and patient's daughter    Clinically Significant Risk Factors Present on Admission                 # Drug Induced Platelet Defect: home medication list includes an antiplatelet medication       # Dementia: noted on problem list       # Overweight: Estimated body mass index is 25.08 kg/m  as calculated from the following:    Height as of 11/25/24: 1.473 m (4' 10\").    Weight as of 11/25/24: 54.4 kg (120 lb).                   Isauro Evans MD  [facility]    ______________________________________________________________________    Chief Complaint   Confusion    History of Present Illness   Kandis Gallagher is a 84 year old female who presents with the above chief complaint.    History is obtained from the patient, patient's daughters and review of medical record. The patient presented to ED in [place] with confusion that started around 2 weeks ago. She presented to urgent care on 11/22/24 his daughters were concerned that her primary symptom was confusion.  From prior UTIs she had a mildly abnormal urinalysis and was prescribed cephalexin, which was changed to TMP-SMX after urine culture results demonstrated Enterobacter.  She continued to be confused and also developed generalized weakness prompting her to be evaluated in the emergency department.  Her daughters report that the patient is typically \"very sharp\", they deny any prior diagnosis of cognitive impairment or dementia.  The patient reports that she has had some intermittent left-sided flank pain.  In the emergency department she was noted to have elevated creatinine, normal UA, CT abdomen/pelvis with new moderate left hydronephrosis with findings suspicious for UPJ obstruction.  She was transferred to " [facility] for further evaluation by urology.    Past Medical History    I have reviewed this patient's medical history and updated it with pertinent information if needed.   Past Medical History:   Diagnosis Date    Congenital bicuspid aortic valve     H/O aortic valve replacement     23 mm Magna Ease     HFrEF (heart failure with reduced ejection fraction) (H)     Hyperlipidemia     Hypertension     Hyperthyroidism     Multiple myeloma (H)     Severe aortic stenosis     SVT (supraventricular tachycardia) (H)     s/p ablation    Thyroiditis        Past Surgical History   I have reviewed this patient's surgical history and updated it with pertinent information if needed.  Past Surgical History:   Procedure Laterality Date    EP ABLATION SVT N/A 6/7/2023    Procedure: Ablation Supraventricular Tachycardia;  Surgeon: Aaron Hoffman MD;  Location:  [facility]         Social History   I have reviewed this patient's social history and updated it with pertinent information if needed.  Social History     Tobacco Use    Smoking status: Never    Smokeless tobacco: Never   Vaping Use    Vaping status: Never Used   Substance Use Topics    Alcohol use: No    Drug use: No        Family History   I have reviewed this patient's family history and updated it with pertinent information if needed.   Family History   Problem Relation Age of Onset    Breast Cancer Sister     Ovarian Cancer Sister     Colon Cancer Brother     Liver Cancer Brother     Breast Cancer Daughter     Colon Cancer Daughter         Prior to Admission Medications     Prior to Admission Medications   Prescriptions Last Dose Informant Patient Reported? Taking?   Biotin w/ Vitamins C & E (HAIR SKIN & NAILS GUMMIES PO) 11/29/2024 Morning  Yes Yes   Sig: Take 1 tablet by mouth daily.   CALCIUM PO Past Week Morning  Yes Yes   Sig: Take 1 tablet by mouth daily.   FEROSUL 325 (65 Fe) MG tablet 11/29/2024 Morning  No Yes   Sig: TAKE ONE TABLET BY  MOUTH DAILY WITH BREAKFAST   Probiotic Product (CULTURELLE PROBIOTICS) CHEW 11/29/2024 Morning  Yes Yes   Sig: Take 1 chew tab by mouth daily.   Vitamin D3 (CHOLECALCIFEROL) 25 mcg (1000 units) tablet Past Week Morning  Yes Yes   Sig: Take 25 mcg by mouth daily.   acyclovir (ZOVIRAX) 400 MG tablet 11/29/2024 Morning  No Yes   Sig: TAKE 1 TABLET(400 MG) BY MOUTH EVERY 12 HOURS   aspirin (ASA) 81 MG chewable tablet 11/29/2024 Morning  Yes Yes   Sig: Take 81 mg by mouth daily.   atorvastatin (LIPITOR) 10 MG tablet 11/28/2024 Evening  No Yes   Sig: Take 1 tablet (10 mg) by mouth daily.   furosemide (LASIX) 20 MG tablet 11/29/2024 Morning  No Yes   Sig: Take 1 tablet (20 mg) by mouth daily.   pantoprazole (PROTONIX) 40 MG EC tablet 11/29/2024 Morning  No Yes   Sig: Take 1 tablet (40 mg) by mouth daily. 30-60 min prior to meal.   potassium chloride (KLOR-CON) 20 MEQ packet 11/28/2024 Noon  No Yes   Sig: MIX 1 PACKET IN LIQUID AND TAKE BY MOUTH ONCE EVERY DAY AS DIRECTED.   sulfamethoxazole-trimethoprim (BACTRIM DS) 800-160 MG tablet 11/29/2024 Morning  Yes Yes   Sig: Take 1 tablet by mouth 2 times daily.      Facility-Administered Medications: None     Allergies   Allergies   Allergen Reactions    Carvedilol Other (See Comments)    Lidocaine Nausea and Vomiting     Vomiting with general anesthesia    Lisinopril Cough    Seasonal Allergies Other (See Comments)     Problems with narcotics - oxygen desaturates    Spironolactone        Physical Exam   Vital Signs:     BP: (!) 151/73 Pulse: 82   Resp: 16 SpO2: 94 % O2 Device: None (Room air)    Weight: 0 lbs 0 oz    Constitutional: Well-appearing, NAD  Eyes: PERRL, EOMI  Respiratory: Clear to auscultation bilaterally, good air movement, normal effort   Cardiovascular: RRR, no m/r/g. No peripheral edema.   GI: Soft, non-tender, non-distended. No rebound tenderness or guarding.    Skin: Warm, dry   Neurologic: Alert. Following commands. Oriented to person, place. Not oriented to  date. Face symmetric. Tongue midline. 5/5 upper and lower extremity strength symmetric bilaterally   Psychiatric: Normal affect, appropriate      Data   Data reviewed today: I reviewed all medications, new labs and imaging results over the last 24 hours. I personally reviewed no images or EKG's today.    No lab results found in last 7 days.    Recent Results (from the past 24 hours)   CT Head w/o Contrast    Narrative    For Patients: As a result of the 21st Century Cures Act, medical imaging exams and procedure reports are released immediately into your electronic medical record. You may view this report before your referring provider. If you have questions, please contact your health care provider.    EXAM: CT HEAD BRAIN WO  LOCATION: [facility]  DATE: 11/29/2024    INDICATION: confusion  COMPARISON: Head CT 08/18/2024.  TECHNIQUE: Routine CT Head without IV contrast. Multiplanar reformats. Dose reduction techniques were used.    FINDINGS:  INTRACRANIAL CONTENTS: No intracranial hemorrhage, extraaxial collection, or mass effect.  No CT evidence of acute infarct. Chronic infarct in the right cerebellar hemisphere. Stable calcified extra-axial mass along the left parietal convexity, presumed to represent a meningioma. Moderate presumed chronic small vessel ischemic changes. Mild generalized volume loss. No hydrocephalus.     VISUALIZED ORBITS/SINUSES/MASTOIDS: Prior bilateral cataract surgery. Visualized portions of the orbits are otherwise unremarkable. Subtotal opacification of the left sphenoid sinus, similar to the prior exam. No middle ear or mastoid effusion.    BONES/SOFT TISSUES: No acute abnormality.    Impression    1.  No acute intracranial findings.  2.  Stable generalized cerebral volume loss and presumed chronic microvascular ischemic changes.  3.  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  4.  Stable calcified extra-axial mass along the left parietal convexity, presumed to represent a meningioma.    CT Abdomen Pelvis w Contrast    Narrative    For Patients: As a result of the 21st Century Cures Act, medical imaging exams and procedure reports are released immediately into your electronic medical record. You may view this report before your referring provider. If you have questions, please contact your health care provider.    EXAM: CT ABDOMEN PELVIS W  LOCATION: [facility]  DATE: 11/29/2024    INDICATION: Abdominal or pelvic pain. Abdominal trauma, blunt. Bilateral flank pain with active UTI not responding to outpatient antibiotics. Left flank pain with ecchymosis from fall 3 days ago.  COMPARISON: CT pelvis dated 8/18/2024, CT lumbar spine dated 8/18/2024, CT abdomen and pelvis dated 12/21/2020.  TECHNIQUE: CT scan of the abdomen and pelvis was performed following injection of IV contrast. Multiplanar reformats were obtained. Dose reduction techniques were used.  CONTRAST: Omnipaque 350 63 mL    FINDINGS:   LOWER CHEST: A few strands of atelectasis in the lung bases.    HEPATOBILIARY: Hepatic steatosis. Cholecystectomy 2.4 cm cyst posterior lateral segment left hepatic lobe requires no further evaluation. No bile duct dilatation.    PANCREAS: New cystic lesion in the body of the pancreas measuring 1.5 x 1.0 cm with possible additional cystic lesion in the distal body measuring 1.4 x 0.9 cm also new.    SPLEEN: Normal.    ADRENAL GLANDS: Normal.    KIDNEYS/BLADDER: New moderate left hydronephrosis and dilatation of the left renal pelvis with normal decompressed left ureter. No renal or ureteral calculi. Symmetric enhancement both kidneys. Findings likely due to UPJ stenosis. Low-attenuation subcentimeter renal lesion(s). These are compatible with small benign cysts and no specific imaging evaluation or follow-up is recommended. Bladder unremarkable.    BOWEL: Colonic diverticulosis with no diverticulitis. Moderate stool in the colon. No bowel obstruction.    LYMPH NODES: Normal.    VASCULATURE:  Aortoiliac calcification.    PELVIC ORGANS: Hysterectomy. Enlarging cystic lesion in the left adnexa likely ovarian in origin measuring 8.7 x 7.2 x 7.3 cm and previously measured 7.1 x 4.5 x 5.6 cm.    MUSCULOSKELETAL: Changes of vertebroplasty T11, L1-L5 vertebral bodies. Stable compression with anterior wedging superior endplate of T12 compared to most recent exam. The expansile lytic lesion involving the left iliac bone measuring 3.0 x 1.7 cm remains stable. Multiple additional subtle tiny lucent foci in the bony pelvis. These findings are most consistent with known multiple myeloma. Multiple old pelvic fractures. No acute fractures. Right ERA.    Impression    1.  New moderate left hydronephrosis and dilatation of the left renal pelvis with normal decompressed left ureter and symmetric enhancement of both kidneys. Findings likely due to UPJ stenosis.  2.  Colonic diverticulosis with no diverticulitis. Moderate stool in the colon. No bowel obstruction.  3.  Hepatic steatosis.  4.  Cholecystectomy and hysterectomy.  5.  Enlarging cystic lesion in the left adnexa likely ovarian in origin measuring up to 8.7 cm in maximum diameter today and previously measured 7.1 cm on 12/21/2020. See below for management recommendations.  6.  Multiple old pelvic fractures. Stable compression with anterior wedging superior endplate of T12. Changes of vertebroplasty T11 and L1-L5 vertebral bodies.  7.  Lytic expansile lesion left iliac bone as well as numerous tiny foci of cortical lytic changes in the bony pelvis, stable, likely due to patient's known multiple myeloma.  8.  Two new cystic lesions in the pancreatic body measuring 1.5 x 1.0 cm and 1.4 x 0.9 cm. See below for management recommendations.    REFERENCE:  Management of Incidental Adnexal Findings on CT and MRI: A White Paper of the ACR Incidental Findings Committee. J Am Miguel Radiol 2020; 17(2):248-254.    Postmenopausal or equal to or >50 years if status  unknown:    Equal to or <3 cm: No further imaging.  >3 cm on CT: Ultrasound.  Equal to or <5 cm on MRI: No follow-up if fully characterized.  >3 cm on MRI: Ultrasound in 6-12 months if not fully characterized.  >5 cm on MRI: Ultrasound in 6-12 months even if fully characterized.    REFERENCE:  Revisions of international consensus [place] guidelines for the management of IPMN of the pancreas. Pancreatology 2017;17(5):738-753.    Largest cyst between 10-20 mm: CT or MRI/MRCP every 6 months for 1 year and then yearly for 2 years and then every 2 years if no change.    CT Lumbar Spine Reconstructed    Narrative    For Patients: As a result of the 21st Century Cures Act, medical imaging exams and procedure reports are released immediately into your electronic medical record. You may view this report before your referring provider. If you have questions, please contact your health care provider.    EXAM: CT 2D RECONSTRUCTION LUMBAR SPINE  LOCATION: [facility]  DATE: 11/29/2024    INDICATION: free text)->Left flank pain fall 3 days ago  COMPARISON: Lumbar spine CT 08/18/2024.  TECHNIQUE: Routine CT Lumbar Spine without IV contrast. Multiplanar reformats. Dose reduction techniques were used.     FINDINGS:  VERTEBRA: Stable moderate anterior compression deformity of the superior endplate of T12. Stable chronic compression deformities of the T11, L1, L2, L3, L4, and L5 vertebral bodies, status post vertebroplasty. Unchanged chronic fracture of the right sacral ala. No evidence of acute lumbar fracture. Unchanged grade 1 anterolisthesis of L4 on L5 and L5 on S1, presumed degenerative.     CANAL/FORAMINA: Multilevel lumbar spondylosis, similar to the prior exam. Moderate spinal canal stenosis at L4-L5. Moderate bilateral neural foraminal stenosis at L4-L5 and L5-S1.    PARASPINAL: Atherosclerotic calcification of the abdominal aorta and its branches.    Impression    1.  No evidence of acute lumbar fracture.  2.  Stable  moderate chronic compression deformity of T12.  3.  Stable chronic compression deformities of T11 and L1-L5, status post vertebroplasty.  4.  Stable chronic fracture of the right sacral ala.  5.  Unchanged multilevel lumbar spondylosis.

## 2024-12-01 ENCOUNTER — APPOINTMENT (OUTPATIENT)
Dept: PHYSICAL THERAPY | Facility: CLINIC | Age: 84
DRG: 682 | End: 2024-12-01
Attending: INTERNAL MEDICINE
Payer: MEDICARE

## 2024-12-01 ENCOUNTER — APPOINTMENT (OUTPATIENT)
Dept: OCCUPATIONAL THERAPY | Facility: CLINIC | Age: 84
DRG: 682 | End: 2024-12-01
Attending: INTERNAL MEDICINE
Payer: MEDICARE

## 2024-12-01 LAB
ALBUMIN SERPL BCG-MCNC: 3.5 G/DL (ref 3.5–5.2)
ANION GAP SERPL CALCULATED.3IONS-SCNC: 8 MMOL/L (ref 7–15)
BASOPHILS # BLD AUTO: 0.1 10E3/UL (ref 0–0.2)
BASOPHILS NFR BLD AUTO: 1 %
BUN SERPL-MCNC: 19.6 MG/DL (ref 8–23)
CALCIUM SERPL-MCNC: 9.7 MG/DL (ref 8.8–10.4)
CHLORIDE SERPL-SCNC: 105 MMOL/L (ref 98–107)
CREAT SERPL-MCNC: 1.01 MG/DL (ref 0.51–0.95)
EGFRCR SERPLBLD CKD-EPI 2021: 54 ML/MIN/1.73M2
EOSINOPHIL # BLD AUTO: 0.2 10E3/UL (ref 0–0.7)
EOSINOPHIL NFR BLD AUTO: 3 %
ERYTHROCYTE [DISTWIDTH] IN BLOOD BY AUTOMATED COUNT: 13.7 % (ref 10–15)
FOLATE SERPL-MCNC: 10.2 NG/ML (ref 4.6–34.8)
GLUCOSE SERPL-MCNC: 96 MG/DL (ref 70–99)
HCO3 SERPL-SCNC: 25 MMOL/L (ref 22–29)
HCT VFR BLD AUTO: 32.7 % (ref 35–47)
HGB BLD-MCNC: 10.7 G/DL (ref 11.7–15.7)
IMM GRANULOCYTES # BLD: 0 10E3/UL
IMM GRANULOCYTES NFR BLD: 0 %
LYMPHOCYTES # BLD AUTO: 1.7 10E3/UL (ref 0.8–5.3)
LYMPHOCYTES NFR BLD AUTO: 24 %
MCH RBC QN AUTO: 30 PG (ref 26.5–33)
MCHC RBC AUTO-ENTMCNC: 32.7 G/DL (ref 31.5–36.5)
MCV RBC AUTO: 92 FL (ref 78–100)
MONOCYTES # BLD AUTO: 0.4 10E3/UL (ref 0–1.3)
MONOCYTES NFR BLD AUTO: 6 %
NEUTROPHILS # BLD AUTO: 4.6 10E3/UL (ref 1.6–8.3)
NEUTROPHILS NFR BLD AUTO: 65 %
NRBC # BLD AUTO: 0 10E3/UL
NRBC BLD AUTO-RTO: 0 /100
PHOSPHATE SERPL-MCNC: 2.8 MG/DL (ref 2.5–4.5)
PLATELET # BLD AUTO: 131 10E3/UL (ref 150–450)
POTASSIUM SERPL-SCNC: 3.9 MMOL/L (ref 3.4–5.3)
RBC # BLD AUTO: 3.57 10E6/UL (ref 3.8–5.2)
SODIUM SERPL-SCNC: 138 MMOL/L (ref 135–145)
WBC # BLD AUTO: 7.1 10E3/UL (ref 4–11)

## 2024-12-01 PROCEDURE — 36415 COLL VENOUS BLD VENIPUNCTURE: CPT | Performed by: INTERNAL MEDICINE

## 2024-12-01 PROCEDURE — 36415 COLL VENOUS BLD VENIPUNCTURE: CPT | Performed by: PSYCHIATRY & NEUROLOGY

## 2024-12-01 PROCEDURE — 82040 ASSAY OF SERUM ALBUMIN: CPT | Performed by: INTERNAL MEDICINE

## 2024-12-01 PROCEDURE — 97110 THERAPEUTIC EXERCISES: CPT | Mod: GP | Performed by: PHYSICAL THERAPIST

## 2024-12-01 PROCEDURE — 250N000009 HC RX 250: Performed by: INTERNAL MEDICINE

## 2024-12-01 PROCEDURE — 120N000001 HC R&B MED SURG/OB

## 2024-12-01 PROCEDURE — 85025 COMPLETE CBC W/AUTO DIFF WBC: CPT | Performed by: INTERNAL MEDICINE

## 2024-12-01 PROCEDURE — 82565 ASSAY OF CREATININE: CPT | Performed by: INTERNAL MEDICINE

## 2024-12-01 PROCEDURE — 97161 PT EVAL LOW COMPLEX 20 MIN: CPT | Mod: GP | Performed by: PHYSICAL THERAPIST

## 2024-12-01 PROCEDURE — 84446 ASSAY OF VITAMIN E: CPT | Performed by: PSYCHIATRY & NEUROLOGY

## 2024-12-01 PROCEDURE — 97535 SELF CARE MNGMENT TRAINING: CPT | Mod: GO

## 2024-12-01 PROCEDURE — 82310 ASSAY OF CALCIUM: CPT | Performed by: INTERNAL MEDICINE

## 2024-12-01 PROCEDURE — 250N000013 HC RX MED GY IP 250 OP 250 PS 637: Performed by: INTERNAL MEDICINE

## 2024-12-01 PROCEDURE — 250N000011 HC RX IP 250 OP 636: Performed by: INTERNAL MEDICINE

## 2024-12-01 PROCEDURE — 97530 THERAPEUTIC ACTIVITIES: CPT | Mod: GP | Performed by: PHYSICAL THERAPIST

## 2024-12-01 PROCEDURE — 99233 SBSQ HOSP IP/OBS HIGH 50: CPT | Performed by: INTERNAL MEDICINE

## 2024-12-01 PROCEDURE — 250N000011 HC RX IP 250 OP 636: Performed by: PSYCHIATRY & NEUROLOGY

## 2024-12-01 PROCEDURE — 82746 ASSAY OF FOLIC ACID SERUM: CPT | Performed by: PSYCHIATRY & NEUROLOGY

## 2024-12-01 PROCEDURE — 97166 OT EVAL MOD COMPLEX 45 MIN: CPT | Mod: GO

## 2024-12-01 PROCEDURE — 97530 THERAPEUTIC ACTIVITIES: CPT | Mod: GO

## 2024-12-01 PROCEDURE — 82947 ASSAY GLUCOSE BLOOD QUANT: CPT | Performed by: INTERNAL MEDICINE

## 2024-12-01 RX ORDER — CYANOCOBALAMIN 1000 UG/ML
1000 INJECTION, SOLUTION INTRAMUSCULAR; SUBCUTANEOUS ONCE
Status: COMPLETED | OUTPATIENT
Start: 2024-12-01 | End: 2024-12-01

## 2024-12-01 RX ORDER — FUROSEMIDE 20 MG/1
20 TABLET ORAL DAILY
Status: DISCONTINUED | OUTPATIENT
Start: 2024-12-01 | End: 2024-12-06 | Stop reason: HOSPADM

## 2024-12-01 RX ADMIN — FUROSEMIDE 20 MG: 20 TABLET ORAL at 13:21

## 2024-12-01 RX ADMIN — ACYCLOVIR 400 MG: 400 TABLET ORAL at 08:30

## 2024-12-01 RX ADMIN — ATORVASTATIN CALCIUM 10 MG: 10 TABLET, FILM COATED ORAL at 20:25

## 2024-12-01 RX ADMIN — ACYCLOVIR 400 MG: 400 TABLET ORAL at 20:25

## 2024-12-01 RX ADMIN — PANTOPRAZOLE SODIUM 40 MG: 40 INJECTION, POWDER, FOR SOLUTION INTRAVENOUS at 06:19

## 2024-12-01 RX ADMIN — CEFTRIAXONE SODIUM 1 G: 1 INJECTION, POWDER, FOR SOLUTION INTRAMUSCULAR; INTRAVENOUS at 13:21

## 2024-12-01 RX ADMIN — CYANOCOBALAMIN 1000 MCG: 1000 INJECTION, SOLUTION INTRAMUSCULAR; SUBCUTANEOUS at 16:38

## 2024-12-01 ASSESSMENT — ACTIVITIES OF DAILY LIVING (ADL)
ADLS_ACUITY_SCORE: 76
ADLS_ACUITY_SCORE: 80
ADLS_ACUITY_SCORE: 76

## 2024-12-01 NOTE — PROGRESS NOTES
12/01/24 0900   Appointment Info   Signing Clinician's Name / Credentials (PT) Jazzmine Franco Poster, SPT   Student Supervision Direct Patient Contact Provided;Line of sight supervision provided;Direct supervision provided;On-site supervision provided   Living Environment   People in Home alone   Current Living Arrangements apartment;assisted living   Home Accessibility no concerns   Transportation Anticipated family or friend will provide   Living Environment Comments three elevators in building. pt limited historian per OT, pt living in Assisted living facility per pt, pt lives alone in apartment.   Self-Care   Usual Activity Tolerance moderate   Current Activity Tolerance poor   Regular Exercise No   Equipment Currently Used at Home walker, standard   Fall history within last six months yes   Number of times patient has fallen within last six months 1  (fall this summer)   Activity/Exercise/Self-Care Comment Had been getting home care PT/OT but then they discharged her from  therapy. pt limited historian stated using wheelchair for all activities. it was a gift from children.   General Information   Onset of Illness/Injury or Date of Surgery 11/29/24   Referring Physician Isauro Evans MD   Patient/Family Therapy Goals Statement (PT) return home   Pertinent History of Current Problem (include personal factors and/or comorbidities that impact the POC) 84 year old female with past medical history significant for CKD stage 3, chronic HFrEF. 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   Existing Precautions/Restrictions fall   Cognition   Affect/Mental Status (Cognition) confused;sad/depressed affect   Orientation Status (Cognition) verbal cues/prompts needed for orientation;oriented to;person   Follows Commands (Cognition) follows one-step commands;50-74% accuracy   Behavioral Issues overwhelmed easily   Safety Deficit (Cognition) minimal  deficit;ability to follow commands;problem-solving;judgment;awareness of need for assistance;impulsivity;insight into deficits/self-awareness   Cognitive Status Comments Slow processing   Posture    Posture Forward head position;Protracted shoulders;Kyphosis   Range of Motion (ROM)   Range of Motion ROM is WFL   ROM Comment pt able to move through antigravity AROM   Strength (Manual Muscle Testing)   Strength (Manual Muscle Testing) Deficits observed during functional mobility   Strength Comments pt demonstrated 4-/5 strength in all major muscle groups including hips, knees, and ankles.   Transfers   Comment, (Transfers) ModA sit>stand with inability to fully stand upright. Knees are crouched.   Gait/Stairs (Locomotion)   Comment, (Gait/Stairs) Unable to take steps with standing secondary to weakness in the legs and difficulty with attempt at weight shift.   Balance   Balance Comments pt leaned posterior during standing with frequent cues to shift weight forward.   Sensory Examination   Sensory Perception Comments light touch: in tact   Coordination   Coordination Comments finger to nose: slight dysmetria noted. Pt with slow motor planning and processing noted. Requires more than adequate time to complete tasks provided.   Clinical Impression   Criteria for Skilled Therapeutic Intervention Yes, treatment indicated   PT Diagnosis (PT) Unable to ambulate   Influenced by the following impairments Weakness, impaired balance, motor processing/planning, decreased functional activity tolerance and fatigue   Functional limitations due to impairments decreased indepdnence with functional mobility   Clinical Presentation (PT Evaluation Complexity) stable   Clinical Presentation Rationale see MR   Clinical Decision Making (Complexity) low complexity   Planned Therapy Interventions (PT) balance training;bed mobility training;gait training;home exercise program;joint mobilization;motor coordination training;neuromuscular  re-education;patient/family education;postural re-education;ROM (range of motion);strengthening;stretching;transfer training;progressive activity/exercise;risk factor education;home program guidelines   Risk & Benefits of therapy have been explained evaluation/treatment results reviewed;care plan/treatment goals reviewed;risks/benefits reviewed;current/potential barriers reviewed;participants voiced agreement with care plan;participants included;patient   PT Total Evaluation Time   PT Eval, Low Complexity Minutes (99280) 10   Physical Therapy Goals   PT Frequency 5x/week   PT Predicted Duration/Target Date for Goal Attainment 12/13/24   PT Goals Bed Mobility;Transfers;Gait   PT: Bed Mobility Supervision/stand-by assist;Rolling;Bridging   PT: Transfers Moderate assist;Sit to/from stand;Bed to/from chair;Assistive device   PT: Gait Supervision/stand-by assist;Standard walker;100 feet   Interventions   Interventions Quick Adds Gait Training;Neuromuscular Re-ed;Therapeutic Activity;Therapeutic Procedure   Therapeutic Procedure/Exercise   Ther. Procedure: strength, endurance, ROM, flexibillity Minutes (34879) 25   Symptoms Noted During/After Treatment fatigue   Treatment Detail/Skilled Intervention pt completed STS using SS (anam steady) x2 with CGA verbal cues to stand tall and shift weight. pt given verbal/tactile cues to scoot legs back to be in line with hips and tighten glutes to stand tall. weight shifts 30s with seated rest break on SS. pt edu to complete ankle pumps and kickouts for circulation benefits.   Therapeutic Activity   Therapeutic Activities: dynamic activities to improve functional performance Minutes (60944) 10   Symptoms Noted During/After Treatment Fatigue   Treatment Detail/Skilled Intervention pt greated seated in recliner. pt agreeable to PT. increased time for equipment management and VS monitoring. pt completed STSx2 Mod-MaxA knees blocked continuous verbal cues to push up with UE/LE and bring  "hips forward. pt returned to recliner with legs elevated, pillows placed under UE/LE and head, call light and table with in reach. Chair alarm on.   Gait Training   Treatment Detail/Skilled Intervention Pre-gait weight shifts and 'mini march' in anam steady, limited foot clerance and weight shift.   PT Discharge Planning   PT Plan STSx5 SS, strengthening   PT Discharge Recommendation (DC Rec) Transitional Care Facility   PT Rationale for DC Rec pt under baseline for functional mobility. pt is limited by weakness, decreased activity tolerance, and impaired coordination and balance. Will benefit from skilled rehab at TCU in order to improve safely and functional independence for return home.   PT Brief overview of current status Strong Ax1 with Ax1 at  for transfers. \"Goals of therapy will be to address safe mobility and make recs for d/c to next level of care. Pt and RN will continue to follow all falls risk precautions as documented by RN staff while hospitalized.\"   Physical Therapy Time and Intention   Timed Code Treatment Minutes 35   Total Session Time (sum of timed and untimed services) 45     "

## 2024-12-01 NOTE — PROVIDER NOTIFICATION
".MD Notification    Notified Person: MD    Notified Person Name: Elmer Hidalgo    Notification Date/Time: 11/30/24 @ 1735    Notification Interaction: Diabetica Messaging    Purpose of Notification: \"Hello, pt has had a large loose tarry/green stool\"    Orders Received: MD acknowledged the page    Comments:   "

## 2024-12-01 NOTE — PROGRESS NOTES
[facility] lab called Critical lab result: urine culture shows pos for proteus mirabilis. MD notified. sensitivities came back lab to fax over. Fax placed in paper chart.

## 2024-12-01 NOTE — PROGRESS NOTES
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 Transferred to [facility] 11/29/2024 for further evaluation and treatment.      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  Now developed acute on chronic renal failure.  Baseline creatinine around 0.8-0.1.08  CT scan of the abdominal pelvis done in outlying hospital, shows new moderate left hydronephrosis and dilatation of the left renal pelvis.  With normal decompression left ureter and symmetrical enhancement of both kidneys finding likely due to UPJ stenosis  Urology is consulted.  [place] [facility] consulted this morning and discussed the case.  She was given IV fluids overnight, noted creatinine improved to 1.16 the next day, and continue improve without IV fluids to 1.01at this time to her baseline.  UA normal at this time.  Discontinue Bactrim agree with that.  Mostly because of acute renal failure chronic kidney disease most likely side effect of Bactrim.  IV fluids now discontinued.  No procedure planned by the urology at this time.      Patient was evaluated by the urology, does not recommend any intervention or stenting at this time.         Acute encephalopathy likely metabolic: Improved  Possible underlying cognitive  "impairment/dementia versus delirium  Patient most likely has some confusion for couple of weeks because of UTI initially and then use of Bactrim.  Bactrim can cause some CNS side effects, as well as renal failure.  UTI is now resolved, no other metabolic abnormality.  Renal failure is improved as well  Patient is awake and alert at this time.  Not sure about her baseline cognitive status.  Patient is oriented to his person and place, disoriented to time.  Has some lack of memory on examination.  OT is consulted, will do slums testing once appropriate.  On review of chart and [facility] hospital, and was evaluate by the neurology in the past, because of delirium,  They thought that she may have underlying dementia likely Alzheimer's  *Hx of dementia noted in chart, though daughters report patient is usually quite \"sharp\" and they deny any dx of cognitive impairment or dementia in the past.     *CT head done on [facility] showed no acute intracranial finding, stable generalized volume loss presumed chronic microvascular ischemic changes.  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    COVID/influenza/RSV negative.  TSH slightly low but free T4 normal.  Vitamin B12 borderline low normal, start on B12 supplements.  -   OT consulted for Mini-Mental or slums testing      Generalized weakness  - PT consulted     The discussed with patient daughter Gege, she does not think the patient has any memory issues, as she lives by herself and take care of herself.  She did have a history of delirium with UTIs and sepsis in the past.  Reevaluated her today, she was anxious, calm down, she has good long-term memory, short-term memory is still an issue.  I will have neurology evaluate the patient, given concern of the family of acute worsening of her memory issues.     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  - GDMT limited due to contraindications  Will resume her Lasix on 12/1/2024     Possible recent UTI  *Recently dx in urgent care 11/22, initially on cephalexin, changed to TMP-SMX 11/25 with UC growing enterobacter cloacae spp, though also note 50-100k mixed gram positive and urogenital rosario   *UA at OSH negative, received a call from outside hospital, that urine cultures growing Proteus sensitivities   They have send the urine culture result, Proteus growing sensitive to ceftriaxone.  -Agree with stopping Bactrim at this time.  She was started on IV ceftriaxone on 11/30/2024, because of a positive culture  Repeat UA here is unremarkable.  Will plan to continue IV antibiotic for at least 3 days.     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      Hypertension  Controlled off of anti-hypertensives  - Monitor      Coronary artery disease, non-obstructive  Hyperlipidemia  - Continue prior to admission atorvastatin  - Hold aspirin for possible urologic procedure      Multiple myeloma  Follows with [facility]. Currently off treatment. Lytic lesions noted on CT likely due to known MM.   - Continue prior to admission acyclovir, currently on surveillance only,  -Will check SPEP, UPEP, and free light chains.     GERD  - Continue prior to admission PPI     Adnexal cyst  *CT abdomen/pelvis incidentally noted enlarging cystic lesion in the left adnexa likely ovarian in origin measuring up to 8.7 cm in maximum diameter   *Pelvic US 3/2021 A 6.0 cm left adnexal cyst is simple in appearance.  - Discussed with daughters, recommend continued outpatient follow-up      Chronic compression fracture T12  - Acetaminophen PRN     Pancreatic cysts  CT abd/pelvis with two new cystic lesions  "in pancreatic body, 1.5 x 1.0 cm and 1.4 x 0.9 cm.   - Discussed with daughters  - CT or MRI/MRCP every 6 months for 1 year and then yearly for 2 years per radiology recommendations           Clinically Significant Risk Factors                       # Dementia: noted on problem list        # Overweight: Estimated body mass index is 25.08 kg/m  as calculated from the following:    Height as of 11/25/24: 1.473 m (4' 10\").    Weight as of 11/25/24: 54.4 kg (120 lb)., PRESENT ON ADMISSION            Call patient daughter again over the phone, she thinks her mom is even worsening and not getting better.  She think that this happened in the past when she have some kind of infection.  At this time no obvious source of infection.  Explained her that she is afebrile, white cell counts are normal CT scan abdomen negative UA is negative.  No sign of upper respiratory symptoms at this time either.  COVID is negative influenza is negative RSV is negative.  Updated her on that.  Will continue supportive care, IV antibiotics to continue for possible positive urine culture from outside hospital.  Will have neurology evaluate the patient as well.  This is all this discussed with the patient daughter today        DVT Prophylaxis: Pneumatic Compression Devices  Code Status: No CPR- Do NOT Intubate        Medically Ready for Discharge: Anticipated in 2-4 Days    Discussed with patient, patient daughter, and the nursing staff taking care of the patient    Elmer Hidalgo MD, MD  Text Page  (7am - 6pm)    Interval History   Patient seen and evaluated in the room today, sitting in a chair, somewhat anxious initially as was talking to her daughter.  She denies any pain, nausea vomiting fever chills abdominal pain dysuria hematuria constipation diarrhea at this time.  She seems to have good remote memory, but have trouble remembering things lately.    Her daughter Rashmi that lives in the area help her at her home.  [facility]      No other significant event overnight    -Data reviewed today: I reviewed all new labs and imaging results over the last 24 hours. I personally reviewed no images or EKG's today.    Physical Exam   Temp: 98.4  F (36.9  C) Temp src: Oral BP: (!) 149/67 Pulse: 118   Resp: 18 SpO2: 96 % O2 Device: None (Room air)    There were no vitals filed for this visit.  Vital Signs with Ranges  Temp:  [98.4  F (36.9  C)-98.7  F (37.1  C)] 98.4  F (36.9  C)  Pulse:  [] 118  Resp:  [17-18] 18  BP: (130-149)/(62-79) 149/67  SpO2:  [94 %-97 %] 96 %  I/O last 3 completed shifts:  In: 360 [P.O.:360]  Out: -     Constitutional: Awake and alert, cooperative no acute distress.  Eyes: Lids and lashes normal, pupils equal, round and reactive to light, extra ocular muscles intact, sclera clear, conjunctiva normal  Respiratory: No increased work of breathing, good air exchange, clear to auscultation bilaterally, no crackles or wheezing  Cardiovascular: Normal apical impulse, regular rate and rhythm, normal S1 and S2, no S3 or S4, and no murmur noted  GI: No scars, normal bowel sounds, soft, non-distended, non-tender, no masses palpated, no hepatosplenomegally  Skin: no bruising or bleeding  Musculoskeletal: no lower extremity pitting edema present  Neurologic: No focal deficit, moving all 4 limbs.  Seems she has some cognitive short-term memory issues.    Medications   Current Facility-Administered Medications   Medication Dose Route Frequency Provider Last Rate Last Admin     Current Facility-Administered Medications   Medication Dose Route Frequency Provider Last Rate Last Admin    acyclovir (ZOVIRAX) tablet 400 mg  400 mg Oral BID Elmer Hidalgo MD   400 mg at 12/01/24 0830    aspirin (ASA) chewable tablet 81 mg  81 mg Oral Daily Elmer Hidalgo MD        atorvastatin (LIPITOR) tablet 10 mg  10 mg Oral Daily Isauro Evans MD   10 mg at 11/30/24 2028    cefTRIAXone (ROCEPHIN) 1 g vial to  attach to  mL bag for ADULTS or NS 50 mL bag for PEDS  1 g Intravenous Q24H Elmer Hidalgo MD   1 g at 11/30/24 1325    pantoprazole (PROTONIX) IV push injection 40 mg  40 mg Intravenous QAM AC Elmer Hidalgo MD   40 mg at 12/01/24 0619    sodium chloride (PF) 0.9% PF flush 3 mL  3 mL Intracatheter Q8H Isauro Evans MD   3 mL at 12/01/24 0619       Data   Recent Labs   Lab 12/01/24  0551 11/30/24  1045 11/30/24  0913   WBC 7.1 8.3  --    HGB 10.7* 11.2*  --    MCV 92 93  --    * 142*  --      --  140   POTASSIUM 3.9  --  3.8   CHLORIDE 105  --  104   CO2 25  --  25   BUN 19.6  --  18.8   CR 1.01*  --  1.16*   ANIONGAP 8  --  11   DOUG 9.7  --  9.4   GLC 96  --  89   ALBUMIN 3.5  --  3.6   PROTTOTAL  --   --  5.8*   BILITOTAL  --   --  0.6   ALKPHOS  --   --  125   ALT  --   --  20   AST  --   --  32       No results found for this or any previous visit (from the past 24 hours).

## 2024-12-01 NOTE — PROGRESS NOTES
"   12/01/24 0927   Appointment Info   Signing Clinician's Name / Credentials (OT) Ann Chaves, OTR/L   Living Environment   People in Home facility resident   Current Living Arrangements assisted living   Home Accessibility no concerns   Transportation Anticipated family or friend will provide   Living Environment Comments Hx obtained from daughter(s) called prior to session. Pt lives in [facility] building w/ elevators, she typically walks herself down to the dinning connelly for meals but is otherwise IND in her apartment   Self-Care   Usual Activity Tolerance good   Current Activity Tolerance poor   Regular Exercise No  (had been recieving [facility] PT/OT but daughter reports about a month ago they discharged her because she was doing well.)   Equipment Currently Used at Home walker, standard  (4ww)   Fall history within last six months yes   Number of times patient has fallen within last six months 1  (daughter reports she had 1 fall this summer)   Activity/Exercise/Self-Care Comment Pt had been getting [facility] PT/OT but has been IND since they discharged her. She dresses herself, someone present for bathing but she does it on her own. Pt perseverates on a \"really nice\" chair gifted to her -- however this was after therapist swaped out recliner chairs in room.   Instrumental Activities of Daily Living (IADL)   IADL Comments Pt goes to meals in dining connelly, walks down there IND w/ her 4WW, family reports similiar situation happened w/ pts confusion about 4 years ago. Daughters very concerned about pts confusion.   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 Transferred to [facility] 11/29/2024 for further evaluation and treatment.\"   Existing Precautions/Restrictions fall   Cognitive Status Examination   Orientation Status not oriented to person, place or time   Behavioral Issues overwhelmed easily;difficulty managing stress   Affect/Mental Status (Cognitive) anxious;confused   Follows Commands follows one-step commands;25-49% accuracy   Safety Deficit severe deficit;at risk behavior observed;awareness of need for assistance   Memory Deficit severe deficit   Attention Deficit severe deficit   Executive Function Deficit severe deficit   Cognitive Status Comments Pt currently w/ very poor cognition, staes year as 1966, unaware of being in hospital, perseverates on multiple different things throughout session needing frequent redirection  (of note: acknowledge orders for cog screening however this is not appropriate at this time as pt not oriented)   Visual Perception   Impact of Vision Impairment on Function (Vision) difficult to assess 2/2 to pts confusion and cognition   Sensory   Sensory Comments very sensitive BLE, especially LLE to touch   Pain Assessment   Patient Currently in Pain Yes, see Vital Sign flowsheet   Posture   Posture forward head position;protracted shoulders   Range of Motion Comprehensive   Comment, General Range of Motion BUE WFL   Strength Comprehensive (MMT)   Comment, General Manual Muscle Testing (MMT) Assessment significant generalized global weakness, strength testing bilaterally is equal, does not tolerate LE MMT 2/2 pain   Coordination   Coordination Comments difficult to assess, poor attention, poor command following, reaching for mutliple things throughout session   Bed Mobility   Comment (Bed Mobility) Mod-Max Ax1, resistive to assist at times,   Transfers   Transfer Comments Max Ax1 STS from EOB   Balance   Balance Comments significantly imparied sitting and " standing balance, unable to correct LOB, posterior lean sitting EOB   Activities of Daily Living   BADL Assessment/Intervention lower body dressing;grooming;upper body dressing;toileting   Upper Body Dressing Assessment/Training   [place] Level (Upper Body Dressing) maximum assist (25% patient effort)   Lower Body Dressing Assessment/Training   [place] Level (Lower Body Dressing) maximum assist (25% patient effort)   Grooming Assessment/Training   [place] Level (Grooming) dependent (less than 25% patient effort)  (poor attention, could not cognitively process steps or sequence through BADLs and refused to do oral cares as she has dentures and needs to talk to her daughter?)   Toileting   Comment, (Toileting) Max Ax1-2, rec SS w/ nsg   Clinical Impression   Criteria for Skilled Therapeutic Interventions Met (OT) Yes, treatment indicated   OT Diagnosis impaired ADL/IADL cognition   Influenced by the following impairments confusion   OT Problem List-Impairments impacting ADL problems related to;activity tolerance impaired;cognition;mobility;strength   Assessment of Occupational Performance 5 or more Performance Deficits   Identified Performance Deficits dressing, bathing, transfers, mobility, home mgmt, meds, cognition   Planned Therapy Interventions (OT) ADL retraining;strengthening;cognition;transfer training;progressive activity/exercise   Clinical Decision Making Complexity (OT) detailed assessment/moderate complexity   OT Total Evaluation Time   OT Eval, Moderate Complexity Minutes (85828) 10   OT Goals   Therapy Frequency (OT) 4 times/week  (T-F)   OT Predicted Duration/Target Date for Goal Attainment 12/11/24   OT Goals Hygiene/Grooming;Lower Body Dressing;Toilet Transfer/Toileting;Cognition;OT Goal 1   OT: Hygiene/Grooming supervision/stand-by assist   OT: Lower Body Dressing Supervision/stand-by assist   OT: Toilet Transfer/Toileting Supervision/stand-by assist   OT: Cognitive Patient/caregiver  "will verbalize understanding of cognitive assessment results/recommendations as needed for safe discharge planning   OT: Goal 1 Pt will demonstrate improved funcitonal cognition by following >75% commands.   Interventions   Interventions Quick Adds Therapeutic Activity;Self-Care/Home Management   Self-Care/Home Management   Self-Care/Home Mgmt/ADL, Compensatory, Meal Prep Minutes (52672) 10   Treatment Detail/Skilled Intervention OT: Attempted cognitive tasks, command following very poor, confused, disoriented to year, place and situation, thinks that her things are being taken away demonstrating parinoia, perseverates on talking to daughters, tried to have patient perform oral cares but then refused as she needs to talk to her daughter because she has dentures and thinks that therapist is going to break them. Pt needing total A to manage phone and call daughter, talked to daughter on phone for ~8-10 minutes regarding POC and pts cognition and safety. Daughter perseverating on pts labs, directed these questions to RN/MD.   Therapeutic Activities   Therapeutic Activity Minutes (11376) 25   Treatment Detail/Skilled Intervention OT: Mod-Max Ax1 supine > sit EOB w/ HOB elevated, pt reports her apartment has \"everything\" but unclear if she has bed rails at home, pain at LEs when asssisting pt to EOB, posterior lean sitting EOB, pt very anxious and refusing assist at times, impaired safety, Max Ax1 STS to FWW (allowed pt to pull up on walker this date 2/2 poor cognition and command following), took steps over to recliner chair very slow steps and barely picking LEs off floor, VC for each step, multiple posterior LOB and pt unable to self correct leaning back into heels, therapist needing to swap out chair in room then Max A STS and step pivots w/ FWW over to other recliner chair, pt then perseverates on \"them taking and buring all my nice things\", pt is not redirectable and contineus to be confused. x2 step pivots w/ " "significant cues and Max A.   OT Discharge Planning   OT Plan seated g/h, cognition, command following, orientation, stand and steps, walk to BR when ready, step pivots to commode   OT Discharge Recommendation (DC Rec) Transitional Care Facility   OT Rationale for DC Rec Per daughters reports pt is functioning well below baseline, had previously been walking down to dinning connelly and managing her own medications and is usually very \"sharp\" and she currently is very confused and heavy Ax1-2 for bed mobility, stand pivot tr and Assist for all ADLs. Pt will require TCU stay prior to returning to her prior living situation   OT Brief overview of current status Goals of therapy will be to address safe mobility and make recs for d/c to next level of care. Pt and RN will continue to follow all falls risk precautions as documented by RN staff while hospitalized   Total Session Time   Timed Code Treatment Minutes 35   Total Session Time (sum of timed and untimed services) 45     "

## 2024-12-01 NOTE — PLAN OF CARE
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  /GI: incontinent. Last BM 12/1/24.  Pain: denies pain.     Drains/Lines: PIV patent intact and infused   Skin: redness blanchable on sacrum coccyx. Meplex placed.  Activity: Assist x two sera steady  Diet: reg with thin liquids. ate 100% of  breakfast and 75 % of lunch. Takes pills whole.      Therapies recs: TCU  Discharge: pending     Aggression Stoplight Tool: green     End of shift summary: spoke with Daughter extensively today about pt and course of care.

## 2024-12-01 NOTE — PROGRESS NOTES
UROLOGY BRIEF NOTE    Chart reviewed. Creat improved (1.16-->1.01). Remains vitally stable.     Per Dr. Love's note from yd: Discussed patient with Hospitalist and reviewed the CT images from [facility]. Their working diagnosis was a UTI causing mental status changes. The CT showed mild left hydro but the nephrogram phases were the same bilaterally indicating that there is no obstruction; at least not enough to cause her current symptoms. Also, there was no crossing vessel to cause hydro. Therefore, I would not recommend stent placement at this time.      Continue supportive care with antibiotics and consider ID consults if progress is not being made.     No further urology recs, will sign off.     Carla Macario PA-C on 12/1/2024 at 12:51 PM  [facility]

## 2024-12-01 NOTE — PROGRESS NOTES
Writer spent 30 min on the phone with daughter Ggee who is very distraught with plan of care. Writer informed daughter on the process of pt workup, pt labs and vital signs. Daughter was also updated on pt nutritional improvements and pt ADL's. Active listening provided, resources offered to daughter for feedback. Awaiting neurology consult and further plan of care.   0

## 2024-12-01 NOTE — CONSULTS
"[facility]    Neurology Consultation     Date of Admission:  11/29/2024    Assessment & Plan   Kandis Gallagher is a 84 year old female who was admitted on 11/29/2024. I was asked to see the patient for mental status changes.  The patient is an 84-year-old lady with encephalopathy possible on top of mild cognitive impairment by history.  Possible UTI.  Other treatable causes should be excluded.  Of note vitamin B12 was low at 266 very likely adds to the confusion as is the renal insufficiency.    -Will do an EEG  -MRI of the head if no contraindication  -Will give vitamin B12 injection 1000 mcg and then continue sublingual vitamin B12  -Will check vitamin EE and folate  -Occupational Therapy to do a cognitive assessment  -Physical therapy to evaluate the patient.      Anita Cruz MD    Code Status    No CPR- Do NOT Intubate    Reason for Consult   Reason for consult: I was asked by Dr Hidalgo to evaluate this patient for mental status changes.    Primary Care Physician   Noemi Sanchez    Chief Complaint   Mental status changes, confusion    History is obtained from the patient and electronic health record    History of Present Illness   Kandis Gallagher is a 84 year old female who presents with confusion.  The patient is not able to give me history.  However she states that she has had on and off some memory problems.  She cannot give me more details.  Reviewing the history of present illness from the admission the daughter provided history that the patient has been more confused in the last 2 weeks.  Apparently the patient has had mildly abnormal UA and was prescribed cephalexin on 11/22/2024 when she presented for the first time for confusion.  She was also noticed just prior to admission to be weak generally.  Daughters report that the patient is usually \"very sharp\".  The patient was complaining of intermittent left-sided flank pain.    On admission the patient was " noticed to have elevated creatinine CT abdomen and pelvis shows new moderate left hydronephrosis.    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  She has been treated at [facility] with chemotherapy.  The patient has had multiple complications with GI bleed and July 2022, this was found to be due to duodenal ulcer.  She has had multiple admissions for UTIs and other symptoms.    The patient lives in an assisted living.    CT scan of the head  1.  No acute intracranial findings.   2.  Stable generalized cerebral volume loss and presumed chronic microvascular ischemic changes.   3.  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   4.  Stable calcified extra-axial mass along the left parietal convexity, presumed to represent a meningioma.     Past Medical History   I have reviewed this patient's medical history and updated it with pertinent information if needed.   Past Medical History:   Diagnosis Date    Congenital bicuspid aortic valve     H/O aortic valve replacement     23 mm Magna Ease     HFrEF (heart failure with reduced ejection fraction) (H)     Hyperlipidemia     Hypertension     Hyperthyroidism     Multiple myeloma (H)     Severe aortic stenosis     SVT (supraventricular tachycardia) (H)     s/p ablation    Thyroiditis        Past Surgical History   I have reviewed this patient's surgical history and updated it with pertinent information if needed.  Past Surgical History:   Procedure Laterality Date    EP ABLATION SVT N/A 6/7/2023    Procedure: Ablation Supraventricular Tachycardia;  Surgeon: Aaron Hoffman MD;  Location:  [facility]       Prior to Admission Medications   Prior to Admission Medications   Prescriptions Last Dose Informant Patient Reported? Taking?   Biotin w/ Vitamins C & E (HAIR SKIN & NAILS GUMMIES PO) 11/29/2024 Morning  Yes Yes   Sig: Take 1 tablet by mouth daily.   CALCIUM PO Past Week Morning  Yes Yes    Sig: Take 1 tablet by mouth daily.   FEROSUL 325 (65 Fe) MG tablet 11/29/2024 Morning  No Yes   Sig: TAKE ONE TABLET BY MOUTH DAILY WITH BREAKFAST   Probiotic Product (CULTURELLE PROBIOTICS) CHEW 11/29/2024 Morning  Yes Yes   Sig: Take 1 chew tab by mouth daily.   Vitamin D3 (CHOLECALCIFEROL) 25 mcg (1000 units) tablet Past Week Morning  Yes Yes   Sig: Take 25 mcg by mouth daily.   acyclovir (ZOVIRAX) 400 MG tablet 11/29/2024 Morning  No Yes   Sig: TAKE 1 TABLET(400 MG) BY MOUTH EVERY 12 HOURS   aspirin (ASA) 81 MG chewable tablet 11/29/2024 Morning  Yes Yes   Sig: Take 81 mg by mouth daily.   atorvastatin (LIPITOR) 10 MG tablet 11/28/2024 Evening  No Yes   Sig: Take 1 tablet (10 mg) by mouth daily.   furosemide (LASIX) 20 MG tablet 11/29/2024 Morning  No Yes   Sig: Take 1 tablet (20 mg) by mouth daily.   pantoprazole (PROTONIX) 40 MG EC tablet 11/29/2024 Morning  No Yes   Sig: Take 1 tablet (40 mg) by mouth daily. 30-60 min prior to meal.   potassium chloride (KLOR-CON) 20 MEQ packet 11/28/2024 Noon  No Yes   Sig: MIX 1 PACKET IN LIQUID AND TAKE BY MOUTH ONCE EVERY DAY AS DIRECTED.   sulfamethoxazole-trimethoprim (BACTRIM DS) 800-160 MG tablet 11/29/2024 Morning  Yes Yes   Sig: Take 1 tablet by mouth 2 times daily.      Facility-Administered Medications: None     Allergies   Allergies   Allergen Reactions    Carvedilol Other (See Comments)    Lidocaine Nausea and Vomiting     Vomiting with general anesthesia    Lisinopril Cough    Seasonal Allergies Other (See Comments)     Problems with narcotics - oxygen desaturates    Spironolactone        Social History   I have reviewed this patient's social history and updated it with pertinent information if needed. Kandis AI Gallagher  reports that she has never smoked. She has never used smokeless tobacco. She reports that she does not drink alcohol and does not use drugs.    Family History   I have reviewed this patient's family history and updated it with pertinent  information if needed.   Family History   Problem Relation Age of Onset    Breast Cancer Sister     Ovarian Cancer Sister     Colon Cancer Brother     Liver Cancer Brother     Breast Cancer Daughter     Colon Cancer Daughter        Review of Systems   The 10 point Review of Systems is negative other than noted in the HPI or here.     Physical Exam   Temp: 98.4  F (36.9  C) Temp src: Oral BP: (!) 141/69 Pulse: 83   Resp: 18 SpO2: 96 % O2 Device: None (Room air)    Vital Signs with Ranges  Temp:  [98.4  F (36.9  C)-98.7  F (37.1  C)] 98.4  F (36.9  C)  Pulse:  [] 83  Resp:  [17-18] 18  BP: (130-149)/(62-79) 141/69  SpO2:  [94 %-97 %] 96 %  0 lbs 0 oz    Constitutional: Slim lady  Eyes: No conjunctival erythema  ENT: Neck is supple  Respiratory: CTA  Cardiovascular: RRR  Skin: No obvious lesions  Musculoskeletal: No pedal edema  The patient is alert oriented to person and place but not to date.  She does not know the name of the president.  She can spell world forward but not backward.  Memory was 4 out of 4 at 1 minute and 0 out of 4 at 5 minutes.  The patient is very hard of hearing.  Pupils are round reactive to light bilaterally.  There is no nystagmus.  Face is symmetric.  Tongue protrudes midline.  There is no pronator drift.  There is mild weakness in both hip flexion at about plus 4 out of 5 otherwise strength is normal in lower extremities.  Reflexes are 0 throughout.  Light touch was unremarkable.  Vibratory sense was decreased above the ankles.  Finger-nose-finger without dysmetria fine movements are slow bilateral.  Gait was deferred.  Neuropsychiatric: Somewhat depressed    Data   Results for orders placed or performed during the hospital encounter of 11/29/24 (from the past 24 hours)   CBC with Platelets & Differential    Narrative    The following orders were created for panel order CBC with Platelets & Differential.  Procedure                               Abnormality         Status                      ---------                               -----------         ------                     CBC with platelets and d...[[id number]]  Abnormal            Final result                 Please view results for these tests on the individual orders.   Renal panel   Result Value Ref Range    Sodium 138 135 - 145 mmol/L    Potassium 3.9 3.4 - 5.3 mmol/L    Chloride 105 98 - 107 mmol/L    Carbon Dioxide (CO2) 25 22 - 29 mmol/L    Anion Gap 8 7 - 15 mmol/L    Glucose 96 70 - 99 mg/dL    Urea Nitrogen 19.6 8.0 - 23.0 mg/dL    Creatinine 1.01 (H) 0.51 - 0.95 mg/dL    GFR Estimate 54 (L) >60 mL/min/1.73m2    Calcium 9.7 8.8 - 10.4 mg/dL    Albumin 3.5 3.5 - 5.2 g/dL    Phosphorus 2.8 2.5 - 4.5 mg/dL   CBC with platelets and differential   Result Value Ref Range    WBC Count 7.1 4.0 - 11.0 10e3/uL    RBC Count 3.57 (L) 3.80 - 5.20 10e6/uL    Hemoglobin 10.7 (L) 11.7 - 15.7 g/dL    Hematocrit 32.7 (L) 35.0 - 47.0 %    MCV 92 78 - 100 fL    MCH 30.0 26.5 - 33.0 pg    MCHC 32.7 31.5 - 36.5 g/dL    RDW 13.7 10.0 - 15.0 %    Platelet Count 131 (L) 150 - 450 10e3/uL    % Neutrophils 65 %    % Lymphocytes 24 %    % Monocytes 6 %    % Eosinophils 3 %    % Basophils 1 %    % Immature Granulocytes 0 %    NRBCs per 100 WBC 0 <1 /100    Absolute Neutrophils 4.6 1.6 - 8.3 10e3/uL    Absolute Lymphocytes 1.7 0.8 - 5.3 10e3/uL    Absolute Monocytes 0.4 0.0 - 1.3 10e3/uL    Absolute Eosinophils 0.2 0.0 - 0.7 10e3/uL    Absolute Basophils 0.1 0.0 - 0.2 10e3/uL    Absolute Immature Granulocytes 0.0 <=0.4 10e3/uL    Absolute NRBCs 0.0 10e3/uL

## 2024-12-01 NOTE — PLAN OF CARE
"Summary: New Hydronephrosis with dilatation of L Renal Pelvis   DATE & TIME: 11/30/2024 [id number] Cognitive Concerns/ Orientation : Alert to self only, Calm and cooperative. pleasant  BEHAVIOR & AGGRESSION TOOL COLOR: Green   CIWA SCORE: NA    ABNL VS/O2: VSS on RA,. Cont Pox at daughter's request, reported to desat when in an \"ill state.\"  MOBILITY: Ast 2 w/ GB and Sera Steady, up in chair for dinner, turn/repo  PAIN MANAGMENT: denies pain, has baseline neuropathy in hands and feet.   DIET: Regular diet, good appetite,. Pills need to be crushed in apple sauce   BOWEL/BLADDER: Incontinent of bowel and bladder, Purewick in place. Had 1 large tarry and green stool - MD updated  ABNL LAB/BG: Cr 1.16, hgb 11.2  DRAIN/DEVICES: L PIV SL  TELEMETRY RHYTHM: NA   SKIN: Dry and flushed. Blanchable redness on buttocks- mepilex in place  TESTS/PROCEDURES: none   D/C DAY/GOALS/PLACE: Pending   OTHER IMPORTANT INFO: Dawna De Guzman updated on phone, lives in [place]. She will be primary contact at anytime if needed. Contact information in sticky note and on pt's board.Urology following. PT, OT, CCRN, SW consulted.     "

## 2024-12-01 NOTE — PLAN OF CARE
Goal Outcome Evaluation:    Shift Summary [id number]    Admitting Diagnosis: DINA, hydronephrosis  Hydronephrosis   Vitals: WNL on RA   Pain: Denies  Alert to self. Confused, but pleasant.  Voiding: Incontinent of B&B. Purewick on.   Skin: Blanchable redness on buttocks & mepilex in place  Mobility: Ax2 with anam steady and GB  Tele: NA  CMS: Intact ex baseline neuropathy  GI: Incontinent  Dressing: Mepilex on. Turn & repo.   IV access/Drains: PIV SL    Orders Placed This Encounter      Regular Diet Adult       Plan: Urology, PT, OT, SW following.

## 2024-12-02 LAB
ALBUMIN MFR UR ELPH: 62.5 %
ALBUMIN SERPL ELPH-MCNC: 3.3 G/DL (ref 3.7–5.1)
ALPHA1 GLOB MFR UR ELPH: 7.5 %
ALPHA1 GLOB SERPL ELPH-MCNC: 0.3 G/DL (ref 0.2–0.4)
ALPHA2 GLOB MFR UR ELPH: 9.8 %
ALPHA2 GLOB SERPL ELPH-MCNC: 0.7 G/DL (ref 0.5–0.9)
ANION GAP SERPL CALCULATED.3IONS-SCNC: 12 MMOL/L (ref 7–15)
B-GLOBULIN MFR UR ELPH: 14.1 %
B-GLOBULIN SERPL ELPH-MCNC: 0.5 G/DL (ref 0.6–1)
BASOPHILS # BLD AUTO: 0.1 10E3/UL (ref 0–0.2)
BASOPHILS NFR BLD AUTO: 1 %
BUN SERPL-MCNC: 15.7 MG/DL (ref 8–23)
CALCIUM SERPL-MCNC: 9.9 MG/DL (ref 8.8–10.4)
CHLORIDE SERPL-SCNC: 105 MMOL/L (ref 98–107)
CREAT SERPL-MCNC: 0.89 MG/DL (ref 0.51–0.95)
EGFRCR SERPLBLD CKD-EPI 2021: 63 ML/MIN/1.73M2
EOSINOPHIL # BLD AUTO: 0.2 10E3/UL (ref 0–0.7)
EOSINOPHIL NFR BLD AUTO: 3 %
ERYTHROCYTE [DISTWIDTH] IN BLOOD BY AUTOMATED COUNT: 13.6 % (ref 10–15)
GAMMA GLOB MFR UR ELPH: 6.1 %
GAMMA GLOB SERPL ELPH-MCNC: 0.6 G/DL (ref 0.7–1.6)
GLUCOSE SERPL-MCNC: 94 MG/DL (ref 70–99)
HCO3 SERPL-SCNC: 24 MMOL/L (ref 22–29)
HCT VFR BLD AUTO: 38.2 % (ref 35–47)
HGB BLD-MCNC: 12.3 G/DL (ref 11.7–15.7)
IMM GRANULOCYTES # BLD: 0 10E3/UL
IMM GRANULOCYTES NFR BLD: 0 %
KAPPA LC FREE SER-MCNC: 2.25 MG/DL (ref 0.33–1.94)
KAPPA LC FREE/LAMBDA FREE SER NEPH: 2.03 {RATIO} (ref 0.26–1.65)
LAMBDA LC FREE SERPL-MCNC: 1.11 MG/DL (ref 0.57–2.63)
LOCATION OF TASK: ABNORMAL
LOCATION OF TASK: ABNORMAL
LYMPHOCYTES # BLD AUTO: 2.4 10E3/UL (ref 0.8–5.3)
LYMPHOCYTES NFR BLD AUTO: 28 %
M PROTEIN MFR UR ELPH: 0 %
M PROTEIN SERPL ELPH-MCNC: 0 G/DL
MCH RBC QN AUTO: 29.6 PG (ref 26.5–33)
MCHC RBC AUTO-ENTMCNC: 32.2 G/DL (ref 31.5–36.5)
MCV RBC AUTO: 92 FL (ref 78–100)
MONOCYTES # BLD AUTO: 0.6 10E3/UL (ref 0–1.3)
MONOCYTES NFR BLD AUTO: 7 %
NEUTROPHILS # BLD AUTO: 5.2 10E3/UL (ref 1.6–8.3)
NEUTROPHILS NFR BLD AUTO: 61 %
NRBC # BLD AUTO: 0 10E3/UL
NRBC BLD AUTO-RTO: 0 /100
PLATELET # BLD AUTO: 143 10E3/UL (ref 150–450)
POTASSIUM SERPL-SCNC: 4.2 MMOL/L (ref 3.4–5.3)
PROT PATTERN SERPL ELPH-IMP: ABNORMAL
PROT PATTERN UR ELPH-IMP: ABNORMAL
RBC # BLD AUTO: 4.15 10E6/UL (ref 3.8–5.2)
SODIUM SERPL-SCNC: 141 MMOL/L (ref 135–145)
WBC # BLD AUTO: 8.5 10E3/UL (ref 4–11)

## 2024-12-02 PROCEDURE — 250N000011 HC RX IP 250 OP 636: Performed by: HOSPITALIST

## 2024-12-02 PROCEDURE — 250N000009 HC RX 250: Performed by: INTERNAL MEDICINE

## 2024-12-02 PROCEDURE — 80048 BASIC METABOLIC PNL TOTAL CA: CPT | Performed by: INTERNAL MEDICINE

## 2024-12-02 PROCEDURE — 36415 COLL VENOUS BLD VENIPUNCTURE: CPT | Performed by: INTERNAL MEDICINE

## 2024-12-02 PROCEDURE — 250N000013 HC RX MED GY IP 250 OP 250 PS 637: Performed by: INTERNAL MEDICINE

## 2024-12-02 PROCEDURE — 85025 COMPLETE CBC W/AUTO DIFF WBC: CPT | Performed by: INTERNAL MEDICINE

## 2024-12-02 PROCEDURE — 99232 SBSQ HOSP IP/OBS MODERATE 35: CPT | Performed by: HOSPITALIST

## 2024-12-02 PROCEDURE — 250N000013 HC RX MED GY IP 250 OP 250 PS 637: Performed by: PSYCHIATRY & NEUROLOGY

## 2024-12-02 PROCEDURE — 120N000001 HC R&B MED SURG/OB

## 2024-12-02 PROCEDURE — 84132 ASSAY OF SERUM POTASSIUM: CPT | Performed by: INTERNAL MEDICINE

## 2024-12-02 RX ADMIN — PANTOPRAZOLE SODIUM 40 MG: 40 INJECTION, POWDER, FOR SOLUTION INTRAVENOUS at 09:27

## 2024-12-02 RX ADMIN — ASPIRIN 81 MG CHEWABLE TABLET 81 MG: 81 TABLET CHEWABLE at 09:27

## 2024-12-02 RX ADMIN — Medication 1000 MCG: at 09:26

## 2024-12-02 RX ADMIN — CEFTRIAXONE SODIUM 1 G: 1 INJECTION, POWDER, FOR SOLUTION INTRAMUSCULAR; INTRAVENOUS at 14:25

## 2024-12-02 RX ADMIN — ACYCLOVIR 400 MG: 400 TABLET ORAL at 21:50

## 2024-12-02 RX ADMIN — ACYCLOVIR 400 MG: 400 TABLET ORAL at 09:26

## 2024-12-02 RX ADMIN — FUROSEMIDE 20 MG: 20 TABLET ORAL at 09:26

## 2024-12-02 RX ADMIN — ATORVASTATIN CALCIUM 10 MG: 10 TABLET, FILM COATED ORAL at 21:50

## 2024-12-02 ASSESSMENT — ACTIVITIES OF DAILY LIVING (ADL)
ADLS_ACUITY_SCORE: 80
ADLS_ACUITY_SCORE: 84
ADLS_ACUITY_SCORE: 80
ADLS_ACUITY_SCORE: 84
ADLS_ACUITY_SCORE: 84
ADLS_ACUITY_SCORE: 80
ADLS_ACUITY_SCORE: 84
ADLS_ACUITY_SCORE: 80
ADLS_ACUITY_SCORE: 84
ADLS_ACUITY_SCORE: 80
ADLS_ACUITY_SCORE: 80
ADLS_ACUITY_SCORE: 84
ADLS_ACUITY_SCORE: 80
ADLS_ACUITY_SCORE: 84
ADLS_ACUITY_SCORE: 80

## 2024-12-02 NOTE — PROGRESS NOTES
Shift Summary [id number]    Admitting Diagnosis: DINA, hydronephrosis  Hydronephrosis   Vitals  Stable on RA  Pain Denies  A&O to self  Voiding  Purewick in place  Mobility A2 anam steady  Tele N/A  CMS Intact  Lung Sounds Clear RA  GI WNL  Dressing N/A    Orders Placed This Encounter      Regular Diet Adult       Plan:  Neuro and urology following

## 2024-12-02 NOTE — PLAN OF CARE
Summary: New Hydronephrosis with dilatation of L Renal Pelvis   DATE & TIME: 11/30/2024 [id number]   Cognitive Concerns/ Orientation : Alert to self only and place. Calm and cooperative. Pleasant. Off topic freq with conversations.   BEHAVIOR & AGGRESSION TOOL COLOR: Green   CIWA SCORE: NA    ABNL VS/O2: VSS on RA  MOBILITY: 2PA, GB, and Jodi-Steady. Up in chair during day. Needs repositioning Q2h and as needed.   PAIN MANAGMENT: Denies. Baseline neuropathy in hands and feet.   DIET: Regular diet, Room Service. Pills need to be crushed in apple sauce. Feeds self with set up.   BOWEL/BLADDER: Incontinent of bowel and bladder, Purewick in place. ABNL LAB/BG: Crea 1.01, GFR 54, Hgb 10.7, EEG completed this shift, reading pending.   DRAIN/DEVICES: L PIV SL  TELEMETRY RHYTHM: NA   SKIN: Dry and flushed. Blanchable redness on buttocks- mepilex in place. Bruising along back.   TESTS/PROCEDURES: AM BMP, CBC  D/C DAY/GOALS/PLACE: Pending work up and plan.   OTHER IMPORTANT INFO: Neurology and Urology following. PT, OT, CCRN, SW consulted. Bed alarm on for safety. Rounded on freq. EEG completed.

## 2024-12-02 NOTE — PLAN OF CARE
"Summary: New Hydronephrosis with dilatation of L Renal Pelvis   DATE & TIME: 11/30/2024 9386-3817   Cognitive Concerns/ Orientation: Alert to self only; more confused overnight stating she wants to \"go home\"  BEHAVIOR & AGGRESSION TOOL COLOR: Green       ABNL VS/O2: /74 (PRN hydralazine for SBP > 180, room air  MOBILITY: Assist-2 gait belt/Jodi-Steady; Turn/repo Q2 hours   PAIN MANAGMENT: Denies. Baseline neuropathy in hands and feet; PAINAD =1 (groans with turns)  DIET: Regular diet with Room Service-able to feed self with tray set-up); Pills crushed apple sauce; Pills crushed in apple sauce   BOWEL/BLADDER: Incontinent of bowel and bladder--removing purewick frequently-switched to brief   ABNL LAB/BG: Crea 1.01, GFR 54, Hgb 10.2   DRAIN/DEVICES: L PIV saline locked  SKIN: Dry and flushed. Blanchable redness on buttocks- mepilex in place. Bruising along back  TESTS/PROCEDURES: EEG complete  D/C DAY/GOALS/PLACE: Pending    OTHER IMPORTANT INFO: Neurology and Urology following; OT consult for SLUMS testing; PT/SW/Care coordination consults  "

## 2024-12-02 NOTE — PLAN OF CARE
"Summary: New Hydronephrosis with dilatation of L Renal Pelvis     DATE & TIME: 12/2/24 1307-5257  Cognitive Concerns/ Orientation: Alert to self only; more confused overnight stating she wants to \"go home\"  BEHAVIOR & AGGRESSION TOOL COLOR: Green       ABNL VS/O2: VSS except HTNsive at times, (PRN hydralazine for SBP > 180), room air  MOBILITY: Assist-2 gait belt/Jodi-Steady; Turn/repo Q2 hours.  Sat up in recliner   PAIN MANAGMENT: Denies but however groans with turns.  Baseline neuropathy in hands and feet  DIET: Regular diet with Room Service-able to feed self with tray set-up); Pills crushed apple sauce  BOWEL/BLADDER: Incontinent of bowel and bladder--removes purewick occasionally   ABNL LAB/BG: PLT-143, Hgb 10.7  DRAIN/DEVICES: L PIV saline locked  SKIN: Dry and flushed. Blanchable redness on buttocks- mepilex in place. Bruising along back  TESTS/PROCEDURES: EEG complete, MRI ordered  D/C DAY/GOALS/PLACE: Pending    OTHER IMPORTANT INFO: Neurology following; Urology signed off; OT consult for SLUMS testing; PT/SW/Care coordination consults.  Visited by daughter this shift.  "

## 2024-12-02 NOTE — PROGRESS NOTES
[facility]  Hospitalist Progress Note        Conrado Elizabeth MD   12/02/2024        Interval History:        - per Urology (signed off 12/1), the CT showed mild left hydro but the nephrogram phases were the same bilaterally indicating that there is no obstruction; at least not enough to cause her current symptoms. Urology thus not recommending stent placement at this time     - Remains pleasantly confused, oriented X 1-2, no focal neurological deficits  - evaluated by neurology, note low Vitamin B 12 at 266; getting EEG (results pending); ordered for vitamin B12 injection 1000  g and then to continue sublingual vitamin B12  - Neuro rec MRI brain-- ordered  - PT/OT rec TCU    - Remains afebrile; no leukocytosis; started on Rocephin 11/30, will complete 3 days course on 12/2/24  - Cr 1.1--> 0.89         Assessment and Plan:        Kandis Gallagher is a 84 year old female with PMH significant for CKD stage 3, chronic HFrEF, history of multiple myeloma, severe aortic stenosis (s/p aortic valve replacement), pulmonary hypertension, possible history of dementia, who presented to OSH ED with confusion, found to have DINA and CT with hydronephrosis and 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 Transferred to [facility] 11/29/2024 for further evaluation and treatment.      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 Urology thus not recommending stent placement at this time  "  -creatinine improved 1.16---> 0.89 with IV hydration  -will monitor BMP intermittently; avoid nephrotoxic; Bactrim discontinued      Acute encephalopathy likely metabolic  Possible underlying cognitive impairment/dementia versus delirium  vitamin B12 deficiency  - concern for some underlying baseline cognitive impairment  - per family has had episodes of confusion in past with UTI/infections  - was treated with Bactrim at outside facility; Bactrim can cause some CNS side effects as well as renal failure -- now discontinued  - Remains pleasantly confused, oriented X 1-2 (person in place) ; no focal neurological deficits  - evaluated by neurology, note low Vitamin B 12 at 266; getting EEG (results pending); ordered for vitamin B12 injection 1000  g and then to continue sublingual vitamin B12  - Neuro rec MRI brain-- ordered  - PT/OT rec TCU  - *Hx of dementia noted in chart, though daughters report patient is usually quite \"sharp\" and they deny any dx of cognitive impairment or dementia in the past.  - [facility], stable generalized volume loss presumed chronic microvascular ischemic changes.  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      Hypertension  Controlled off of anti-hypertensives  - Monitor      Coronary artery disease, non-obstructive  Hyperlipidemia  - Continue PTA atorvastatin  - continued PTA ASA since no urologic intervention planned     Multiple myeloma  Follows with [facility]. Currently off treatment. Lytic lesions noted on CT likely due to known MM.   - Continue PTA acyclovir, currently on surveillance only,     GERD  - Continue PTA PPI     Adnexal cyst  *CT abdomen/pelvis incidentally noted enlarging cystic lesion in the left adnexa likely ovarian in origin measuring up to 8.7 cm in maximum diameter   *Pelvic US 3/2021 A 6.0 cm left adnexal cyst is simple in appearance.  -Prior provider discussed with daughters, recommend continued outpatient follow-up      Chronic compression fracture T12  - Acetaminophen PRN     Pancreatic cysts  CT abd/pelvis with two new cystic lesions in pancreatic body, 1.5 x 1.0 cm and 1.4 x 0.9 cm.   -prior provider discussed with daughters  - CT or MRI/MRCP every 6 months for 1 year and then yearly for 2 years per radiology recommendations        Diet:   Regular Diet Adult  Room Service      DVT Prophylaxis: Pneumatic Compression Devices  Code Status: No CPR- Do NOT Intubate    Disposition:   Medically Ready for Discharge: Anticipated in 2-4 Days pending clinical stability; neurology clearance, awaiting MRI and EEG    PT/OT recommend TCU placement   to follow for disposition    Care plan discussed with patient and nursing  also discussed with neurology and later updated the care plan to her daughter Rashmi over the phone       Clinically Significant Risk Factors                         # Dementia: noted on problem list          # Overweight: Estimated body mass index is 25.08 kg/m  as calculated from the following:    Height as of 11/25/24: 1.473 m (4' 10\").    Weight as of 11/25/24: 54.4 " kg (120 lb)., PRESENT ON ADMISSION                             Physical Exam:      Blood pressure (!) 154/77, pulse 78, temperature 98.9  F (37.2  C), temperature source Oral, resp. rate 16, SpO2 97%.  There were no vitals filed for this visit.  Vital Signs with Ranges  Temp:  [97.2  F (36.2  C)-98.9  F (37.2  C)] 98.9  F (37.2  C)  Pulse:  [] 78  Resp:  [16-18] 16  BP: (138-162)/(64-77) 154/77  SpO2:  [95 %-97 %] 97 %  I/O's Last 24 hours  I/O last 3 completed shifts:  In: 240 [P.O.:240]  Out: 1050 [Urine:1050]    Constitutional: Alert, awake and oriented X 1-2 (place in person); seems pleasantly confused to situation; resting comfortably in no apparent distress       Oral cavity: Moist mucosa   Cardiovascular: Normal s1 s2, regular rate and rhythm, no murmur   Lungs: B/l clear to auscultation, no wheezes or crepitations   Abdomen: Soft, nt, nd, no guarding, rigidity or rebound; BS +   LE : No edema   Musculoskeletal/Neuro Power 5/5 in all extremities; No focal neurological deficits noted   Psychiatry: normal mood and affect                Medications:        Current Facility-Administered Medications   Medication Dose Route Frequency Provider Last Rate Last Admin    acyclovir (ZOVIRAX) tablet 400 mg  400 mg Oral BID Elmer Hidalgo MD   400 mg at 12/01/24 2025    aspirin (ASA) chewable tablet 81 mg  81 mg Oral Daily Elmer Hidalgo MD        atorvastatin (LIPITOR) tablet 10 mg  10 mg Oral Daily Isauro Evans MD   10 mg at 12/01/24 2025    cefTRIAXone (ROCEPHIN) 1 g vial to attach to  mL bag for ADULTS or NS 50 mL bag for PEDS  1 g Intravenous Q24H Elmer Hidalgo MD   1 g at 12/01/24 1321    cyanocobalamin (VITAMIN B-12) sublingual tablet 1,000 mcg  1,000 mcg Sublingual Daily Anita Cruz MD        furosemide (LASIX) tablet 20 mg  20 mg Oral Daily Elmer Hidalgo MD   20 mg at 12/01/24 1321    pantoprazole (PROTONIX) IV push injection 40 mg  40 mg Intravenous  [facility] Elmer Emerson MD   40 mg at 12/01/24 0619    sodium chloride (PF) 0.9% PF flush 3 mL  3 mL Intracatheter Q8H Isauro Evans MD   3 mL at 12/01/24 1321     PRN Meds:   Current Facility-Administered Medications   Medication Dose Route Frequency Provider Last Rate Last Admin    acetaminophen (TYLENOL) tablet 650 mg  650 mg Oral Q4H PRN Isauro Evans MD        Or    acetaminophen (TYLENOL) Suppository 650 mg  650 mg Rectal Q4H PRN Isauro Evans MD        benzocaine-menthol (CHLORASEPTIC) 6-10 MG lozenge 1 lozenge  1 lozenge Buccal Q1H PRIsauro Bautista MD        hydrALAZINE (APRESOLINE) tablet 10 mg  10 mg Oral Q4H PRN Isauro Evans MD        Or    hydrALAZINE (APRESOLINE) injection 10 mg  10 mg Intravenous Q4H PRIsauro Bautista MD        lidocaine (LMX4) cream   Topical Q1H PRIsauro Bautista MD        lidocaine 1 % 0.1-1 mL  0.1-1 mL Other Q1H PRIsauro Bautista MD        melatonin tablet 1 mg  1 mg Oral At Bedtime PRIsauro Bautista MD        ondansetron (ZOFRAN ODT) ODT tab 4 mg  4 mg Oral Q6H PRIsauro Bautista MD        Or    ondansetron (ZOFRAN) injection 4 mg  4 mg Intravenous Q6H PRIsauro Bautista MD        polyethylene glycol (MIRALAX) Packet 17 g  17 g Oral BID PRIsauro Bautista MD        prochlorperazine (COMPAZINE) injection 5 mg  5 mg Intravenous Q6H PRIsauro Bautista MD        Or    prochlorperazine (COMPAZINE) tablet 5 mg  5 mg Oral Q6H PRIsauro Bautista MD        senna-docusate (SENOKOT-S/PERICOLACE) 8.6-50 MG per tablet 1 tablet  1 tablet Oral BID PRIsauro Bautista MD        Or    senna-docusate (SENOKOT-S/PERICOLACE) 8.6-50 MG per tablet 2 tablet  2 tablet Oral BID Isauro Arreola MD        sodium chloride (PF) 0.9% PF flush 3 mL  3 mL Intracatheter q1 min prn Isauro Evans MD   3 mL at 11/30/24 2030            Data:      All new lab and  "imaging data was reviewed.   Recent Labs   Lab Test 12/02/24  0635 12/01/24  0551 11/30/24  1045 10/25/22  1012 09/04/18  0900   WBC 8.5 7.1 8.3   < > 6.7   HGB 12.3 10.7* 11.2*   < > 12.3   MCV 92 92 93   < > 89   * 131* 142*   < > 222   INR  --   --   --   --  0.97    < > = values in this interval not displayed.      Recent Labs   Lab Test 12/02/24  0635 12/01/24  0551 11/30/24  0913    138 140   POTASSIUM 4.2 3.9 3.8   CHLORIDE 105 105 104   CO2 24 25 25   BUN 15.7 19.6 18.8   CR 0.89 1.01* 1.16*   ANIONGAP 12 8 11   DOUG 9.9 9.7 9.4   GLC 94 96 89     No lab results found.    Invalid input(s): \"TROP\", \"TROPONINIES\"      "

## 2024-12-03 ENCOUNTER — APPOINTMENT (OUTPATIENT)
Dept: PHYSICAL THERAPY | Facility: CLINIC | Age: 84
DRG: 682 | End: 2024-12-03
Attending: INTERNAL MEDICINE
Payer: MEDICARE

## 2024-12-03 ENCOUNTER — APPOINTMENT (OUTPATIENT)
Dept: MRI IMAGING | Facility: CLINIC | Age: 84
DRG: 682 | End: 2024-12-03
Attending: HOSPITALIST
Payer: MEDICARE

## 2024-12-03 ENCOUNTER — APPOINTMENT (OUTPATIENT)
Dept: OCCUPATIONAL THERAPY | Facility: CLINIC | Age: 84
DRG: 682 | End: 2024-12-03
Attending: INTERNAL MEDICINE
Payer: MEDICARE

## 2024-12-03 LAB — POTASSIUM SERPL-SCNC: 4.2 MMOL/L (ref 3.4–5.3)

## 2024-12-03 PROCEDURE — 120N000001 HC R&B MED SURG/OB

## 2024-12-03 PROCEDURE — 250N000013 HC RX MED GY IP 250 OP 250 PS 637: Performed by: PSYCHIATRY & NEUROLOGY

## 2024-12-03 PROCEDURE — 97130 THER IVNTJ EA ADDL 15 MIN: CPT | Mod: GO

## 2024-12-03 PROCEDURE — 250N000009 HC RX 250: Performed by: INTERNAL MEDICINE

## 2024-12-03 PROCEDURE — 250N000013 HC RX MED GY IP 250 OP 250 PS 637: Performed by: INTERNAL MEDICINE

## 2024-12-03 PROCEDURE — 97129 THER IVNTJ 1ST 15 MIN: CPT | Mod: GO

## 2024-12-03 PROCEDURE — 99232 SBSQ HOSP IP/OBS MODERATE 35: CPT | Performed by: HOSPITALIST

## 2024-12-03 PROCEDURE — 36415 COLL VENOUS BLD VENIPUNCTURE: CPT | Performed by: HOSPITALIST

## 2024-12-03 PROCEDURE — 84132 ASSAY OF SERUM POTASSIUM: CPT | Performed by: HOSPITALIST

## 2024-12-03 PROCEDURE — 97530 THERAPEUTIC ACTIVITIES: CPT | Mod: GP | Performed by: PHYSICAL THERAPIST

## 2024-12-03 PROCEDURE — 97110 THERAPEUTIC EXERCISES: CPT | Mod: GP | Performed by: PHYSICAL THERAPIST

## 2024-12-03 PROCEDURE — G1010 CDSM STANSON: HCPCS

## 2024-12-03 PROCEDURE — 70551 MRI BRAIN STEM W/O DYE: CPT | Mod: 52,MG

## 2024-12-03 RX ADMIN — ASPIRIN 81 MG CHEWABLE TABLET 81 MG: 81 TABLET CHEWABLE at 09:42

## 2024-12-03 RX ADMIN — ACETAMINOPHEN 650 MG: 325 TABLET, FILM COATED ORAL at 22:53

## 2024-12-03 RX ADMIN — ATORVASTATIN CALCIUM 10 MG: 10 TABLET, FILM COATED ORAL at 22:53

## 2024-12-03 RX ADMIN — PANTOPRAZOLE SODIUM 40 MG: 40 INJECTION, POWDER, FOR SOLUTION INTRAVENOUS at 09:42

## 2024-12-03 RX ADMIN — ACYCLOVIR 400 MG: 400 TABLET ORAL at 09:42

## 2024-12-03 RX ADMIN — FUROSEMIDE 20 MG: 20 TABLET ORAL at 09:42

## 2024-12-03 RX ADMIN — ACYCLOVIR 400 MG: 400 TABLET ORAL at 22:53

## 2024-12-03 RX ADMIN — Medication 1000 MCG: at 09:43

## 2024-12-03 ASSESSMENT — ACTIVITIES OF DAILY LIVING (ADL)
ADLS_ACUITY_SCORE: 80
ADLS_ACUITY_SCORE: 76
ADLS_ACUITY_SCORE: 80
ADLS_ACUITY_SCORE: 76
ADLS_ACUITY_SCORE: 80
ADLS_ACUITY_SCORE: 76
ADLS_ACUITY_SCORE: 80
DEPENDENT_IADLS:: MEAL PREPARATION

## 2024-12-03 NOTE — CONSULTS
Care Management Initial Consult    General Information  Assessment completed with: VM-chart review, Children,    Type of CM/SW Visit: Initial Assessment    Primary Care Provider verified and updated as needed:  (Noemi Sanchez)   Readmission within the last 30 days: no previous admission in last 30 days      Reason for Consult: discharge planning  Advance Care Planning: Advance Care Planning Reviewed: present on chart          Communication Assessment  Patient's communication style:               Cognitive  Cognitive/Neuro/Behavioral: .WDL except, mood/behavior, speech, orientation  Level of Consciousness: intermittent confusion, alert  Arousal Level: arouses to voice, arouses to touch/gentle shaking, opens eyes spontaneously  Orientation: disoriented to, situation  Mood/Behavior: calm, cooperative  Best Language: 0 - No aphasia  Speech: clear    Living Environment:   People in home: alone     Current living Arrangements: assisted living ([facility] in [place])      Able to return to prior arrangements:  (requires TCU first)       Family/Social Support:  Care provided by: self  Provides care for: no one  Marital Status:   Support system:            Description of Support System: Supportive, Involved    Support Assessment: Adequate family and caregiver support    Current Resources:   Patient receiving home care services: No        Community Resources:    Equipment currently used at home: walker, standard (4ww)  Supplies currently used at home:      Employment/Financial:  Employment Status: retired        Financial Concerns:             Does the patient's insurance plan have a 3 day qualifying hospital stay waiver?  No    Lifestyle & Psychosocial Needs:  Social Drivers of Health     Food Insecurity: Low Risk  (8/26/2024)    Food Insecurity     Within the past 12 months, did you worry that your food would run out before you got money to buy more?: No     Within the past 12 months, did the food you bought just not  last and you didn t have money to get more?: No   Depression: Not at risk (8/26/2024)    PHQ-2     PHQ-2 Score: 2   Housing Stability: Low Risk  (8/26/2024)    Housing Stability     Do you have housing? : Yes     Are you worried about losing your housing?: No   Tobacco Use: Low Risk  (11/25/2024)    Patient History     Smoking Tobacco Use: Never     Smokeless Tobacco Use: Never     Passive Exposure: Not on file   Financial Resource Strain: Low Risk  (8/26/2024)    Financial Resource Strain     Within the past 12 months, have you or your family members you live with been unable to get utilities (heat, electricity) when it was really needed?: No   Alcohol Use: Not on file (7/31/2022)   Transportation Needs: Low Risk  (8/26/2024)    Transportation Needs     Within the past 12 months, has lack of transportation kept you from medical appointments, getting your medicines, non-medical meetings or appointments, work, or from getting things that you need?: No   Physical Activity: Inactive (10/25/2023)    [facility]    Exercise Vital Sign     Days of Exercise per Week: 0 days     Minutes of Exercise per Session: 0 min   Interpersonal Safety: Not At Risk (4/29/2024)    Received from [facility]    Humiliation, Afraid, Rape, and Kick questionnaire     Fear of Current or Ex-Partner: No     Emotionally Abused: No     Physically Abused: No     Sexually Abused: No   Stress: Stress Concern Present (8/26/2024)    [place] [employer] - Occupational Stress Questionnaire     Feeling of Stress : To some extent   Social Connections: Unknown (8/26/2024)    Social Connection and Isolation Panel [NHANES]     Frequency of Communication with Friends and Family: Not on file     Frequency of Social Gatherings with Friends and Family: Twice a week     Attends Tenriism Services: Not on file     Active Member of Clubs or Organizations: Not on file     Attends Club or Organization Meetings: Not on  file     Marital Status: Not on file   Recent Concern: Social Connections - Socially Isolated (8/18/2024)    Received from [facility]    Social Connections     Do you often feel lonely or isolated from those around you?: 4   Health Literacy: Not on file       Functional Status:  Prior to admission patient needed assistance:   Dependent ADLs:: Ambulation-walker  Dependent IADLs:: Meal Preparation  Assesssment of Functional Status: Needs placement in a SNF/TCF for rehabilitation    Mental Health Status:  Mental Health Status: No Current Concerns       Chemical Dependency Status:  Chemical Dependency Status: No Current Concerns             Values/Beliefs:  Spiritual, Cultural Beliefs, Restorationist Practices, Values that affect care:                 Discussed  Partnership in Safe Discharge Planning  document with patient/family: No    Additional Information:  Contacted daughter Gege who is listed as patient's HCA.  Gege reports she lives in [place] and requests writer to speak with her sister Rashmi who lives local and also is a HCA.    Writer called Rashmi at [phone].  Rashmi reports patient has been in TCU at [facility] in [place] two times and this is there preference for TCU.  Referral sent to [facility] and called to inquire about vacancies.  Discussed with Rashmi the need to make additional referrals if [facility] is not available. She said she will speak with her family.  Patient had a rough experience at [facility] in [place] this summer and so she is cautious.  She prefers a TCU in the general area of [place] where she lives.  Writer volunteered to look at other TCU's in the surrounding area of [place] in case alternatives are needed and daughter agreed to this plan.     Next Steps: Follow with [place] referral and provide additional options for daughter tomorrow morning    JOSE Troy

## 2024-12-03 NOTE — PLAN OF CARE
Goal Outcome Evaluation:    Summary:  New Hydronephrosis with dilatation of L Renal Pelvis, confusion    Date & Time: 12/2/24 4742-2368   Orientation: Alert to self, confused    Activity Level: A2 GBW/Jodi steady, T&R while in bed      Fall Risk: Yes   Behavior & Aggression: Green   Pain Management: No sign of pain   ABNL VS/O2: VSS on RA   Tele: N/A   ABNL Lab/BG: K protocols, recheck tomorrow AM   Diet: Regular,  Pills crushed in apple sauce    Bowel/Bladder:  Incontinent of bowel and bladder-- purewick in place  Skin:  Dry and flushed. Blanchable redness on buttocks- mepilex in place. Bruising along back  Drains/Devices: L PIV SL   Tests/Procedures: Waiting MRI brain   Anticipated DC Date: Pending   Other Important Info:  Neurology and Urology following; OT consult for SLUMS testing; PT/SW/Care coordination consults

## 2024-12-03 NOTE — PLAN OF CARE
"Summary: New Hydronephrosis with dilatation of L Renal Pelvis; confusion; possible UTI  DATE & TIME: 12/02-12/03/24 Night shift   Cognitive Concerns/ Orientation: Alert to self only; more confused overnight stating she wants to \"go home\"; crying at times for \"pain\"  BEHAVIOR & AGGRESSION TOOL COLOR: Green       ABNL VS/O2: VSS on RA  MOBILITY: Assist-2 gait belt/Jodi-Steady; Turn/repo Q2 hours   PAIN MANAGMENT: Denies. Baseline neuropathy in hands and feet; PAINAD =3 - provided hot pack to neck  DIET: Regular diet with Room Service-able to feed self with tray set-up); Pills crushed apple sauce;   BOWEL/BLADDER: Incontinent of bowel and bladder; purewick in place; no BM   ABNL LAB/BG: Crea 1.01, GFR 54, Hgb 10.2   DRAIN/DEVICES: L PIV saline locked  SKIN: Dry and flushed. Blanchable redness on buttocks- mepilex in place. Bruising along back  TESTS/PROCEDURES: EEG complete; MRI pf brain attempted during shift- only partially completed due to patient moving- will need sedation to complete  D/C DAY/GOALS/PLACE: Will need TCU at discharge per therapy notes  OTHER IMPORTANT INFO: Neurology following; Urology signed off; OT consult for SLUMS testing; PT/SW/Care coordination consults; slept well all shift    Goal Outcome Evaluation:                        "

## 2024-12-03 NOTE — PROGRESS NOTES
"[facility]  Hospitalist Progress Note        Conrado Elizabeth MD   12/03/2024        Interval History:        - patient could not complete MRI; limited exam 12/3 reported : \"Diffusion-weighted sequence only demonstrates no restricted diffusion to suggest recent infarct. There is a chronic infarct in the right cerebellar hemisphere. 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 Transferred to [facility] 11/29/2024 for further evaluation and treatment.      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 Urology thus not recommending stent placement at this time   - creatinine improved 1.16---> 0.89 with IV hydration  - will monitor BMP intermittently; avoid nephrotoxic; Bactrim discontinued      Acute encephalopathy likely metabolic  Possible underlying cognitive impairment/dementia versus delirium  vitamin B12 deficiency  - concern for some underlying baseline cognitive impairment and history of dementia noted on chart though " "family reported patient is usually very sharp; per family she has had episodes of confusion in past with UTI/infections  - *CT head done on outlying hospital showed no acute intracranial finding, stable generalized volume loss presumed chronic microvascular ischemic changes.  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  - was treated with Bactrim at outside facility; Bactrim can cause some CNS side effects as well as renal failure -- now discontinued  - mentation seems to be slowly improving; is oriented X 2, seems slightly disoriented to situation; no focal neurological deficits  - evaluated by neurology, note low Vitamin B 12 at 266  - EEG 12/1 was consistent with a encephalopathy of multiple causes metabolic, toxic or degenerative but no electrographic seizures or epileptiform discharges noted  - Neuro ordered for vitamin B12 injection 1000  g and then to continue sublingual vitamin B12  - patient could not complete MRI; limited exam 12/3 reported : \"Diffusion-weighted sequence only demonstrates no restricted diffusion to suggest recent infarct. There is a chronic infarct in the right cerebellar hemisphere. 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      Hypertension  Controlled off of anti-hypertensives  - Monitor      Coronary artery disease, non-obstructive  Hyperlipidemia  - Continue PTA atorvastatin  - continued PTA ASA since no urologic intervention planned     Multiple myeloma  Follows with [facility]. Currently off treatment. Lytic lesions noted on CT likely due to known MM.   - Continue PTA acyclovir, currently on surveillance only,     GERD  - Continue PTA PPI     Adnexal cyst  *CT abdomen/pelvis incidentally noted enlarging cystic lesion in the left adnexa likely ovarian in origin measuring up to 8.7 cm in maximum diameter   *Pelvic US 3/2021 A 6.0 cm left adnexal cyst is simple in appearance.  - Prior provider discussed with daughters, recommend continued outpatient follow-up      Chronic compression fracture T12  - Acetaminophen PRN     Pancreatic cysts  CT abd/pelvis with two new cystic lesions in pancreatic body, 1.5 x 1.0 cm and 1.4 x 0.9 cm.   -prior provider discussed with daughters  - CT or MRI/MRCP every 6 months for 1 year and then yearly for 2 years per radiology recommendations        Diet:   Regular Diet Adult  Room Service      DVT Prophylaxis: Pneumatic Compression Devices  Code Status: No CPR- Do NOT Intubate    Disposition:   Medically Ready for Discharge: Anticipated Tomorrow pending clinical stability; neurology clearance    PT/OT recommend TCU placement   to follow for disposition    Care plan discussed with patient and   Also update her daughter Rashmi over the phone       Clinically Significant Risk Factors                         # Dementia: noted on problem list          # Overweight: Estimated body mass index is 25.08 kg/m  as calculated from the following:    Height as of 11/25/24: 1.473 m (4' 10\").    Weight as of 11/25/24: 54.4 kg (120 lb)., PRESENT ON ADMISSION                             Physical Exam:      Blood pressure 138/66, pulse 74, temperature 99  F (37.2  C), " temperature source Oral, resp. rate 17, SpO2 96%.  There were no vitals filed for this visit.  Vital Signs with Ranges  Temp:  [97.7  F (36.5  C)-99  F (37.2  C)] 99  F (37.2  C)  Pulse:  [70-78] 74  Resp:  [16-17] 17  BP: (133-153)/(61-76) 138/66  SpO2:  [95 %-96 %] 96 %  I/O's Last 24 hours  I/O last 3 completed shifts:  In: -   Out: 2100 [Urine:2100]    Constitutional: Alert, awake and oriented X 2, slightly disoriented to situation; resting comfortably in no apparent distress       Oral cavity: Moist mucosa   Cardiovascular: Normal s1 s2, regular rate and rhythm, no murmur   Lungs: B/l clear to auscultation, no wheezes or crepitations   Abdomen: Soft, nt, nd, no guarding, rigidity or rebound; BS +   LE : No edema   Musculoskeletal/Neuro Power 5/5 in all extremities; No focal neurological deficits noted   Psychiatry: normal mood and affect                Medications:        Current Facility-Administered Medications   Medication Dose Route Frequency Provider Last Rate Last Admin    acyclovir (ZOVIRAX) tablet 400 mg  400 mg Oral BID Elmer Hidalgo MD   400 mg at 12/02/24 2150    aspirin (ASA) chewable tablet 81 mg  81 mg Oral Daily Elmer Hidalgo MD   81 mg at 12/02/24 0927    atorvastatin (LIPITOR) tablet 10 mg  10 mg Oral Daily Isauro Evans MD   10 mg at 12/02/24 2150    cyanocobalamin (VITAMIN B-12) sublingual tablet 1,000 mcg  1,000 mcg Sublingual Daily Anita Cruz MD   1,000 mcg at 12/02/24 0926    furosemide (LASIX) tablet 20 mg  20 mg Oral Daily Elmer Hidalgo MD   20 mg at 12/02/24 0926    pantoprazole (PROTONIX) IV push injection 40 mg  40 mg Intravenous QAM AC Elmer Hidalgo MD   40 mg at 12/02/24 0927    sodium chloride (PF) 0.9% PF flush 3 mL  3 mL Intracatheter Q8H Isauro Evans MD   3 mL at 12/02/24 2151     PRN Meds:   Current Facility-Administered Medications   Medication Dose Route Frequency Provider Last Rate Last Admin    acetaminophen  (TYLENOL) tablet 650 mg  650 mg Oral Q4H Isauro Arreola MD        Or    acetaminophen (TYLENOL) Suppository 650 mg  650 mg Rectal Q4H Isauro Arreola MD        benzocaine-menthol (CHLORASEPTIC) 6-10 MG lozenge 1 lozenge  1 lozenge Buccal Q1H PRIsauro Bautista MD        hydrALAZINE (APRESOLINE) tablet 10 mg  10 mg Oral Q4H PRIsauro Bautista MD        Or    hydrALAZINE (APRESOLINE) injection 10 mg  10 mg Intravenous Q4H Isauro Arreola MD        lidocaine (LMX4) cream   Topical Q1H PRIsauro Bautista MD        lidocaine 1 % 0.1-1 mL  0.1-1 mL Other Q1H PRIsauro Bautista MD        melatonin tablet 1 mg  1 mg Oral At Bedtime Isauro Arreola MD        ondansetron (ZOFRAN ODT) ODT tab 4 mg  4 mg Oral Q6H Isauro Arreola MD        Or    ondansetron (ZOFRAN) injection 4 mg  4 mg Intravenous Q6H Isauro Arreola MD        polyethylene glycol (MIRALAX) Packet 17 g  17 g Oral BID Isauro Arreola MD        prochlorperazine (COMPAZINE) injection 5 mg  5 mg Intravenous Q6H Isauro Arreola MD        Or    prochlorperazine (COMPAZINE) tablet 5 mg  5 mg Oral Q6H Isauro Arreola MD        senna-docusate (SENOKOT-S/PERICOLACE) 8.6-50 MG per tablet 1 tablet  1 tablet Oral BID Isauro Arreola MD        Or    senna-docusate (SENOKOT-S/PERICOLACE) 8.6-50 MG per tablet 2 tablet  2 tablet Oral BID Isauro Arreola MD        sodium chloride (PF) 0.9% PF flush 3 mL  3 mL Intracatheter q1 min Isauro Arreola MD   3 mL at 11/30/24 2030            Data:      All new lab and imaging data was reviewed.   Recent Labs   Lab Test 12/02/24  0635 12/01/24  0551 11/30/24  1045 10/25/22  1012 09/04/18  0900   WBC 8.5 7.1 8.3   < > 6.7   HGB 12.3 10.7* 11.2*   < > 12.3   MCV 92 92 93   < > 89   * 131* 142*   < > 222   INR  --   --   --   --  0.97    < > = values in this interval not  "displayed.      Recent Labs   Lab Test 12/02/24  0635 12/01/24  0551 11/30/24  0913    138 140   POTASSIUM 4.2 3.9 3.8   CHLORIDE 105 105 104   CO2 24 25 25   BUN 15.7 19.6 18.8   CR 0.89 1.01* 1.16*   ANIONGAP 12 8 11   DOUG 9.9 9.7 9.4   GLC 94 96 89     No lab results found.    Invalid input(s): \"TROP\", \"TROPONINIES\"      "

## 2024-12-04 ENCOUNTER — APPOINTMENT (OUTPATIENT)
Dept: PHYSICAL THERAPY | Facility: CLINIC | Age: 84
DRG: 682 | End: 2024-12-04
Attending: INTERNAL MEDICINE
Payer: MEDICARE

## 2024-12-04 ENCOUNTER — APPOINTMENT (OUTPATIENT)
Dept: OCCUPATIONAL THERAPY | Facility: CLINIC | Age: 84
DRG: 682 | End: 2024-12-04
Attending: INTERNAL MEDICINE
Payer: MEDICARE

## 2024-12-04 LAB
A-TOCOPHEROL VIT E SERPL-MCNC: 9.6 MG/L
BETA+GAMMA TOCOPHEROL SERPL-MCNC: 0.4 MG/L

## 2024-12-04 PROCEDURE — 97530 THERAPEUTIC ACTIVITIES: CPT | Mod: GP | Performed by: PHYSICAL THERAPIST

## 2024-12-04 PROCEDURE — 120N000001 HC R&B MED SURG/OB

## 2024-12-04 PROCEDURE — 250N000013 HC RX MED GY IP 250 OP 250 PS 637: Performed by: INTERNAL MEDICINE

## 2024-12-04 PROCEDURE — 99232 SBSQ HOSP IP/OBS MODERATE 35: CPT | Performed by: INTERNAL MEDICINE

## 2024-12-04 PROCEDURE — 97530 THERAPEUTIC ACTIVITIES: CPT | Mod: GO

## 2024-12-04 PROCEDURE — 250N000011 HC RX IP 250 OP 636: Performed by: INTERNAL MEDICINE

## 2024-12-04 PROCEDURE — 250N000009 HC RX 250: Performed by: INTERNAL MEDICINE

## 2024-12-04 PROCEDURE — 97116 GAIT TRAINING THERAPY: CPT | Mod: GP | Performed by: PHYSICAL THERAPIST

## 2024-12-04 PROCEDURE — 250N000013 HC RX MED GY IP 250 OP 250 PS 637: Performed by: PSYCHIATRY & NEUROLOGY

## 2024-12-04 RX ORDER — AMOXICILLIN 250 MG
1 CAPSULE ORAL 2 TIMES DAILY
Status: DISCONTINUED | OUTPATIENT
Start: 2024-12-04 | End: 2024-12-06 | Stop reason: HOSPADM

## 2024-12-04 RX ORDER — ENOXAPARIN SODIUM 100 MG/ML
40 INJECTION SUBCUTANEOUS EVERY 24 HOURS
Status: DISCONTINUED | OUTPATIENT
Start: 2024-12-04 | End: 2024-12-06 | Stop reason: HOSPADM

## 2024-12-04 RX ADMIN — ATORVASTATIN CALCIUM 10 MG: 10 TABLET, FILM COATED ORAL at 20:30

## 2024-12-04 RX ADMIN — SENNOSIDES AND DOCUSATE SODIUM 1 TABLET: 50; 8.6 TABLET ORAL at 16:04

## 2024-12-04 RX ADMIN — PANTOPRAZOLE SODIUM 40 MG: 40 INJECTION, POWDER, FOR SOLUTION INTRAVENOUS at 08:42

## 2024-12-04 RX ADMIN — ENOXAPARIN SODIUM 40 MG: 40 INJECTION SUBCUTANEOUS at 16:12

## 2024-12-04 RX ADMIN — ASPIRIN 81 MG CHEWABLE TABLET 81 MG: 81 TABLET CHEWABLE at 08:43

## 2024-12-04 RX ADMIN — ACYCLOVIR 400 MG: 400 TABLET ORAL at 20:30

## 2024-12-04 RX ADMIN — ACYCLOVIR 400 MG: 400 TABLET ORAL at 08:43

## 2024-12-04 RX ADMIN — SENNOSIDES AND DOCUSATE SODIUM 1 TABLET: 50; 8.6 TABLET ORAL at 20:30

## 2024-12-04 RX ADMIN — FUROSEMIDE 20 MG: 20 TABLET ORAL at 08:43

## 2024-12-04 RX ADMIN — Medication 1000 MCG: at 08:43

## 2024-12-04 ASSESSMENT — ACTIVITIES OF DAILY LIVING (ADL)
ADLS_ACUITY_SCORE: 84
ADLS_ACUITY_SCORE: 80
ADLS_ACUITY_SCORE: 84
ADLS_ACUITY_SCORE: 84
ADLS_ACUITY_SCORE: 80
ADLS_ACUITY_SCORE: 84
ADLS_ACUITY_SCORE: 80
ADLS_ACUITY_SCORE: 80
ADLS_ACUITY_SCORE: 84
ADLS_ACUITY_SCORE: 84
ADLS_ACUITY_SCORE: 80
ADLS_ACUITY_SCORE: 84
ADLS_ACUITY_SCORE: 89
ADLS_ACUITY_SCORE: 84
ADLS_ACUITY_SCORE: 89
ADLS_ACUITY_SCORE: 84
ADLS_ACUITY_SCORE: 80
ADLS_ACUITY_SCORE: 84

## 2024-12-04 NOTE — PLAN OF CARE
Goal Outcome Evaluation:      Plan of Care Reviewed With: patient, family    Overall Patient Progress: improvingOverall Patient Progress: improving    Summary: New Hydronephrosis with dilatation of L Renal Pelvis; confusion; possible UTI  DATE & TIME:12/3/24 1365-0389  Cognitive Concerns/ Orientation: A&Ox3, disoriented to situation, forgetful   BEHAVIOR & AGGRESSION TOOL COLOR: Green       ABNL VS/O2: Temp 99s, other VSS, O2 96-98% RA   MOBILITY: Assist-2 gait belt/Jodi-Steady; Turn/repo Q2 hours   PAIN MANAGMENT: Denies. Baseline neuropathy in hands and feet,   DIET: Regular diet, need assist with tray set up. Take pills crushed with apple sauce;   BOWEL/BLADDER: Incontinent of bowel and bladder; purewick in place; no BM   ABNL LAB/BG: no labs drawn today   DRAIN/DEVICES: L PIV saline locked  SKIN: Dry and flushed. Blanchable redness on buttocks- mepilex in place. Bruising along Lt lower back  TESTS/PROCEDURES: none  D/C DAY/GOALS/PLACE: Will need TCU at discharge per therapy notes, SW following for placement   OTHER IMPORTANT INFO: Neurology following; Urology signed off; OT consult for SLUMS testing; PT/SW/Care coordination consults.

## 2024-12-04 NOTE — PROGRESS NOTES
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 Transferred to [facility] 11/29/2024 for further evaluation and treatment.      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 Urology thus not recommending stent placement at this time   - creatinine improved 1.16---> 0.89 with IV hydration  - will monitor BMP intermittently; avoid nephrotoxic; Bactrim discontinued      Acute encephalopathy likely metabolic  Possible underlying cognitive impairment/dementia versus delirium  vitamin B12 deficiency  - concern for some underlying baseline cognitive impairment and history of dementia noted on chart though family reported patient is usually very sharp; per family she has had episodes of confusion in past with UTI/infections  - *CT head done on outlying hospital showed no acute intracranial finding, stable generalized volume loss presumed chronic microvascular ischemic changes.  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  - was treated with Bactrim at  "outside facility; Bactrim can cause some CNS side effects as well as renal failure -- now discontinued  - evaluated by neurology, note low Vitamin B 12 at 266  - EEG 12/1 was consistent with a encephalopathy of multiple causes metabolic, toxic or degenerative but no electrographic seizures or epileptiform discharges noted  - Neuro ordered for vitamin B12 injection 1000  g and then to continue sublingual vitamin B12  - mentation seems to be slowly improving; is oriented X 2, seems slightly disoriented to situation; no focal neurological deficits  - patient could not complete MRI; limited exam 12/3 reported : \"Diffusion-weighted sequence only demonstrates no restricted diffusion to suggest recent infarct. There is a chronic infarct in the right cerebellar hemisphere. 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      Hypertension  Controlled off of anti-hypertensives  - Monitor      Coronary artery disease, non-obstructive  Hyperlipidemia  - Continue PTA atorvastatin  - continued PTA ASA since no urologic intervention planned     Multiple myeloma  Follows with [facility]. " "Currently off treatment. Lytic lesions noted on CT likely due to known MM.   - Continue PTA acyclovir, currently on surveillance only,     GERD  - Continue PTA PPI     Adnexal cyst  *CT abdomen/pelvis incidentally noted enlarging cystic lesion in the left adnexa likely ovarian in origin measuring up to 8.7 cm in maximum diameter   *Pelvic US 3/2021 A 6.0 cm left adnexal cyst is simple in appearance.  - Prior provider discussed with daughters, recommend continued outpatient follow-up      Chronic compression fracture T12  - Acetaminophen PRN     Pancreatic cysts  CT abd/pelvis with two new cystic lesions in pancreatic body, 1.5 x 1.0 cm and 1.4 x 0.9 cm.   -prior provider discussed with daughters  - CT or MRI/MRCP every 6 months for 1 year and then yearly for 2 years per radiology recommendations     Constipation  Senna-colace BID ordered          Diet: Regular Diet Adult  Room Service    DVT Prophylaxis: Enoxaparin (Lovenox) SQ  Murphy Catheter: Not present  Lines: None     Cardiac Monitoring: None  Code Status: No CPR- Do NOT Intubate      Clinically Significant Risk Factors                       # Dementia: noted on problem list        # Overweight: Estimated body mass index is 25.08 kg/m  as calculated from the following:    Height as of 11/25/24: 1.473 m (4' 10\").    Weight as of 11/25/24: 54.4 kg (120 lb).             Social Drivers of Health    Physical Activity: Inactive (10/25/2023)    [facility], [facility]    Exercise Vital Sign     Days of Exercise per Week: 0 days     Minutes of Exercise per Session: 0 min   Stress: Stress Concern Present (8/26/2024)    [employer] [place] [employer] - Occupational Stress Questionnaire     Feeling of Stress : To some extent   Social Connections: Unknown (8/26/2024)    Social Connection and Isolation Panel [NHANES]     Frequency of Social Gatherings with Friends and Family: Twice a week   Recent Concern: Social Connections - Socially Isolated " (8/18/2024)    [facility]    Social Connections     Do you often feel lonely or isolated from those around you?: 4          Disposition Plan     Medically Ready for Discharge: Ready Now pending TCU              Kaylyn Wilson MD  Hospitalist Service  [facility]  Securely message with [employer] (more info)  Text page via [employer] Paging/Directory   ______________________________________________________________________    Interval History   Patient oriented to place, but not to time during visit.   She reports feeling constipated. Per chart review, last BM was on 12/1.   Denies nausea or vomiting    Physical Exam   Vital Signs: Temp: 98.1  F (36.7  C) Temp src: Oral BP: 101/53 Pulse: 83   Resp: 16 SpO2: 99 % O2 Device: None (Room air)    Weight: 0 lbs 0 oz    General Appearance: Alert, awake and no apparent distress  Respiratory: clear to auscultation bilaterally, no wheezing  Cardiovascular: regular rate and rhythm  GI: soft and non-tender  Skin: warm and dry      Medical Decision Making       MANAGEMENT DISCUSSED with the following over the past 24 hours: pt, RN and care management   NOTE(S)/MEDICAL RECORDS REVIEWED over the past 24 hours: nursing note, neurology note  Tests ORDERED & REVIEWED in the past 24 hours:  - BMP  - CBC   from 12/2    Data         Imaging results reviewed over the past 24 hrs:   No results found for this or any previous visit (from the past 24 hours).

## 2024-12-04 NOTE — PROGRESS NOTES
Date/Time 12/3/24 4991-7109    Diagnosis: Hydronephrosis with dilation of left Renal Pelvis,   Mental Status: A&O x2-3, confused, forgetful  Activity/dangle Assist of 2 with Jodi steady  Diet: Regular  Pain: PRN Tylenol  Murphy/Voiding: Incontinent of B&B/ Pure wick  Tele/Restraints/Iso: Contact precaution for CRE  Skin: Blanchable redness on buttock. Mepilex  dressing CDI  02/LDA: On RA, PIV SL  D/C Date: Pending TCU placement  Others: Turned and repo for comfort.

## 2024-12-04 NOTE — PLAN OF CARE
Summary: New Hydronephrosis with dilatation of L Renal Pelvis; confusion; possible UTI    DATE & TIME:12/3/24, 4035 - 4786  Cognitive Concerns/ Orientation: A&O x self only, forgetful   BEHAVIOR & AGGRESSION TOOL COLOR: Green       ABNL VS/O2: VSS on room air   MOBILITY: Assist-2 gait belt/Jodi-Steady; Turn and repositioned   PAIN MANAGMENT: Denied. Non verbal indicators of pain absent  DIET: Regular diet. Take pills crushed with apple sauce;   BOWEL/BLADDER: Incontinent of bowel and bladder. Pure wick in place, no BM this shift  ABNL LAB/BG: NA  DRAIN/DEVICES: PIV SL  SKIN: Dry and flushed. Blanchable redness to buttocks. Protective mepilex in place. Bruising along left lower back  TESTS/PROCEDURES: NA  D/C DAY/GOALS/PLACE: Will need TCU at discharge per therapy notes, SW following for placement   OTHER IMPORTANT INFO: Neurology following. PT/SW/Care coordination consults. Contact precautions maintained    Goal Outcome Evaluation:      Plan of Care Reviewed With: patient    Overall Patient Progress: no changeOverall Patient Progress: no change

## 2024-12-05 ENCOUNTER — APPOINTMENT (OUTPATIENT)
Dept: PHYSICAL THERAPY | Facility: CLINIC | Age: 84
DRG: 682 | End: 2024-12-05
Attending: INTERNAL MEDICINE
Payer: MEDICARE

## 2024-12-05 VITALS
TEMPERATURE: 98 F | DIASTOLIC BLOOD PRESSURE: 67 MMHG | RESPIRATION RATE: 75 BRPM | SYSTOLIC BLOOD PRESSURE: 121 MMHG | OXYGEN SATURATION: 95 % | HEART RATE: 80 BPM

## 2024-12-05 LAB
CREAT SERPL-MCNC: 0.91 MG/DL (ref 0.51–0.95)
EGFRCR SERPLBLD CKD-EPI 2021: 61 ML/MIN/1.73M2
PLATELET # BLD AUTO: 144 10E3/UL (ref 150–450)

## 2024-12-05 PROCEDURE — 250N000011 HC RX IP 250 OP 636: Performed by: INTERNAL MEDICINE

## 2024-12-05 PROCEDURE — 250N000013 HC RX MED GY IP 250 OP 250 PS 637: Performed by: INTERNAL MEDICINE

## 2024-12-05 PROCEDURE — 120N000001 HC R&B MED SURG/OB

## 2024-12-05 PROCEDURE — 97530 THERAPEUTIC ACTIVITIES: CPT | Mod: GP

## 2024-12-05 PROCEDURE — 36415 COLL VENOUS BLD VENIPUNCTURE: CPT | Performed by: INTERNAL MEDICINE

## 2024-12-05 PROCEDURE — 97110 THERAPEUTIC EXERCISES: CPT | Mod: GP

## 2024-12-05 PROCEDURE — 250N000009 HC RX 250: Performed by: INTERNAL MEDICINE

## 2024-12-05 PROCEDURE — 250N000013 HC RX MED GY IP 250 OP 250 PS 637: Performed by: PSYCHIATRY & NEUROLOGY

## 2024-12-05 PROCEDURE — 85049 AUTOMATED PLATELET COUNT: CPT | Performed by: INTERNAL MEDICINE

## 2024-12-05 PROCEDURE — 82565 ASSAY OF CREATININE: CPT | Performed by: INTERNAL MEDICINE

## 2024-12-05 PROCEDURE — 99232 SBSQ HOSP IP/OBS MODERATE 35: CPT | Performed by: INTERNAL MEDICINE

## 2024-12-05 RX ORDER — POLYETHYLENE GLYCOL 3350 17 G/17G
17 POWDER, FOR SOLUTION ORAL DAILY
Status: DISCONTINUED | OUTPATIENT
Start: 2024-12-05 | End: 2024-12-06 | Stop reason: HOSPADM

## 2024-12-05 RX ORDER — BISACODYL 10 MG
10 SUPPOSITORY, RECTAL RECTAL DAILY PRN
Status: DISCONTINUED | OUTPATIENT
Start: 2024-12-05 | End: 2024-12-06 | Stop reason: HOSPADM

## 2024-12-05 RX ADMIN — ACYCLOVIR 400 MG: 400 TABLET ORAL at 21:22

## 2024-12-05 RX ADMIN — SENNOSIDES AND DOCUSATE SODIUM 1 TABLET: 50; 8.6 TABLET ORAL at 21:22

## 2024-12-05 RX ADMIN — SENNOSIDES AND DOCUSATE SODIUM 1 TABLET: 50; 8.6 TABLET ORAL at 08:15

## 2024-12-05 RX ADMIN — POLYETHYLENE GLYCOL 3350 17 G: 17 POWDER, FOR SOLUTION ORAL at 14:42

## 2024-12-05 RX ADMIN — ACYCLOVIR 400 MG: 400 TABLET ORAL at 08:15

## 2024-12-05 RX ADMIN — ENOXAPARIN SODIUM 40 MG: 40 INJECTION SUBCUTANEOUS at 16:00

## 2024-12-05 RX ADMIN — Medication 1000 MCG: at 08:14

## 2024-12-05 RX ADMIN — PANTOPRAZOLE SODIUM 40 MG: 40 INJECTION, POWDER, FOR SOLUTION INTRAVENOUS at 08:14

## 2024-12-05 RX ADMIN — FUROSEMIDE 20 MG: 20 TABLET ORAL at 08:15

## 2024-12-05 RX ADMIN — ATORVASTATIN CALCIUM 10 MG: 10 TABLET, FILM COATED ORAL at 21:22

## 2024-12-05 RX ADMIN — ASPIRIN 81 MG CHEWABLE TABLET 81 MG: 81 TABLET CHEWABLE at 08:15

## 2024-12-05 ASSESSMENT — ACTIVITIES OF DAILY LIVING (ADL)
ADLS_ACUITY_SCORE: 80

## 2024-12-05 NOTE — PROGRESS NOTES
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 Transferred to [facility] 11/29/2024 for further evaluation and treatment.      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 Urology thus not recommending stent placement at this time   - creatinine improved 1.16---> 0.89 with IV hydration  - will monitor BMP intermittently; avoid nephrotoxic; Bactrim discontinued      Acute encephalopathy likely metabolic  Possible underlying cognitive impairment/dementia versus delirium  vitamin B12 deficiency  - concern for some underlying baseline cognitive impairment and history of dementia noted on chart though family reported patient is usually very sharp; per family she has had episodes of confusion in past with UTI/infections  - *CT head done on outlying hospital showed no acute intracranial finding, stable generalized volume loss presumed chronic microvascular ischemic changes.  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  - was treated with Bactrim at  "outside facility; Bactrim can cause some CNS side effects as well as renal failure -- now discontinued  - evaluated by neurology, note low Vitamin B 12 at 266  - EEG 12/1 was consistent with a encephalopathy of multiple causes metabolic, toxic or degenerative but no electrographic seizures or epileptiform discharges noted  - Neuro ordered for vitamin B12 injection 1000  g and then to continue sublingual vitamin B12  - mentation seems to be slowly improving; is oriented X 2, seems slightly disoriented to situation; no focal neurological deficits  - patient could not complete MRI; limited exam 12/3 reported : \"Diffusion-weighted sequence only demonstrates no restricted diffusion to suggest recent infarct. There is a chronic infarct in the right cerebellar hemisphere. 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      Hypertension  Controlled off of anti-hypertensives  - Monitor      Coronary artery disease, non-obstructive  Hyperlipidemia  - Continue PTA atorvastatin  - continued PTA ASA since no urologic intervention planned     Multiple myeloma  Follows with [facility]. " "Currently off treatment. Lytic lesions noted on CT likely due to known MM.   - Continue PTA acyclovir, currently on surveillance only,     GERD  - Continue PTA PPI     Adnexal cyst  *CT abdomen/pelvis incidentally noted enlarging cystic lesion in the left adnexa likely ovarian in origin measuring up to 8.7 cm in maximum diameter   *Pelvic US 3/2021 A 6.0 cm left adnexal cyst is simple in appearance.  - Prior provider discussed with daughters, recommend continued outpatient follow-up      Chronic compression fracture T12  - Acetaminophen PRN     Pancreatic cysts  CT abd/pelvis with two new cystic lesions in pancreatic body, 1.5 x 1.0 cm and 1.4 x 0.9 cm.   -prior provider discussed with daughters  - CT or MRI/MRCP every 6 months for 1 year and then yearly for 2 years per radiology recommendations     Constipation  Last bm 12/1  - Senna-colace BID  - add Miralax daily on 12/5/2024  - dulcolax suppository prn            Diet: Regular Diet Adult  Room Service    DVT Prophylaxis: Enoxaparin (Lovenox) SQ  Murphy Catheter: Not present  Lines: None     Cardiac Monitoring: None  Code Status: No CPR- Do NOT Intubate      Clinically Significant Risk Factors                        # Overweight: Estimated body mass index is 25.08 kg/m  as calculated from the following:    Height as of 11/25/24: 1.473 m (4' 10\").    Weight as of 11/25/24: 54.4 kg (120 lb).             Social Drivers of Health    Physical Activity: Inactive (10/25/2023)    [facility]    Exercise Vital Sign     Days of Exercise per Week: 0 days     Minutes of Exercise per Session: 0 min   Stress: Stress Concern Present (8/26/2024)    [employer] - Occupational Stress Questionnaire     Feeling of Stress : To some extent   Social Connections: Unknown (8/26/2024)    Social Connection and Isolation Panel [NHANES]     Frequency of Social Gatherings with Friends and Family: Twice a week   Recent Concern: Social " Connections - Socially Isolated (8/18/2024)    Received from [facility]    Social Connections     Do you often feel lonely or isolated from those around you?: 4          Disposition Plan     Medically Ready for Discharge: Ready Now pending TCU     Daughter, Rashmi, updated by phone 12/5/2024             Kaylyn Wilsno MD  Hospitalist Service  [facility]  Securely message with [employer] (more info)  Text page via YouTube Paging/Directory   ______________________________________________________________________    Interval History   Discussed with bedsides RN.   Patient denies pain.  Last bm 12/1. No BM with addition of Senna-Colace. Plan to add miralax today.  She knows she is in hospital, but disoriented to time.     Physical Exam   Vital Signs: Temp: 98.3  F (36.8  C) Temp src: Oral BP: (!) 150/75 Pulse: 74   Resp: 17 SpO2: 95 % O2 Device: None (Room air)    Weight: 0 lbs 0 oz    General Appearance: Alert, awake and no apparent distress  Respiratory: clear to auscultation bilaterally, no wheezing  Cardiovascular: regular rate and rhythm  GI: soft and non-tender  Skin: warm and dry      Medical Decision Making       MANAGEMENT DISCUSSED with the following over the past 24 hours: pt, RN   NOTE(S)/MEDICAL RECORDS REVIEWED over the past 24 hours: nursing note, care management note  Tests ORDERED & REVIEWED in the past 24 hours:  - creatinine and platelet        Data     I have personally reviewed the following data over the past 24 hrs:    N/A  \   N/A   / 144 (L)     N/A N/A N/A /  N/A   N/A N/A 0.91 \       Imaging results reviewed over the past 24 hrs:   No results found for this or any previous visit (from the past 24 hours).

## 2024-12-05 NOTE — PROGRESS NOTES
Care Management Follow Up    Length of Stay (days): 6    Expected Discharge Date: 12/06/2024     Concerns to be Addressed:  (arrange TCU)     Patient plan of care discussed at interdisciplinary rounds: Yes    Anticipated Discharge Disposition: Transitional Care      Anticipated Discharge Services:    Anticipated Discharge DME:      Patient/family educated on Medicare website which has current facility and service quality ratings:    Education Provided on the Discharge Plan:    Patient/Family in Agreement with the Plan:      Referrals Placed by CM/SW:    Private pay costs discussed: Not applicable    Discussed  Partnership in Safe Discharge Planning  document with patient/family: Yes:     Handoff Completed: No, handoff not indicated or clinically appropriate    Additional Information:  Writer covering for unit SW - resent referral for TCU at [facility]   They can accept patient tomorrow after 12 noon they are aware of the CRE     Called Lauren and left a VM message     Called [facility] and they have available bed SAT - Noy will call me back and let me know     [facility] will not accept CRE   Added a few more 3 star facilities in the area     Next Steps: Calling daughter to get more choices     [facility] had a few more questions     Is neurology seeing patient again? If memory issues is new they not seem to address the cause? Found out [facility] has noticed a decline for six months     Where is the CRE located in her body?  Writer read note from ID     Will patient leave on IV's - no     Left a message for [facility] to see if they would still be willing to accept     ADD - had a pleasant conversation with daughter Rashmi trying to explain our dilemma with finding a TCU. Asked about sending to [place] ([facility]) and Rashmi did not like that distance. Did not really want to go back to [facility] TCU but may consider this over alternatives and did know that more services are available at [facility],  however, did not want the expense of adding on services at [facility] versus getting into a TCU. Understands writer will continue to find accepting facility      HEMANT Castillo

## 2024-12-05 NOTE — PROGRESS NOTES
Care Management Follow Up    Length of Stay (days): 6    Expected Discharge Date: 12/05/2024     Concerns to be Addressed:  (arrange TCU)     Patient plan of care discussed at interdisciplinary rounds: Yes    Anticipated Discharge Disposition: Transitional Care              Anticipated Discharge Services:    Anticipated Discharge DME:      Patient/family educated on Medicare website which has current facility and service quality ratings:    Education Provided on the Discharge Plan:    Patient/Family in Agreement with the Plan:      Referrals Placed by CM/SW:    Private pay costs discussed: transportation costs    Discussed  Partnership in Safe Discharge Planning  document with patient/family: No     Handoff Completed: No, handoff not indicated or clinically appropriate    Additional Information:  Daughter Rashmi discussed additional referrals with her sister. They request the following facilities in this order;   [facility]  [facility]  [facility]  Referrals have been sent this morning.  Also left a message with Galina admissions coordinator at [facility] in [place].  This is family's first choice.  Asked if any vacancies have occurred and if so to speak call SW.    Next Steps: Follow up with referrals.  Once TCU finalized Arrange transport  Complete PAS  Send orders    JOSE Troy

## 2024-12-05 NOTE — PROGRESS NOTES
Care Management Follow Up    Length of Stay (days): 6    Expected Discharge Date: 12/05/2024     Concerns to be Addressed:  Pre-admission screening   Patient plan of care discussed at interdisciplinary rounds: Yes    Anticipated Discharge Disposition: Transitional Care              Anticipated Discharge Services:  Therapy  Anticipated Discharge DME:  N/A    Patient/family educated on Medicare website which has current facility and service quality ratings:  N/A  Education Provided on the Discharge Plan:  N/A  Patient/Family in Agreement with the Plan:  N/A    Referrals Placed by CM/SW:  [facility]  Private pay costs discussed: Not applicable    Discussed  Partnership in Safe Discharge Planning  document with patient/family: No     Handoff Completed: No, handoff not indicated or clinically appropriate    Additional Information:  Completed the pre-admission screening and sent it to the unit  as there has been no facility selected as of yet.    Next Steps:   Confirm bed and complete the discharge.      PAS-RR    D: Per DHS regulation, SW completed and submitted PAS-RR to [place] [employer] Direct Connect via the [facility].  PAS-RR confirmation # is : [id number].    I: SW spoke with patient and family and they are aware a PAS-RR has been submitted.  SW reviewed with patient and family that they may be contacted for a follow up appointment within 10 days of hospital discharge if their SNF stay is < 30 days.  Contact information for [facility] was also provided.    A: Patient and family verbalized understanding.    P: Further questions may be directed to [facility] at #[phone], option #4 for PAS-RR staff.       REINA Jaeger, [facility]    [phone]  [facility]

## 2024-12-05 NOTE — PROGRESS NOTES
Care Management Follow Up    Length of Stay (days): 5    Expected Discharge Date: 12/05/2024     Concerns to be Addressed:  (arrange TCU)     Patient plan of care discussed at interdisciplinary rounds: Yes    Anticipated Discharge Disposition: Transitional Care              Anticipated Discharge Services:    Anticipated Discharge DME:      Patient/family educated on Medicare website which has current facility and service quality ratings:    Education Provided on the Discharge Plan:    Patient/Family in Agreement with the Plan:      Referrals Placed by CM/SW:    Private pay costs discussed:     Discussed  Partnership in Safe Discharge Planning  document with patient/family:      Handoff Completed:     Additional Information:  Spoke with Rashmi late afternoon.  Reviewed the facilities she expressed interest in are not available.  We are still waiting to hear from Lauren.  Discussed need to make additional referrals and we discussed several TCU's both   in the [place], and [facility] that have private rooms and higher Medicare ratings.  A private room is needed due to patient's CRE hx.  One facility, [facility], decline due to the hx of CRE.  We agreed that Rashmi will review the facilities discussed and tomorrow morning she will give writer a list of facilities to send referrals to.  Rashmi is aware Dr Wilson does feel patient is stable for discharge and wants to confirm a TCU for a Thursday discharge.    Next Steps: Send out additional TCU referrals tomorrow morning.    JOSE Troy

## 2024-12-05 NOTE — PLAN OF CARE
Goal Outcome Evaluation:      Plan of Care Reviewed With: patient, family    Overall Patient Progress: improvingOverall Patient Progress: improving    Summary: New Hydronephrosis with dilatation of Left Renal Pelvis; confusion; possible UTI    DATE & TIME:12/4/24, 4540-7291  Cognitive Concerns/ Orientation: A&O 2-3, disoriented to situation and forgetful   BEHAVIOR & AGGRESSION TOOL COLOR: Green       ABNL VS/O2: /73, 158/58, other VSS on room air   MOBILITY: Assist 1-2 with walker/gait belt, walks slowly with small steps. Required anam steady at times.   PAIN MANAGMENT: Denied. Non verbal indicators of pain absent  DIET: Regular diet. Take pills crushed with apple sauce;   BOWEL/BLADDER: Incontinent of bowel and bladder. Pure wick in place, no BM this shift, given Senna x1,   ABNL LAB/BG: NA  DRAIN/DEVICES: PIV SL  SKIN: Dry and flushed. Blanchable redness to buttocks. Protective mepilex in place. Bruising along left lower back  TESTS/PROCEDURES: NA  D/C DAY/GOALS/PLACE: Will need TCU at discharge per therapy notes, SW following for placement   OTHER IMPORTANT INFO: Neurology following. PT/SW/Care coordination consults.

## 2024-12-05 NOTE — PROGRESS NOTES
Care Management Follow Up    Length of Stay (days): 6    Expected Discharge Date: 12/05/2024     Concerns to be Addressed:  (arrange TCU)     Patient plan of care discussed at interdisciplinary rounds:     Anticipated Discharge Disposition: Transitional Care              Anticipated Discharge Services:    Anticipated Discharge DME:      Patient/family educated on Medicare website which has current facility and service quality ratings:    Education Provided on the Discharge Plan:    Patient/Family in Agreement with the Plan:      Referrals Placed by CM/SW:    Private pay costs discussed:     Discussed  Partnership in Safe Discharge Planning  document with patient/family:      Handoff Completed:     Additional Information:  Referral pending.  Facilities are declining either because they do not have a vacancy or because of patient's CRE history.   Destination    Service Provider Request Status Services Address Phone Fax Patient Preferred   [facility] ([facility]) Pending - Request Sent -- [address] [place] [address] [phone] --   Current Capacity last updated by Tarsha Potts MA on 11/25/2024 11:32 AM    09/13/24: Per admissions, no weekend admissions and does not accept medicaid patients.            Internal Comment last updated by Nicki Schaffer, [facility] 12/4/2024  7:46 PM    Left message for admissions on Wednesday afternoon            [facility] ([facility]) Pending - Request Sent -- [address] [facility] [address] [phone] --   [facility] ([facility]) Declined  Bed Not Available -- [address] [facility] [address] [phone] --   [facility] ([facility]) Declined  SW made referral without dtr's permission and she is decl -- [address] [facility] [address] [phone] --   [facility] (SNF,group home) Declined  Bed Not Available -- [address] [facility] [address] [phone] --   Current Capacity last updated by Isma Mendez MA on 11/26/2024  9:42 AM    11/26 per Ana they are weeks out on having open TCU beds and over a year waitlist for LTC ~ap            [facility] ([facility]) Declined  CRE- can't accomodate -- [address] [facility] [id number] [phone] --   [facility] (SNF, [facility]) Declined  Acuity too high -- [address] [facility] [id number] [phone] --   Current Capacity last updated by Tarsha Potts MA on 12/5/2023  9:40 AM    No LTC at this facility, short term only. Priority given to [facility] patients.,            Internal Comment last updated by Tarsha Potts MA 12/5/2024 11:22 AM    12/5 1110 Spoke to Jess, she will review and connect with [facility]'s admissions-sb            [facility] (SNF) Declined  due to CRE cannot accept. -- [address] [place] [address] [phone] --   Current Capacity last updated by Radha Mcgee RN on 10/31/2024  3:15 PM    [facility] [phone]                Next Steps: Seek TCU which accepts CRE. Communicate with daughter Rashmi.    Nicki Schaffer, LICSW

## 2024-12-05 NOTE — PLAN OF CARE
Summary: New Hydronephrosis with dilatation of Left Renal Pelvis; confusion; possible UTI    DATE & TIME:12/4/24, 4775 - 6416  Cognitive Concerns/ Orientation: A&O x self only, forgetful   BEHAVIOR & AGGRESSION TOOL COLOR: Green       ABNL VS/O2: VSS on room air  MOBILITY: Assist 1-2 with walker/gait belt or anam steady. Turned and repositioned  PAIN MANAGMENT: Denied. Non verbal indicators of pain absent  DIET: Regular diet. Take pills crushed with apple sauce;   BOWEL/BLADDER: Incontinent of bowel and bladder. Pure wick in place, no BM this shift  ABNL LAB/BG: NA  DRAIN/DEVICES: PIV SL  SKIN: Dry and flushed. Blanchable redness to buttocks. Protective mepilex in place. Bruising along left lower back  TESTS/PROCEDURES: NA  D/C DAY/GOALS/PLACE: Will need TCU at discharge per therapy notes, SW following for placement   OTHER IMPORTANT INFO: Neurology following. PT/SW/Care coordination consults.     Goal Outcome Evaluation:      Plan of Care Reviewed With: patient    Overall Patient Progress: improvingOverall Patient Progress: improving

## 2024-12-05 NOTE — PLAN OF CARE
Date/Time: 12/4/24 1900 - 2300  Diagnosis: Hydronephrosis with dilatation of Left Renal Pelvis; confusion; possible UTI   Mental Status: Alert to self  Activity/dangle: Ast of 2 GB/W  Diet: Regular diet  Pain: Denies pain  Murphy/Voiding: Incontinent B&B. Purwick in place.  Tele/Restraints/Iso: Contact isolation maintained  02/LDA: Room air. IV saline locked  D/C Date: TCU pending  Other Info: Meds crushed with applesauce.

## 2024-12-06 VITALS
OXYGEN SATURATION: 95 % | HEART RATE: 67 BPM | RESPIRATION RATE: 20 BRPM | SYSTOLIC BLOOD PRESSURE: 153 MMHG | DIASTOLIC BLOOD PRESSURE: 67 MMHG | TEMPERATURE: 98.1 F

## 2024-12-06 PROCEDURE — 250N000013 HC RX MED GY IP 250 OP 250 PS 637: Performed by: INTERNAL MEDICINE

## 2024-12-06 PROCEDURE — 250N000013 HC RX MED GY IP 250 OP 250 PS 637: Performed by: PSYCHIATRY & NEUROLOGY

## 2024-12-06 PROCEDURE — 99239 HOSP IP/OBS DSCHRG MGMT >30: CPT | Performed by: INTERNAL MEDICINE

## 2024-12-06 RX ORDER — AMOXICILLIN 250 MG
1 CAPSULE ORAL 2 TIMES DAILY PRN
DISCHARGE
Start: 2024-12-06

## 2024-12-06 RX ADMIN — ACYCLOVIR 400 MG: 400 TABLET ORAL at 10:34

## 2024-12-06 RX ADMIN — POLYETHYLENE GLYCOL 3350 17 G: 17 POWDER, FOR SOLUTION ORAL at 10:48

## 2024-12-06 RX ADMIN — SENNOSIDES AND DOCUSATE SODIUM 1 TABLET: 50; 8.6 TABLET ORAL at 10:33

## 2024-12-06 RX ADMIN — FUROSEMIDE 20 MG: 20 TABLET ORAL at 10:32

## 2024-12-06 RX ADMIN — Medication 1000 MCG: at 11:20

## 2024-12-06 RX ADMIN — ASPIRIN 81 MG CHEWABLE TABLET 81 MG: 81 TABLET CHEWABLE at 10:32

## 2024-12-06 ASSESSMENT — ACTIVITIES OF DAILY LIVING (ADL)
ADLS_ACUITY_SCORE: 80
ADLS_ACUITY_SCORE: 79
ADLS_ACUITY_SCORE: 79
ADLS_ACUITY_SCORE: 80
ADLS_ACUITY_SCORE: 75
ADLS_ACUITY_SCORE: 80
ADLS_ACUITY_SCORE: 79
ADLS_ACUITY_SCORE: 80
ADLS_ACUITY_SCORE: 80

## 2024-12-06 NOTE — PLAN OF CARE
Orientation: alert to self- disoriented to time, place, situation    Vitals/Tele: vitals stable on room air except elevated blood pressure- denies pain this shift    IV Access/drains: PIV removed prior to transfer to TCU- purewick removed    Diet: regular with tray set-up and room service    Mobility: assist 1 gb/walker    GI/: incontinent of bowel and bladder, purewick, last BM 12/5    Wound/Skin: scattered bruising, blanchable redness to coccyx/sacrum     Consults: PT/OT/SW    Discharge Plan: TCU today      See Flow sheets for assessment

## 2024-12-06 NOTE — PROGRESS NOTES
Care Management Discharge Note    Discharge Date: 12/06/2024       Discharge Disposition:  ([facility] TCU in [place])    Discharge Services:      Discharge DME:      Discharge Transportation: family or friend will provide    Private pay costs discussed: transportation costs    Does the patient's insurance plan have a 3 day qualifying hospital stay waiver?  No    PAS Confirmation Code: [id number]  Patient/family educated on Medicare website which has current facility and service quality ratings:      Education Provided on the Discharge Plan: Yes  Persons Notified of Discharge Plans: taylor Caraballo  Patient/Family in Agreement with the Plan:      Handoff Referral Completed: No, handoff not indicated or clinically appropriate    Additional Information:  Patient accepted at [facility] in [place] for today.  Daughter, Rashmi, is in agreement with this plan.  She is requesting medivan, she is aware of the private cost charged by [facility] and the transport time.  She will stop over to the TCU after work today.  Transport is arriving between 1220 and 1320.  Bedside RN and Dr Wilson are aware.   SLL notified of patient's destination.  Writer reviewed PAS with daughter Rashmi.    The facility is asking in addition to sending the discharge orders to their facility to also fax them to their Pharmacy Provider at [phone].       JOSE Troy

## 2024-12-06 NOTE — PLAN OF CARE
Goal Outcome Evaluation:       DATE & TIME:12/5/24 2788-7776  Cognitive Concerns/ Orientation: A&O to self and place  BEHAVIOR & AGGRESSION TOOL COLOR: Green, calm/cooperative       ABNL VS/O2: VSS on room air  MOBILITY: Assist 1-2 with walker/gait belt or anam steady, turned and repositioned q2h  PAIN MANAGMENT: no s/s of pain  DIET: Regular diet. good appetite, need assist with tray set up. Take pills crushed with apple sauce  BOWEL/BLADDER: Incontinent of bladder, purewick in place, no BM  ABNL LAB/BG: NA  DRAIN/DEVICES: PIV SL  SKIN: Dry and flushed. Blanchable redness to buttocks. Protective mepilex in place Bruising along left lower back  TESTS/PROCEDURES: NA  D/C DAY/GOALS/PLACE: Will need TCU at discharge per therapy notes, SW following for placement   OTHER IMPORTANT INFO: Pt confused/pleasant, slept well, no s/s of pain/discomfort, PT/OT/SW following.

## 2024-12-06 NOTE — PLAN OF CARE
"Occupational Therapy Discharge Summary    Reason for therapy discharge:    Discharged to transitional care facility.    Progress towards therapy goal(s). See goals on Care Plan in Epic electronic health record for goal details.  Goals partially met.  Barriers to achieving goals:   discharge from facility.    Therapy recommendation(s):    pt typically mod I in [facility] apt and daughter reports ps is \"very sharp\". currently significantly below baseline; limited by cog/confusion, weakness, activity intolerance, etc. will need TCU prior to discharge to home apt.       "

## 2024-12-06 NOTE — PROGRESS NOTES
Care Management Follow Up    Length of Stay (days): 7    Expected Discharge Date: 12/06/2024     Concerns to be Addressed:  (arrange TCU)     Patient plan of care discussed at interdisciplinary rounds: Yes    Anticipated Discharge Disposition: Transitional Care              Anticipated Discharge Services:    Anticipated Discharge DME:      Patient/family educated on Medicare website which has current facility and service quality ratings:    Education Provided on the Discharge Plan:    Patient/Family in Agreement with the Plan:      Referrals Placed by CM/SW:    Private pay costs discussed: transportation costs    Discussed  Partnership in Safe Discharge Planning  document with patient/family: No     Handoff Completed: No, handoff not indicated or clinically appropriate    Additional Information:  Daughter Rashmi is accepting [facility] for her mother.  Decision made with her sister.    Call placed to [facility] to ask if they can still admit patient today.    Next Steps:   Wait to hear back from [facility].   Notify hospitalist with [facility] response.   Anticipate discharge today if [facility] can accept.  Daughter wants to look at patient return to her [facility] is [facility] in not available.    Nicki Schaffer, BLANESW

## 2024-12-06 NOTE — PLAN OF CARE
Goal Outcome Evaluation:      Plan of Care Reviewed With: patient    Overall Patient Progress: improvingOverall Patient Progress: improving    Summary: New Hydronephrosis with dilatation of Left Renal Pelvis; confusion; possible UTI    DATE & TIME:12/5/24 0795-0488  Cognitive Concerns/ Orientation: A&O to self and place, confused at times, forgetful   BEHAVIOR & AGGRESSION TOOL COLOR: Green       ABNL VS/O2: /75 this morning, other VSS on room air  MOBILITY: Assist 1-2 with walker/gait belt or anam steady. Turned and repositioned  PAIN MANAGMENT: Denied. Non verbal indicators of pain absent  DIET: Regular diet, good appetite, need assist with tray set up. Take pills crushed with apple sauce;   BOWEL/BLADDER: Incontinent of bladder. Pure wick in place, on scheduled senna, MD added Miralax daily, large BM x1.  ABNL LAB/BG: NA  DRAIN/DEVICES: PIV SL  SKIN: Dry and flushed. Blanchable redness to buttocks. Protective mepilex in place. Bruising along left lower back  TESTS/PROCEDURES: NA  D/C DAY/GOALS/PLACE: Will need TCU at discharge per therapy notes, SW following for placement   OTHER IMPORTANT INFO: PT/OT/SW following.

## 2024-12-06 NOTE — PLAN OF CARE
Physical Therapy Discharge Summary     Reason for therapy discharge:    Discharged to transitional care facility.     Progress towards therapy goal(s). See goals on Care Plan in [facility] electronic health record for goal details.  Goals not met.  Barriers to achieving goals:   discharge from facility.     Therapy recommendation(s):    Continued therapy is recommended.  Rationale/Recommendations:  Patient would benefit from PT eval at TCU in order to increase strength, activity tolerance, balance and independence with mobility.    **Pt not seen by discharging therapist on this date, note written based on previous treating therapist's notes and recommendations

## 2024-12-06 NOTE — DISCHARGE SUMMARY
"[facility]  Hospitalist Discharge Summary      Date of Admission:  11/29/2024  Date of Discharge:  12/6/2024  Discharging Provider: Kaylyn Wilson MD  Discharge Service: Hospitalist Service    Discharge Diagnoses   See below    Clinically Significant Risk Factors     # Overweight: Estimated body mass index is 25.08 kg/m  as calculated from the following:    Height as of 11/25/24: 1.473 m (4' 10\").    Weight as of 11/25/24: 54.4 kg (120 lb).       Follow-ups Needed After Discharge   Follow-up Appointments       Follow Up and recommended labs and tests      Follow up with snf physician.  The following labs/tests are recommended: basic metabolic panel and complete blood count in a week.   Recommend vitamin B12 check in 4 weeks.                Unresulted Labs Ordered in the Past 30 Days of this Admission       No orders found from 10/30/2024 to 11/30/2024.            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 Transferred to [facility] 11/29/2024 for further evaluation and treatment.      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 Urology thus not recommending stent placement at this time   - creatinine improved 1.16---> 0.89 with IV hydration  - Bactrim discontinued   - follow up BMP in a week     Acute encephalopathy likely metabolic  Possible underlying cognitive impairment/dementia versus delirium  vitamin B12 deficiency  *concern for some underlying baseline cognitive impairment and history of dementia noted on chart though family reported patient is usually very sharp; per family she has had episodes of confusion in past with UTI/infections  *CT head done on [facility] hospital showed no acute intracranial finding, stable generalized volume loss presumed chronic microvascular ischemic changes.  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  *was treated with Bactrim at outside facility; Bactrim can cause some CNS side effects as well as renal failure -- now discontinued  *evaluated by neurology, note low Vitamin B 12 at 266. Initiated on supplement  *EEG 12/1 was consistent with a encephalopathy of multiple causes metabolic, toxic or degenerative but no electrographic seizures or epileptiform discharges noted  *patient could not complete MRI; limited exam 12/3 reported : \"Diffusion-weighted sequence only demonstrates no restricted diffusion to suggest recent infarct. There is a chronic infarct in the right cerebellar hemisphere. \"  - Received vitamin B12 injection 1000  g x 1 per neuro recommendation, then started on sublingual vitamin B12 daily which will continue at discharge  - mentation seems to be slowly improving; she is oriented to place and off with time. She understands she is discharging to rehab today.  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      Hypertension  Controlled off of anti-hypertensive     Coronary artery disease, non-obstructive  Hyperlipidemia  - Continue PTA atorvastatin  - continued PTA ASA     Multiple myeloma  Follows with [facility]. Currently off treatment. Lytic lesions noted on CT likely due to known MM.   - Continue PTA acyclovir, currently on surveillance only,     GERD  - Continue PTA PPI     Adnexal cyst  *CT abdomen/pelvis incidentally noted enlarging cystic lesion in the left adnexa likely ovarian in origin measuring up to 8.7 cm in maximum diameter   *Pelvic US 3/2021 A 6.0 cm left adnexal cyst is simple in appearance.  - Prior provider discussed with daughters, recommend continued outpatient follow-up      Chronic compression fracture T12  - Acetaminophen PRN     Pancreatic cysts  CT abd/pelvis with two new cystic lesions in pancreatic body, 1.5 x 1.0 cm and 1.4 x 0.9 cm.   -prior provider discussed with daughters  - CT or MRI/MRCP every 6 months for 1 year and then yearly for 2 years per radiology recommendations     Constipation  Had BM on 12/5.  - discharge on Senna-colace BID prn for constipation      Consultations This Hospital Stay   UROLOGY IP CONSULT  OCCUPATIONAL THERAPY ADULT IP CONSULT  PHYSICAL THERAPY  ADULT IP CONSULT  CARE MANAGEMENT / SOCIAL WORK IP CONSULT  UROLOGY IP CONSULT  OCCUPATIONAL THERAPY ADULT IP CONSULT  NEUROLOGY IP CONSULT  PHARMACY IP CONSULT  PHYSICAL THERAPY ADULT IP CONSULT  OCCUPATIONAL THERAPY ADULT IP CONSULT    Code Status   No CPR- Do NOT Intubate    Time Spent on this Encounter   I, Kaylyn Wilson MD, personally saw the patient today and spent 35 minutes discharging this patient.       Kaylyn Wilson MD  [place] [address] [facility]  [facility] [place] [address]  Phone: [phone]  ______________________________________________________________________    Physical Exam   Vital Signs: Temp: 98.1  F (36.7  C) Temp src: Oral BP: (!) 153/67 Pulse: 67   Resp: 20 SpO2: 95 % O2 Device: None (Room air)    Weight: 0 lbs 0 oz  General Appearance: Alert,awake and no apparent distress. Knows she is in hospital and discharge to rehab today. Though month Novemeber  Respiratory: clear to auscultation bilaterally, no wheezing  Cardiovascular: regular rate and rhythm  GI: soft and non-tender  Skin: warn and dry         Primary Care Physician   Noemi Sanchez    Discharge Orders      General info for SNF    Length of Stay Estimate: Short Term Care: Estimated # of Days 31-90  Condition at Discharge: Stable  Level of care:skilled   Rehabilitation Potential: Good  Admission H&P remains valid and up-to-date: Yes  Recent Chemotherapy: N/A  Use Nursing Home Standing Orders: Yes     Mantoux instructions    Give two-step Mantoux (PPD) Per Facility Policy Yes     Follow Up and recommended labs and tests    Follow up with senior living physician.  The following labs/tests are recommended: basic metabolic panel and complete blood count in a week.   Recommend vitamin B12 check in 4 weeks.     Reason for your hospital stay    You were in the hospital for acute kidney injury and metabolic encephalopathy.     Activity - Up with assistive device     Activity - Up with  nursing assistance     Physical Therapy Adult Consult    Evaluate and treat as clinically indicated.    Reason:  physical deconditioning     Occupational Therapy Adult Consult    Evaluate and treat as clinically indicated.    Reason:  physical deconditioning     Fall precautions     Diet    Follow this diet upon discharge: Current Diet:Orders Placed This Encounter      Room Service      Regular Diet Adult       Significant Results and Procedures   Most Recent 3 CBC's:  Recent Labs   Lab Test 12/05/24  0711 12/02/24  0635 12/01/24  0551 11/30/24  1045   WBC  --  8.5 7.1 8.3   HGB  --  12.3 10.7* 11.2*   MCV  --  92 92 93   * 143* 131* 142*     Most Recent 3 BMP's:  Recent Labs   Lab Test 12/05/24  0711 12/03/24  0636 12/02/24  0635 12/01/24  0551 11/30/24  0913   NA  --   --  141 138 140   POTASSIUM  --  4.2 4.2 3.9 3.8   CHLORIDE  --   --  105 105 104   CO2  --   --  24 25 25   BUN  --   --  15.7 19.6 18.8   CR 0.91  --  0.89 1.01* 1.16*   ANIONGAP  --   --  12 8 11   DOUG  --   --  9.9 9.7 9.4   GLC  --   --  94 96 89   ,   Results for orders placed or performed during the hospital encounter of 11/29/24   MR Brain w/o Contrast    Narrative    EXAM: MR BRAIN W/O CONTRAST  LOCATION: [facility]  DATE: 12/3/2024    INDICATION: confusion  COMPARISON: CT head 11/29/2024  TECHNIQUE: Diffusion-weighted sequence only. The patient could not tolerate further imaging.    Report: Diffusion-weighted sequence only demonstrates no restricted diffusion to suggest recent infarct. There is a chronic infarct in the right cerebellar hemisphere.       Discharge Medications   Current Discharge Medication List        START taking these medications    Details   cyanocobalamin (VITAMIN B-12) 500 MCG SUBL sublingual tablet Place 2 tablets (1,000 mcg) under the tongue daily.    Associated Diagnoses: Vitamin B12 deficiency (non anemic)      senna-docusate (SENOKOT-S/PERICOLACE) 8.6-50 MG tablet Take 1 tablet by  mouth 2 times daily as needed for constipation.    Associated Diagnoses: Constipation, unspecified constipation type           CONTINUE these medications which have NOT CHANGED    Details   acyclovir (ZOVIRAX) 400 MG tablet TAKE 1 TABLET(400 MG) BY MOUTH EVERY 12 HOURS  Qty: 180 tablet, Refills: 0    Associated Diagnoses: Multiple myeloma, remission status unspecified (H)      aspirin (ASA) 81 MG chewable tablet Take 81 mg by mouth daily.      atorvastatin (LIPITOR) 10 MG tablet Take 1 tablet (10 mg) by mouth daily.  Qty: 90 tablet, Refills: 3    Associated Diagnoses: Hyperlipidemia LDL goal <100      Biotin w/ Vitamins C & E (HAIR SKIN & NAILS GUMMIES PO) Take 1 tablet by mouth daily.      CALCIUM PO Take 1 tablet by mouth daily.      FEROSUL 325 (65 Fe) MG tablet TAKE ONE TABLET BY MOUTH DAILY WITH BREAKFAST  Qty: 90 tablet, Refills: 0    Associated Diagnoses: Iron deficiency      furosemide (LASIX) 20 MG tablet Take 1 tablet (20 mg) by mouth daily.  Qty: 90 tablet, Refills: 1    Associated Diagnoses: Chronic heart failure with preserved ejection fraction (HFpEF) (H)      pantoprazole (PROTONIX) 40 MG EC tablet Take 1 tablet (40 mg) by mouth daily. 30-60 min prior to meal.  Qty: 90 tablet, Refills: 1    Associated Diagnoses: Gastrointestinal hemorrhage associated with duodenal ulcer      Probiotic Product (CULTURELLE PROBIOTICS) CHEW Take 1 chew tab by mouth daily.      Vitamin D3 (CHOLECALCIFEROL) 25 mcg (1000 units) tablet Take 25 mcg by mouth daily.           STOP taking these medications       potassium chloride (KLOR-CON) 20 MEQ packet Comments:   Reason for Stopping:         sulfamethoxazole-trimethoprim (BACTRIM DS) 800-160 MG tablet Comments:   Reason for Stopping:             Allergies   Allergies   Allergen Reactions    Carvedilol Other (See Comments)    Lidocaine Nausea and Vomiting     Vomiting with general anesthesia    Lisinopril Cough    Seasonal Allergies Other (See Comments)     Problems with  narcotics - oxygen desaturates    Spironolactone

## 2024-12-11 ENCOUNTER — PATIENT OUTREACH (OUTPATIENT)
Dept: FAMILY MEDICINE | Facility: CLINIC | Age: 84
End: 2024-12-11
Payer: MEDICARE

## 2024-12-11 NOTE — TELEPHONE ENCOUNTER
Transitions of Care Outreach  No chief complaint on file.      Most Recent Admission Date: 11/29/2024   Most Recent Admission Diagnosis:      Most Recent Discharge Date: 12/6/2024   Most Recent Discharge Diagnosis: Vitamin B12 deficiency (non anemic) - E53.8  Constipation, unspecified constipation type - K59.00     Transitions of Care Assessment    Discharge Assessment  How are you doing now that you are home?: slowly improving, still having a little bit of confusion  How are your symptoms? (Red Flag symptoms escalate to triage hotline per guidelines): Improved  Do you know how to contact your clinic care team if you have future questions or changes to your health status? : Yes  Does the patient have their discharge instructions? : Yes  Does the patient have questions regarding their discharge instructions? : No  Were you started on any new medications or were there changes to any of your previous medications? : No  Does the patient have all of their medications?: Yes  Do you have questions regarding any of your medications? : No  Do you have all of your needed medical supplies or equipment (DME)?  (i.e. oxygen tank, CPAP, cane, etc.): Yes    Follow up Plan     Discharge Follow-Up  Discharge follow up appointment scheduled in alignment with recommended follow up timeframe or Transitions of Risk Category? (Low = within 30 days; Moderate= within 14 days; High= within 7 days): No  Patient's follow up appointment not scheduled: Patient declined scheduling support. Education on the importance of transitions of care follow up. Provided scheduling phone number.    Future Appointments   Date Time Provider Department Center   2/12/2025 10:00 AM Delmi Phillips MD [facility] [place]   4/1/2025  1:00 PM LAB FIRST FLOOR [facility] [place]   4/1/2025  2:00 PM Susan Yi MD [facility] [place]   9/8/2025 10:40 AM Noemi Sanchez MD [facility]       Outpatient Plan as outlined on AVS reviewed with  patient.    For any urgent concerns, please contact our 24 hour nurse triage line: [phone] ([phone])       Vaishnavi Patel RN

## 2024-12-19 DIAGNOSIS — I50.33 ACUTE ON CHRONIC DIASTOLIC HEART FAILURE (H): ICD-10-CM

## 2024-12-19 DIAGNOSIS — Z95.2 S/P AVR (AORTIC VALVE REPLACEMENT): Primary | ICD-10-CM

## 2024-12-19 DIAGNOSIS — I50.22 CHRONIC SYSTOLIC HEART FAILURE (H): ICD-10-CM

## 2025-01-29 ENCOUNTER — MYC MEDICAL ADVICE (OUTPATIENT)
Dept: CARDIOLOGY | Facility: CLINIC | Age: 85
End: 2025-01-29
Payer: MEDICARE

## 2025-01-29 DIAGNOSIS — I10 ESSENTIAL HYPERTENSION: Primary | ICD-10-CM

## 2025-01-29 DIAGNOSIS — E85.9 AMYLOIDOSIS (H): ICD-10-CM

## 2025-01-29 DIAGNOSIS — I50.42 CHRONIC COMBINED SYSTOLIC AND DIASTOLIC HEART FAILURE (H): ICD-10-CM

## 2025-02-25 ENCOUNTER — TELEPHONE (OUTPATIENT)
Dept: FAMILY MEDICINE | Facility: CLINIC | Age: 85
End: 2025-02-25
Payer: MEDICARE

## 2025-02-25 NOTE — LETTER
February 26, 2025      Kandis Gallagher  [address]   [facility] [address]        Attn: Samaria RN  [phone]        To Whom It May Concern,     Authorization to crush medication given for ease of administration unless expressly restricted on prescription.            Sincerely,        Noemi Sanchez MD

## 2025-02-25 NOTE — TELEPHONE ENCOUNTER
MYRON Mera calling from patient LTC unit requesting Crush Order.     Patient was previously with [facility] TCU and while there, they had been crushing her medications. Patient is now back at the [facility] and wants to continue crushing medications. LTC unit needs a signed order stating that they can crush medications. If approved, please route to TEAM to fax to:    Attn: MYRON Mera  [phone]    They have also faxed over an updated med list with a couple requests from patient daughter for provider to review and sign. Patient daughter would like the following added to medication list:    Nature's Bounty Hair, Skin, and Nails 2500 mcg biotin  Klor-Con Powder    Routing to provider, please review and advise.    Routing to TEAM- please keep an eye out for fax and have provider sign to fax back. Fantasma Patel RN, BSN

## 2025-02-25 NOTE — LETTER
February 27, 2025      Kandis Gallagher  [address]   [facility] [address]        To Whom It May Concern,     Add the following to patient medications:    Nature's Bounty Hair, Skin, and Nails 2500 mcg biotin   daily    Klor-Con Powder 20 meq packet  1 packet in liquid once daily          Sincerely,        Noemi Sanchez MD    Electronically signed

## 2025-02-26 NOTE — TELEPHONE ENCOUNTER
Forms arrived from [facility] -   Fax back to [phone] when completed.   Place form in provider bin.

## 2025-04-14 ENCOUNTER — MYC MEDICAL ADVICE (OUTPATIENT)
Dept: FAMILY MEDICINE | Facility: CLINIC | Age: 85
End: 2025-04-14
Payer: MEDICARE

## 2025-04-14 RX ORDER — AMLODIPINE BESYLATE 5 MG/1
5 TABLET ORAL DAILY
COMMUNITY
Start: 2025-04-11

## 2025-04-14 NOTE — LETTER
April 16, 2025      Kandis Gallagher  [address]   [facility] [place] [facility] [address]        To Whom It May Concern,       Resume the Lasix 20 mg daily.   Resume the Klor-con 1 packet daily.       Monitor weight daily (at least for now)     Call office with BP and update on weight and swelling on 4/21/25 or sooner with negative side effects             Sincerely,        Noemi Sanchez MD    Electronically signed

## 2025-04-14 NOTE — TELEPHONE ENCOUNTER
Please offer her a virtual visit with me on Wednesday AM.  If no appointment available - double book at 11:30 and let her know it will be noon for appointment likely.      Below is a copy of the changes made per [facility] records.       With BP being elevated, I would recommend a restart of the lasix at 20 mg daily along with potassium ( if she was previously using this).    The reason for change to the pantoprazole looks like it was to allow the capsules to be opened up and sprinkle onto food rather than having to swallow the esomeprazole (Nexium)      Unfortunately, there could be a couple of potential reasons for the increase in swelling.  Amlodipine has a side effect of swelling and she was off the water pill.  Hopefully if we restart the furosemide (water pill) the swelling will resolve.  If persistent significant swelling is noted in spite of being back on the furosemide, we may have to relook at whether or not to continue the amlodipine.      PSK            Started  Amlodipine (hypertension)  Ramelteon 8 mg (insomnia)  Polyethelene Glycol (constipation)    Held  furosemide (resume as clinically indicated by BP or volume status) and Klor-Con (while holding furosemide to avoid hyperkalemia)    Stopped  Acyclovir 400 mg twice a day- prophylaxis discontinued after discussion with outpatient hematologist.    Changed  Pantoprazole changed to esomeprazole (for capsules to be opened and sprinkled onto soft food)

## 2025-04-14 NOTE — TELEPHONE ENCOUNTER
Called patient- contact information is for patient daughter Rashmi. She reports the following:    Patient was discharged from [facility] on Friday, patient was admitted due to a UTI and kidney issue, was transferred to their TCU, where she ended up getting another UTI. Reports that the hospital made several changes to the patient's medications, she is unsure why.    Reports that she got a call from the [facility] Care Unit where the patient lives on Saturday stating that her blood pressure was high, 180's over something (daughter is unable to remember exact numbers), they were able to get it down but it was still higher than previously.     Reports that patient has bilateral swelling in her ankles and legs- up to the knees- again, had been doing very well with lasix and wrapping legs, had gotten swelling to a manageable level but it is now back after being in the hospital.     Reports that patient has been more lightheaded, she was only able to participate in an activity at the long term care unit for about 5 minutes before she got lightheaded and felt overwhelmed.     Routing to provider- patient needs hospital follow up to discuss multiple concerns. Would a virtual visit be sufficient? Can she be worked in to your schedule? Please review and advise. Fantasma Patel, RN, BSN, PHN

## 2025-04-15 NOTE — TELEPHONE ENCOUNTER
Daughter notified per Noemi Sanchez MD:    Please offer her a virtual visit with me on Wednesday AM.  If no appointment available - double book at 11:30 and let her know it will be noon for appointment likely.       Below is a copy of the changes made per [facility] records.        With BP being elevated, I would recommend a restart of the lasix at 20 mg daily along with potassium ( if she was previously using this).    The reason for change to the pantoprazole looks like it was to allow the capsules to be opened up and sprinkle onto food rather than having to swallow the esomeprazole (Nexium)    Unfortunately, there could be a couple of potential reasons for the increase in swelling.  Amlodipine has a side effect of swelling and she was off the water pill.  Hopefully if we restart the furosemide (water pill) the swelling will resolve.  If persistent significant swelling is noted in spite of being back on the furosemide, we may have to relook at whether or not to continue the amlodipine.    PSK    Started  Amlodipine (hypertension)  Ramelteon 8 mg (insomnia)  Polyethelene Glycol (constipation)    Held  furosemide (resume as clinically indicated by BP or volume status) and Klor-Con (while holding furosemide to avoid hyperkalemia)    Stopped  Acyclovir 400 mg twice a day- prophylaxis discontinued after discussion with outpatient hematologist.    Changed  Pantoprazole changed to esomeprazole (for capsules to be opened and sprinkled onto soft food)    Appointment scheduled. Patient verbalized understanding and agrees with plan. Advised to call with questions or concerns. Fantasma Patel, RN, BSN, PHN

## 2025-04-16 ENCOUNTER — VIRTUAL VISIT (OUTPATIENT)
Dept: FAMILY MEDICINE | Facility: CLINIC | Age: 85
End: 2025-04-16
Payer: MEDICARE

## 2025-04-16 DIAGNOSIS — N18.31 CHRONIC KIDNEY DISEASE, STAGE 3A (H): ICD-10-CM

## 2025-04-16 DIAGNOSIS — I50.32 CHRONIC HEART FAILURE WITH PRESERVED EJECTION FRACTION (HFPEF) (H): ICD-10-CM

## 2025-04-16 DIAGNOSIS — I10 PRIMARY HYPERTENSION: Primary | ICD-10-CM

## 2025-04-16 DIAGNOSIS — N39.0 RECURRENT UTI: ICD-10-CM

## 2025-04-16 DIAGNOSIS — F02.80 ALZHEIMER'S DEMENTIA WITHOUT BEHAVIORAL DISTURBANCE, PSYCHOTIC DISTURBANCE, MOOD DISTURBANCE, OR ANXIETY, UNSPECIFIED DEMENTIA SEVERITY, UNSPECIFIED TIMING OF DEMENTIA ONSET (H): ICD-10-CM

## 2025-04-16 DIAGNOSIS — G30.9 ALZHEIMER'S DEMENTIA WITHOUT BEHAVIORAL DISTURBANCE, PSYCHOTIC DISTURBANCE, MOOD DISTURBANCE, OR ANXIETY, UNSPECIFIED DEMENTIA SEVERITY, UNSPECIFIED TIMING OF DEMENTIA ONSET (H): ICD-10-CM

## 2025-04-16 PROCEDURE — 98006 SYNCH AUDIO-VIDEO EST MOD 30: CPT | Performed by: FAMILY MEDICINE

## 2025-04-16 PROCEDURE — 1111F DSCHRG MED/CURRENT MED MERGE: CPT | Mod: 95 | Performed by: FAMILY MEDICINE

## 2025-04-16 RX ORDER — POLYETHYLENE GLYCOL 3350 17 G/17G
17 POWDER, FOR SOLUTION ORAL DAILY
COMMUNITY
Start: 2025-04-11

## 2025-04-16 RX ORDER — RAMELTEON 8 MG/1
8 TABLET ORAL AT BEDTIME
COMMUNITY
Start: 2025-04-11

## 2025-04-16 RX ORDER — POTASSIUM CHLORIDE 1.5 G/1.58G
20 POWDER, FOR SOLUTION ORAL DAILY
COMMUNITY

## 2025-04-16 NOTE — TELEPHONE ENCOUNTER
Please call memory care:  [facility] - with following recommendations -    Resume the Lasix 20 mg daily.   Resume the Klor-con 1 packet daily.      Monitor weight daily (at least for now)    Request they call office with BP and update on weight and swelling on 4/21/25 or sooner with negative side effects     PSK

## 2025-04-16 NOTE — PATIENT INSTRUCTIONS
Resume lasix and potassium supplement as previous.  We will request update from [facility] in 4-5 days on swelling and BP levels.    Follow up with appointments as scheduled at [place] and labs will be done at that time.    You can continue the Esomeprazole (instead of the Pantoprazole) at this time for reflux/heartburn prevention.

## 2025-04-16 NOTE — PROGRESS NOTES
"  If patient has telephone visit, have they been educated on video visit as preferred visit method and offered to change to video visit? NOT APPLICABLE        Instructions Relayed to Patient by Virtual Roomer:     Patient is active on [employer]:   Relayed following to patient: \"It looks like you are active on [employer], are you able to join the visit this way? If not, do you need us to send you a link now or would you like your provider to send a link via text or email when they are ready to initiate the visit?\"      Patient Confirmed they will join visit via: [employer]  Reminded patient to ensure they were logged on to virtual visit by arrival time listed.   Asked if patient has flexibility to initiate visit sooner than arrival time: patient is unable to initiate visit earlier than arrival time     If pediatric virtual visit, ensured pediatric patient along with parent/guardian will be present for video visit.     Patient offered the website [email or web address] and/or phone number to [employer] Help line: [phone]      Answers submitted by the patient for this visit:  Hypertension Visit (Submitted on 4/16/2025)  Chief Complaint: Chronic problems general questions HPI Form  Do you check your blood pressure regularly outside of the clinic?: Yes  Are your blood pressures ever more than 140 on the top number (systolic) OR more than 90 on the bottom number (diastolic)? (For example, greater than 140/90): Yes  Are you following a low salt diet?: No  General Questionnaire (Submitted on 4/16/2025)  Chief Complaint: Chronic problems general questions HPI Form  How many servings of fruits and vegetables do you eat daily?: 2-3  On average, how many sweetened beverages do you drink each day (Examples: soda, juice, sweet tea, etc.  Do NOT count diet or artificially sweetened beverages)?: 0  How many minutes a day do you exercise enough to make your heart beat faster?: 10 to 19  How many days a week do you exercise " enough to make your heart beat faster?: 3 or less  How many days per week do you miss taking your medication?: 0  Questionnaire about: Chronic problems general questions HPI Form (Submitted on 4/16/2025)  Chief Complaint: Chronic problems general questions HPI Form  Kandis is a 84 year old who is being evaluated via a billable video visit.    How would you like to obtain your AVS? MyChart  If the video visit is dropped, the invitation should be resent by: Text to cell phone: [phone]  Will anyone else be joining your video visit? Yes: Daughter Rashmi. How would they like to receive their invitation? Text to cell phone: [phone]      Assessment & Plan     Primary hypertension  Blood pressure is higher than previous.  She was discharged home on amlodipine alone.  Previously had been on Lasix 20 mg daily with potassium supplement.  With significant elevated blood pressure we are going to resume her Lasix as previous along with the potassium supplement.  This will hopefully help blood pressure and the fluid retention.  The amlodipine could be contributing to the swelling.  For this reason, if blood pressures improved but swelling persist, discontinuing the amlodipine would be recommended.    Chronic heart failure with preserved ejection fraction (HFpEF) (H)  Known heart failure with preserved ejection fraction by history.  She has had swelling since being home and off the furosemide.  When she initially presented to the hospital with her illness/UTI kidney function was decreased which is likely at least part of the reason the Lasix was held.  Upon discharge from the hospital her kidney function had returned to her baseline.  Resuming furosemide is recommended as noted above.  Will resume the potassium along with that.  Message is sent to her memory care unit regarding these medication changes.  I have requested that they forward a copy of her blood pressure and update on her swelling and weight in 4 to 5 days.  " Would recommend they reach out sooner if worsening swelling or other symptoms occur.    Recurrent UTI  Recently hospitalized for a UTI with significant neurologic changes.  Daughter is not noticing any current evidence of infection.  She is currently on the methenamine and using estrogen topically to the periurethral area.  Has upcoming appointment with urology as well,      Chronic kidney disease, stage 3a (H)  Decreasing kidney function was noted at the time of her recent admission.  She is back to her baseline now.  Will have lab testing done at [place] next week and I will review those results when available.    Alzheimer's dementia without behavioral disturbance, psychotic disturbance, mood disturbance, or anxiety, unspecified dementia severity, unspecified timing of dementia onset (H)  Recent change to memory care.  She is adjusting to this currently.  Increase services to ensure safety planned.        MED REC REQUIRED  Post Medication Reconciliation Status: discharge medications reconciled and changed, per note/orders  BMI  Estimated body mass index is 25.08 kg/m  as calculated from the following:    Height as of 11/25/24: 1.473 m (4' 10\").    Weight as of 11/25/24: 54.4 kg (120 lb).         Patient Instructions   Resume lasix and potassium supplement as previous.  We will request update from Memory Care in 4-5 days on swelling and BP levels.    Follow up with appointments as scheduled at [place] and labs will be done at that time.    You can continue the Esomeprazole (instead of the Pantoprazole) at this time for reflux/heartburn prevention.       The longitudinal plan of care for the diagnosis(es)/condition(s) as documented were addressed during this visit. Due to the added complexity in care, I will continue to support Kandis in the subsequent management and with ongoing continuity of care.    Charles Marquis is a 84 year old, presenting for the following health issues:  Hospital F/U        4/16/2025 "     9:20 AM   Additional Questions   Roomed by Bam   Accompanied by Daughter-Rashmi       Video Start Time:  9:46 AM    History of Present Illness       Hypertension: She presents for follow up of hypertension.  She does check blood pressure  regularly outside of the clinic. Outside blood pressures have been over 140/90. She does not follow a low salt diet.     She eats 2-3 servings of fruits and vegetables daily.She consumes 0 sweetened beverage(s) daily.She exercises with enough effort to increase her heart rate 10 to 19 minutes per day.  She exercises with enough effort to increase her heart rate 3 or less days per week.   She is taking medications regularly.          Hospital Follow-up Visit:    Hospital/Nursing Home/IP Rehab Facility:  [facility]  Date of Admission: 3/13/2025  Date of Discharge: 4/11/2025  Reason(s) for Admission: Urinary problem  Was the patient in the ICU or did the patient experience delirium during hospitalization?  Yes     Known dementia    Do you have any other stressors you would like to discuss with your provider? No    Problems taking medications regularly:  None  Medication changes since discharge: Started Amlodipine, Ramelteon, Polyethelene Glycol, Pantoprazole changed to esomeprazole, Stopped Acyclovir 400 mg twice a day  Problems adhering to non-medication therapy:  None    Summary of hospitalization:  CareEverywhere information obtained and reviewed  Diagnostic Tests/Treatments reviewed.  Follow up needed: none  Other Healthcare Providers Involved in Patient s Care:         Specialist appointment - cardiology, urology, gynecology  Update since discharge: worsened.   Higher BP and swelling.      Plan of care communicated with patient, family, and call will be made to memory care staff.           Hypertension Follow-up    Do you check your blood pressure regularly outside of the clinic? Yes   Are you following a low salt diet? Yes  Are your blood pressures ever more than 140 on  the top number (systolic) OR more   than 90 on the bottom number (diastolic), for example 140/90? Yes    BP Readings from Last 2 Encounters:   12/06/24 (!) 153/67   11/25/24 138/78   /92 -   on 3/10/25 - 140-150's.   Spiked in hospital -     Swelling currently - tender legs and swelling present - 1-2+ per nurse report to patient daughter.      Heart Failure Follow-up   Are you experiencing any shortness of breath? No  Are you experiencing any swelling in your legs or feet?  Worse than usual  Are you using more pillows than usual? No  Do you cough at night?  No  Do you check your weight daily?  No  Have you had a weight change recently?  unknown  Are you having any of the following side effects from your medications? (Select all that apply)  Swelling?   Since your last visit, how many times have you gone to the cardiologist, urgent care, emergency room, or hospital because of your heart failure?   None  Last Echo:   Echo result w/o MOPS: Interpretation Summary Left ventricular systolic function is mildly reduced.The visual ejection fraction is 40-45%.There is inferior wall akinesis.Mild valvular aortic stenosis.Compared to prior study, changes are noted.      Chronic Kidney Disease Follow-up    Do you take any over the counter pain medicine?: No    Recurrent UTI: Hospitalization in mid March for recurrent UTI.  She does have an appointment with urology in early May.  Continues on methenamine as well as topical estrogen.    Alzheimer dementia: Recent transfer from her independent living to memory care upon discharge from the hospital/TCU on 4/11/2025.  She is slowly getting used to the new location.  Some increase in confusion and difficulty sleeping has been noted.        Review of Systems  Constitutional, HEENT, cardiovascular, pulmonary, gi and gu systems are negative, except as otherwise noted.      Objective           Vitals:  No vitals were obtained today due to virtual visit.    Physical Exam   GENERAL:  alert, no distress, and elderly  EYES: Eyes grossly normal to inspection.  No discharge or erythema, or obvious scleral/conjunctival abnormalities.  RESP: No audible wheeze, cough, or visible cyanosis.    SKIN: Visible skin clear. No significant rash, abnormal pigmentation or lesions.  NEURO: Cranial nerves grossly intact.  Mentation and speech appropriate for age.  PSYCH: confused and pleasant.    Component  Ref Range & Units 6 d ago   Potassium, S  3.6 - 5.2 mmol/L 4.6   Sodium, S  135 - 145 mmol/L 141   Chloride, S  98 - 107 mmol/L 104   Bicarbonate, S  22 - 29 mmol/L 23   Anion Gap  7 - 15 14   BUN (Blood Urea Nitrogen), S  6 - 21 mg/dL 26 High    Creatinine  0.59 - 1.04 mg/dL 0.84   Estimated GFR (eGFR)  >=60 mL/min/BSA 68   Comment: Estimated GFR calculated using the 2021  CKD_EPI creatinine equation.   Calcium, Total, S  8.8 - 10.2 mg/dL 9.7   Glucose, S  70 - 140 mg/dL 125   Albumin, S  3.5 - 5.0 g/dL 4.0   Phosphorus (Inorganic), S  2.5 - 4.5 mg/dL 3.5   Resulting Agency DTL     Specimen Collected: 04/10/25  9:08 AM     Component  Ref Range & Units 6 d ago Resulting Agency   Hemoglobin  11.6 - 15.0 g/dL 11.1 Low  DTL   Hematocrit  35.5 - 44.9 % 34.9 Low  DTL   Erythrocytes  3.92 - 5.13 x10(12)/L 3.70 Low  DTL   MCV  78.2 - 97.9 fL 94.3 DTL   RBC Distrib Width  12.2 - 16.1 % 15.7 DTL   Platelet Count  157 - 371 x10(9)/L 117 Low  DTL   Leukocytes  3.4 - 9.6 x10(9)/L 7.3 DTL   Neutrophils  1.56 - 6.45 x10(9)/L 4.47 DHPM   Lymphocytes  0.95 - 3.07 x10(9)/L 1.97 DTL   Monocytes  0.26 - 0.81 x10(9)/L 0.50 DTL   Eosinophils  0.03 - 0.48 x10(9)/L 0.29 DTL   Basophils  0.01 - 0.08 x10(9)/L 0.07              Video-Visit Details    Type of service:  Video Visit   Video End Time: 10:12 AM  Originating Location (pt. Location): Long term Care    Distant Location (provider location):  Off-site  Platform used for Video Visit: [employer]  Signed Electronically by: Noemi Sanchez MD

## 2025-04-16 NOTE — TELEPHONE ENCOUNTER
Called [facility] per Noemi Sanchez MD:    Please call memory care:  [facility] - with following recommendations -     Resume the Lasix 20 mg daily.   Resume the Klor-con 1 packet daily.       Monitor weight daily (at least for now)     Request they call office with BP and update on weight and swelling on 4/21/25 or sooner with negative side effects      PSK     They are unable to take any verbal orders. They are requesting signed orders be faxed to them at:    [phone]    Routing to provider, please review. Fantasma Patel, RN, BSN, PHN

## 2025-04-21 ENCOUNTER — NURSE TRIAGE (OUTPATIENT)
Dept: FAMILY MEDICINE | Facility: CLINIC | Age: 85
End: 2025-04-21
Payer: MEDICARE

## 2025-04-21 DIAGNOSIS — I10 PRIMARY HYPERTENSION: Primary | ICD-10-CM

## 2025-04-21 DIAGNOSIS — I50.32 CHRONIC HEART FAILURE WITH PRESERVED EJECTION FRACTION (HFPEF) (H): ICD-10-CM

## 2025-04-21 NOTE — LETTER
April 22, 2025      Kandis Gallagher  [address]   [facility] [address]        Attn: Liliya  [phone]    To Whom It May Concern,       Discontinue amlodipine  Begin losartan 25 mg daily  Continue Lasix 20 mg daily     Follow-up blood pressure tomorrow.  Immediate evaluation if shortness of breath/breathing issues or chest pain occurs.          Sincerely,        Noemi Sanchez MD    Electronically signed

## 2025-04-21 NOTE — TELEPHONE ENCOUNTER
RN from [facility] calling to report blood pressure readings. She states all of BP checks were obtained prior to medication administration of amlodipine and lasix.     4/16 149/67,   4/17 163/85 weight 111.8 lbs  4/18 182/86, medication given and recheck BP was 118/64 weight 112.6 lbs  4/19 151/97 weight 112.6  4/20 176/80 weight 112.8   4/21 81/79 weight 114.6 lbs, no recheck has occurred at this time. Writer encouraged recheck of vital signs.       RN also reports +2 pitting edema bilaterall. She states her left leg is usually more edematous than the right.     RN confirmed that pt is not complaining of SOB, chest pain, dyspnea. She notes cognitively she has extremely declined over the past 6 months.     Reviewed last OV with PCP from 4/16/25. RN states she does not feel that her BP is controlled with amlodipine and is concerned with the swelling in her lower extremities. Routing to PCP for review. Please advise.     [phone], detailed VM authorized.   fax: [phone]

## 2025-04-22 RX ORDER — LOSARTAN POTASSIUM 25 MG/1
25 TABLET ORAL DAILY
Qty: 30 TABLET | Refills: 1 | Status: SHIPPED | OUTPATIENT
Start: 2025-04-22

## 2025-04-22 NOTE — TELEPHONE ENCOUNTER
Noemi Sanchez MD recommendations reviewed with Nurse at senior living facility:    Discontinue amlodipine  Begin losartan 25 mg daily  Continue Lasix 20 mg daily     Follow-up blood pressure tomorrow.  Immediate evaluation if shortness of breath/breathing issues or chest pain occurs.    She is requesting that these orders be signed and faxed to them, they are not able to take a verbal order.     Attn: Liliya  [phone]    Routing to provider, please review. Once completed, please route to TEAM to fax. Fantasma Patel, RN, BSN, PHN

## 2025-04-22 NOTE — TELEPHONE ENCOUNTER
I would recommend the following:    Discontinue amlodipine  Begin losartan 25 mg daily  Continue Lasix 20 mg daily    Follow-up blood pressure tomorrow.  Immediate evaluation if shortness of breath/breathing issues or chest pain occurs.      IAINK

## 2025-04-22 NOTE — TELEPHONE ENCOUNTER
Nurse Triage SBAR    Is this a 2nd Level Triage? YES, LICENSED PRACTITIONER REVIEW IS REQUIRED    Situation: Worsening lower extremity edema, weight gain and new bilateral leg pain since OV 4/16/25 w/ PCP. BP also may not be well managed w/ current plan    Background: Hx HTN, HFpEF, CKD III, Alzheimer's, recurrent UTIs    Assessment:  - RN called yesterday w/ update per provider's request on weights and BPs since OV. Also reported 2+ edema bilateral legs:    4/16 149/67,   4/17 163/85 weight 111.8 lbs  4/18 182/86, medication given and recheck BP was 118/64 weight 112.6 lbs  4/19 151/97 weight 112.6  4/20 176/80 weight 112.8   4/21 81/79 weight 114.6 lbs, no recheck has occurred at this time    - Writer noted patient has gained nearly 3 lbs since last week  - Today, RN states patient's legs are much more swollen, more like +2/+3 (RN told this writer than yesterday patient was more like a +1 R leg and +2 left leg. Today, R leg +2 and L leg +3). Patient also c/o mild/moderate pain in both leg from swelling. /67, HR 82 today prior to amlodipine 5 mg daily and lasix 20 mg daily.  - RN states they do not check patient's BP after medications, so not sure if medications are helping w/ BP  - Denies any SOB, CP, any breathing issues  - Currently in recliner with her feet up, does not use compression stockings    Protocol Recommended Disposition:   See in Office Today    Recommendation: RN asking if we can reach out to provider group to advise on amlodipine use (patient allergic to many other BP meds and also has kidney issues), any alternative or next steps? RN asking if patient needs to be seen, would need to figure out transport    Routed to provider    Does the patient meet one of the following criteria for ADS visit consideration? 16+ years old, with an [facility] PCP     TIP  Providers, please consider if this condition is appropriate for management at one of our Acute and Diagnostic Services sites.     If patient is a  good candidate, please use dotphrase <dot>triageresponse and select Refer to ADS to document.      Latosha Del Valle, RN, BSN  [facility] [place]    Reason for Disposition   MODERATE swelling of both ankles (e.g., swelling extends up to the knees) AND new-onset or getting worse    Additional Information   Negative: Sounds like a life-threatening emergency to the triager   Negative: Chest pain   Negative: Followed an insect bite and has localized swelling (e.g., small area of puffy or swollen skin)   Negative: Followed a knee injury   Negative: Ankle injury   Negative: Pregnant with leg swelling or edema   Negative: Difficulty breathing at rest   Negative: Entire foot is cool or blue in comparison to other side   Negative: Cast on leg or ankle and has increasing pain   Negative: Can't walk or can barely stand (new-onset)   Negative: Fever and red area (or area very tender to touch)   Negative: Patient sounds very sick or weak to the triager   Negative: SEVERE swelling (e.g., swelling extends above knee, entire leg is swollen, weeping fluid)   Negative: Pregnant 20 or more weeks and sudden weight gain (i.e., > 2 lbs, 1 kg in one week)   Negative: Thigh or calf pain and only 1 side and present > 1 hour   Negative: Thigh, calf, or ankle swelling in only one leg   Negative: Thigh, calf, or ankle swelling in both legs, but one side is definitely more swollen (Exception: Longstanding difference between legs.)   Negative: Difficulty breathing with exertion AND new-onset or getting worse    Protocols used: Leg Swelling and Edema-A-OH

## 2025-04-24 ENCOUNTER — TELEPHONE (OUTPATIENT)
Dept: FAMILY MEDICINE | Facility: CLINIC | Age: 85
End: 2025-04-24
Payer: MEDICARE

## 2025-04-24 DIAGNOSIS — M79.605 PAIN IN BOTH LOWER EXTREMITIES: Primary | ICD-10-CM

## 2025-04-24 DIAGNOSIS — M79.604 PAIN IN BOTH LOWER EXTREMITIES: Primary | ICD-10-CM

## 2025-04-24 RX ORDER — ACETAMINOPHEN 500 MG
500 TABLET ORAL EVERY 6 HOURS PRN
Qty: 90 TABLET | Refills: 1 | Status: SHIPPED | OUTPATIENT
Start: 2025-04-24

## 2025-04-24 NOTE — TELEPHONE ENCOUNTER
MYRON Lovell with [facility], calling to request prescription for Acetaminophen 500 MG PACK for leg pain.     Per chart review, previous pt reported entry for Acetaminophen 500 MG PACK, take 500 mg by mouth 4 times daily as needed (pain) entered into pt chart 8/22/24.     Routing to provider to review and send prescription if appropriate.   Pharmacy pended, order will also need to be faxed to facility at [phone].     Kerrie Barrera RN

## 2025-04-24 NOTE — TELEPHONE ENCOUNTER
Order printed faxed to [facility] fax provided :[phone]      Valeria ABDUL Visit Facilitator

## 2025-04-24 NOTE — TELEPHONE ENCOUNTER
Script has been sent.  Order printed for faxing to [facility].    LINDA

## 2025-04-25 ENCOUNTER — MEDICAL CORRESPONDENCE (OUTPATIENT)
Dept: HEALTH INFORMATION MANAGEMENT | Facility: CLINIC | Age: 85
End: 2025-04-25
Payer: MEDICARE

## 2025-05-05 ENCOUNTER — TELEPHONE (OUTPATIENT)
Dept: FAMILY MEDICINE | Facility: CLINIC | Age: 85
End: 2025-05-05

## 2025-05-12 ENCOUNTER — OFFICE VISIT (OUTPATIENT)
Dept: CARDIOLOGY | Facility: CLINIC | Age: 85
End: 2025-05-12
Attending: NURSE PRACTITIONER
Payer: MEDICARE

## 2025-05-12 VITALS
WEIGHT: 120.8 LBS | OXYGEN SATURATION: 96 % | HEART RATE: 78 BPM | BODY MASS INDEX: 25.36 KG/M2 | HEIGHT: 58 IN | SYSTOLIC BLOOD PRESSURE: 132 MMHG | DIASTOLIC BLOOD PRESSURE: 69 MMHG

## 2025-05-12 DIAGNOSIS — Q23.81 BICUSPID AORTIC VALVE: ICD-10-CM

## 2025-05-12 DIAGNOSIS — I50.32 CHRONIC HEART FAILURE WITH PRESERVED EJECTION FRACTION (HFPEF) (H): Primary | ICD-10-CM

## 2025-05-12 DIAGNOSIS — I10 ESSENTIAL HYPERTENSION: ICD-10-CM

## 2025-05-12 DIAGNOSIS — I50.22 CHRONIC SYSTOLIC HEART FAILURE (H): ICD-10-CM

## 2025-05-12 DIAGNOSIS — Z95.2 S/P AVR (AORTIC VALVE REPLACEMENT): ICD-10-CM

## 2025-05-12 DIAGNOSIS — I47.10 PAROXYSMAL SUPRAVENTRICULAR TACHYCARDIA: ICD-10-CM

## 2025-05-12 DIAGNOSIS — I50.42 CHRONIC COMBINED SYSTOLIC AND DIASTOLIC HEART FAILURE (H): ICD-10-CM

## 2025-05-12 DIAGNOSIS — E85.4 CARDIAC AMYLOIDOSIS (H): ICD-10-CM

## 2025-05-12 DIAGNOSIS — E78.2 MIXED HYPERLIPIDEMIA: ICD-10-CM

## 2025-05-12 DIAGNOSIS — I10 PRIMARY HYPERTENSION: ICD-10-CM

## 2025-05-12 DIAGNOSIS — I43 CARDIAC AMYLOIDOSIS (H): ICD-10-CM

## 2025-05-12 PROCEDURE — 3078F DIAST BP <80 MM HG: CPT | Performed by: NURSE PRACTITIONER

## 2025-05-12 PROCEDURE — 99214 OFFICE O/P EST MOD 30 MIN: CPT | Performed by: NURSE PRACTITIONER

## 2025-05-12 PROCEDURE — 3075F SYST BP GE 130 - 139MM HG: CPT | Performed by: NURSE PRACTITIONER

## 2025-05-12 RX ORDER — METHENAMINE HIPPURATE 1000 MG/1
1 TABLET ORAL 2 TIMES DAILY
COMMUNITY
Start: 2025-02-11

## 2025-05-12 RX ORDER — FUROSEMIDE 20 MG/1
40 TABLET ORAL DAILY
COMMUNITY
Start: 2025-05-12

## 2025-05-12 RX ORDER — ESTRADIOL 0.1 MG/G
CREAM VAGINAL
COMMUNITY
Start: 2025-04-11 | End: 2028-01-10

## 2025-05-12 NOTE — LETTER
5/12/2025    Noemi Sanchez MD  [address]  [facility] [address]    RE: Kandis Gallagher       Dear Colleague,     I had the pleasure of seeing Kandis Gallagher in the [facility].    Cardiology Clinic Progress Note  Kandis Gallagher MRN# [id number]   YOB: 1940 Age: 84 year old   Primary Cardiologist: Dr. Herzog  Reason for visit: 6 month follow up             Assessment and Plan:       1.  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  Chronic HFrEF  - LVEF: 40 to 45% (10/2024), previously noted to be down to 35% (11/2023)  - NYHA class II  -Etiology: unknown, possibly mixed ischemic and non-ischemic  - Fluid status: euvolemic; suspected dry weight: ~115-120  - Diuretic regimen: furosemide 20mg daily   - Ischemic evaluation: None  - Guideline directed medical therapy:  - Beta blocker: contraindicated 2/2 to #1    - ACEI/ARB/ARNI: losartan 25mg daily     - Aldactone antagonist:previously taking, discontinued due to concern for possible side effect  - SGLT2 inhibitor: Contraindication due to recurrent UTI    3.  Bilateral lymphedema, improving  -Significantly improved following working with lymphedema clinic last year, then resolved and diuretics tapered last fall  -Recently worsened following start of amlodipine and discontinuation of lasix     4.  Bicuspid aortic valve, s/p SAVR with 23 mm Magna Ease bioprosthesis in August 2016 at [facility]   -10/2024 TTE with peak gradient 9, mean gradient of 16 mmHg across the aortic valve    5.  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  Hypertension, controlled  -Amlodipine  worsened leg swelling and changed to losartan recently with improvement    7.  Dyslipidemia, well-controlled  -8/2024 lipid panel with LDL 57    8.  Thrombocytopenia  -Platelets stable in the 103-140 range    9.  Gammopathy monoclonal nonspecific  -+ 4/2022 bone marrow biopsy demonstrating amyloidosis  -Follows with [facility] hematology group     10.  CAD  -2018 coronary angiogram showed normal left main, large LAD with no significant disease, small caliber distal LAD, no significant disease in circumflex, large dominant RCA with 20% proximal disease  -No anginal symptoms       Plan:  Continue aspirin and atorvastatin for medical management of CAD  Continue furosemide 40 mg daily, recommended daily compression wraps  Continue losartan 25 mg daily, will review alternative antihypertensive options with Dr. Herzog given patient's amyloidosis  Annual echocardiograms for surveillance of aortic valve, next due in the fall    Follow up: Follow up with Dr. Herzog in 6 months         History of Presenting Illness:    Kandis Gallagher is a very pleasant 84 year old female with a history of nonobstructive coronary artery disease paroxysmal SVT, hypertension, hyperlipidemia, bicuspid aortic valve s/p AVR 2016, lymphedema, multiple myeloma, amyloidosis with cardiac involvement.    Patient underwent a bicuspid aortic surgical repair in 2016 at [facility].  Her aortic root diameters have been normal since that time.  She also has AL amyloidosis with cardiac and renal diagnosed by cardiac MRI.  She also underwent a successful AVNRT ablation in 2023 following Zio patch monitor that showed 165 runs of SVT up to 50 minutes in length and 29 runs of NSVT.    Since that times, she was hospitalized for multiple falls and urinary tract infections.  She had an echocardiogram done during 1 of these hospitalizations in November 2023 which showed normal left ventricular size and ejection fraction of 35% mild reduced  RV systolic function, moderate mitral regurgitation, normal bioprosthetic aortic valve function.      An echocardiogram done in October 2024  which showed a slight improvement in her ejection fraction to 40 to 45% with inferior wall akinesis and mild aortic valve stenosis.    Since I last saw her in November, she again has had multiple hospitalization for urinary tract infections and a fall. She now resides in [facility]. During her March admission, she had an DINA which resolved following IV fluids and her furosemide was discontinued. She was started on amlodipine for optimization of her hypertension. This was discontinued a week ago by her PCP as her lower extremity edema was significantly worsened. She was resumed on 40mg furosemide daily.     Patient is here today for a 6 month follow up. She presents with her daughter today.  Patient reports feeling good.  Denies any acute weight gains, shortness of breath, orthopnea, or PND. Her leg swelling has slowly been improving following discontinuation of amlodipine and resumption of furosemide.     Patient denies chest pain or chest tightness. Denies dizziness, lightheadedness or other presyncopal symptoms. Denies tachycardia or palpitations.     Most recent labs from 5/5/25 sodium 140, potassium 3.7, BUN 24, creatinine 0.65, GFR greater than 60    Blood pressure 132/69 and HR 78 in clinic today.        Recent Hospitalizations   4/2024 pelvic fracture from fall  6/2024 septic shock with UTI  8/2024 ER visits for 2 falls  1/2025 UTI  3/10/2025 acute confusion/hallucination  5/2025 ER visit for a fall        Social History      Social History     Socioeconomic History     Marital status:      Spouse name: Not on file     Number of children: Not on file     Years of education: Not on file     Highest education level: Not on file   Occupational History     Not on file   Tobacco Use     Smoking status: Never     Smokeless tobacco: Never   Vaping Use     Vaping  status: Never Used   Substance and Sexual Activity     Alcohol use: No     Drug use: No     Sexual activity: Not on file   Other Topics Concern     Not on file   Social History Narrative     Not on file     Social Drivers of Health     Financial Resource Strain: Low Risk  (8/26/2024)    Financial Resource Strain      Within the past 12 months, have you or your family members you live with been unable to get utilities (heat, electricity) when it was really needed?: No   Food Insecurity: No Food Insecurity (3/15/2025)    Received from [facility]    Hunger Vital Sign      Worried About Running Out of Food in the Last Year: Never true      Ran Out of Food in the Last Year: Never true   Transportation Needs: No Transportation Needs (3/15/2025)    Received from [facility]    PRAPARE - Transportation      Lack of Transportation (Medical): No      Lack of Transportation (Non-Medical): No   Physical Activity: Inactive (2/10/2025)    Received from [facility]    Exercise Vital Sign      Days of Exercise per Week: 0 days      Minutes of Exercise per Session: 0 min   Stress: Stress Concern Present (8/26/2024)    [employer] [place] [employer] - Occupational Stress Questionnaire      Feeling of Stress : To some extent   Social Connections: Socially Integrated (1/24/2025)    Received from [facility]    Social Connections      Do you often feel lonely or isolated from those around you?: 0   Interpersonal Safety: Not At Risk (5/2/2025)    Received from [facility] IPV      Do you feel UNSAFE in any of your personal relationships with your family members or any other acquaintances?: No   Housing Stability: Low Risk  (3/15/2025)    Received from [facility]    Housing Stability      What is your living situation today?: I have a steady place to live            Review of Systems:   Skin:        Eyes:       ENT:       Respiratory:  Negative  "for dyspnea on exertion, shortness of breath  Cardiovascular:  Negative for, palpitations, chest pain, dizziness, syncope or near-syncope, fatigue Positive for, edema  Gastroenterology:      Genitourinary:       Musculoskeletal:       Neurologic:       Psychiatric:       Heme/Lymph/Imm:       Endocrine:              Physical Exam:   Vitals: /69   Pulse 78   Ht 1.473 m (4' 10\")   Wt 54.8 kg (120 lb 12.8 oz)   SpO2 96%   BMI 25.25 kg/m     Wt Readings from Last 4 Encounters:   05/12/25 54.8 kg (120 lb 12.8 oz)   11/25/24 54.4 kg (120 lb)   08/26/24 51.4 kg (113 lb 6 oz)   04/24/24 52.3 kg (115 lb 6.4 oz)     GEN: well nourished, in no acute distress.  HEENT:  Pupils equal, round. Sclerae nonicteric.   NECK: Supple, no masses appreciated. No JVD with patient supine.  C/V:  Regular rate and rhythm, crisp valvular click  RESP: Respirations are unlabored. Clear to auscultation bilaterally without wheezing, rales, or rhonchi.  GI: Abdomen soft, nontender.  EXTREM: 1+ edema in bilateral feet to mid calf   NEURO: Alert and oriented, cooperative.  SKIN: Warm and dry.        Data:     LIPID RESULTS:  Lab Results   Component Value Date    CHOL 137 08/26/2024    CHOL 237 (H) 10/06/2020    HDL 61 08/26/2024    HDL 65 10/06/2020    LDL 57 08/26/2024     (H) 10/06/2020    TRIG 96 08/26/2024    TRIG 158 (H) 10/06/2020     LIVER ENZYME RESULTS:  Lab Results   Component Value Date    AST 32 11/30/2024    AST 16 07/13/2018    ALT 20 11/30/2024    ALT <5 (L) 10/06/2020     CBC RESULTS:  Lab Results   Component Value Date    WBC 8.5 12/02/2024    WBC 6.7 09/04/2018    RBC 4.15 12/02/2024    RBC 4.05 09/04/2018    HGB 12.3 12/02/2024    HGB 12.3 09/04/2018    HCT 38.2 12/02/2024    HCT 35.9 09/04/2018    MCV 92 12/02/2024    MCV 89 09/04/2018    MCH 29.6 12/02/2024    MCH 30.4 09/04/2018    MCHC 32.2 12/02/2024    MCHC 34.3 09/04/2018    RDW 13.6 12/02/2024    RDW 13.2 09/04/2018     (L) 12/05/2024     " "09/04/2018     BMP RESULTS:  Lab Results   Component Value Date     12/02/2024     09/04/2018    POTASSIUM 4.2 12/03/2024    POTASSIUM 4.1 02/07/2023    POTASSIUM 3.6 09/04/2018    CHLORIDE 105 12/02/2024    CHLORIDE 109 02/07/2023    CHLORIDE 102 01/30/2023    CHLORIDE 100 09/04/2018    CO2 24 12/02/2024    CO2 26 02/07/2023    CO2 29 09/04/2018    ANIONGAP 12 12/02/2024    ANIONGAP 6 02/07/2023    ANIONGAP 8 09/04/2018    GLC 94 12/02/2024    GLC 90 02/07/2023     (H) 09/04/2018    BUN 15.7 12/02/2024    BUN 23 02/07/2023    BUN 13 09/04/2018    CR 0.91 12/05/2024    CR 0.94 09/04/2018    GFRESTIMATED 61 12/05/2024    GFRESTIMATED 58 (L) 09/04/2018    GFRESTBLACK 70 09/04/2018    DOUG 9.9 12/02/2024    DOUG 9.2 09/04/2018      A1C RESULTS:  No results found for: \"A1C\"  INR RESULTS:  Lab Results   Component Value Date    INR 0.97 09/04/2018            Medications     Current Outpatient Medications   Medication Sig Dispense Refill     acetaminophen (TYLENOL) 500 MG tablet Take 1 tablet (500 mg) by mouth every 6 hours as needed for pain. 90 tablet 1     aspirin (ASA) 81 MG chewable tablet Take 81 mg by mouth daily.       atorvastatin (LIPITOR) 10 MG tablet Take 1 tablet (10 mg) by mouth daily. 90 tablet 3     Biotin w/ Vitamins C & E (HAIR SKIN & NAILS GUMMIES PO) Take 1 tablet by mouth daily.       CALCIUM PO Take 1 tablet by mouth daily.       cyanocobalamin (VITAMIN B-12) 500 MCG SUBL sublingual tablet Place 2 tablets (1,000 mcg) under the tongue daily.       esomeprazole (NEXIUM) 20 MG DR capsule Take 20 mg by mouth every morning (before breakfast).       estradiol (ESTRACE) 0.1 MG/GM vaginal cream Place vaginally three times a week.       FEROSUL 325 (65 Fe) MG tablet TAKE ONE TABLET BY MOUTH DAILY WITH BREAKFAST 90 tablet 0     furosemide (LASIX) 20 MG tablet Take 2 tablets (40 mg) by mouth daily.       losartan (COZAAR) 25 MG tablet Take 1 tablet (25 mg) by mouth daily. 30 tablet 1     " methenamine hippurate (HIPREX) 1 g tablet Take 1 g by mouth 2 times daily.       polyethylene glycol (MIRALAX) 17 g packet Take 17 g by mouth daily.       potassium chloride (KLOR-CON) 20 MEQ packet Take 20 mEq by mouth daily.       Probiotic Product (CULTURELLE PROBIOTICS) CHEW Take 1 chew tab by mouth daily.       senna-docusate (SENOKOT-S/PERICOLACE) 8.6-50 MG tablet Take 1 tablet by mouth 2 times daily as needed for constipation.       Vitamin D3 (CHOLECALCIFEROL) 25 mcg (1000 units) tablet Take 25 mcg by mouth daily.            Past Medical History     Past Medical History:   Diagnosis Date     Congenital bicuspid aortic valve      H/O aortic valve replacement     23 mm Magna Ease      HFrEF (heart failure with reduced ejection fraction) (H)      Hyperlipidemia      Hypertension      Hyperthyroidism      Multiple myeloma (H)      Severe aortic stenosis      SVT (supraventricular tachycardia)     s/p ablation     Thyroiditis      Past Surgical History:   Procedure Laterality Date     EP ABLATION SVT N/A 6/7/2023    Procedure: Ablation Supraventricular Tachycardia;  Surgeon: Aaron Hoffman MD;  Location: [facility] CARDIAC CATH LAB     Family History   Problem Relation Age of Onset     Breast Cancer Sister      Ovarian Cancer Sister      Colon Cancer Brother      Liver Cancer Brother      Breast Cancer Daughter      Colon Cancer Daughter             Allergies   Carvedilol, Lidocaine, Lisinopril, Seasonal allergies, and Spironolactone        Shy Quintero NP  [facility]     Thank you for allowing me to participate in the care of your patient.      Sincerely,     Shy Quintero NP     [facility]  cc:   Shy Quintero NP  [address]  [place] [address]

## 2025-05-12 NOTE — PATIENT INSTRUCTIONS
Today's Recommendations    I recommend you using the wraps daily until the swelling is better and take them off at night   Your blood pressure looks better today   Double check she has been getting the right dose of furosemide  I will check with Dr. Herzog regarding the losartan  Continue all other medications without changes.  Please follow up with Dr. Herzog in 6 months with an echocardiogram before.    Please send a [employer] message or call [phone] to the RN team with questions or concerns.     Scheduling number [phone]  OJANN Olvera, CNP

## 2025-05-12 NOTE — PROGRESS NOTES
Cardiology Clinic Progress Note  Kandis Gallagher MRN# [id number]   YOB: 1940 Age: 84 year old   Primary Cardiologist: Dr. Herzog  Reason for visit: 6 month follow up             Assessment and Plan:       1.  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  Chronic HFrEF  - LVEF: 40 to 45% (10/2024), previously noted to be down to 35% (11/2023)  - NYHA class II  -Etiology: unknown, possibly mixed ischemic and non-ischemic  - Fluid status: euvolemic; suspected dry weight: ~115-120  - Diuretic regimen: furosemide 20mg daily   - Ischemic evaluation: None  - Guideline directed medical therapy:  - Beta blocker: contraindicated 2/2 to #1    - ACEI/ARB/ARNI: losartan 25mg daily     - Aldactone antagonist:previously taking, discontinued due to concern for possible side effect  - SGLT2 inhibitor: Contraindication due to recurrent UTI    3.  Bilateral lymphedema, improving  -Significantly improved following working with lymphedema clinic last year, then resolved and diuretics tapered last fall  -Recently worsened following start of amlodipine and discontinuation of lasix     4.  Bicuspid aortic valve, s/p SAVR with 23 mm Magna Ease bioprosthesis in August 2016 at [facility]   -10/2024 TTE with peak gradient 9, mean gradient of 16 mmHg across the aortic valve    5.  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  Hypertension, controlled  -Amlodipine worsened leg swelling and changed to losartan recently with improvement    7.  Dyslipidemia, well-controlled  -8/2024 lipid panel with LDL 57    8.  Thrombocytopenia  -Platelets stable in the 103-140 range    9.  Gammopathy  monoclonal nonspecific  -+ 4/2022 bone marrow biopsy demonstrating amyloidosis  -Follows with [facility] hematology group     10.  CAD  -2018 coronary angiogram showed normal left main, large LAD with no significant disease, small caliber distal LAD, no significant disease in circumflex, large dominant RCA with 20% proximal disease  -No anginal symptoms       Plan:  Continue aspirin and atorvastatin for medical management of CAD  Continue furosemide 40 mg daily, recommended daily compression wraps  Continue losartan 25 mg daily, will review alternative antihypertensive options with Dr. Herzog given patient's amyloidosis  Annual echocardiograms for surveillance of aortic valve, next due in the fall    Follow up: Follow up with Dr. Herzog in 6 months         History of Presenting Illness:    Kandis Gallagher is a very pleasant 84 year old female with a history of nonobstructive coronary artery disease paroxysmal SVT, hypertension, hyperlipidemia, bicuspid aortic valve s/p AVR 2016, lymphedema, multiple myeloma, amyloidosis with cardiac involvement.    Patient underwent a bicuspid aortic surgical repair in 2016 at [facility].  Her aortic root diameters have been normal since that time.  She also has AL amyloidosis with cardiac and renal diagnosed by cardiac MRI.  She also underwent a successful AVNRT ablation in 2023 following Zio patch monitor that showed 165 runs of SVT up to 50 minutes in length and 29 runs of NSVT.    Since that times, she was hospitalized for multiple falls and urinary tract infections.  She had an echocardiogram done during 1 of these hospitalizations in November 2023 which showed normal left ventricular size and ejection fraction of 35% mild reduced RV systolic function, moderate mitral regurgitation, normal bioprosthetic aortic valve function.      An echocardiogram done in October 2024  which showed a slight improvement in her ejection fraction to 40 to 45% with  inferior wall akinesis and mild aortic valve stenosis.    Since I last saw her in November, she again has had multiple hospitalization for urinary tract infections and a fall. She now resides in [facility] care. During her March admission, she had an DINA which resolved following IV fluids and her furosemide was discontinued. She was started on amlodipine for optimization of her hypertension. This was discontinued a week ago by her PCP as her lower extremity edema was significantly worsened. She was resumed on 40mg furosemide daily.     Patient is here today for a 6 month follow up. She presents with her daughter today.  Patient reports feeling good.  Denies any acute weight gains, shortness of breath, orthopnea, or PND. Her leg swelling has slowly been improving following discontinuation of amlodipine and resumption of furosemide.     Patient denies chest pain or chest tightness. Denies dizziness, lightheadedness or other presyncopal symptoms. Denies tachycardia or palpitations.     Most recent labs from 5/5/25 sodium 140, potassium 3.7, BUN 24, creatinine 0.65, GFR greater than 60    Blood pressure 132/69 and HR 78 in clinic today.        Recent Hospitalizations   4/2024 pelvic fracture from fall  6/2024 septic shock with UTI  8/2024 ER visits for 2 falls  1/2025 UTI  3/10/2025 acute confusion/hallucination  5/2025 ER visit for a fall        Social History      Social History     Socioeconomic History    Marital status:      Spouse name: Not on file    Number of children: Not on file    Years of education: Not on file    Highest education level: Not on file   Occupational History    Not on file   Tobacco Use    Smoking status: Never    Smokeless tobacco: Never   Vaping Use    Vaping status: Never Used   Substance and Sexual Activity    Alcohol use: No    Drug use: No    Sexual activity: Not on file   Other Topics Concern    Not on file   Social History Narrative    Not on file     Social Drivers of Health      Financial Resource Strain: Low Risk  (8/26/2024)    Financial Resource Strain     Within the past 12 months, have you or your family members you live with been unable to get utilities (heat, electricity) when it was really needed?: No   Food Insecurity: No Food Insecurity (3/15/2025)    Received from [facility]    Hunger Vital Sign     Worried About Running Out of Food in the Last Year: Never true     Ran Out of Food in the Last Year: Never true   Transportation Needs: No Transportation Needs (3/15/2025)    Received from [facility]    PRAPARE - Transportation     Lack of Transportation (Medical): No     Lack of Transportation (Non-Medical): No   Physical Activity: Inactive (2/10/2025)    Received from [facility]    Exercise Vital Sign     Days of Exercise per Week: 0 days     Minutes of Exercise per Session: 0 min   Stress: Stress Concern Present (8/26/2024)    [employer] - Occupational Stress Questionnaire     Feeling of Stress : To some extent   Social Connections: Socially Integrated (1/24/2025)    [facility]    Social Connections     Do you often feel lonely or isolated from those around you?: 0   Interpersonal Safety: Not At Risk (5/2/2025)    Received from [facility]     IP Custom IPV     Do you feel UNSAFE in any of your personal relationships with your family members or any other acquaintances?: No   Housing Stability: Low Risk  (3/15/2025)    Received from [facility]    Housing Stability     What is your living situation today?: I have a steady place to live            Review of Systems:   Skin:        Eyes:       ENT:       Respiratory:  Negative for dyspnea on exertion, shortness of breath  Cardiovascular:  Negative for, palpitations, chest pain, dizziness, syncope or near-syncope, fatigue Positive for, edema  Gastroenterology:      Genitourinary:       Musculoskeletal:      "  Neurologic:       Psychiatric:       Heme/Lymph/Imm:       Endocrine:              Physical Exam:   Vitals: /69   Pulse 78   Ht 1.473 m (4' 10\")   Wt 54.8 kg (120 lb 12.8 oz)   SpO2 96%   BMI 25.25 kg/m     Wt Readings from Last 4 Encounters:   05/12/25 54.8 kg (120 lb 12.8 oz)   11/25/24 54.4 kg (120 lb)   08/26/24 51.4 kg (113 lb 6 oz)   04/24/24 52.3 kg (115 lb 6.4 oz)     GEN: well nourished, in no acute distress.  HEENT:  Pupils equal, round. Sclerae nonicteric.   NECK: Supple, no masses appreciated. No JVD with patient supine.  C/V:  Regular rate and rhythm, crisp valvular click  RESP: Respirations are unlabored. Clear to auscultation bilaterally without wheezing, rales, or rhonchi.  GI: Abdomen soft, nontender.  EXTREM: 1+ edema in bilateral feet to mid calf   NEURO: Alert and oriented, cooperative.  SKIN: Warm and dry.        Data:     LIPID RESULTS:  Lab Results   Component Value Date    CHOL 137 08/26/2024    CHOL 237 (H) 10/06/2020    HDL 61 08/26/2024    HDL 65 10/06/2020    LDL 57 08/26/2024     (H) 10/06/2020    TRIG 96 08/26/2024    TRIG 158 (H) 10/06/2020     LIVER ENZYME RESULTS:  Lab Results   Component Value Date    AST 32 11/30/2024    AST 16 07/13/2018    ALT 20 11/30/2024    ALT <5 (L) 10/06/2020     CBC RESULTS:  Lab Results   Component Value Date    WBC 8.5 12/02/2024    WBC 6.7 09/04/2018    RBC 4.15 12/02/2024    RBC 4.05 09/04/2018    HGB 12.3 12/02/2024    HGB 12.3 09/04/2018    HCT 38.2 12/02/2024    HCT 35.9 09/04/2018    MCV 92 12/02/2024    MCV 89 09/04/2018    MCH 29.6 12/02/2024    MCH 30.4 09/04/2018    MCHC 32.2 12/02/2024    MCHC 34.3 09/04/2018    RDW 13.6 12/02/2024    RDW 13.2 09/04/2018     (L) 12/05/2024     09/04/2018     BMP RESULTS:  Lab Results   Component Value Date     12/02/2024     09/04/2018    POTASSIUM 4.2 12/03/2024    POTASSIUM 4.1 02/07/2023    POTASSIUM 3.6 09/04/2018    CHLORIDE 105 12/02/2024    CHLORIDE 109 " "02/07/2023    CHLORIDE 102 01/30/2023    CHLORIDE 100 09/04/2018    CO2 24 12/02/2024    CO2 26 02/07/2023    CO2 29 09/04/2018    ANIONGAP 12 12/02/2024    ANIONGAP 6 02/07/2023    ANIONGAP 8 09/04/2018    GLC 94 12/02/2024    GLC 90 02/07/2023     (H) 09/04/2018    BUN 15.7 12/02/2024    BUN 23 02/07/2023    BUN 13 09/04/2018    CR 0.91 12/05/2024    CR 0.94 09/04/2018    GFRESTIMATED 61 12/05/2024    GFRESTIMATED 58 (L) 09/04/2018    GFRESTBLACK 70 09/04/2018    DOUG 9.9 12/02/2024    DOUG 9.2 09/04/2018      A1C RESULTS:  No results found for: \"A1C\"  INR RESULTS:  Lab Results   Component Value Date    INR 0.97 09/04/2018            Medications     Current Outpatient Medications   Medication Sig Dispense Refill    acetaminophen (TYLENOL) 500 MG tablet Take 1 tablet (500 mg) by mouth every 6 hours as needed for pain. 90 tablet 1    aspirin (ASA) 81 MG chewable tablet Take 81 mg by mouth daily.      atorvastatin (LIPITOR) 10 MG tablet Take 1 tablet (10 mg) by mouth daily. 90 tablet 3    Biotin w/ Vitamins C & E (HAIR SKIN & NAILS GUMMIES PO) Take 1 tablet by mouth daily.      CALCIUM PO Take 1 tablet by mouth daily.      cyanocobalamin (VITAMIN B-12) 500 MCG SUBL sublingual tablet Place 2 tablets (1,000 mcg) under the tongue daily.      esomeprazole (NEXIUM) 20 MG DR capsule Take 20 mg by mouth every morning (before breakfast).      estradiol (ESTRACE) 0.1 MG/GM vaginal cream Place vaginally three times a week.      FEROSUL 325 (65 Fe) MG tablet TAKE ONE TABLET BY MOUTH DAILY WITH BREAKFAST 90 tablet 0    furosemide (LASIX) 20 MG tablet Take 2 tablets (40 mg) by mouth daily.      losartan (COZAAR) 25 MG tablet Take 1 tablet (25 mg) by mouth daily. 30 tablet 1    methenamine hippurate (HIPREX) 1 g tablet Take 1 g by mouth 2 times daily.      polyethylene glycol (MIRALAX) 17 g packet Take 17 g by mouth daily.      potassium chloride (KLOR-CON) 20 MEQ packet Take 20 mEq by mouth daily.      Probiotic Product " (CULTURELLE PROBIOTICS) CHEW Take 1 chew tab by mouth daily.      senna-docusate (SENOKOT-S/PERICOLACE) 8.6-50 MG tablet Take 1 tablet by mouth 2 times daily as needed for constipation.      Vitamin D3 (CHOLECALCIFEROL) 25 mcg (1000 units) tablet Take 25 mcg by mouth daily.            Past Medical History     Past Medical History:   Diagnosis Date    Congenital bicuspid aortic valve     H/O aortic valve replacement     23 mm Magna Ease     HFrEF (heart failure with reduced ejection fraction) (H)     Hyperlipidemia     Hypertension     Hyperthyroidism     Multiple myeloma (H)     Severe aortic stenosis     SVT (supraventricular tachycardia)     s/p ablation    Thyroiditis      Past Surgical History:   Procedure Laterality Date    EP ABLATION SVT N/A 6/7/2023    Procedure: Ablation Supraventricular Tachycardia;  Surgeon: Aaron Hoffman MD;  Location:  HEART CARDI[facility] CATH LAB     Family History   Problem Relation Age of Onset    Breast Cancer Sister     Ovarian Cancer Sister     Colon Cancer Brother     Liver Cancer Brother     Breast Cancer Daughter     Colon Cancer Daughter             Allergies   Carvedilol, Lidocaine, Lisinopril, Seasonal allergies, and Spironolactone        Shy Quintero NP  [facility]

## 2025-05-13 ENCOUNTER — MYC MEDICAL ADVICE (OUTPATIENT)
Dept: CARDIOLOGY | Facility: CLINIC | Age: 85
End: 2025-05-13

## 2025-05-13 ENCOUNTER — TELEPHONE (OUTPATIENT)
Dept: NEPHROLOGY | Facility: CLINIC | Age: 85
End: 2025-05-13

## 2025-05-13 ENCOUNTER — MYC MEDICAL ADVICE (OUTPATIENT)
Dept: FAMILY MEDICINE | Facility: CLINIC | Age: 85
End: 2025-05-13

## 2025-05-13 ENCOUNTER — LAB (OUTPATIENT)
Dept: LAB | Facility: CLINIC | Age: 85
End: 2025-05-13
Payer: MEDICARE

## 2025-05-13 ENCOUNTER — OFFICE VISIT (OUTPATIENT)
Dept: NEPHROLOGY | Facility: CLINIC | Age: 85
End: 2025-05-13
Payer: MEDICARE

## 2025-05-13 VITALS
WEIGHT: 118.7 LBS | SYSTOLIC BLOOD PRESSURE: 178 MMHG | OXYGEN SATURATION: 98 % | BODY MASS INDEX: 24.81 KG/M2 | HEART RATE: 80 BPM | DIASTOLIC BLOOD PRESSURE: 85 MMHG

## 2025-05-13 DIAGNOSIS — K26.4 GASTROINTESTINAL HEMORRHAGE ASSOCIATED WITH DUODENAL ULCER: ICD-10-CM

## 2025-05-13 DIAGNOSIS — N18.31 STAGE 3A CHRONIC KIDNEY DISEASE (H): ICD-10-CM

## 2025-05-13 DIAGNOSIS — N18.31 CHRONIC KIDNEY DISEASE, STAGE 3A (H): Primary | ICD-10-CM

## 2025-05-13 DIAGNOSIS — Z85.79 H/O MULTIPLE MYELOMA: ICD-10-CM

## 2025-05-13 LAB
ALBUMIN MFR UR ELPH: 22.5 MG/DL
ALBUMIN SERPL BCG-MCNC: 4 G/DL (ref 3.5–5.2)
ANION GAP SERPL CALCULATED.3IONS-SCNC: 11 MMOL/L (ref 7–15)
BUN SERPL-MCNC: 31.7 MG/DL (ref 8–23)
CALCIUM SERPL-MCNC: 10.3 MG/DL (ref 8.8–10.4)
CHLORIDE SERPL-SCNC: 105 MMOL/L (ref 98–107)
CREAT SERPL-MCNC: 0.95 MG/DL (ref 0.51–0.95)
CREAT UR-MCNC: 77.9 MG/DL
EGFRCR SERPLBLD CKD-EPI 2021: 58 ML/MIN/1.73M2
ERYTHROCYTE [DISTWIDTH] IN BLOOD BY AUTOMATED COUNT: 14.6 % (ref 10–15)
GLUCOSE SERPL-MCNC: 109 MG/DL (ref 70–99)
HCO3 SERPL-SCNC: 24 MMOL/L (ref 22–29)
HCT VFR BLD AUTO: 34.3 % (ref 35–47)
HGB BLD-MCNC: 11 G/DL (ref 11.7–15.7)
MCH RBC QN AUTO: 29.1 PG (ref 26.5–33)
MCHC RBC AUTO-ENTMCNC: 32.1 G/DL (ref 31.5–36.5)
MCV RBC AUTO: 91 FL (ref 78–100)
PHOSPHATE SERPL-MCNC: 3.8 MG/DL (ref 2.5–4.5)
PLATELET # BLD AUTO: 128 10E3/UL (ref 150–450)
POTASSIUM SERPL-SCNC: 4.5 MMOL/L (ref 3.4–5.3)
PROT/CREAT 24H UR: 0.29 MG/MG CR (ref 0–0.2)
RBC # BLD AUTO: 3.78 10E6/UL (ref 3.8–5.2)
SODIUM SERPL-SCNC: 140 MMOL/L (ref 135–145)
WBC # BLD AUTO: 7.8 10E3/UL (ref 4–11)

## 2025-05-13 PROCEDURE — 3077F SYST BP >= 140 MM HG: CPT | Performed by: INTERNAL MEDICINE

## 2025-05-13 PROCEDURE — 3079F DIAST BP 80-89 MM HG: CPT | Performed by: INTERNAL MEDICINE

## 2025-05-13 PROCEDURE — 99215 OFFICE O/P EST HI 40 MIN: CPT | Performed by: INTERNAL MEDICINE

## 2025-05-13 PROCEDURE — 80069 RENAL FUNCTION PANEL: CPT

## 2025-05-13 PROCEDURE — 84156 ASSAY OF PROTEIN URINE: CPT

## 2025-05-13 PROCEDURE — 36415 COLL VENOUS BLD VENIPUNCTURE: CPT

## 2025-05-13 PROCEDURE — 85027 COMPLETE CBC AUTOMATED: CPT

## 2025-05-13 NOTE — PATIENT INSTRUCTIONS
It was a pleasure taking care of you today.  I've included a brief summary of our discussion and care plan from today's visit below.  Please review this information with your primary care provider.     My recommendations are summarized as follows:  -Kidney numbers are stable  -Please check your blood pressure at home 1-2 times daily and send them to me and to cardiology  -Let me know what cardiology say regarding her BP medications    Who do I call with any questions after my visit?  Please be in touch if there are any further questions that arise following today's visit.  There are multiple ways to contact your nephrology care team.       During business hours, you may reach your Nephrology Care Team or schedule or reschedule an appointment or lab at [phone].       If you need to schedule imaging, please call [phone].   To schedule a COVID test, please call [phone].     You can always send a secure message through ContactMonkey. ContactMonkey messages are answered by your nurse or doctor typically within 24-48 hours. Please allow extra time on weekends and holidays.       For urgent/emergent questions after business hours, you may reach the on-call Nephrology Fellow by contacting the [facility]  at [phone].     How will I get the results of any tests ordered?    You will receive all of your results.  If you have signed up for ContactMonkey, any tests ordered at your visit will be available to you once resulted on [employer]. Typically the physician reviews them and may or may not make further recommendations. If there are urgent results that require a change in your care plan, your physician or nurse will call you to discuss the next steps. If you are not on MyChart, a letter may be generated and mailed to you with your results.

## 2025-05-13 NOTE — PROGRESS NOTES
CC: Proteinuria, AL Amyloidosis, Multiple Mylopma    HPI:   I had the pleasure today of seeing Kandis Gallagher today. She is a 83 year old female who presents to follow up on her proteinuria. She is here with her daughter Rashmi. She lives at an assisted nursing facility.  She has two sisters close by in the area.    Her medical history is significant for a bicuspid aortic valve which progressed to severe aortic stenosis for which she underwent surgical AVR with a 23 mm Magna Ease bioprosthesis in August 2016 at [facility].  She also has HFpEF, dyslipidemia, hx of HTN now off medications, prediabetes, anemia and multiple myeloma/AL amyloidosis for which she has resumed chemotherapy. 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     Myeloma History:  She presented with several vertebral fractures in Oct 2020 and in Dec 2020, when she underwent L1, L2, and L3 vertebroplasty. Her NICOLAS at [place] showed monoclonal kappa free light chains by immunofixation. In February 2021, she presented with persistent low back pain and leg weakness. MRI of the thoracic and lumbar spine showed a new T11 compression fracture. She underwent T11, L4, and L5 vertebroplasty. Repeat labs revealed free kappa light chain of 25.3, lambda free light chains 1.24, kappa/lambda ratio 20.4.   In March 2021, she had a bone marrow biopsy revealing plasma cell proliferative disorder with approximately 5% kappa light chain-restricted plasma cells and slightly hypercellular bone marrow. I could not locate a kidney biopsy done at that point. It seems to me that at that point, she was considered smoldering myeloma and treatment was on hold. A repeat BM biopsy was done April 2022. Follow up with imaging done 4/25/22  revealed osteolytic lesions, the largest in the iliac wing and there was new finding of nephrotic range proteinuria on 24 hour urine 3.6 grams.  Repeat BM Biopsy in April 2022 revealed amyloidosis involving small periosteal  vessels, plasma cell myeloma with 10% kappa light chain restricted plasma cells. 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 Abdominal fat aspirate was negative for amyloid. Subcutaneous daratumumab, bortezomib and oral dex initiated 6/1/22 but she could not tolerate it (only two sessions-developed fluid retension and neurotoxicity per patient) and it was on hold but she has resumed her chemotherapy.     Interval history:  Since her last clinic visit, she had 3 hospital admissions, 1 for DINA and 2 for urinary tract infections.  She has also sustained several falls.  She has moved from her assisted living to a memory care unit.  For her UTIs, she was prescribed a vaginal cream.  She saw her oncologist at [facility] recently and her monoclonal protein is negative so she remains in remission from her multiple myeloma.  She has been working with PT. She denies any nausea or vomiting. She denies any SOB, chest pain, dizziness or skin rash.   Her blood pressure was elevated in clinic today, though reportedly better at the assisted facility. Given her cardiac amyloidosis, she is not supposed to be on beta-blockers, RAAS inhibitors and CCB's.  However she is currently on losartan 25 mg daily. Her lasix was increased to 40 mg daily(from 20)    Social history:  Retired    Has 4 children (daughter is the only one in [place]) and 6 grandkids    Wt Readings from Last 4 Encounters:   05/13/25 53.8 kg (118 lb 11.2 oz)   05/12/25 54.8 kg (120 lb 12.8 oz)   11/25/24 54.4 kg (120 lb)   08/26/24 51.4 kg (113 lb 6 oz)       Allergies   Allergen Reactions    Carvedilol Other (See Comments)    Lidocaine Nausea and Vomiting     Vomiting with general anesthesia    Lisinopril Cough    Seasonal Allergies Other (See Comments)     Problems with narcotics - oxygen desaturates    Spironolactone        Current Outpatient Medications   Medication Sig Dispense Refill    acetaminophen (TYLENOL) 500 MG tablet Take 1 tablet (500  mg) by mouth every 6 hours as needed for pain. 90 tablet 1    aspirin (ASA) 81 MG chewable tablet Take 81 mg by mouth daily.      atorvastatin (LIPITOR) 10 MG tablet Take 1 tablet (10 mg) by mouth daily. 90 tablet 3    Biotin w/ Vitamins C & E (HAIR SKIN & NAILS GUMMIES PO) Take 1 tablet by mouth daily.      CALCIUM PO Take 1 tablet by mouth daily.      cyanocobalamin (VITAMIN B-12) 500 MCG SUBL sublingual tablet Place 2 tablets (1,000 mcg) under the tongue daily.      esomeprazole (NEXIUM) 20 MG DR capsule Take 20 mg by mouth every morning (before breakfast).      estradiol (ESTRACE) 0.1 MG/GM vaginal cream Place vaginally three times a week.      FEROSUL 325 (65 Fe) MG tablet TAKE ONE TABLET BY MOUTH DAILY WITH BREAKFAST 90 tablet 0    furosemide (LASIX) 20 MG tablet Take 2 tablets (40 mg) by mouth daily.      losartan (COZAAR) 25 MG tablet Take 1 tablet (25 mg) by mouth daily. 30 tablet 1    methenamine hippurate (HIPREX) 1 g tablet Take 1 g by mouth 2 times daily.      polyethylene glycol (MIRALAX) 17 g packet Take 17 g by mouth daily.      potassium chloride (KLOR-CON) 20 MEQ packet Take 20 mEq by mouth daily.      Probiotic Product (CULTURELLE PROBIOTICS) CHEW Take 1 chew tab by mouth daily.      senna-docusate (SENOKOT-S/PERICOLACE) 8.6-50 MG tablet Take 1 tablet by mouth 2 times daily as needed for constipation.      Vitamin D3 (CHOLECALCIFEROL) 25 mcg (1000 units) tablet Take 25 mcg by mouth daily.       No current facility-administered medications for this visit.       Past Medical History:   Diagnosis Date    Congenital bicuspid aortic valve     H/O aortic valve replacement     23 mm Magna Ease     HFrEF (heart failure with reduced ejection fraction) (H)     Hyperlipidemia     Hypertension     Hyperthyroidism     Multiple myeloma (H)     Severe aortic stenosis     SVT (supraventricular tachycardia)     s/p ablation    Thyroiditis        Past Surgical History:   Procedure Laterality Date    EP ABLATION SVT  N/A 6/7/2023    Procedure: Ablation Supraventricular Tachycardia;  Surgeon: Aaron Hoffman MD;  Location:  [facility]       Social History     Tobacco Use    Smoking status: Never    Smokeless tobacco: Never   Vaping Use    Vaping status: Never Used   Substance Use Topics    Alcohol use: No    Drug use: No       Family History   Problem Relation Age of Onset    Breast Cancer Sister     Ovarian Cancer Sister     Colon Cancer Brother     Liver Cancer Brother     Breast Cancer Daughter     Colon Cancer Daughter        ROS: A 12 system review of systems was negative other than noted here or above.     Exam:  BP (!) 178/85 (BP Location: Left arm, Patient Position: Chair, Cuff Size: Adult Regular)   Pulse 80   Wt 53.8 kg (118 lb 11.2 oz)   SpO2 98%   BMI 24.81 kg/m       BP Readings from Last 6 Encounters:   05/13/25 (!) 178/85   05/12/25 132/69   12/06/24 (!) 153/67   11/25/24 138/78   10/01/24 (!) 178/82   08/26/24 139/75     Wt Readings from Last 4 Encounters:   05/13/25 53.8 kg (118 lb 11.2 oz)   05/12/25 54.8 kg (120 lb 12.8 oz)   11/25/24 54.4 kg (120 lb)   08/26/24 51.4 kg (113 lb 6 oz)     GENERAL APPEARANCE: alert and no distress  EYES: PERRL  HENT: mouth without ulcers or lesions  NECK: supple, no adenopathy  RESP: lungs clear to auscultation - no rales, rhonchi or wheezes  CV: regular rhythm, normal rate, no rub   ABDOMEN:  soft, nontender, no HSM or masses and bowel sounds normal  MS: extremities normal- no gross deformities noted, no evidence of inflammation in joints, no muscle tenderness  SKIN: no rash  NEURO: Normal strength and tone, sensory exam grossly normal, mentation intact and speech normal  PSYCH: mentation appears normal. and affect normal/bright    Bilateral +1 Le edema    Results:reviewed in details with the patient and her sister    Assessment/Plan:  Problem #1 nephrotic range proteinuria/resolved: Most likely has been related to renal Amyloidosis. Her urine protein to  creat ratio was almost  9 g/g back in October 2022. UPCR from today is at 0.3g/g and I except this is related to initiating losartan. Her GFR remains stable. Her kappa light chain has normalized. She has been off daratumumab.     Problem #2 CKD: stable. Her creatinine overestimates her GFR given her increased total body water and small muscle mass.  Her GFR by cystatin C is 33 ml/min. This GFR is to be used when dosing medications.   -On losartan 25 mg daily  - SGLT2 inhibitor: Contraindication due to recurrent UTI    Problem #3: Volume overload/edema: reportedly better. She has been taking lasix 40 mg daily and follow with lymphedema clinic. She has cut down on her fluid intake. She doesn't like the compression stockings and I left that up to her discretion.    Problem #4: Anticoagulation: Her albumin is up to 4g/dl. No indication for anticoagulation at this level.    Problem #5 HTN: she was found to have cardiac involvement by her amyloidosis.  She has been taken off her antihypertensive medications. With cardiac amyloidosis, it is advised to avoid CCB's, beta-blockers and ACE inhibitors.   Given her friability and frequent falls, I am okay with a blood pressure target around 150/90. BP slightly elevated in clinic  -She is currently on lasix 40 mg daily and losartan 25 mg daily  -Will discuss with cardiology whether we can increase her losartan to 25 mg bid now she is in remission.     Problem # 6 left adnexal mass: An incidental finding on her recent CT, 5.6 cm in diameter.  This correlates with CT findings from 2/15/2021. This is probably benign.     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 Glasgow at the [facility].  She received treatment with daratumumab, bortezomib, and dexamethasone (cyclophosphamide omitted for tolerability), as part of the Andromeda regimen for AL amyloid. Her treatment has been complicated and interrupted for multiple hospitalizations for COVID,  symptomatic bradycardia, GI bleed, type 2 MI associated debility, RSV hospitalization. Her last daratumumab was  in October 2023.  her kappa light chain has normalized.     The total time of this encounter amounted to 40 minutes on the day of the encounter 5/13/2025. This time included face to face time spent with the patient, preparatory work and chart review, ordering tests, and performing post visit documentation.    *This dictation was prepared in part using Dragon recognition software.  As a result errors may occur. When identified these transcription errors have been corrected.  While every attempt is made to correct errors during dictation, errors may still exist.     Susan Yi MD   [facility]   Department of Medicine   Division of Renal Disease and Hypertension

## 2025-05-13 NOTE — NURSING NOTE
Kandis Gallagher's goals for this visit include:   Chief Complaint   Patient presents with    RECHECK     Follow up CKD        She requests these members of her care team be copied on today's visit information: no     PCP: Noemi Sanchez    Referring Provider:  Referred Self, MD  No address on file    BP (!) 178/85 (BP Location: Left arm, Patient Position: Chair, Cuff Size: Adult Regular)   Pulse 80   Wt 53.8 kg (118 lb 11.2 oz)   SpO2 98%   BMI 24.81 kg/m      Do you need any medication refills at today's visit? No     LYUDMILA Arellano   Neph/Pulm [facility]

## 2025-05-13 NOTE — Clinical Note
Chas,  I saw Kandis in clinic today. BP ~160-170/70. She has cardiac amyloidosis and was instructed to keep off CCB, BB and WAYNE. Her kappa has normalized and her MM is in remission. My question is whether that would make us a bit flexible with her BP meds. I think losartan has helped her with better urine protein profile. Would you be comfortable to increase that to 25 bid given that she is in remission now? Thanks   Susan Yi MD    Division of Kidney Disease and Hypertension  [facility]

## 2025-05-13 NOTE — TELEPHONE ENCOUNTER
Patient had hospital follow up on 4/16/25. Routing to provider to review and advise on MyChart message.     MYRON Israel  [facility]

## 2025-05-13 NOTE — TELEPHONE ENCOUNTER
5/13/2025 10:30AM  Patient Contacted Pt's daughter, Rashmi contacted for the patient to call back and schedule the following:    Appointment type: Return Nephrology  Provider: Dr. Yi  Return date: 1 year follow-up (May 2026)  Specialty phone number: [phone]  Additional appointment(s) needed: labs prior  Additonal Notes: 5/13 Call pt's daughter Rashmi 5/14 @ 10AM to schedule Return Nephrology w/ Dr. Yi w/ labs prior May 2026. [facility]   Rheumatology, Gastroenterology, Nephrology, Infectious Disease, Pulmonology  [facility]

## 2025-05-14 RX ORDER — PANTOPRAZOLE SODIUM 40 MG/1
40 TABLET, DELAYED RELEASE ORAL DAILY
Qty: 90 TABLET | Refills: 1 | Status: SHIPPED | OUTPATIENT
Start: 2025-05-14

## 2025-05-15 ENCOUNTER — MYC MEDICAL ADVICE (OUTPATIENT)
Dept: NEPHROLOGY | Facility: CLINIC | Age: 85
End: 2025-05-15
Payer: MEDICARE

## 2025-05-15 DIAGNOSIS — I50.32 CHRONIC HEART FAILURE WITH PRESERVED EJECTION FRACTION (HFPEF) (H): ICD-10-CM

## 2025-05-15 DIAGNOSIS — I10 PRIMARY HYPERTENSION: ICD-10-CM

## 2025-05-19 ENCOUNTER — TELEPHONE (OUTPATIENT)
Dept: FAMILY MEDICINE | Facility: CLINIC | Age: 85
End: 2025-05-19

## 2025-05-21 ENCOUNTER — TELEPHONE (OUTPATIENT)
Dept: NEPHROLOGY | Facility: CLINIC | Age: 85
End: 2025-05-21
Payer: MEDICARE

## 2025-05-21 RX ORDER — LOSARTAN POTASSIUM 25 MG/1
TABLET ORAL
Qty: 135 TABLET | Refills: 1 | Status: SHIPPED | OUTPATIENT
Start: 2025-05-21

## 2025-05-21 NOTE — TELEPHONE ENCOUNTER
[facility] Call Center    Phone Message    May a detailed message be left on voicemail: yes     Reason for Call: Other: [facility] is needing orders for new dosage of losartan (COZAAR) 25 MG tablet [[id number]]  to be faxed over to them at  [phone] . These orders do need to be signed by the provider. Thanks      Action Taken: Other: uro    Travel Screening: Not Applicable     Date of Service:

## 2025-05-21 NOTE — TELEPHONE ENCOUNTER
Dr. Yi is on service this week. Spoke to Liliya at [facility]. Advised that the new order can be signed and sent to [facility] next Tuesday. She verbalized understanding.

## 2025-06-02 DIAGNOSIS — E61.1 IRON DEFICIENCY: ICD-10-CM

## 2025-06-03 RX ORDER — FERROUS SULFATE 325(65) MG
325 TABLET ORAL
Qty: 90 TABLET | Refills: 0 | Status: SHIPPED | OUTPATIENT
Start: 2025-06-03

## 2025-07-21 ENCOUNTER — LAB (OUTPATIENT)
Dept: LAB | Facility: CLINIC | Age: 85
End: 2025-07-21
Payer: MEDICARE

## 2025-07-21 DIAGNOSIS — M81.0 OSTEOPOROSIS: ICD-10-CM

## 2025-07-21 LAB
ANION GAP SERPL CALCULATED.3IONS-SCNC: 11 MMOL/L (ref 7–15)
BUN SERPL-MCNC: 28.6 MG/DL (ref 8–23)
CALCIUM SERPL-MCNC: 10.1 MG/DL (ref 8.8–10.4)
CHLORIDE SERPL-SCNC: 102 MMOL/L (ref 98–107)
CREAT SERPL-MCNC: 0.91 MG/DL (ref 0.51–0.95)
EGFRCR SERPLBLD CKD-EPI 2021: 61 ML/MIN/1.73M2
GLUCOSE SERPL-MCNC: 113 MG/DL (ref 70–99)
HCO3 SERPL-SCNC: 27 MMOL/L (ref 22–29)
POTASSIUM SERPL-SCNC: 4 MMOL/L (ref 3.4–5.3)
SODIUM SERPL-SCNC: 140 MMOL/L (ref 135–145)

## 2025-07-21 PROCEDURE — 36415 COLL VENOUS BLD VENIPUNCTURE: CPT

## 2025-07-21 PROCEDURE — 80048 BASIC METABOLIC PNL TOTAL CA: CPT

## 2025-08-12 ENCOUNTER — MYC MEDICAL ADVICE (OUTPATIENT)
Dept: NEPHROLOGY | Facility: CLINIC | Age: 85
End: 2025-08-12
Payer: MEDICARE

## 2025-08-12 ENCOUNTER — MYC MEDICAL ADVICE (OUTPATIENT)
Dept: CARDIOLOGY | Facility: CLINIC | Age: 85
End: 2025-08-12
Payer: MEDICARE

## 2025-08-12 DIAGNOSIS — I50.32 CHRONIC HEART FAILURE WITH PRESERVED EJECTION FRACTION (HFPEF) (H): ICD-10-CM

## 2025-08-13 ENCOUNTER — TELEPHONE (OUTPATIENT)
Dept: NEPHROLOGY | Facility: CLINIC | Age: 85
End: 2025-08-13
Payer: MEDICARE

## 2025-08-13 RX ORDER — FUROSEMIDE 20 MG/1
20 TABLET ORAL DAILY
Qty: 90 TABLET | Refills: 3 | Status: SHIPPED | OUTPATIENT
Start: 2025-08-13 | End: 2025-08-13

## 2025-08-13 RX ORDER — FUROSEMIDE 20 MG/1
20 TABLET ORAL DAILY
Qty: 90 TABLET | Refills: 3 | Status: SHIPPED | OUTPATIENT
Start: 2025-08-13

## 2025-08-19 ENCOUNTER — MYC MEDICAL ADVICE (OUTPATIENT)
Dept: FAMILY MEDICINE | Facility: CLINIC | Age: 85
End: 2025-08-19
Payer: MEDICARE

## 2025-08-25 DIAGNOSIS — E61.1 IRON DEFICIENCY: ICD-10-CM

## 2025-08-25 RX ORDER — FERROUS SULFATE 325(65) MG
325 TABLET ORAL
Qty: 90 TABLET | Refills: 1 | Status: SHIPPED | OUTPATIENT
Start: 2025-08-25

## (undated) DEVICE — CATH EP 7FR X 115CM DECANAV CA

## (undated) DEVICE — KIT PROC DISP VLV CNCT ENDO CARRY-ON DEFENDO

## (undated) DEVICE — BLOCK BITE 60FR STURDY STRAP SDPRT DNTL RTNT RIM LF MAXI

## (undated) DEVICE — INTRO CATH 12CM 8.5FR FST-CATH

## (undated) DEVICE — MASK O2 ADULT SMPL LN MALE TO MALE MED CONC ADPR 7FT 10FT

## (undated) DEVICE — Device

## (undated) DEVICE — INTRO SHEATH 7FRX10CM PINNACLE RSS702

## (undated) DEVICE — DEFIB PRO-PADZ LVP LQD GEL ADULT 8900-2105-01

## (undated) DEVICE — PACK EP SRG PROC LF DISP SAN32EPFSR

## (undated) DEVICE — FORCEPS BIOPSY LG CAPACITY NDL 240CM 2.4MM RJ 3 DISP ORNG

## (undated) DEVICE — ATTACHMENT ESCP 4MM 11.35MM RND EDGE STRL LF DISP

## (undated) DEVICE — CATH EP EZ STEER NAV 4MMX115CM 2-5-2 F-J CURVE BN7TCFJ4L

## (undated) DEVICE — PATCH CARTO 3 EXTERNAL REFERENCE 3D MAPPING CREFP6

## (undated) DEVICE — INTRO SHEALTH 8.5FRX63CM SRO 406853

## (undated) DEVICE — CATH EP CATH EP REPRO DAIG RSPN FX CRV DX EP C

## (undated) RX ORDER — HEPARIN SODIUM 1000 [USP'U]/ML
INJECTION, SOLUTION INTRAVENOUS; SUBCUTANEOUS
Status: DISPENSED
Start: 2018-09-04

## (undated) RX ORDER — LIDOCAINE HYDROCHLORIDE 10 MG/ML
INJECTION, SOLUTION EPIDURAL; INFILTRATION; INTRACAUDAL; PERINEURAL
Status: DISPENSED
Start: 2018-09-04

## (undated) RX ORDER — NITROGLYCERIN 5 MG/ML
VIAL (ML) INTRAVENOUS
Status: DISPENSED
Start: 2018-09-04

## (undated) RX ORDER — LIDOCAINE HYDROCHLORIDE 10 MG/ML
INJECTION, SOLUTION EPIDURAL; INFILTRATION; INTRACAUDAL; PERINEURAL
Status: DISPENSED
Start: 2023-06-07

## (undated) RX ORDER — FENTANYL CITRATE 50 UG/ML
INJECTION, SOLUTION INTRAMUSCULAR; INTRAVENOUS
Status: DISPENSED
Start: 2023-06-07

## (undated) RX ORDER — VERAPAMIL HYDROCHLORIDE 2.5 MG/ML
INJECTION, SOLUTION INTRAVENOUS
Status: DISPENSED
Start: 2018-09-04

## (undated) RX ORDER — FENTANYL CITRATE 50 UG/ML
INJECTION, SOLUTION INTRAMUSCULAR; INTRAVENOUS
Status: DISPENSED
Start: 2018-09-04